# Patient Record
Sex: MALE | Race: BLACK OR AFRICAN AMERICAN | URBAN - METROPOLITAN AREA
[De-identification: names, ages, dates, MRNs, and addresses within clinical notes are randomized per-mention and may not be internally consistent; named-entity substitution may affect disease eponyms.]

---

## 2022-02-09 ENCOUNTER — INPATIENT (INPATIENT)
Facility: HOSPITAL | Age: 77
LOS: 12 days | Discharge: HOME | DRG: 884 | End: 2022-02-22
Attending: INTERNAL MEDICINE | Admitting: FAMILY MEDICINE
Payer: MEDICARE

## 2022-02-09 VITALS
SYSTOLIC BLOOD PRESSURE: 176 MMHG | RESPIRATION RATE: 16 BRPM | HEART RATE: 53 BPM | OXYGEN SATURATION: 99 % | WEIGHT: 184.97 LBS | DIASTOLIC BLOOD PRESSURE: 99 MMHG | HEIGHT: 73 IN | TEMPERATURE: 98 F

## 2022-02-09 DIAGNOSIS — Z95.1 PRESENCE OF AORTOCORONARY BYPASS GRAFT: Chronic | ICD-10-CM

## 2022-02-09 LAB
ADD ON TEST-SPECIMEN IN LAB: SIGNIFICANT CHANGE UP
ALBUMIN SERPL ELPH-MCNC: 3.9 G/DL — SIGNIFICANT CHANGE UP (ref 3.3–5)
ALP SERPL-CCNC: 84 U/L — SIGNIFICANT CHANGE UP (ref 40–120)
ALT FLD-CCNC: 17 U/L — SIGNIFICANT CHANGE UP (ref 12–78)
ANION GAP SERPL CALC-SCNC: 5 MMOL/L — SIGNIFICANT CHANGE UP (ref 5–17)
APPEARANCE UR: CLEAR — SIGNIFICANT CHANGE UP
APTT BLD: 34 SEC — SIGNIFICANT CHANGE UP (ref 27.5–35.5)
AST SERPL-CCNC: 24 U/L — SIGNIFICANT CHANGE UP (ref 15–37)
BASOPHILS # BLD AUTO: 0.03 K/UL — SIGNIFICANT CHANGE UP (ref 0–0.2)
BASOPHILS NFR BLD AUTO: 0.6 % — SIGNIFICANT CHANGE UP (ref 0–2)
BILIRUB SERPL-MCNC: 0.8 MG/DL — SIGNIFICANT CHANGE UP (ref 0.2–1.2)
BILIRUB UR-MCNC: NEGATIVE — SIGNIFICANT CHANGE UP
BUN SERPL-MCNC: 15 MG/DL — SIGNIFICANT CHANGE UP (ref 7–23)
CALCIUM SERPL-MCNC: 9.6 MG/DL — SIGNIFICANT CHANGE UP (ref 8.5–10.1)
CHLORIDE SERPL-SCNC: 106 MMOL/L — SIGNIFICANT CHANGE UP (ref 96–108)
CO2 SERPL-SCNC: 31 MMOL/L — SIGNIFICANT CHANGE UP (ref 22–31)
COLOR SPEC: YELLOW — SIGNIFICANT CHANGE UP
CREAT SERPL-MCNC: 1.08 MG/DL — SIGNIFICANT CHANGE UP (ref 0.5–1.3)
DIFF PNL FLD: NEGATIVE — SIGNIFICANT CHANGE UP
EOSINOPHIL # BLD AUTO: 0.18 K/UL — SIGNIFICANT CHANGE UP (ref 0–0.5)
EOSINOPHIL NFR BLD AUTO: 3.8 % — SIGNIFICANT CHANGE UP (ref 0–6)
ETHANOL SERPL-MCNC: <10 MG/DL — SIGNIFICANT CHANGE UP (ref 0–10)
FOLATE SERPL-MCNC: 10.4 NG/ML — SIGNIFICANT CHANGE UP
GLUCOSE SERPL-MCNC: 94 MG/DL — SIGNIFICANT CHANGE UP (ref 70–99)
GLUCOSE UR QL: NEGATIVE — SIGNIFICANT CHANGE UP
HCT VFR BLD CALC: 44.2 % — SIGNIFICANT CHANGE UP (ref 39–50)
HGB BLD-MCNC: 13.6 G/DL — SIGNIFICANT CHANGE UP (ref 13–17)
IMM GRANULOCYTES NFR BLD AUTO: 0.2 % — SIGNIFICANT CHANGE UP (ref 0–1.5)
INR BLD: 1.07 RATIO — SIGNIFICANT CHANGE UP (ref 0.88–1.16)
KETONES UR-MCNC: NEGATIVE — SIGNIFICANT CHANGE UP
LACTATE SERPL-SCNC: 0.8 MMOL/L — SIGNIFICANT CHANGE UP (ref 0.7–2)
LEUKOCYTE ESTERASE UR-ACNC: ABNORMAL
LYMPHOCYTES # BLD AUTO: 1.53 K/UL — SIGNIFICANT CHANGE UP (ref 1–3.3)
LYMPHOCYTES # BLD AUTO: 32.6 % — SIGNIFICANT CHANGE UP (ref 13–44)
MAGNESIUM SERPL-MCNC: 2.2 MG/DL — SIGNIFICANT CHANGE UP (ref 1.6–2.6)
MCHC RBC-ENTMCNC: 27.7 PG — SIGNIFICANT CHANGE UP (ref 27–34)
MCHC RBC-ENTMCNC: 30.8 GM/DL — LOW (ref 32–36)
MCV RBC AUTO: 90 FL — SIGNIFICANT CHANGE UP (ref 80–100)
MONOCYTES # BLD AUTO: 0.44 K/UL — SIGNIFICANT CHANGE UP (ref 0–0.9)
MONOCYTES NFR BLD AUTO: 9.4 % — SIGNIFICANT CHANGE UP (ref 2–14)
NEUTROPHILS # BLD AUTO: 2.51 K/UL — SIGNIFICANT CHANGE UP (ref 1.8–7.4)
NEUTROPHILS NFR BLD AUTO: 53.4 % — SIGNIFICANT CHANGE UP (ref 43–77)
NITRITE UR-MCNC: POSITIVE
PCP SPEC-MCNC: SIGNIFICANT CHANGE UP
PH UR: 7 — SIGNIFICANT CHANGE UP (ref 5–8)
PLATELET # BLD AUTO: 268 K/UL — SIGNIFICANT CHANGE UP (ref 150–400)
POTASSIUM SERPL-MCNC: 3.9 MMOL/L — SIGNIFICANT CHANGE UP (ref 3.5–5.3)
POTASSIUM SERPL-SCNC: 3.9 MMOL/L — SIGNIFICANT CHANGE UP (ref 3.5–5.3)
PROT SERPL-MCNC: 8.2 GM/DL — SIGNIFICANT CHANGE UP (ref 6–8.3)
PROT UR-MCNC: SIGNIFICANT CHANGE UP
PROTHROM AB SERPL-ACNC: 12.4 SEC — SIGNIFICANT CHANGE UP (ref 10.6–13.6)
RBC # BLD: 4.91 M/UL — SIGNIFICANT CHANGE UP (ref 4.2–5.8)
RBC # FLD: 16.1 % — HIGH (ref 10.3–14.5)
SARS-COV-2 RNA SPEC QL NAA+PROBE: SIGNIFICANT CHANGE UP
SODIUM SERPL-SCNC: 142 MMOL/L — SIGNIFICANT CHANGE UP (ref 135–145)
SP GR SPEC: 1.01 — SIGNIFICANT CHANGE UP (ref 1.01–1.02)
TROPONIN I, HIGH SENSITIVITY RESULT: 23.7 NG/L — SIGNIFICANT CHANGE UP
TSH SERPL-MCNC: 0.82 UU/ML — SIGNIFICANT CHANGE UP (ref 0.34–4.82)
UROBILINOGEN FLD QL: NEGATIVE — SIGNIFICANT CHANGE UP
VIT B12 SERPL-MCNC: 325 PG/ML — SIGNIFICANT CHANGE UP (ref 232–1245)
WBC # BLD: 4.7 K/UL — SIGNIFICANT CHANGE UP (ref 3.8–10.5)
WBC # FLD AUTO: 4.7 K/UL — SIGNIFICANT CHANGE UP (ref 3.8–10.5)

## 2022-02-09 PROCEDURE — 99285 EMERGENCY DEPT VISIT HI MDM: CPT

## 2022-02-09 PROCEDURE — 93010 ELECTROCARDIOGRAM REPORT: CPT

## 2022-02-09 PROCEDURE — 70450 CT HEAD/BRAIN W/O DYE: CPT | Mod: 26,MA

## 2022-02-09 PROCEDURE — 71045 X-RAY EXAM CHEST 1 VIEW: CPT | Mod: 26

## 2022-02-09 RX ORDER — SODIUM CHLORIDE 9 MG/ML
500 INJECTION INTRAMUSCULAR; INTRAVENOUS; SUBCUTANEOUS ONCE
Refills: 0 | Status: COMPLETED | OUTPATIENT
Start: 2022-02-09 | End: 2022-02-09

## 2022-02-09 RX ORDER — CEFTRIAXONE 500 MG/1
1000 INJECTION, POWDER, FOR SOLUTION INTRAMUSCULAR; INTRAVENOUS ONCE
Refills: 0 | Status: COMPLETED | OUTPATIENT
Start: 2022-02-09 | End: 2022-02-09

## 2022-02-09 RX ORDER — THIAMINE MONONITRATE (VIT B1) 100 MG
100 TABLET ORAL DAILY
Refills: 0 | Status: COMPLETED | OUTPATIENT
Start: 2022-02-09 | End: 2022-02-12

## 2022-02-09 RX ORDER — METOPROLOL TARTRATE 50 MG
12.5 TABLET ORAL
Refills: 0 | Status: DISCONTINUED | OUTPATIENT
Start: 2022-02-09 | End: 2022-02-22

## 2022-02-09 RX ORDER — FOLIC ACID 0.8 MG
1 TABLET ORAL DAILY
Refills: 0 | Status: DISCONTINUED | OUTPATIENT
Start: 2022-02-09 | End: 2022-02-22

## 2022-02-09 RX ORDER — ASPIRIN/CALCIUM CARB/MAGNESIUM 324 MG
81 TABLET ORAL DAILY
Refills: 0 | Status: DISCONTINUED | OUTPATIENT
Start: 2022-02-09 | End: 2022-02-22

## 2022-02-09 RX ORDER — CEFTRIAXONE 500 MG/1
1000 INJECTION, POWDER, FOR SOLUTION INTRAMUSCULAR; INTRAVENOUS EVERY 24 HOURS
Refills: 0 | Status: DISCONTINUED | OUTPATIENT
Start: 2022-02-10 | End: 2022-02-11

## 2022-02-09 RX ORDER — CEFTRIAXONE 500 MG/1
1000 INJECTION, POWDER, FOR SOLUTION INTRAMUSCULAR; INTRAVENOUS ONCE
Refills: 0 | Status: DISCONTINUED | OUTPATIENT
Start: 2022-02-09 | End: 2022-02-09

## 2022-02-09 RX ADMIN — SODIUM CHLORIDE 500 MILLILITER(S): 9 INJECTION INTRAMUSCULAR; INTRAVENOUS; SUBCUTANEOUS at 17:06

## 2022-02-09 RX ADMIN — Medication 100 MILLIGRAM(S): at 23:14

## 2022-02-09 RX ADMIN — Medication 1 MILLIGRAM(S): at 23:14

## 2022-02-09 RX ADMIN — Medication 1 TABLET(S): at 23:14

## 2022-02-09 RX ADMIN — CEFTRIAXONE 100 MILLIGRAM(S): 500 INJECTION, POWDER, FOR SOLUTION INTRAMUSCULAR; INTRAVENOUS at 17:57

## 2022-02-09 RX ADMIN — Medication 12.5 MILLIGRAM(S): at 23:15
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
## 2022-02-09 NOTE — ED PROVIDER NOTE - CARE PLAN
1 Principal Discharge DX:	Hallucinations  Secondary Diagnosis:	Acute UTI  Secondary Diagnosis:	Alcohol abuse
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
## 2022-02-09 NOTE — H&P ADULT - NSHPPHYSICALEXAM_GEN_ALL_CORE
ICU Vital Signs Last 24 Hrs  T(C): 36.5 (09 Feb 2022 14:14), Max: 36.5 (09 Feb 2022 14:14)  T(F): 97.7 (09 Feb 2022 14:14), Max: 97.7 (09 Feb 2022 14:14)  HR: 53 (09 Feb 2022 14:14) (53 - 53)  BP: 176/99 (09 Feb 2022 14:14) (176/99 - 176/99)  BP(mean): --  ABP: --  ABP(mean): --  RR: 16 (09 Feb 2022 14:14) (16 - 16)  SpO2: 99% (09 Feb 2022 14:14) (99% - 99%)      PHYSICAL EXAM:    Constitutional: NAD, awake and alert  HEENT: PERR, EOMI, Normal Hearing, MMM  Neck: Soft and supple, No LAD, No JVD  Respiratory: Breath sounds are clear bilaterally, No wheezing, rales or rhonchi  Cardiovascular: S1 and S2, regular rate and rhythm, no Murmurs, gallops or rubs  Gastrointestinal: Bowel Sounds present, soft, nontender, nondistended, no guarding, no rebound  Extremities: No peripheral edema  Vascular: 2+ peripheral pulses  Neurological: A/O x 3, no focal deficits  Musculoskeletal: 5/5 strength b/l upper and lower extremities  Skin: No rashes
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
## 2022-02-09 NOTE — H&P ADULT - NSHPLABSRESULTS_GEN_ALL_CORE
LABS: All Labs Reviewed:                        13.6   4.70  )-----------( 268      ( 09 Feb 2022 14:39 )             44.2     02-09    142  |  106  |  15  ----------------------------<  94  3.9   |  31  |  1.08    Ca    9.6      09 Feb 2022 14:39  Mg     2.2     02-09    TPro  8.2  /  Alb  3.9  /  TBili  0.8  /  DBili  x   /  AST  24  /  ALT  17  /  AlkPhos  84  02-09    PT/INR - ( 09 Feb 2022 14:39 )   PT: 12.4 sec;   INR: 1.07 ratio         PTT - ( 09 Feb 2022 14:39 )  PTT:34.0 sec          Blood Culture:             EKG pending  All imaging reviewed

## 2022-02-09 NOTE — H&P ADULT - ASSESSMENT
#Auditory/visual hallucinations with paranoia  -r/o metabolic etiology ie uti  -will dispo under obs and obtain psych eval  -start ceftriaxone and obtain blood and urine cultures  -CT head negative for acute pathology  -obtain TSH  -Utox negative  -CIWA - symptom triggered      #H/o CABG  -ASA  -obtain EKG   -if abnl ekg findings, order echo  -bp control-> start lopressor 12.5 bid  -check a1c/lipid      DVT px: ambulation/SCDs while in bed
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
## 2022-02-10 DIAGNOSIS — F05 DELIRIUM DUE TO KNOWN PHYSIOLOGICAL CONDITION: ICD-10-CM

## 2022-02-10 DIAGNOSIS — R44.3 HALLUCINATIONS, UNSPECIFIED: ICD-10-CM

## 2022-02-10 LAB
A1C WITH ESTIMATED AVERAGE GLUCOSE RESULT: 5.3 % — SIGNIFICANT CHANGE UP (ref 4–5.6)
ALBUMIN SERPL ELPH-MCNC: 2.8 G/DL — LOW (ref 3.3–5)
ALBUMIN SERPL ELPH-MCNC: 3 G/DL — LOW (ref 3.3–5)
ALP SERPL-CCNC: 63 U/L — SIGNIFICANT CHANGE UP (ref 40–120)
ALP SERPL-CCNC: 75 U/L — SIGNIFICANT CHANGE UP (ref 40–120)
ALT FLD-CCNC: 14 U/L — SIGNIFICANT CHANGE UP (ref 12–78)
ALT FLD-CCNC: 14 U/L — SIGNIFICANT CHANGE UP (ref 12–78)
ANION GAP SERPL CALC-SCNC: 2 MMOL/L — LOW (ref 5–17)
ANION GAP SERPL CALC-SCNC: 5 MMOL/L — SIGNIFICANT CHANGE UP (ref 5–17)
AST SERPL-CCNC: 17 U/L — SIGNIFICANT CHANGE UP (ref 15–37)
AST SERPL-CCNC: 17 U/L — SIGNIFICANT CHANGE UP (ref 15–37)
BASE EXCESS BLDV CALC-SCNC: 7.8 MMOL/L — SIGNIFICANT CHANGE UP
BILIRUB DIRECT SERPL-MCNC: 0.2 MG/DL — SIGNIFICANT CHANGE UP (ref 0–0.3)
BILIRUB DIRECT SERPL-MCNC: <0.1 MG/DL — SIGNIFICANT CHANGE UP (ref 0–0.3)
BILIRUB INDIRECT FLD-MCNC: 0.5 MG/DL — SIGNIFICANT CHANGE UP (ref 0.2–1)
BILIRUB INDIRECT FLD-MCNC: >0.3 MG/DL — SIGNIFICANT CHANGE UP (ref 0.2–1)
BILIRUB SERPL-MCNC: 0.4 MG/DL — SIGNIFICANT CHANGE UP (ref 0.2–1.2)
BILIRUB SERPL-MCNC: 0.7 MG/DL — SIGNIFICANT CHANGE UP (ref 0.2–1.2)
BUN SERPL-MCNC: 16 MG/DL — SIGNIFICANT CHANGE UP (ref 7–23)
BUN SERPL-MCNC: 17 MG/DL — SIGNIFICANT CHANGE UP (ref 7–23)
CALCIUM SERPL-MCNC: 9 MG/DL — SIGNIFICANT CHANGE UP (ref 8.5–10.1)
CALCIUM SERPL-MCNC: 9.1 MG/DL — SIGNIFICANT CHANGE UP (ref 8.5–10.1)
CHLORIDE SERPL-SCNC: 106 MMOL/L — SIGNIFICANT CHANGE UP (ref 96–108)
CHLORIDE SERPL-SCNC: 108 MMOL/L — SIGNIFICANT CHANGE UP (ref 96–108)
CHOLEST SERPL-MCNC: 196 MG/DL — SIGNIFICANT CHANGE UP
CO2 BLDV-SCNC: 36 MMOL/L — HIGH (ref 22–26)
CO2 SERPL-SCNC: 30 MMOL/L — SIGNIFICANT CHANGE UP (ref 22–31)
CO2 SERPL-SCNC: 32 MMOL/L — HIGH (ref 22–31)
CREAT SERPL-MCNC: 1.02 MG/DL — SIGNIFICANT CHANGE UP (ref 0.5–1.3)
CREAT SERPL-MCNC: 1.2 MG/DL — SIGNIFICANT CHANGE UP (ref 0.5–1.3)
ESTIMATED AVERAGE GLUCOSE: 105 MG/DL — SIGNIFICANT CHANGE UP (ref 68–114)
GGT SERPL-CCNC: 22 U/L — SIGNIFICANT CHANGE UP (ref 9–50)
GLUCOSE SERPL-MCNC: 88 MG/DL — SIGNIFICANT CHANGE UP (ref 70–99)
GLUCOSE SERPL-MCNC: 90 MG/DL — SIGNIFICANT CHANGE UP (ref 70–99)
HCO3 BLDV-SCNC: 34 MMOL/L — HIGH (ref 22–29)
HCT VFR BLD CALC: 38.4 % — LOW (ref 39–50)
HCV AB S/CO SERPL IA: 0.19 S/CO — SIGNIFICANT CHANGE UP (ref 0–0.99)
HCV AB SERPL-IMP: SIGNIFICANT CHANGE UP
HDLC SERPL-MCNC: 72 MG/DL — SIGNIFICANT CHANGE UP
HGB BLD-MCNC: 12.6 G/DL — LOW (ref 13–17)
LACTATE SERPL-SCNC: 1.8 MMOL/L — SIGNIFICANT CHANGE UP (ref 0.7–2)
LIPID PNL WITH DIRECT LDL SERPL: 116 MG/DL — HIGH
MAGNESIUM SERPL-MCNC: 2 MG/DL — SIGNIFICANT CHANGE UP (ref 1.6–2.6)
MAGNESIUM SERPL-MCNC: 2.2 MG/DL — SIGNIFICANT CHANGE UP (ref 1.6–2.6)
MCHC RBC-ENTMCNC: 29.6 PG — SIGNIFICANT CHANGE UP (ref 27–34)
MCHC RBC-ENTMCNC: 32.8 GM/DL — SIGNIFICANT CHANGE UP (ref 32–36)
MCV RBC AUTO: 90.4 FL — SIGNIFICANT CHANGE UP (ref 80–100)
NON HDL CHOLESTEROL: 124 MG/DL — SIGNIFICANT CHANGE UP
PCO2 BLDV: 54 MMHG — SIGNIFICANT CHANGE UP (ref 42–55)
PH BLDV: 7.41 — SIGNIFICANT CHANGE UP (ref 7.32–7.43)
PHOSPHATE SERPL-MCNC: 3.2 MG/DL — SIGNIFICANT CHANGE UP (ref 2.5–4.5)
PHOSPHATE SERPL-MCNC: 3.6 MG/DL — SIGNIFICANT CHANGE UP (ref 2.5–4.5)
PLATELET # BLD AUTO: 269 K/UL — SIGNIFICANT CHANGE UP (ref 150–400)
PO2 BLDV: 63 MMHG — SIGNIFICANT CHANGE UP
POTASSIUM SERPL-MCNC: 3.8 MMOL/L — SIGNIFICANT CHANGE UP (ref 3.5–5.3)
POTASSIUM SERPL-MCNC: 4 MMOL/L — SIGNIFICANT CHANGE UP (ref 3.5–5.3)
POTASSIUM SERPL-SCNC: 3.8 MMOL/L — SIGNIFICANT CHANGE UP (ref 3.5–5.3)
POTASSIUM SERPL-SCNC: 4 MMOL/L — SIGNIFICANT CHANGE UP (ref 3.5–5.3)
PROT SERPL-MCNC: 6.3 GM/DL — SIGNIFICANT CHANGE UP (ref 6–8.3)
PROT SERPL-MCNC: 7 GM/DL — SIGNIFICANT CHANGE UP (ref 6–8.3)
RBC # BLD: 4.25 M/UL — SIGNIFICANT CHANGE UP (ref 4.2–5.8)
RBC # FLD: 15.7 % — HIGH (ref 10.3–14.5)
SAO2 % BLDV: 91.9 % — SIGNIFICANT CHANGE UP
SODIUM SERPL-SCNC: 141 MMOL/L — SIGNIFICANT CHANGE UP (ref 135–145)
SODIUM SERPL-SCNC: 142 MMOL/L — SIGNIFICANT CHANGE UP (ref 135–145)
TRIGL SERPL-MCNC: 43 MG/DL — SIGNIFICANT CHANGE UP
WBC # BLD: 5.62 K/UL — SIGNIFICANT CHANGE UP (ref 3.8–10.5)
WBC # FLD AUTO: 5.62 K/UL — SIGNIFICANT CHANGE UP (ref 3.8–10.5)

## 2022-02-10 PROCEDURE — 99223 1ST HOSP IP/OBS HIGH 75: CPT

## 2022-02-10 PROCEDURE — 36415 COLL VENOUS BLD VENIPUNCTURE: CPT

## 2022-02-10 PROCEDURE — 70450 CT HEAD/BRAIN W/O DYE: CPT

## 2022-02-10 PROCEDURE — 84443 ASSAY THYROID STIM HORMONE: CPT

## 2022-02-10 PROCEDURE — 83930 ASSAY OF BLOOD OSMOLALITY: CPT

## 2022-02-10 PROCEDURE — U0005: CPT

## 2022-02-10 PROCEDURE — 82140 ASSAY OF AMMONIA: CPT

## 2022-02-10 PROCEDURE — 99233 SBSQ HOSP IP/OBS HIGH 50: CPT

## 2022-02-10 PROCEDURE — 97530 THERAPEUTIC ACTIVITIES: CPT | Mod: GP

## 2022-02-10 PROCEDURE — 82803 BLOOD GASES ANY COMBINATION: CPT

## 2022-02-10 PROCEDURE — 97162 PT EVAL MOD COMPLEX 30 MIN: CPT | Mod: GP

## 2022-02-10 PROCEDURE — 97116 GAIT TRAINING THERAPY: CPT | Mod: GP

## 2022-02-10 PROCEDURE — 83605 ASSAY OF LACTIC ACID: CPT

## 2022-02-10 PROCEDURE — 90792 PSYCH DIAG EVAL W/MED SRVCS: CPT

## 2022-02-10 PROCEDURE — 85027 COMPLETE CBC AUTOMATED: CPT

## 2022-02-10 PROCEDURE — 83735 ASSAY OF MAGNESIUM: CPT

## 2022-02-10 PROCEDURE — U0003: CPT

## 2022-02-10 PROCEDURE — 80053 COMPREHEN METABOLIC PANEL: CPT

## 2022-02-10 PROCEDURE — 70450 CT HEAD/BRAIN W/O DYE: CPT | Mod: 26

## 2022-02-10 PROCEDURE — 97110 THERAPEUTIC EXERCISES: CPT | Mod: GP

## 2022-02-10 PROCEDURE — 80076 HEPATIC FUNCTION PANEL: CPT

## 2022-02-10 PROCEDURE — 82607 VITAMIN B-12: CPT

## 2022-02-10 PROCEDURE — 95816 EEG AWAKE AND DROWSY: CPT

## 2022-02-10 PROCEDURE — 82962 GLUCOSE BLOOD TEST: CPT

## 2022-02-10 PROCEDURE — 86780 TREPONEMA PALLIDUM: CPT

## 2022-02-10 PROCEDURE — 80048 BASIC METABOLIC PNL TOTAL CA: CPT

## 2022-02-10 PROCEDURE — 93005 ELECTROCARDIOGRAM TRACING: CPT

## 2022-02-10 PROCEDURE — 84100 ASSAY OF PHOSPHORUS: CPT

## 2022-02-10 RX ORDER — ATORVASTATIN CALCIUM 80 MG/1
20 TABLET, FILM COATED ORAL AT BEDTIME
Refills: 0 | Status: DISCONTINUED | OUTPATIENT
Start: 2022-02-10 | End: 2022-02-22

## 2022-02-10 RX ORDER — RISPERIDONE 4 MG/1
0.25 TABLET ORAL AT BEDTIME
Refills: 0 | Status: DISCONTINUED | OUTPATIENT
Start: 2022-02-10 | End: 2022-02-13

## 2022-02-10 RX ORDER — TAMSULOSIN HYDROCHLORIDE 0.4 MG/1
0.4 CAPSULE ORAL AT BEDTIME
Refills: 0 | Status: DISCONTINUED | OUTPATIENT
Start: 2022-02-10 | End: 2022-02-22

## 2022-02-10 RX ORDER — LANOLIN ALCOHOL/MO/W.PET/CERES
3 CREAM (GRAM) TOPICAL AT BEDTIME
Refills: 0 | Status: DISCONTINUED | OUTPATIENT
Start: 2022-02-10 | End: 2022-02-22

## 2022-02-10 RX ADMIN — Medication 81 MILLIGRAM(S): at 09:26

## 2022-02-10 RX ADMIN — RISPERIDONE 0.25 MILLIGRAM(S): 4 TABLET ORAL at 22:46

## 2022-02-10 RX ADMIN — CEFTRIAXONE 100 MILLIGRAM(S): 500 INJECTION, POWDER, FOR SOLUTION INTRAMUSCULAR; INTRAVENOUS at 18:42

## 2022-02-10 RX ADMIN — TAMSULOSIN HYDROCHLORIDE 0.4 MILLIGRAM(S): 0.4 CAPSULE ORAL at 22:45

## 2022-02-10 RX ADMIN — Medication 100 MILLIGRAM(S): at 09:27

## 2022-02-10 RX ADMIN — Medication 1 TABLET(S): at 09:27

## 2022-02-10 RX ADMIN — Medication 0.5 MILLIGRAM(S): at 02:42

## 2022-02-10 RX ADMIN — Medication 1 MILLIGRAM(S): at 09:27

## 2022-02-10 RX ADMIN — Medication 2 MILLIGRAM(S): at 22:47

## 2022-02-10 RX ADMIN — ATORVASTATIN CALCIUM 20 MILLIGRAM(S): 80 TABLET, FILM COATED ORAL at 22:45

## 2022-02-10 RX ADMIN — Medication 12.5 MILLIGRAM(S): at 22:45

## 2022-02-10 RX ADMIN — Medication 12.5 MILLIGRAM(S): at 12:49

## 2022-02-10 NOTE — BEHAVIORAL HEALTH ASSESSMENT NOTE - RISK ASSESSMENT
Low acute risk: no SI, no HI, not agitated, denies anxiety, no depression.   Low long term risk: no psychosis , no hx fo SI or SA but EtOH abuse.   Protective factors: no  psych hx. Low Acute Suicide Risk

## 2022-02-10 NOTE — CONSULT NOTE ADULT - ASSESSMENT
75 yo Male PMH CABG many years ago, no medical followup presented for paranoia. Labs essentially negative except for mild UTI. Code stroke called earlier today as pt was found to be unresponsive by RN. On my exam pt was nonverbal, no spont eye opening, no w/d to deep noxious x 4. Pt held eyes closed when trying to examine pupils and resisted having his head manipulated to check for dolls eyes but then relaxed. No nuchal rigidity. CT head neg for acute path.     - Unlikely presentation for stroke  - Likely catatonia 2nd to psych or possibly withdrawal  - Less likely toxic / metabolic related  - Can try small dose Ativan for catatonia  - Medical evaluation and w/u    Discussed with Dr Mitchell
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
## 2022-02-10 NOTE — PATIENT PROFILE ADULT - FALL HARM RISK - HARM RISK INTERVENTIONS
Assistance with ambulation/Assistance OOB with selected safe patient handling equipment/Communicate Risk of Fall with Harm to all staff/Discuss with provider need for PT consult/Monitor for mental status changes/Monitor gait and stability/Move patient closer to nurses' station/Provide patient with walking aids - walker, cane, crutches/Reinforce activity limits and safety measures with patient and family/Reorient to person, place and time as needed/Tailored Fall Risk Interventions/Toileting schedule using arm’s reach rule for commode and bathroom/Use of alarms - bed, chair and/or voice tab/Visual Cue: Yellow wristband and red socks/Bed in lowest position, wheels locked, appropriate side rails in place/Call bell, personal items and telephone in reach/Instruct patient to call for assistance before getting out of bed or chair/Non-slip footwear when patient is out of bed/[place] to call system/Physically safe environment - no spills, clutter or unnecessary equipment/Purposeful Proactive Rounding/Room/bathroom lighting operational, light cord in reach

## 2022-02-10 NOTE — BEHAVIORAL HEALTH ASSESSMENT NOTE - NSBHCHARTREVIEWVS_PSY_A_CORE FT
Vital Signs Last 24 Hrs  T(C): 37.1 (10 Feb 2022 08:17), Max: 37.3 (10 Feb 2022 07:00)  T(F): 98.7 (10 Feb 2022 08:17), Max: 99.1 (10 Feb 2022 07:00)  HR: 75 (10 Feb 2022 08:17) (53 - 89)  BP: 137/65 (10 Feb 2022 08:17) (112/90 - 176/99)  BP(mean): 88 (09 Feb 2022 18:30) (88 - 88)  RR: 18 (10 Feb 2022 08:17) (16 - 18)  SpO2: 100% (10 Feb 2022 08:17) (97% - 100%)

## 2022-02-10 NOTE — CHART NOTE - NSCHARTNOTEFT_GEN_A_CORE
Pt was seen by his bedside, awake and oriented, answering the questions well. no complains at this point of time.  his examination and vitals are stable.   Pt received ativan earlier due to aggression, presently he is calm and co-operative   a/p- CIWA protocol  Close observation  Case was discussed with Dr. Newberry

## 2022-02-10 NOTE — PROGRESS NOTE ADULT - SUBJECTIVE AND OBJECTIVE BOX
SARAHI CRENSHAW  76y  [id number]    CHIEF COMPLAINT: paranoia    INTERVAL HISTORY: Patient is AOx3, sitting in chair in room, denies any complaints at this time.    ROS:   Review of Systems: REVIEW OF SYSTEMS:    CONSTITUTIONAL: No weakness, fevers or chills  EYES/ENT: No visual changes;  No vertigo or throat pain   NECK: No pain or stiffness  RESPIRATORY: No cough, wheezing, hemoptysis; No shortness of breath  CARDIOVASCULAR: No chest pain or palpitations  GASTROINTESTINAL: No abdominal, nausea, vomiting, or diarrhea. LBM yesterday, reports was normal for him.   GENITOURINARY: No dysuria, frequency or hematuria  NEUROLOGICAL: No numbness or weakness  SKIN: No itching, burning, rashes, or lesions   All other review of systems is negative unless indicated above      VITALS: T(C): 37.1 (02-10-22 @ 08:17), Max: 37.3 (02-10-22 @ 07:00)  HR: 75 (02-10-22 @ 08:17) (53 - 89)  BP: 137/65 (02-10-22 @ 08:17) (112/90 - 176/99)  RR: 18 (02-10-22 @ 08:17) (16 - 18)  SpO2: 100% (02-10-22 @ 08:17) (97% - 100%)  PHYSICAL EXAM: VITALS:  T(F): 98.7 (02-10-22 @ 08:17), Max: 99.1 (02-10-22 @ 07:00)  HR: 75 (02-10-22 @ 08:17) (53 - 89)  BP: 137/65 (02-10-22 @ 08:17) (112/90 - 176/99)  RR: 18 (02-10-22 @ 08:17) (16 - 18)  SpO2: 100% (02-10-22 @ 08:17) (97% - 100%)  Wt(kg): --    I&O's Summary      CAPILLARY BLOOD GLUCOSE          PHYSICAL EXAM:  GENERAL: well appearing male, appears stated age  HEENT:  pupils equal and reactive, EOMI, no oropharyngeal lesions, erythema, exudates, oral thrush  NECK:   supple, no carotid bruits, no palpable lymph nodes, no thyromegaly  CV:  +S1, +S2, regular, no murmurs or rubs  RESP:   lungs clear to auscultation bilaterally, no wheezing, rales, rhonchi, good air entry bilaterally  BREAST:  not examined  GI:  abdomen soft, non-tender, non-distended, normal BS, no bruits, no abdominal masses, no palpable masses  RECTAL:  not examined  :  not examined  MSK:   normal muscle tone, no atrophy, no rigidity, no contractions  EXT:  no clubbing, no cyanosis, no edema, no calf pain, swelling or erythema  VASCULAR:  pulses equal and symmetric in the upper and lower extremities  NEURO:  AAOX3, no focal neurological deficits, follows all commands, able to move extremities spontaneously  SKIN:  no ulcers, lesions or rashes    LABS:                            13.6   4.70  )-----------( 268      ( 2022 14:39 )             44.2     02-10    142  |  108  |  16  ----------------------------<  90  3.8   |  32<H>  |  1.02    Ca    9.0      10 Feb 2022 08:50  Phos  3.6     02-10  Mg     2.0     02-10    TPro  6.3  /  Alb  2.8<L>  /  TBili  0.7  /  DBili  0.2  /  AST  17  /  ALT  14  /  AlkPhos  63  02-10        LIVER FUNCTIONS - ( 10 Feb 2022 08:50 )  Alb: 2.8 g/dL / Pro: 6.3 gm/dL / ALK PHOS: 63 U/L / ALT: 14 U/L / AST: 17 U/L / GGT: x           PT/INR - ( 2022 14:39 )   PT: 12.4 sec;   INR: 1.07 ratio         PTT - ( 2022 14:39 )  PTT:34.0 sec  Urinalysis Basic - ( 2022 16:17 )    Color: Yellow / Appearance: Clear / S.010 / pH: x  Gluc: x / Ketone: Negative  / Bili: Negative / Urobili: Negative   Blood: x / Protein: See Note / Nitrite: Positive   Leuk Esterase: Trace / RBC: 3-5 /HPF / WBC 6-10   Sq Epi: x / Non Sq Epi: Occasional / Bacteria: TNTC        Lactate, Blood: 0.8 mmol/L ( @ 17:02)    Blood, Urine: Negative ( @ 16:17)      MICRO:  Blood, Urine: Negative ( @ 16:17)      IMAGING:    CARDIAC TESTING:    PROCEDURES:    MEDS: MEDICATIONS  (STANDING):  aspirin  chewable 81 milliGRAM(s) Oral daily  cefTRIAXone   IVPB 1000 milliGRAM(s) IV Intermittent every 24 hours  folic acid 1 milliGRAM(s) Oral daily  metoprolol tartrate 12.5 milliGRAM(s) Oral two times a day  multivitamin 1 Tablet(s) Oral daily  thiamine 100 milliGRAM(s) Oral daily    MEDICATIONS  (PRN):  LORazepam   Injectable 2 milliGRAM(s) IntraMuscular every 2 hours PRN Symptom-triggered: 2 point increase in CIWA -Ar score and a total score of 7 or LESS  LORazepam   Injectable 2 milliGRAM(s) IntraMuscular every 1 hour PRN Symptom-triggered: each CIWA -Ar score 8 or GREATER          Code status: Full code  DVT px: ambulatory / SCD  Dispo: SARAHI CRENSHAW  76y  [id number]    CHIEF COMPLAINT: paranoia    INTERVAL HISTORY: Patient is AOx3, sitting in chair in room, denies any complaints at this time.    ROS:     CONSTITUTIONAL: No weakness, fevers or chills  EYES/ENT: No visual changes;  No vertigo or throat pain   NECK: No pain or stiffness  RESPIRATORY: No cough, wheezing, hemoptysis; No shortness of breath  CARDIOVASCULAR: No chest pain or palpitations  GASTROINTESTINAL: No abdominal, nausea, vomiting, or diarrhea. LBM yesterday, reports was normal for him.   GENITOURINARY: No dysuria, frequency or hematuria  NEUROLOGICAL: No numbness or weakness  SKIN: No itching, burning, rashes, or lesions   All other review of systems is negative unless indicated above      VITALS: T(C): 37.1 (02-10-22 @ 08:17), Max: 37.3 (02-10-22 @ 07:00)  HR: 75 (02-10-22 @ 08:17) (53 - 89)  BP: 137/65 (02-10-22 @ 08:17) (112/90 - 176/99)  RR: 18 (02-10-22 @ 08:17) (16 - 18)  SpO2: 100% (02-10-22 @ 08:17) (97% - 100%)          PHYSICAL EXAM:  GENERAL: well appearing male, appears stated age  HEENT:  pupils equal and reactive, EOMI, no oropharyngeal lesions, erythema, exudates, oral thrush  NECK:   supple, no carotid bruits, no palpable lymph nodes, no thyromegaly  CV:  +S1, +S2, regular, no murmurs or rubs  RESP:   lungs clear to auscultation bilaterally, no wheezing, rales, rhonchi, good air entry bilaterally  BREAST:  not examined  GI:  abdomen soft, non-tender, non-distended, normal BS, no bruits, no abdominal masses, no palpable masses  RECTAL:  not examined  :  not examined  MSK:   normal muscle tone, no atrophy, no rigidity, no contractions  EXT:  no clubbing, no cyanosis, no edema, no calf pain, swelling or erythema  VASCULAR:  pulses equal and symmetric in the upper and lower extremities  NEURO:  AAOX3, no focal neurological deficits, follows all commands, able to move extremities spontaneously  SKIN:  no ulcers, lesions or rashes    LABS:                            13.6   4.70  )-----------( 268      ( 2022 14:39 )             44.2     02-10    142  |  108  |  16  ----------------------------<  90  3.8   |  32<H>  |  1.02    Ca    9.0      10 Feb 2022 08:50  Phos  3.6     02-10  Mg     2.0     02-10    TPro  6.3  /  Alb  2.8<L>  /  TBili  0.7  /  DBili  0.2  /  AST  17  /  ALT  14  /  AlkPhos  63  02-10        LIVER FUNCTIONS - ( 10 Feb 2022 08:50 )  Alb: 2.8 g/dL / Pro: 6.3 gm/dL / ALK PHOS: 63 U/L / ALT: 14 U/L / AST: 17 U/L / GGT: x           PT/INR - ( 2022 14:39 )   PT: 12.4 sec;   INR: 1.07 ratio         PTT - ( 2022 14:39 )  PTT:34.0 sec  Urinalysis Basic - ( 2022 16:17 )    Color: Yellow / Appearance: Clear / S.010 / pH: x  Gluc: x / Ketone: Negative  / Bili: Negative / Urobili: Negative   Blood: x / Protein: See Note / Nitrite: Positive   Leuk Esterase: Trace / RBC: 3-5 /HPF / WBC 6-10   Sq Epi: x / Non Sq Epi: Occasional / Bacteria: TNTC        Lactate, Blood: 0.8 mmol/L ( @ 17:02)    Blood, Urine: Negative ( @ 16:17)      MICRO:  Blood, Urine: Negative ( @ 16:17)      MEDS: MEDICATIONS  (STANDING):  aspirin  chewable 81 milliGRAM(s) Oral daily  cefTRIAXone   IVPB 1000 milliGRAM(s) IV Intermittent every 24 hours  folic acid 1 milliGRAM(s) Oral daily  metoprolol tartrate 12.5 milliGRAM(s) Oral two times a day  multivitamin 1 Tablet(s) Oral daily  thiamine 100 milliGRAM(s) Oral daily    MEDICATIONS  (PRN):  LORazepam   Injectable 2 milliGRAM(s) IntraMuscular every 2 hours PRN Symptom-triggered: 2 point increase in CIWA -Ar score and a total score of 7 or LESS  LORazepam   Injectable 2 milliGRAM(s) IntraMuscular every 1 hour PRN Symptom-triggered: each CIWA -Ar score 8 or GREATER

## 2022-02-10 NOTE — BEHAVIORAL HEALTH ASSESSMENT NOTE - HPI (INCLUDE ILLNESS QUALITY, SEVERITY, DURATION, TIMING, CONTEXT, MODIFYING FACTORS, ASSOCIATED SIGNS AND SYMPTOMS)
Pt is a 76 YOWAA  man, who resides with wife, and who denies any psych hx in the past, admitted with A MS stared on CIWA.   Pt denies depressed mood, denies insomnia, appetite problems SI, HI, denies AH, and VH. He spontaneous  talks about some people coming to his home and setting some instruments and wires so that eh can see and hear what is happening in his neighbor's houses.    Pt admits to drinking, but minimizes it and states  he drinks only on the weekend,   He is  and resides with wife and grandson.  

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
## 2022-02-10 NOTE — CONSULT NOTE ADULT - ASSESSMENT
Altered Mental Status, unresponsiveness  - not a candidate for ICU admission at this time  - pt appears to be protecting airway, no indication at this time for intubation and MV  - obtain VBG to assess for CO2 narcosis  - pt appears catatonic, CTH negative for acute pathology  - stat labs, CBC lactate, BMP Mg Phos, serum osmolality  - obtain neurology consult, consider EEG, recent CIWA scores would not suggest an EtOH withdrawal seizure, no known h/o seizures in jessica  - would switch to higher dose IV thiamine   - recommend NPO  - avoid sedating meds for now  - H/O   - ECG       Altered Mental Status, unresponsiveness i/s/o paranoid delusions, UTI, possible toxic metabolic encephalopathy, ?EtOH w/d, ?toxic ingestion, ?HTNive encephalopathy  - not a candidate for ICU admission at this time  - pt appears to be protecting airway, no indication at this time for intubation and MV  - obtain VBG to assess for CO2 narcosis  - BP control, would aim for normotensive  - pt appears catatonic, CTH negative for acute pathology  - stat labs, CBC lactate, BMP Mg Phos, serum osmolality, osmolar gap  - obtain neurology consult, consider EEG, recent CIWA scores would not suggest an EtOH withdrawal seizure, no known h/o seizures in chart, afebrile would hold off on consideration for LP  - ceftriaxone rarely associated with seizures, would continue for UTI, F/U UCx  - would switch to higher dose IV thiamine   - recommend NPO  - avoid sedating meds for now  - ECG       Altered Mental Status, unresponsiveness i/s/o paranoid delusions, UTI, possible toxic metabolic encephalopathy, ?EtOH w/d, ?toxic ingestion, ?HTNive encephalopathy  - not a candidate for ICU admission at this time  - pt appears to be protecting airway, no indication at this time for intubation and MV  - obtain VBG to assess for CO2 narcosis  - BP control, would aim for normotensive  - pt appears catatonic, CTH negative for acute pathology  - stat labs, CBC lactate, BMP Mg Phos, serum osmolality, osmolar gap  - obtain neurology consult, consider EEG, recent CIWA scores would not suggest an EtOH withdrawal seizure, no known h/o seizures in chart, afebrile would hold off on consideration for LP  - ceftriaxone rarely associated with seizures, would continue for UTI, F/U UCx  - would switch to higher dose IV thiamine   - recommend NPO  - ECG

## 2022-02-10 NOTE — BEHAVIORAL HEALTH ASSESSMENT NOTE - NSBHCHARTREVIEWLAB_PSY_A_CORE FT
13.6   4.70  )-----------( 268      ( 09 Feb 2022 14:39 )             44.2   02-10    142  |  108  |  16  ----------------------------<  90  3.8   |  32<H>  |  1.02    Ca    9.0      10 Feb 2022 08:50  Phos  3.6     02-10  Mg     2.0     02-10    TPro  6.3  /  Alb  2.8<L>  /  TBili  0.7  /  DBili  0.2  /  AST  17  /  ALT  14  /  AlkPhos  63  02-10
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
## 2022-02-10 NOTE — BEHAVIORAL HEALTH ASSESSMENT NOTE - ORIENTATION OTHER
Partially oriented in time and place: February 9th, Wednesday, 2022" [facility] in [place]"

## 2022-02-10 NOTE — PROGRESS NOTE ADULT - ASSESSMENT
Patient is 75yo M PMH of CABG 16 years ago, reports he has not seen a doctor "in a very long time", admitted to hospital for paranoia 2/2 metabolic encephalopathy 2/2 UTI vs EtOH withdrawal. Patient receiving IV ceftriaxone with improvement in mental status. Patient currently AOx3. Plan for continued IV ceftriaxone and CIWAs.     #Auditory/visual hallucinations with paranoia  -r/o metabolic etiology ie uti  -will dispo under obs and obtain psych eval  -start ceftriaxone and obtain blood and urine cultures  -CT head negative for acute pathology  -obtain TSH  -Utox negative  -CIWA - symptom triggered      #H/o CABG  -ASA  -obtain EKG   -if abnl ekg findings, order echo  -bp control-> start lopressor 12.5 bid  -check a1c/lipid      DVT px: ambulation/SCDs while in bed Patient is 75yo M PMH of CABG 16 years ago, reports he has not seen a doctor "in a very long time", admitted to hospital for paranoia 2/2 metabolic encephalopathy 2/2 UTI vs EtOH withdrawal. Patient receiving IV ceftriaxone with improvement in mental status. Patient currently AOx3. Plan for continued IV ceftriaxone and CIWAs.     #Auditory/visual hallucinations with paranoia  -r/o metabolic etiology ie uti  -ceftriaxone   - FU urine culture and blood cultures  -CT head negative for acute pathology  -obtain TSH  -Utox negative  -CIWA - symptom triggered    # BPH  - Start Flomax 0.4    #H/o CABG  -ASA 81  - Add statin  - Fu official EKG  -bp control-> start lopressor 12.5 bid  -check a1c/lipid      DVT px: ambulation/SCDs while in bed

## 2022-02-10 NOTE — CONSULT NOTE ADULT - ATTENDING COMMENTS
76 year old man presented with change in MS, episode of unresponsiveness. CT negative. Doubt CVA, r/o delirium, agree with above

## 2022-02-10 NOTE — BEHAVIORAL HEALTH ASSESSMENT NOTE - NSBHCHARTREVIEWIMAGING_PSY_A_CORE FT
INTERPRETATION:  Exam Date: 2/9/2022 4:05 PM    CT head without IV contrast    CLINICAL INFORMATION:  paranoid at home    TECHNIQUE: Contiguous axial sections were obtained through the head.     Coronal and sagittal reformats were obtained.    COMPARISON: CT head 9/5/2006    FINDINGS:    There is no evidence of intraparenchymal or extraaxial hemorrhage.     There is no CT evidence of large vessel acute infarct. No mass effect is   found in the brain.  No evidence of midline shift or herniation pattern.    The ventricles, sulci and basal cisterns appear unremarkable.    Visualized paranasal sinuses are clear.    IMPRESSION:    No acute intracranial findings.

## 2022-02-10 NOTE — BEHAVIORAL HEALTH ASSESSMENT NOTE - SUMMARY
Pt is a 76 YOWAA  man, who resides with wife, and who denies any psych hx in the past, admitted with A MS stared on CIWA.   Pt denies depressed mood, denies insomnia, appetite problems SI, HI, denies AH, and VH. He spontaneous  talks about some people coming to his home and setting some instruments and wires so that eh can see and hear what is happening in his neighbor's houses.    Pt admits to drinking, but minimizes it and states  he drinks only on the weekend,   He is  and resides with wife and grandson.  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  PT presents with UTI and delirium, delusional, prior to coming to hospital acting upon delusional thoughts.  Suggest Risperdal 0.25 mg at HS for delusions.   melatonin 3 mg PO PRN insomnia

## 2022-02-11 LAB
AMMONIA BLD-MCNC: <10 UMOL/L — LOW (ref 11–32)
ANION GAP SERPL CALC-SCNC: 5 MMOL/L — SIGNIFICANT CHANGE UP (ref 5–17)
BUN SERPL-MCNC: 15 MG/DL — SIGNIFICANT CHANGE UP (ref 7–23)
CALCIUM SERPL-MCNC: 9.1 MG/DL — SIGNIFICANT CHANGE UP (ref 8.5–10.1)
CHLORIDE SERPL-SCNC: 108 MMOL/L — SIGNIFICANT CHANGE UP (ref 96–108)
CO2 SERPL-SCNC: 32 MMOL/L — HIGH (ref 22–31)
CREAT SERPL-MCNC: 1.04 MG/DL — SIGNIFICANT CHANGE UP (ref 0.5–1.3)
CULTURE RESULTS: NO GROWTH — SIGNIFICANT CHANGE UP
GLUCOSE SERPL-MCNC: 94 MG/DL — SIGNIFICANT CHANGE UP (ref 70–99)
MAGNESIUM SERPL-MCNC: 2.1 MG/DL — SIGNIFICANT CHANGE UP (ref 1.6–2.6)
OSMOLALITY SERPL: 292 MOSMOL/KG — SIGNIFICANT CHANGE UP (ref 280–301)
PHOSPHATE SERPL-MCNC: 3.5 MG/DL — SIGNIFICANT CHANGE UP (ref 2.5–4.5)
POTASSIUM SERPL-MCNC: 3.8 MMOL/L — SIGNIFICANT CHANGE UP (ref 3.5–5.3)
POTASSIUM SERPL-SCNC: 3.8 MMOL/L — SIGNIFICANT CHANGE UP (ref 3.5–5.3)
SODIUM SERPL-SCNC: 145 MMOL/L — SIGNIFICANT CHANGE UP (ref 135–145)
SPECIMEN SOURCE: SIGNIFICANT CHANGE UP
T PALLIDUM AB TITR SER: NEGATIVE — SIGNIFICANT CHANGE UP
TSH SERPL-MCNC: 1.57 UU/ML — SIGNIFICANT CHANGE UP (ref 0.34–4.82)

## 2022-02-11 PROCEDURE — 99232 SBSQ HOSP IP/OBS MODERATE 35: CPT

## 2022-02-11 PROCEDURE — 99233 SBSQ HOSP IP/OBS HIGH 50: CPT

## 2022-02-11 PROCEDURE — 93010 ELECTROCARDIOGRAM REPORT: CPT

## 2022-02-11 RX ORDER — HALOPERIDOL DECANOATE 100 MG/ML
2 INJECTION INTRAMUSCULAR EVERY 6 HOURS
Refills: 0 | Status: DISCONTINUED | OUTPATIENT
Start: 2022-02-11 | End: 2022-02-21

## 2022-02-11 RX ORDER — THIAMINE MONONITRATE (VIT B1) 100 MG
100 TABLET ORAL DAILY
Refills: 0 | Status: DISCONTINUED | OUTPATIENT
Start: 2022-02-11 | End: 2022-02-22

## 2022-02-11 RX ORDER — AMLODIPINE BESYLATE 2.5 MG/1
10 TABLET ORAL DAILY
Refills: 0 | Status: DISCONTINUED | OUTPATIENT
Start: 2022-02-11 | End: 2022-02-11

## 2022-02-11 RX ADMIN — Medication 3 MILLIGRAM(S): at 21:30

## 2022-02-11 RX ADMIN — RISPERIDONE 0.25 MILLIGRAM(S): 4 TABLET ORAL at 21:30

## 2022-02-11 RX ADMIN — TAMSULOSIN HYDROCHLORIDE 0.4 MILLIGRAM(S): 0.4 CAPSULE ORAL at 21:30

## 2022-02-11 RX ADMIN — Medication 2 MILLIGRAM(S): at 09:55

## 2022-02-11 RX ADMIN — ATORVASTATIN CALCIUM 20 MILLIGRAM(S): 80 TABLET, FILM COATED ORAL at 21:30

## 2022-02-11 RX ADMIN — Medication 12.5 MILLIGRAM(S): at 21:29

## 2022-02-11 NOTE — PROVIDER CONTACT NOTE (CHANGE IN STATUS NOTIFICATION) - SITUATION
Patient admitted for hallucinations and paranoia possibly related to metabolic encephalopathy related to UTI vs. ETOH. No prior psych history. Patient found unresponsive and unarousable with sternal rub. RRT called. 1 hour prior to rrt patient was interacting with staff.

## 2022-02-11 NOTE — PROGRESS NOTE ADULT - ASSESSMENT
Patient is 75yo M PMH of CABG 16 years ago, reports he has not seen a doctor "in a very long time", admitted to hospital for paranoia 2/2 metabolic encephalopathy 2/2 UTI vs EtOH withdrawal. Patient receiving IV ceftriaxone with improvement in mental status.  Patient with acute episode of agitation and unresponsiveness overnight, rapid response called, head CT obtained, no acute changes, seen and evaluated by neurology. This morning patient awoke with another episode of agitation and paranoia, 2mg IM ativan administered, concern for psychosis vs metabolic encephalopathy. Failed attempt to contact family, phone number not in service. Patient seen and cleared by psych on 02/10, recs for Risperdal at bedtime, plan to have psych reeval 2/2 two acute episodes of agitation. Continue IV ceftriaxone and CIWAs.     #Auditory/visual hallucinations with paranoia  -r/o metabolic etiology ie uti  -ceftriaxone   - FU urine culture and blood cultures  -CT head negative for acute pathology  -Utox negative  -CIWA - symptom triggered  -2/10 psych cleared, Risperdal ordered  -2/11 psych re-eval for two episodes of agitation  - will obtain Vit B12, RPR, TSH, and ammonia levels    # BPH  - Start Flomax 0.4    #H/o CABG  -ASA 81  - Add statin  - Fu official EKG  -bp control-> start lopressor 12.5 bid  -check a1c/lipid    DVT px: ambulation/SCDs while in bed Patient is 75yo M PMH of CABG 16 years ago, reports he has not seen a doctor "in a very long time", admitted to hospital for paranoia 2/2 metabolic encephalopathy 2/2 UTI vs EtOH withdrawal. Patient receiving IV ceftriaxone with improvement in mental status.  Patient with acute episode of agitation and unresponsiveness overnight, rapid response called, head CT obtained, no acute changes, seen and evaluated by neurology. This morning patient awoke with another episode of agitation and paranoia, 2mg IM ativan administered, concern for psychosis vs metabolic encephalopathy. Failed attempt to contact family, phone number not in service. Patient seen and cleared by psych on 02/10, recs for Risperdal at bedtime, plan to have psych reeval 2/2 two acute episodes of agitation. Continue IV ceftriaxone and CIWAs.     #Auditory/visual hallucinations with paranoia  - UA culture negative, DC Rocephin  - Blood cultures negative to date  -CT head negative for acute pathology  -U-tox negative  -CIWA - symptom triggered, doubt ETOH related  -  Risperdal ordered  -2/11 psych re-eval for two episodes of agitation  - will obtain Vit B12, RPR, TSH, and ammonia levels  - Ativan and Haldol PRN    # BPH  - Start Flomax 0.4    #H/o CABG, Afib  -ASA 81 only at this point due to agitation, encephalopathy  - Chadsvasc of 4, once stable from psych standpoint and not a fall    risk, would start eliquis.  - statin  -bp control-> start lopressor 12.5 bid  -check a1c/lipid    # DVT px:   - ambulation/SCDs     Disposition: Tried reaching out to family however number not in service. Social work to fu.

## 2022-02-11 NOTE — DIETITIAN INITIAL EVALUATION ADULT. - ADD RECOMMEND
1) Liberalize diet to regular to maximize caloric and nutrient intake, 2) add ensure enlive TID to optimize PO intake, 3) add thiamine and folic acid 2/2 malnutrition/ ETOH use, 4) MVI w/ minerals daily to ensure 100% RDA met, 5) Monitor bowel movements, if no BM for >3 days, consider implementing bowel regimen. RD will continue to monitor PO intake, labs, hydration, and wt prn.

## 2022-02-11 NOTE — DIETITIAN INITIAL EVALUATION ADULT. - PERTINENT LABORATORY DATA
02-11    145  |  108  |  15  ----------------------------<  94  3.8   |  32<H>  |  1.04    Ca    9.1      11 Feb 2022 08:18  Phos  3.5     02-11  Mg     2.1     02-11    TPro  7.0  /  Alb  3.0<L>  /  TBili  0.4  /  DBili  <0.1  /  AST  17  /  ALT  14  /  AlkPhos  75  02-10    Folate, Serum: 10.4 ng/mL (02-09-22 @ 14:39)  Vitamin B12, Serum: 325 pg/mL (02-09-22 @ 14:39)    POCT Blood Glucose.: 82 mg/dL (10 Feb 2022 21:23)
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
## 2022-02-11 NOTE — PROGRESS NOTE ADULT - SUBJECTIVE AND OBJECTIVE BOX
SARAHI CRENSHAW  76y  [id number]    CHIEF COMPLAINT: paranoia    INTERVAL HISTORY: Patient with acute episode of agitation and unresponsiveness overnight requiring rapid response. Patient remains agitated this morning requiring IM ativan.     ROS: Unable to obtain 2/2 patient confusion/agitation.     VITALS: T(C): 36.3 (22 @ 08:00), Max: 36.8 (22 @ 02:00)  HR: 86 (22 @ 08:00) (73 - 91)  BP: 150/90 (22 @ 08:00) (115/59 - 177/98)  RR: 16 (22 @ 08:00) (16 - 18)  SpO2: 99% (22 @ 08:00) (96% - 100%)  PHYSICAL EXAM: VITALS:  T(F): 97.4 (22 @ 08:00), Max: 98.2 (22 @ 02:00)  HR: 86 (22 @ 08:00) (73 - 91)  BP: 150/90 (22 @ 08:00) (115/59 - 177/98)  RR: 16 (22 @ 08:00) (16 - 18)  SpO2: 99% (22 @ 08:00) (96% - 100%)  Wt(kg): --    I&O's Summary      CAPILLARY BLOOD GLUCOSE      POCT Blood Glucose.: 82 mg/dL (10 Feb 2022 21:23)      PHYSICAL EXAM:  GENERAL: patient confused, combative and agitated  HEENT:  pupils equal and reactive, EOMI, no oropharyngeal lesions, erythema, exudates, oral thrush  NECK:   supple, no carotid bruits, no palpable lymph nodes, no thyromegaly  CV:  +S1, +S2, regular, no murmurs or rubs  RESP:   lungs clear to auscultation bilaterally, no wheezing, rales, rhonchi, good air entry bilaterally  BREAST:  not examined  GI:  abdomen soft, non-tender, non-distended, normal BS, no bruits, no abdominal masses, no palpable masses  RECTAL:  not examined  :  not examined  MSK:   normal muscle tone, no atrophy, no rigidity, no contractions  EXT:  no clubbing, no cyanosis, no edema, no calf pain, swelling or erythema  VASCULAR:  pulses equal and symmetric in the upper and lower extremities  NEURO:  AAOX2, no focal neurological deficits, follows all commands, able to move extremities spontaneously  SKIN:  no ulcers, lesions or rashes    LABS:                            12.6   5.62  )-----------( 269      ( 10 Feb 2022 22:24 )             38.4     02-11    145  |  108  |  15  ----------------------------<  94  3.8   |  32<H>  |  1.04    Ca    9.1      2022 08:18  Phos  3.5       Mg     2.1         TPro  7.0  /  Alb  3.0<L>  /  TBili  0.4  /  DBili  <0.1  /  AST  17  /  ALT  14  /  AlkPhos  75  0210        LIVER FUNCTIONS - ( 10 Feb 2022 22:24 )  Alb: 3.0 g/dL / Pro: 7.0 gm/dL / ALK PHOS: 75 U/L / ALT: 14 U/L / AST: 17 U/L / GGT: x           PT/INR - ( 2022 14:39 )   PT: 12.4 sec;   INR: 1.07 ratio         PTT - ( 2022 14:39 )  PTT:34.0 sec  Urinalysis Basic - ( 2022 16:17 )    Color: Yellow / Appearance: Clear / S.010 / pH: x  Gluc: x / Ketone: Negative  / Bili: Negative / Urobili: Negative   Blood: x / Protein: See Note / Nitrite: Positive   Leuk Esterase: Trace / RBC: 3-5 /HPF / WBC 6-10   Sq Epi: x / Non Sq Epi: Occasional / Bacteria: TNTC        Lactate, Blood: 1.8 mmol/L (02-10 @ 22:24)      MICRO:      IMAGING:    CARDIAC TESTING:    PROCEDURES:    MEDS: MEDICATIONS  (STANDING):  aspirin  chewable 81 milliGRAM(s) Oral daily  atorvastatin 20 milliGRAM(s) Oral at bedtime  folic acid 1 milliGRAM(s) Oral daily  metoprolol tartrate 12.5 milliGRAM(s) Oral two times a day  multivitamin 1 Tablet(s) Oral daily  risperiDONE   Tablet 0.25 milliGRAM(s) Oral at bedtime  tamsulosin 0.4 milliGRAM(s) Oral at bedtime  thiamine 100 milliGRAM(s) Oral daily    MEDICATIONS  (PRN):  haloperidol    Injectable 2 milliGRAM(s) IntraMuscular every 6 hours PRN Agitation  LORazepam   Injectable 2 milliGRAM(s) IntraMuscular every 2 hours PRN Symptom-triggered: 2 point increase in CIWA -Ar score and a total score of 7 or LESS  LORazepam   Injectable 2 milliGRAM(s) IntraMuscular every 1 hour PRN Symptom-triggered: each CIWA -Ar score 8 or GREATER  melatonin 3 milliGRAM(s) Oral at bedtime PRN Insomnia          Code status: Full code  DVT px: ambulatory / SCD  Dispo:

## 2022-02-11 NOTE — DIETITIAN INITIAL EVALUATION ADULT. - MALNUTRITION
Pt meets criteria for severe protein-calorie malnutrition in context of chronic disease r/t decreased ability to meet increased nutrient needs 2/2 ETOH abuse AEB severe muscle/ fat wasting, poor PO intake

## 2022-02-11 NOTE — DIETITIAN INITIAL EVALUATION ADULT. - NUTRITION CONSULT
The patient is a 80 year old female with a history of HTN, HL, CVA who is admitted s/p TKR.    Plan:  - Episodes likely vasovagal in nature, in part contributed by dehydration, anemia and pain medication  - No significant bradyarrhythmias noted; intermittent sinus bradycardia and rare blocked PACs  - More anemic this morning - hemoglobin down to 8. May need transfusion.  - Encourage oral hydration  - Minimize narcotics  - Continue losartan 100 mg daily  - BP more elevated this morning. Received losartan this morning. Will not be aggressive treating given recent syncope. Add prn hydralazine PO.  - PT no

## 2022-02-11 NOTE — PROGRESS NOTE ADULT - ASSESSMENT
76 year old man presents with change in MS, agitated, ? etoh withdrawal. Limited exam, CT head showed no acute changes. No significant metabolic derangements.  Suggest:  sedation as per medicine  EEG once cooperative  can consider MRI head w/wo,   PT evaluation  thiamine supplementation    Dr. Javier takes on service from tomorrow. She can f//u as needed.

## 2022-02-11 NOTE — DIETITIAN NUTRITION RISK NOTIFICATION - OTHER RISK FACTORS
Patient has upcoming physical. Asking for lab order to have done prior to visit. ([facility])   Not applicable Elidel Counseling: Patient may experience a mild burning sensation during topical application. Elidel is not approved in children less than 2 years of age. There have been case reports of hematologic and skin malignancies in patients using topical calcineurin inhibitors although causality is questionable.
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
## 2022-02-11 NOTE — DIETITIAN INITIAL EVALUATION ADULT. - PERTINENT MEDS FT
MEDICATIONS  (STANDING):  aspirin  chewable 81 milliGRAM(s) Oral daily  atorvastatin 20 milliGRAM(s) Oral at bedtime  folic acid 1 milliGRAM(s) Oral daily  metoprolol tartrate 12.5 milliGRAM(s) Oral two times a day  multivitamin 1 Tablet(s) Oral daily  risperiDONE   Tablet 0.25 milliGRAM(s) Oral at bedtime  tamsulosin 0.4 milliGRAM(s) Oral at bedtime  thiamine 100 milliGRAM(s) Oral daily    MEDICATIONS  (PRN):  haloperidol    Injectable 2 milliGRAM(s) IntraMuscular every 6 hours PRN Agitation  LORazepam   Injectable 2 milliGRAM(s) IntraMuscular every 2 hours PRN Symptom-triggered: 2 point increase in CIWA -Ar score and a total score of 7 or LESS  LORazepam   Injectable 2 milliGRAM(s) IntraMuscular every 1 hour PRN Symptom-triggered: each CIWA -Ar score 8 or GREATER  melatonin 3 milliGRAM(s) Oral at bedtime PRN Insomnia

## 2022-02-12 LAB
-  AMIKACIN: SIGNIFICANT CHANGE UP
-  AMOXICILLIN/CLAVULANIC ACID: SIGNIFICANT CHANGE UP
-  AMPICILLIN/SULBACTAM: SIGNIFICANT CHANGE UP
-  AMPICILLIN: SIGNIFICANT CHANGE UP
-  AZTREONAM: SIGNIFICANT CHANGE UP
-  CEFAZOLIN: SIGNIFICANT CHANGE UP
-  CEFEPIME: SIGNIFICANT CHANGE UP
-  CEFOXITIN: SIGNIFICANT CHANGE UP
-  CEFTRIAXONE: SIGNIFICANT CHANGE UP
-  CIPROFLOXACIN: SIGNIFICANT CHANGE UP
-  ERTAPENEM: SIGNIFICANT CHANGE UP
-  GENTAMICIN: SIGNIFICANT CHANGE UP
-  IMIPENEM: SIGNIFICANT CHANGE UP
-  LEVOFLOXACIN: SIGNIFICANT CHANGE UP
-  MEROPENEM: SIGNIFICANT CHANGE UP
-  NITROFURANTOIN: SIGNIFICANT CHANGE UP
-  PIPERACILLIN/TAZOBACTAM: SIGNIFICANT CHANGE UP
-  TIGECYCLINE: SIGNIFICANT CHANGE UP
-  TOBRAMYCIN: SIGNIFICANT CHANGE UP
-  TRIMETHOPRIM/SULFAMETHOXAZOLE: SIGNIFICANT CHANGE UP
CULTURE RESULTS: SIGNIFICANT CHANGE UP
METHOD TYPE: SIGNIFICANT CHANGE UP
ORGANISM # SPEC MICROSCOPIC CNT: SIGNIFICANT CHANGE UP
ORGANISM # SPEC MICROSCOPIC CNT: SIGNIFICANT CHANGE UP
SPECIMEN SOURCE: SIGNIFICANT CHANGE UP
VIT B12 SERPL-MCNC: 332 PG/ML — SIGNIFICANT CHANGE UP (ref 232–1245)

## 2022-02-12 PROCEDURE — 99233 SBSQ HOSP IP/OBS HIGH 50: CPT

## 2022-02-12 RX ADMIN — ATORVASTATIN CALCIUM 20 MILLIGRAM(S): 80 TABLET, FILM COATED ORAL at 21:43

## 2022-02-12 RX ADMIN — RISPERIDONE 0.25 MILLIGRAM(S): 4 TABLET ORAL at 21:43

## 2022-02-12 RX ADMIN — Medication 12.5 MILLIGRAM(S): at 10:26

## 2022-02-12 RX ADMIN — Medication 12.5 MILLIGRAM(S): at 21:43

## 2022-02-12 RX ADMIN — Medication 100 MILLIGRAM(S): at 10:26

## 2022-02-12 RX ADMIN — Medication 81 MILLIGRAM(S): at 10:27

## 2022-02-12 RX ADMIN — Medication 1 TABLET(S): at 10:26

## 2022-02-12 RX ADMIN — Medication 1 MILLIGRAM(S): at 10:27

## 2022-02-12 RX ADMIN — TAMSULOSIN HYDROCHLORIDE 0.4 MILLIGRAM(S): 0.4 CAPSULE ORAL at 21:43

## 2022-02-12 NOTE — PROGRESS NOTE ADULT - SUBJECTIVE AND OBJECTIVE BOX
Patient seen and examined:  Cooperative this am. No code  mell called overnight.  No acute complaints. Tolerating  po and voiding well.    ROS: Negative except for above      All other systems reviewed and found to be negative with the exception of what has been described above.    MEDICATIONS  (STANDING):  aspirin  chewable 81 milliGRAM(s) Oral daily  atorvastatin 20 milliGRAM(s) Oral at bedtime  folic acid 1 milliGRAM(s) Oral daily  metoprolol tartrate 12.5 milliGRAM(s) Oral two times a day  multivitamin 1 Tablet(s) Oral daily  risperiDONE   Tablet 0.25 milliGRAM(s) Oral at bedtime  tamsulosin 0.4 milliGRAM(s) Oral at bedtime    MEDICATIONS  (PRN):  haloperidol    Injectable 2 milliGRAM(s) IntraMuscular every 6 hours PRN Agitation  LORazepam   Injectable 2 milliGRAM(s) IntraMuscular every 2 hours PRN Symptom-triggered: 2 point increase in CIWA -Ar score and a total score of 7 or LESS  LORazepam   Injectable 2 milliGRAM(s) IntraMuscular every 1 hour PRN Symptom-triggered: each CIWA -Ar score 8 or GREATER  melatonin 3 milliGRAM(s) Oral at bedtime PRN Insomnia      VITALS:  T(F): 97.1 (02-12-22 @ 08:00), Max: 98.6 (02-12-22 @ 02:38)  HR: 93 (02-12-22 @ 08:00) (78 - 93)  BP: 145/73 (02-12-22 @ 08:00) (143/65 - 177/83)  RR: 18 (02-12-22 @ 08:00) (18 - 18)  SpO2: 99% (02-12-22 @ 08:00) (96% - 100%)    PHYSICAL EXAM:  GEN: NAD, slight confusion  HEENT:  pupils equal and reactive, EOMI, no oropharyngeal lesions, erythema, exudates, oral thrush  NECK:   supple, no carotid bruits, no palpable lymph nodes, no thyromegaly  CV:  +S1, +S2, regular, no murmurs or rubs  RESP:   lungs clear to auscultation bilaterally, no wheezing, rales, rhonchi, good air entry bilaterally  BREAST:  not examined  GI:  abdomen soft, non-tender, non-distended, normal BS, no bruits, no abdominal masses, no palpable masses  RECTAL:  not examined  :  not examined  MSK:   normal muscle tone, no atrophy, no rigidity, no contractions  EXT:  no clubbing, no cyanosis, no edema, no calf pain, swelling or erythema  VASCULAR:  pulses equal and symmetric in the upper and lower extremities  NEURO:  AAOX2, no focal neurological deficits, follows all commands, able to move extremities spontaneously  SKIN:  no ulcers, lesions or rashes    LABS:                            12.6   5.62  )-----------( 269      ( 10 Feb 2022 22:24 )             38.4     02-11    145  |  108  |  15  ----------------------------<  94  3.8   |  32<H>  |  1.04    Ca    9.1      11 Feb 2022 08:18  Phos  3.5     02-11  Mg     2.1     02-11    TPro  7.0  /  Alb  3.0<L>  /  TBili  0.4  /  DBili  <0.1  /  AST  17  /  ALT  14  /  AlkPhos  75  02-10    LIVER FUNCTIONS - ( 10 Feb 2022 22:24 )  Alb: 3.0 g/dL / Pro: 7.0 gm/dL / ALK PHOS: 75 U/L / ALT: 14 U/L / AST: 17 U/L / GGT: x             < from: CT Brain Stroke Protocol (02.10.22 @ 21:52) >    IMPRESSION:  No acute intracranial hemorrhage or mass effect.    < end of copied text >  < from: Xray Chest 1 View- PORTABLE-Urgent (Xray Chest 1 View- PORTABLE-Urgent .) (02.09.22 @ 17:05) >    IMPRESSION: Clear lungs at this time.    < end of copied text >

## 2022-02-12 NOTE — PROGRESS NOTE ADULT - ASSESSMENT
Patient is 75yo M PMH of CABG 16 years ago, reports he has not seen a doctor "in a very long time", admitted to hospital for paranoia 2/2 metabolic encephalopathy 2/2 UTI vs EtOH withdrawal. Patient receiving IV ceftriaxone with improvement in mental status.  Patient with acute episode of agitation and unresponsiveness overnight, rapid response called, head CT obtained, no acute changes, seen and evaluated by neurology. This morning patient awoke with another episode of agitation and paranoia, 2mg IM ativan administered, concern for psychosis vs metabolic encephalopathy. Failed attempt to contact family, phone number not in service. Patient seen and cleared by psych on 02/10, recs for Risperdal at bedtime, plan to have psych reeval 2/2 two acute episodes of agitation. Continue IV ceftriaxone and CIWAs.     #Auditory/visual hallucinations with paranoia  - UA culture negative, DC Rocephin  - Blood cultures negative to date  -CT head negative for acute pathology  - U-tox negative  - DC CIWA- doubt ETOH related  - Risperdal ordered as per psych  - TSH, B12 and RPR negative  - Ativan and Haldol PRN  - Psych reconsult     # BPH  - Start Flomax 0.4    #H/o CABG, Afib  -ASA 81 only at this point due to agitation, encephalopathy  - Chadsvasc of 4, once stable from psych standpoint and not a fall    risk, would start eliquis.  - Atorvastatin   - lopressor 12.5 bid  - check a1c/lipid    # DVT px:   - ambulation/SCDs     Disposition: Tried reaching out to family however number not in service. Social work to fu.

## 2022-02-13 PROCEDURE — 99233 SBSQ HOSP IP/OBS HIGH 50: CPT

## 2022-02-13 PROCEDURE — 99221 1ST HOSP IP/OBS SF/LOW 40: CPT

## 2022-02-13 RX ORDER — RISPERIDONE 4 MG/1
0.5 TABLET ORAL AT BEDTIME
Refills: 0 | Status: DISCONTINUED | OUTPATIENT
Start: 2022-02-13 | End: 2022-02-16

## 2022-02-13 RX ADMIN — Medication 12.5 MILLIGRAM(S): at 10:10

## 2022-02-13 RX ADMIN — Medication 81 MILLIGRAM(S): at 10:10

## 2022-02-13 RX ADMIN — Medication 1 TABLET(S): at 10:10

## 2022-02-13 RX ADMIN — Medication 1 MILLIGRAM(S): at 10:10

## 2022-02-13 NOTE — PROGRESS NOTE ADULT - ASSESSMENT
Patient is 75yo M PMH of CABG 16 years ago, reports he has not seen a doctor "in a very long time", admitted to hospital for paranoia 2/2 metabolic encephalopathy 2/2 UTI vs EtOH withdrawal. Patient receiving IV ceftriaxone with improvement in mental status.  Patient with acute episode of agitation and unresponsiveness overnight, rapid response called, head CT obtained, no acute changes, seen and evaluated by neurology. This morning patient awoke with another episode of agitation and paranoia, 2mg IM ativan administered, concern for psychosis vs metabolic encephalopathy. Failed attempt to contact family, phone number not in service. Patient seen and cleared by psych on 02/10, recs for Risperdal at bedtime, plan to have psych reeval 2/2 two acute episodes of agitation. Continue IV ceftriaxone and CIWAs.     #Auditory/visual hallucinations with paranoia  - UA culture negative, DC Rocephin  - Blood cultures negative to date  - CT head negative for acute pathology x 2   - U-tox negative  - DC CIWA- doubt ETOH related  - Risperdal ordered as per psych  - TSH, B12 and RPR negative  - Ativan and Haldol PRN  - Psych reconsult     # BPH  - Start Flomax 0.4    #H/o CABG, Afib  - ASA 81 only at this point due to agitation, encephalopathy and fall risk  - Chadsvasc of 4, Once evaluated by PT and Psych FU, consider eliquis.  - Compliance with medication will be an issue due to cognitive status  - Atorvastatin   - lopressor 12.5 bid  - Avoiding Full dose lovenox for AC coverage due to agitation, especially    with injections  - PT Eval     # DVT px:   - ambulation/SCDs     Disposition: Tried reaching out to family however number not in service. Social work to fu. Will need ALBERTO pending PT eval and psych reevaluation. Currently on 1:1

## 2022-02-13 NOTE — PROVIDER CONTACT NOTE (OTHER) - BACKGROUND
Patient on enhanced observation for safety reasons. Pt will not stay in bed, is agitated most of the time, and verbally combative.

## 2022-02-13 NOTE — PROGRESS NOTE ADULT - SUBJECTIVE AND OBJECTIVE BOX
Patient seen and examined:  Pleasant this am, no acute   issues overnight. Tolerating  po and Voiding well.    ROS: Negative except for above    All other systems reviewed and found to be negative with the exception of what has been described above.    MEDICATIONS  (STANDING):  aspirin  chewable 81 milliGRAM(s) Oral daily  atorvastatin 20 milliGRAM(s) Oral at bedtime  folic acid 1 milliGRAM(s) Oral daily  metoprolol tartrate 12.5 milliGRAM(s) Oral two times a day  multivitamin 1 Tablet(s) Oral daily  risperiDONE   Tablet 0.25 milliGRAM(s) Oral at bedtime  tamsulosin 0.4 milliGRAM(s) Oral at bedtime    MEDICATIONS  (PRN):  haloperidol    Injectable 2 milliGRAM(s) IntraMuscular every 6 hours PRN Agitation  LORazepam   Injectable 2 milliGRAM(s) IntraMuscular every 2 hours PRN Symptom-triggered: 2 point increase in CIWA -Ar score and a total score of 7 or LESS  LORazepam   Injectable 2 milliGRAM(s) IntraMuscular every 1 hour PRN Symptom-triggered: each CIWA -Ar score 8 or GREATER  melatonin 3 milliGRAM(s) Oral at bedtime PRN Insomnia      VITALS:  T(F): 97.5 (02-13-22 @ 07:53), Max: 98.5 (02-12-22 @ 23:45)  HR: 100 (02-13-22 @ 07:53) (86 - 104)  BP: 151/76 (02-13-22 @ 07:53) (113/63 - 151/76)  RR: 18 (02-13-22 @ 07:53) (18 - 20)  SpO2: 99% (02-13-22 @ 07:53) (99% - 100%)            PHYSICAL EXAM:  GEN: NAD  HEENT:  pupils equal and reactive, EOMI, no oropharyngeal lesions, erythema, exudates, oral thrush  NECK:   supple, no carotid bruits, no palpable lymph nodes, no thyromegaly  CV:  +S1, +S2, irregular, no murmurs or rubs  RESP:   lungs clear to auscultation bilaterally, no wheezing, rales, rhonchi, good air entry bilaterally  BREAST:  not examined  GI:  abdomen soft, non-tender, non-distended, normal BS, no bruits, no abdominal masses, no palpable masses  RECTAL:  not examined  :  not examined  MSK:   normal muscle tone, no atrophy, no rigidity, no contractions  EXT:  no clubbing, no cyanosis, no edema, no calf pain, swelling or erythema  VASCULAR:  pulses equal and symmetric in the upper and lower extremities  NEURO:  AAOX1-2, no focal neurological deficits, follows all commands, able to move extremities spontaneously  SKIN:  no ulcers, lesions or rashes      Labs:                         12.6   5.62  )-----------( 269      ( 10 Feb 2022 22:24 )             38.4     02-11    145  |  108  |  15  ----------------------------<  94  3.8   |  32<H>  |  1.04    Ca    9.1      11 Feb 2022 08:18  Phos  3.5     02-11  Mg     2.1     02-11    TPro  7.0  /  Alb  3.0<L>  /  TBili  0.4  /  DBili  <0.1  /  AST  17  /  ALT  14  /  AlkPhos  75  02-10    LIVER FUNCTIONS - ( 10 Feb 2022 22:24 )  Alb: 3.0 g/dL / Pro: 7.0 gm/dL / ALK PHOS: 75 U/L / ALT: 14 U/L / AST: 17 U/L / GGT: x             < from: CT Brain Stroke Protocol (02.10.22 @ 21:52) >  IMPRESSION:  No acute intracranial hemorrhage or mass effect.    Results were discussed with Dr. Newberry by Dr. Montemayor at 9:58 PM on   2/10/2022 with read back followed.    < end of copied text >  < from: CT Head No Cont (02.09.22 @ 16:05) >  IMPRESSION:    No acute intracranial findings.    --- End of Report ---    < end of copied text >  < from: Xray Chest 1 View- PORTABLE-Urgent (Xray Chest 1 View- PORTABLE-Urgent .) (02.09.22 @ 17:05) >    IMPRESSION: Clear lungs at this time.    < end of copied text >

## 2022-02-13 NOTE — PROVIDER CONTACT NOTE (OTHER) - ASSESSMENT
Patient hard to awaken, even with sterna rubbing. Patient opening eyes slowly and closing. Minimal verbal response to questions. Pt's vitals BP:175/73, HR: 89, O2 98% RA, Temp: 97.8. Pt verbally stated that he cannot breathe although saturation @98%. Pt appears to be aware but pretending to be unresponsive at times.

## 2022-02-14 PROCEDURE — 99232 SBSQ HOSP IP/OBS MODERATE 35: CPT

## 2022-02-14 RX ADMIN — Medication 100 MILLIGRAM(S): at 09:59

## 2022-02-14 RX ADMIN — Medication 1 MILLIGRAM(S): at 09:59

## 2022-02-14 RX ADMIN — ATORVASTATIN CALCIUM 20 MILLIGRAM(S): 80 TABLET, FILM COATED ORAL at 23:37

## 2022-02-14 RX ADMIN — RISPERIDONE 0.5 MILLIGRAM(S): 4 TABLET ORAL at 23:37

## 2022-02-14 RX ADMIN — Medication 12.5 MILLIGRAM(S): at 23:39

## 2022-02-14 RX ADMIN — Medication 81 MILLIGRAM(S): at 09:59

## 2022-02-14 RX ADMIN — Medication 12.5 MILLIGRAM(S): at 09:59

## 2022-02-14 RX ADMIN — Medication 1 TABLET(S): at 09:59

## 2022-02-14 RX ADMIN — TAMSULOSIN HYDROCHLORIDE 0.4 MILLIGRAM(S): 0.4 CAPSULE ORAL at 23:37

## 2022-02-14 NOTE — PHYSICAL THERAPY INITIAL EVALUATION ADULT - PERTINENT HX OF CURRENT PROBLEM, REHAB EVAL
76M PMHx CABG 16 years ago,admitted to hospital for paranoia 2/2 metabolic encephalopathy 2/2 UTI vs EtOH withdrawal. Patient receiving IV ceftriaxone with improvement in mental status.  Patient with acute episode of agitation and unresponsiveness overnight, rapid response called, head CT obtained, no acute changes, seen and evaluated by neurology. This morning patient awoke with another episode of agitation and paranoia, 2mg IM ativan given, concern for psychosis vs metabolic encephalopathy.

## 2022-02-14 NOTE — PROGRESS NOTE ADULT - SUBJECTIVE AND OBJECTIVE BOX
Patient seen and examined:  Pleasant this am, no acute   issues overnight. Tolerating  po and Voiding well.    VITALS:  Vital Signs Last 24 Hrs  T(C): 36.2 (14 Feb 2022 07:38), Max: 36.9 (13 Feb 2022 14:56)  T(F): 97.1 (14 Feb 2022 07:38), Max: 98.5 (13 Feb 2022 14:56)  HR: 78 (14 Feb 2022 07:38) (78 - 89)  BP: 152/74 (14 Feb 2022 07:38) (124/66 - 175/73)  BP(mean): --  RR: 18 (14 Feb 2022 07:38) (18 - 18)  SpO2: 100% (14 Feb 2022 07:38) (98% - 100%)  PHYSICAL EXAM:  GEN: NAD  HEENT:  pupils equal and reactive, EOMI, no oropharyngeal lesions, erythema, exudates, oral thrush  NECK:   supple, no carotid bruits, no palpable lymph nodes, no thyromegaly  CV:  +S1, +S2, irregular, no murmurs or rubs  RESP:   lungs clear to auscultation bilaterally, no wheezing, rales, rhonchi, good air entry bilaterally  BREAST:  not examined  GI:  abdomen soft, non-tender, non-distended, normal BS, no bruits, no abdominal masses, no palpable masses  RECTAL:  not examined  :  not examined  MSK:   normal muscle tone, no atrophy, no rigidity, no contractions  EXT:  no clubbing, no cyanosis, no edema, no calf pain, swelling or erythema  VASCULAR:  pulses equal and symmetric in the upper and lower extremities  NEURO:  AAOX1-2, no focal neurological deficits, follows all commands, able to move extremities spontaneously  SKIN:  no ulcers, lesions or rashes    < from: CT Brain Stroke Protocol (02.10.22 @ 21:52) >  IMPRESSION:  No acute intracranial hemorrhage or mass effect.    Results were discussed with Dr. Newberry by Dr. Montemayor at 9:58 PM on   2/10/2022 with read back followed.    < end of copied text >  < from: CT Head No Cont (02.09.22 @ 16:05) >  IMPRESSION:    No acute intracranial findings.    --- End of Report ---    < end of copied text >  < from: Xray Chest 1 View- PORTABLE-Urgent (Xray Chest 1 View- PORTABLE-Urgent .) (02.09.22 @ 17:05) >    IMPRESSION: Clear lungs at this time.    < end of copied text >    MEDICATIONS  (STANDING):  aspirin  chewable 81 milliGRAM(s) Oral daily  atorvastatin 20 milliGRAM(s) Oral at bedtime  folic acid 1 milliGRAM(s) Oral daily  metoprolol tartrate 12.5 milliGRAM(s) Oral two times a day  multivitamin 1 Tablet(s) Oral daily  risperiDONE   Tablet 0.5 milliGRAM(s) Oral at bedtime  tamsulosin 0.4 milliGRAM(s) Oral at bedtime  thiamine 100 milliGRAM(s) Oral daily    MEDICATIONS  (PRN):  haloperidol    Injectable 2 milliGRAM(s) IntraMuscular every 6 hours PRN Agitation  LORazepam   Injectable 2 milliGRAM(s) IntraMuscular every 2 hours PRN Symptom-triggered: 2 point increase in CIWA -Ar score and a total score of 7 or LESS  LORazepam   Injectable 2 milliGRAM(s) IntraMuscular every 1 hour PRN Symptom-triggered: each CIWA -Ar score 8 or GREATER  melatonin 3 milliGRAM(s) Oral at bedtime PRN Insomnia

## 2022-02-15 LAB
ALBUMIN SERPL ELPH-MCNC: 2.9 G/DL — LOW (ref 3.3–5)
ALP SERPL-CCNC: 67 U/L — SIGNIFICANT CHANGE UP (ref 40–120)
ALT FLD-CCNC: 14 U/L — SIGNIFICANT CHANGE UP (ref 12–78)
ANION GAP SERPL CALC-SCNC: 4 MMOL/L — LOW (ref 5–17)
AST SERPL-CCNC: 16 U/L — SIGNIFICANT CHANGE UP (ref 15–37)
BILIRUB SERPL-MCNC: 0.4 MG/DL — SIGNIFICANT CHANGE UP (ref 0.2–1.2)
BUN SERPL-MCNC: 25 MG/DL — HIGH (ref 7–23)
CALCIUM SERPL-MCNC: 9.2 MG/DL — SIGNIFICANT CHANGE UP (ref 8.5–10.1)
CHLORIDE SERPL-SCNC: 106 MMOL/L — SIGNIFICANT CHANGE UP (ref 96–108)
CO2 SERPL-SCNC: 30 MMOL/L — SIGNIFICANT CHANGE UP (ref 22–31)
CREAT SERPL-MCNC: 1.11 MG/DL — SIGNIFICANT CHANGE UP (ref 0.5–1.3)
GLUCOSE SERPL-MCNC: 92 MG/DL — SIGNIFICANT CHANGE UP (ref 70–99)
HCT VFR BLD CALC: 41.3 % — SIGNIFICANT CHANGE UP (ref 39–50)
HGB BLD-MCNC: 13.1 G/DL — SIGNIFICANT CHANGE UP (ref 13–17)
MCHC RBC-ENTMCNC: 28.2 PG — SIGNIFICANT CHANGE UP (ref 27–34)
MCHC RBC-ENTMCNC: 31.7 GM/DL — LOW (ref 32–36)
MCV RBC AUTO: 88.8 FL — SIGNIFICANT CHANGE UP (ref 80–100)
PLATELET # BLD AUTO: 276 K/UL — SIGNIFICANT CHANGE UP (ref 150–400)
POTASSIUM SERPL-MCNC: 4.9 MMOL/L — SIGNIFICANT CHANGE UP (ref 3.5–5.3)
POTASSIUM SERPL-SCNC: 4.9 MMOL/L — SIGNIFICANT CHANGE UP (ref 3.5–5.3)
PROT SERPL-MCNC: 6.9 GM/DL — SIGNIFICANT CHANGE UP (ref 6–8.3)
RBC # BLD: 4.65 M/UL — SIGNIFICANT CHANGE UP (ref 4.2–5.8)
RBC # FLD: 16 % — HIGH (ref 10.3–14.5)
SODIUM SERPL-SCNC: 140 MMOL/L — SIGNIFICANT CHANGE UP (ref 135–145)
WBC # BLD: 5.31 K/UL — SIGNIFICANT CHANGE UP (ref 3.8–10.5)
WBC # FLD AUTO: 5.31 K/UL — SIGNIFICANT CHANGE UP (ref 3.8–10.5)

## 2022-02-15 PROCEDURE — 99232 SBSQ HOSP IP/OBS MODERATE 35: CPT

## 2022-02-15 RX ORDER — PREGABALIN 225 MG/1
1000 CAPSULE ORAL DAILY
Refills: 0 | Status: DISCONTINUED | OUTPATIENT
Start: 2022-02-15 | End: 2022-02-22

## 2022-02-15 RX ADMIN — PREGABALIN 1000 MICROGRAM(S): 225 CAPSULE ORAL at 10:12

## 2022-02-15 RX ADMIN — Medication 100 MILLIGRAM(S): at 10:11

## 2022-02-15 RX ADMIN — Medication 12.5 MILLIGRAM(S): at 21:06

## 2022-02-15 RX ADMIN — ATORVASTATIN CALCIUM 20 MILLIGRAM(S): 80 TABLET, FILM COATED ORAL at 21:06

## 2022-02-15 RX ADMIN — Medication 12.5 MILLIGRAM(S): at 10:11

## 2022-02-15 RX ADMIN — Medication 1 MILLIGRAM(S): at 10:11

## 2022-02-15 RX ADMIN — HALOPERIDOL DECANOATE 2 MILLIGRAM(S): 100 INJECTION INTRAMUSCULAR at 23:29

## 2022-02-15 RX ADMIN — RISPERIDONE 0.5 MILLIGRAM(S): 4 TABLET ORAL at 21:06

## 2022-02-15 RX ADMIN — TAMSULOSIN HYDROCHLORIDE 0.4 MILLIGRAM(S): 0.4 CAPSULE ORAL at 21:07

## 2022-02-15 RX ADMIN — Medication 1 TABLET(S): at 10:11

## 2022-02-15 RX ADMIN — Medication 81 MILLIGRAM(S): at 10:11

## 2022-02-15 NOTE — PROGRESS NOTE ADULT - ASSESSMENT
Patient is 75yo M PMH of CABG 16 years ago, reports he has not seen a doctor "in a very long time", admitted to hospital for paranoia 2/2 metabolic encephalopathy 2/2 UTI vs EtOH withdrawal. Patient receiving IV ceftriaxone with improvement in mental status.  Patient with acute episode of agitation and unresponsiveness overnight, rapid response called, head CT obtained, no acute changes, seen and evaluated by neurology. This morning patient awoke with another episode of agitation and paranoia, 2mg IM ativan administered, concern for psychosis vs metabolic encephalopathy. Failed attempt to contact family, phone number not in service. Patient seen and cleared by psych on 02/10, recs for Risperdal at bedtime, plan to have psych reeval 2/2 two acute episodes of agitation. Continue IV ceftriaxone and CIWAs.     #AMS Confusion Auditory/visual hallucinations with paranoia   multifactorial History of ETOH use, ?Dementia  - Patient having some delusion /Confabulation   - UA culture negative, DC Rocephin  - Blood cultures negative to date  - CT head negative for acute pathology x 2   - U-tox negative  - DC CIWA- No sign of withdrawal t ETOH related  - Risperdal ordered as per psych  - TSH, B12 and RPR negative  - Ativan and Haldol PRN  - Psych reconsult     # BPH  - Started Flomax 0.4    #H/o CABG, Afib  - ASA 81 only at this point due to agitation, encephalopathy and fall risk  - Chadsvasc of 4, Once evaluated by PT and Psych FU, consider eliquis.  - Compliance with medication will be an issue due to cognitive status  - Atorvastatin   - lopressor 12.5 bid  - Avoiding Full dose lovenox for AC coverage due to agitation, especially    with injections  - PT Eval     # DVT px:   - ambulation/SCDs     Disposition: Tried reaching out to family however number not in service. Patient states his wife is in Rehab, but does not remember Which facility she is . unable to provide a phone    Social work to fu.   Will need ALBERTO pending PT eval and psych reevaluation. Currently on 1:1

## 2022-02-15 NOTE — PROGRESS NOTE ADULT - SUBJECTIVE AND OBJECTIVE BOX
Patient seen and examined:  Pleasant this am, no acute   issues overnight. Tolerating  po and Voiding well.    VITALS:  Vital Signs Last 24 Hrs  T(C): 37.1 (14 Feb 2022 23:12), Max: 37.1 (14 Feb 2022 23:12)  T(F): 98.7 (14 Feb 2022 23:12), Max: 98.7 (14 Feb 2022 23:12)  HR: 82 (14 Feb 2022 23:12) (75 - 82)  BP: 136/61 (14 Feb 2022 23:12) (131/60 - 136/61)  BP(mean): --  RR: 18 (14 Feb 2022 15:37) (18 - 18)  SpO2: 99% (14 Feb 2022 23:12) (99% - 100%)  PHYSICAL EXAM:  GEN: NAD  HEENT:  pupils equal and reactive, EOMI, no oropharyngeal lesions, erythema, exudates, oral thrush  NECK:   supple, no carotid bruits, no palpable lymph nodes, no thyromegaly  CV:  +S1, +S2, irregular, no murmurs or rubs  RESP:   lungs clear to auscultation bilaterally, no wheezing, rales, rhonchi, good air entry bilaterally  BREAST:  not examined  GI:  abdomen soft, non-tender, non-distended, normal BS, no bruits, no abdominal masses, no palpable masses  RECTAL:  not examined  :  not examined  MSK:   normal muscle tone, no atrophy, no rigidity, no contractions  EXT:  no clubbing, no cyanosis, no edema, no calf pain, swelling or erythema  VASCULAR:  pulses equal and symmetric in the upper and lower extremities  NEURO:  AAOX1-2, no focal neurological deficits, follows all commands, able to move extremities spontaneously  SKIN:  no ulcers, lesions or rashes      < from: CT Brain Stroke Protocol (02.10.22 @ 21:52) >  IMPRESSION:  No acute intracranial hemorrhage or mass effect.    Results were discussed with Dr. Newberry by Dr. Montemayor at 9:58 PM on   2/10/2022 with read back followed.    < end of copied text >  < from: CT Head No Cont (02.09.22 @ 16:05) >  IMPRESSION:    No acute intracranial findings.    --- End of Report ---    < end of copied text >  < from: Xray Chest 1 View- PORTABLE-Urgent (Xray Chest 1 View- PORTABLE-Urgent .) (02.09.22 @ 17:05) >    IMPRESSION: Clear lungs at this time.    < end of copied text >

## 2022-02-16 DIAGNOSIS — F10.10 ALCOHOL ABUSE, UNCOMPLICATED: ICD-10-CM

## 2022-02-16 LAB — SARS-COV-2 RNA SPEC QL NAA+PROBE: SIGNIFICANT CHANGE UP

## 2022-02-16 PROCEDURE — 95816 EEG AWAKE AND DROWSY: CPT | Mod: 26

## 2022-02-16 PROCEDURE — 99232 SBSQ HOSP IP/OBS MODERATE 35: CPT

## 2022-02-16 RX ORDER — QUETIAPINE FUMARATE 200 MG/1
25 TABLET, FILM COATED ORAL AT BEDTIME
Refills: 0 | Status: DISCONTINUED | OUTPATIENT
Start: 2022-02-16 | End: 2022-02-17

## 2022-02-16 RX ORDER — PREGABALIN 225 MG/1
1000 CAPSULE ORAL DAILY
Refills: 0 | Status: DISCONTINUED | OUTPATIENT
Start: 2022-02-16 | End: 2022-02-22

## 2022-02-16 RX ADMIN — Medication 12.5 MILLIGRAM(S): at 10:01

## 2022-02-16 RX ADMIN — PREGABALIN 1000 MICROGRAM(S): 225 CAPSULE ORAL at 10:01

## 2022-02-16 RX ADMIN — HALOPERIDOL DECANOATE 2 MILLIGRAM(S): 100 INJECTION INTRAMUSCULAR at 06:38

## 2022-02-16 RX ADMIN — QUETIAPINE FUMARATE 25 MILLIGRAM(S): 200 TABLET, FILM COATED ORAL at 22:23

## 2022-02-16 RX ADMIN — Medication 1 TABLET(S): at 10:01

## 2022-02-16 RX ADMIN — Medication 12.5 MILLIGRAM(S): at 22:23

## 2022-02-16 RX ADMIN — ATORVASTATIN CALCIUM 20 MILLIGRAM(S): 80 TABLET, FILM COATED ORAL at 22:23

## 2022-02-16 RX ADMIN — TAMSULOSIN HYDROCHLORIDE 0.4 MILLIGRAM(S): 0.4 CAPSULE ORAL at 22:23

## 2022-02-16 RX ADMIN — Medication 100 MILLIGRAM(S): at 10:01

## 2022-02-16 RX ADMIN — Medication 1 MILLIGRAM(S): at 10:01

## 2022-02-16 RX ADMIN — Medication 81 MILLIGRAM(S): at 10:01

## 2022-02-16 NOTE — PROGRESS NOTE BEHAVIORAL HEALTH - SUMMARY
Pt is a 76 YOWAA  man, who resides with wife, and who denies any psych hx in the past, admitted with A MS stared on CIWA. Pt denies depressed mood, denies insomnia, appetite problems SI, HI, denies AH, and VH. He spontaneous  talks about some people coming to his home and setting some instruments and wires so that eh can see and hear what is happening in his neighbor's houses.    Pt admits to drinking, but minimizes it and states  he drinks only on the weekend,   He is  and resides with wife and grandson.  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  PT presents with UTI and delirium, delusional, prior to coming to hospital acting upon delusional thoughts.  Suggest Risperdal 0.25 mg at HS for delusions, but primary treatment team checked io to Seroquel 25 mg po QHS.   melatonin 3 mg PO PRN insomnia  Tried to call wife, but non-working number.   EEG ordered but cancelled by performing dpt. PT is encephalopathic and , EEG would be confirming that if performed.   I would suggest to obtain  EEG.  Dementia workup so far negative, NO finding on CT, TSH and Vit B12 are normal. Pt came with UTI and EtOH abuse.  Lisa supplement B12 PO

## 2022-02-16 NOTE — PROVIDER CONTACT NOTE (OTHER) - ACTION/TREATMENT ORDERED:
Placed on 1:1 for safety
Dr. Zamora came and assessed pt. Pt deemed medically stable. Dr. Zamora advised that there may be a psychological issue rather a medical issue. Pt should be further assessed by Psych.

## 2022-02-16 NOTE — PROVIDER CONTACT NOTE (OTHER) - SITUATION
Patient admitted for hallucinations with a previous rapid response called on him this admission.
Patient is confused, paranoid. Attempting to elope stating "get out of my house". Became aggressive toward the staff. Shanel gramajo called for safety.

## 2022-02-16 NOTE — PROGRESS NOTE ADULT - ASSESSMENT
Patient is 75yo M PMH of CABG 16 years ago, reports he has not seen a doctor "in a very long time", admitted to hospital for paranoia 2/2 metabolic encephalopathy 2/2 UTI vs EtOH withdrawal. Patient receiving IV ceftriaxone with improvement in mental status.  Patient with acute episode of agitation and unresponsiveness overnight, rapid response called, head CT obtained, no acute changes, seen and evaluated by neurology. This morning patient awoke with another episode of agitation and paranoia, 2mg IM ativan administered, concern for psychosis vs metabolic encephalopathy. Failed attempt to contact family, phone number not in service. Patient seen and cleared by psych on 02/10, recs for Risperdal at bedtime, plan to have psych reeval 2/2 two acute episodes of agitation. Continue IV ceftriaxone and CIWAs.     #AMS Confusion Auditory/visual hallucinations with paranoia rule out Psychiatric disorder  multifactorial History of ETOH abuse  , ?Dementia  - Patient having some delusion /Confabulation   - UA culture negative, DC Rocephin  - Blood cultures negative to date  - CT head negative for acute pathology x 2   - U-tox negative  - DC CIWA- No sign of withdrawal t ETOH related  - Risperdal ordered as per psych  - TSH, B12 and RPR negative  - Ativan and Haldol PRN  - Psych reconsult   02/16 Patient was agitated today.     # BPH  - Started Flomax 0.4    #H/o CABG, Afib  - ASA 81 only at this point due to agitation, encephalopathy and fall risk  - Chadsvasc of 4, Once evaluated by PT and Psych FU, consider eliquis.  - Compliance with medication will be an issue due to cognitive status  - Atorvastatin   - lopressor 12.5 bid  - Avoiding Full dose lovenox for AC coverage due to agitation, especially    with injections  - PT Eval     # DVT px:   - ambulation/SCDs     Disposition: Tried reaching out to family however number not in service. Patient states his wife is in Rehab, but does not remember Which facility she is . unable to provide a phone    Social work to fu.  Patient will likely  ALBERTO pending PT eval and psych reevaluation. Currently on 1:1

## 2022-02-16 NOTE — PROVIDER CONTACT NOTE (OTHER) - RECOMMENDATIONS
Haldol given
Placed pt on 1L n/c for comfort. Call dr to come and see patient as pt seems to be stable, although physically appearing unstable.

## 2022-02-16 NOTE — PROGRESS NOTE ADULT - SUBJECTIVE AND OBJECTIVE BOX
Patient seen and examined:  Pleasant this am, no acute   issues overnight. Tolerating  po and Voiding well.  02/16 Patient was agitated earlier today, patient though he was in his house and people were invading his house. now more calm . Sleeping     VITALS:  Vital Signs Last 24 Hrs  T(C): 36.7 (15 Feb 2022 21:04), Max: 36.9 (15 Feb 2022 14:38)  T(F): 98.1 (15 Feb 2022 21:04), Max: 98.4 (15 Feb 2022 14:38)  HR: 76 (15 Feb 2022 21:04) (76 - 77)  BP: 134/62 (15 Feb 2022 21:04) (110/68 - 134/62)  BP(mean): --  RR: 18 (15 Feb 2022 21:04) (18 - 18)  SpO2: 100% (15 Feb 2022 21:04) (100% - 100%)  PHYSICAL EXAM:  GEN: NAD  HEENT:  pupils equal and reactive, EOMI, no oropharyngeal lesions, erythema, exudates, oral thrush  NECK:   supple, no carotid bruits, no palpable lymph nodes, no thyromegaly  CV:  +S1, +S2, irregular, no murmurs or rubs  RESP:   lungs clear to auscultation bilaterally, no wheezing, rales, rhonchi, good air entry bilaterally  BREAST:  not examined  GI:  abdomen soft, non-tender, non-distended, normal BS, no bruits, no abdominal masses, no palpable masses  RECTAL:  not examined  :  not examined  MSK:   normal muscle tone, no atrophy, no rigidity, no contractions  EXT:  no clubbing, no cyanosis, no edema, no calf pain, swelling or erythema  VASCULAR:  pulses equal and symmetric in the upper and lower extremities  NEURO:  AAOX1-2, no focal neurological deficits, follows all commands, able to move extremities spontaneously  SKIN:  no ulcers, lesions or rashes  02-15    140  |  106  |  25<H>  ----------------------------<  92  4.9   |  30  |  1.11    Ca    9.2      15 Feb 2022 08:14    TPro  6.9  /  Alb  2.9<L>  /  TBili  0.4  /  DBili  x   /  AST  16  /  ALT  14  /  AlkPhos  67  02-15                          13.1   5.31  )-----------( 276      ( 15 Feb 2022 08:14 )             41.3     LIVER FUNCTIONS - ( 15 Feb 2022 08:14 )  Alb: 2.9 g/dL / Pro: 6.9 gm/dL / ALK PHOS: 67 U/L / ALT: 14 U/L / AST: 16 U/L / GGT: x             < from: CT Brain Stroke Protocol (02.10.22 @ 21:52) >  IMPRESSION:  No acute intracranial hemorrhage or mass effect.    Results were discussed with Dr. Newberry by Dr. Montemayor at 9:58 PM on   2/10/2022 with read back followed.    < end of copied text >  < from: CT Head No Cont (02.09.22 @ 16:05) >  IMPRESSION:    No acute intracranial findings.    --- End of Report ---    < end of copied text >  < from: Xray Chest 1 View- PORTABLE-Urgent (Xray Chest 1 View- PORTABLE-Urgent .) (02.09.22 @ 17:05) >  MEDICATIONS  (STANDING):  aspirin  chewable 81 milliGRAM(s) Oral daily  atorvastatin 20 milliGRAM(s) Oral at bedtime  cyanocobalamin 1000 MICROGram(s) Oral daily  folic acid 1 milliGRAM(s) Oral daily  metoprolol tartrate 12.5 milliGRAM(s) Oral two times a day  multivitamin 1 Tablet(s) Oral daily  risperiDONE   Tablet 0.5 milliGRAM(s) Oral at bedtime  tamsulosin 0.4 milliGRAM(s) Oral at bedtime  thiamine 100 milliGRAM(s) Oral daily    MEDICATIONS  (PRN):  haloperidol    Injectable 2 milliGRAM(s) IntraMuscular every 6 hours PRN Agitation  LORazepam   Injectable 2 milliGRAM(s) IntraMuscular every 2 hours PRN Symptom-triggered: 2 point increase in CIWA -Ar score and a total score of 7 or LESS  LORazepam   Injectable 2 milliGRAM(s) IntraMuscular every 1 hour PRN Symptom-triggered: each CIWA -Ar score 8 or GREATER  melatonin 3 milliGRAM(s) Oral at bedtime PRN Insomnia      IMPRESSION: Clear lungs at this time.    < end of copied text >   Patient seen and examined:  Pleasant this am, no acute   issues overnight. Tolerating  po and Voiding well.  02/16 As per Nurse AID , patient did not sleep well. Patient was agitated earlier today, patient though he was in his house and people were invading his house. now more calm . Sleeping     VITALS:  Vital Signs Last 24 Hrs  T(C): 36.7 (15 Feb 2022 21:04), Max: 36.9 (15 Feb 2022 14:38)  T(F): 98.1 (15 Feb 2022 21:04), Max: 98.4 (15 Feb 2022 14:38)  HR: 76 (15 Feb 2022 21:04) (76 - 77)  BP: 134/62 (15 Feb 2022 21:04) (110/68 - 134/62)  BP(mean): --  RR: 18 (15 Feb 2022 21:04) (18 - 18)  SpO2: 100% (15 Feb 2022 21:04) (100% - 100%)  PHYSICAL EXAM:  GEN: NAD  HEENT:  pupils equal and reactive, EOMI, no oropharyngeal lesions, erythema, exudates, oral thrush  NECK:   supple, no carotid bruits, no palpable lymph nodes, no thyromegaly  CV:  +S1, +S2, irregular, no murmurs or rubs  RESP:   lungs clear to auscultation bilaterally, no wheezing, rales, rhonchi, good air entry bilaterally  BREAST:  not examined  GI:  abdomen soft, non-tender, non-distended, normal BS, no bruits, no abdominal masses, no palpable masses  RECTAL:  not examined  :  not examined  MSK:   normal muscle tone, no atrophy, no rigidity, no contractions  EXT:  no clubbing, no cyanosis, no edema, no calf pain, swelling or erythema  VASCULAR:  pulses equal and symmetric in the upper and lower extremities  NEURO:  AAOX1-2, no focal neurological deficits, follows all commands, able to move extremities spontaneously  SKIN:  no ulcers, lesions or rashes  02-15    140  |  106  |  25<H>  ----------------------------<  92  4.9   |  30  |  1.11    Ca    9.2      15 Feb 2022 08:14    TPro  6.9  /  Alb  2.9<L>  /  TBili  0.4  /  DBili  x   /  AST  16  /  ALT  14  /  AlkPhos  67  02-15                          13.1   5.31  )-----------( 276      ( 15 Feb 2022 08:14 )             41.3     LIVER FUNCTIONS - ( 15 Feb 2022 08:14 )  Alb: 2.9 g/dL / Pro: 6.9 gm/dL / ALK PHOS: 67 U/L / ALT: 14 U/L / AST: 16 U/L / GGT: x             < from: CT Brain Stroke Protocol (02.10.22 @ 21:52) >  IMPRESSION:  No acute intracranial hemorrhage or mass effect.    Results were discussed with Dr. Newberry by Dr. Montemayor at 9:58 PM on   2/10/2022 with read back followed.    < end of copied text >  < from: CT Head No Cont (02.09.22 @ 16:05) >  IMPRESSION:    No acute intracranial findings.    --- End of Report ---    < end of copied text >  < from: Xray Chest 1 View- PORTABLE-Urgent (Xray Chest 1 View- PORTABLE-Urgent .) (02.09.22 @ 17:05) >  MEDICATIONS  (STANDING):  aspirin  chewable 81 milliGRAM(s) Oral daily  atorvastatin 20 milliGRAM(s) Oral at bedtime  cyanocobalamin 1000 MICROGram(s) Oral daily  folic acid 1 milliGRAM(s) Oral daily  metoprolol tartrate 12.5 milliGRAM(s) Oral two times a day  multivitamin 1 Tablet(s) Oral daily  risperiDONE   Tablet 0.5 milliGRAM(s) Oral at bedtime  tamsulosin 0.4 milliGRAM(s) Oral at bedtime  thiamine 100 milliGRAM(s) Oral daily    MEDICATIONS  (PRN):  haloperidol    Injectable 2 milliGRAM(s) IntraMuscular every 6 hours PRN Agitation  LORazepam   Injectable 2 milliGRAM(s) IntraMuscular every 2 hours PRN Symptom-triggered: 2 point increase in CIWA -Ar score and a total score of 7 or LESS  LORazepam   Injectable 2 milliGRAM(s) IntraMuscular every 1 hour PRN Symptom-triggered: each CIWA -Ar score 8 or GREATER  melatonin 3 milliGRAM(s) Oral at bedtime PRN Insomnia      IMPRESSION: Clear lungs at this time.    < end of copied text >

## 2022-02-17 PROCEDURE — 99232 SBSQ HOSP IP/OBS MODERATE 35: CPT

## 2022-02-17 RX ORDER — QUETIAPINE FUMARATE 200 MG/1
50 TABLET, FILM COATED ORAL AT BEDTIME
Refills: 0 | Status: DISCONTINUED | OUTPATIENT
Start: 2022-02-17 | End: 2022-02-21

## 2022-02-17 RX ADMIN — Medication 12.5 MILLIGRAM(S): at 09:34

## 2022-02-17 RX ADMIN — Medication 1 TABLET(S): at 09:35

## 2022-02-17 RX ADMIN — TAMSULOSIN HYDROCHLORIDE 0.4 MILLIGRAM(S): 0.4 CAPSULE ORAL at 21:52

## 2022-02-17 RX ADMIN — ATORVASTATIN CALCIUM 20 MILLIGRAM(S): 80 TABLET, FILM COATED ORAL at 21:53

## 2022-02-17 RX ADMIN — PREGABALIN 1000 MICROGRAM(S): 225 CAPSULE ORAL at 09:35

## 2022-02-17 RX ADMIN — Medication 1 MILLIGRAM(S): at 09:37

## 2022-02-17 RX ADMIN — QUETIAPINE FUMARATE 50 MILLIGRAM(S): 200 TABLET, FILM COATED ORAL at 21:53

## 2022-02-17 RX ADMIN — Medication 3 MILLIGRAM(S): at 21:53

## 2022-02-17 RX ADMIN — Medication 12.5 MILLIGRAM(S): at 21:53

## 2022-02-17 RX ADMIN — Medication 81 MILLIGRAM(S): at 09:36

## 2022-02-17 RX ADMIN — Medication 100 MILLIGRAM(S): at 09:37

## 2022-02-17 RX ADMIN — HALOPERIDOL DECANOATE 2 MILLIGRAM(S): 100 INJECTION INTRAMUSCULAR at 03:51

## 2022-02-17 NOTE — PROGRESS NOTE BEHAVIORAL HEALTH - SUMMARY
Pt is a 76 YOWAA  man, who resides with wife, and who denies any psych hx in the past, admitted with A MS stared on CIWA. Pt denies depressed mood, denies insomnia, appetite problems SI, HI, denies AH, and VH. He spontaneous  talks about some people coming to his home and setting some instruments and wires so that eh can see and hear what is happening in his neighbor's houses.    Pt admits to drinking, but minimizes it and states  he drinks only on the weekend,   He is  and resides with wife and grandson.  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  PT presents with UTI and delirium, delusional, prior to coming to hospital acting upon delusional thoughts.  Suggest Risperdal 0.25 mg at HS for delusions, but primary treatment team checked io to Seroquel 25 mg po QHS.   melatonin 3 mg PO PRN insomnia  Tried to call wife, but non-working number.   EEG ordered but cancelled by performing dpt. PT is encephalopathic and , EEG would be confirming that if performed.   I would suggest to obtain  EEG.  Dementia workup so far negative, NO finding on CT, TSH and Vit B12 are normal. Pt came with UTI and EtOH abuse.  Lisa supplement B12 PO  2/17/22 : EEG suggest metabolic encephalopathy   Increase Seroquel to 50mg at HS to adders nighttime agitation.

## 2022-02-17 NOTE — PROGRESS NOTE ADULT - SUBJECTIVE AND OBJECTIVE BOX
Patient seen and examined:  Pleasant this am, no acute   issues overnight. Tolerating  po and Voiding well.  02/16 As per Nurse AID , patient did not sleep well. Patient was agitated earlier today, patient though he was in his house and people were invading his house. now more calm . Sleeping   02/17 Patient more calm today   VITALS:  Vital Signs Last 24 Hrs  T(C): 36.6 (17 Feb 2022 07:55), Max: 36.6 (16 Feb 2022 22:22)  T(F): 97.8 (17 Feb 2022 07:55), Max: 97.9 (16 Feb 2022 22:22)  HR: 90 (17 Feb 2022 07:55) (90 - 92)  BP: 125/60 (17 Feb 2022 07:55) (124/56 - 125/60)  BP(mean): --  RR: 20 (17 Feb 2022 07:55) (18 - 20)  SpO2: 98% (17 Feb 2022 07:55) (98% - 100%)  PHYSICAL EXAM:  GEN: NAD  HEENT:  pupils equal and reactive, EOMI, no oropharyngeal lesions, erythema, exudates, oral thrush  NECK:   supple, no carotid bruits, no palpable lymph nodes, no thyromegaly  CV:  +S1, +S2, irregular, no murmurs or rubs  RESP:   lungs clear to auscultation bilaterally, no wheezing, rales, rhonchi, good air entry bilaterally  BREAST:  not examined  GI:  abdomen soft, non-tender, non-distended, normal BS, no bruits, no abdominal masses, no palpable masses  RECTAL:  not examined  :  not examined  MSK:   normal muscle tone, no atrophy, no rigidity, no contractions  EXT:  no clubbing, no cyanosis, no edema, no calf pain, swelling or erythema  VASCULAR:  pulses equal and symmetric in the upper and lower extremities  NEURO:  AAOX1-2, no focal neurological deficits, follows all commands, able to move extremities spontaneously  SKIN:  no ulcers, lesions or rashes    < from: CT Brain Stroke Protocol (02.10.22 @ 21:52) >  IMPRESSION:  No acute intracranial hemorrhage or mass effect.    Results were discussed with Dr. Newberry by Dr. Montemayor at 9:58 PM on   2/10/2022 with read back followed.    < end of copied text >  < from: CT Head No Cont (02.09.22 @ 16:05) >  IMPRESSION:    No acute intracranial findings.        < MEDICATIONS  (STANDING):  aspirin  chewable 81 milliGRAM(s) Oral daily  atorvastatin 20 milliGRAM(s) Oral at bedtime  cyanocobalamin 1000 MICROGram(s) Oral daily  cyanocobalamin 1000 MICROGram(s) Oral daily  folic acid 1 milliGRAM(s) Oral daily  metoprolol tartrate 12.5 milliGRAM(s) Oral two times a day  multivitamin 1 Tablet(s) Oral daily  QUEtiapine 25 milliGRAM(s) Oral at bedtime  tamsulosin 0.4 milliGRAM(s) Oral at bedtime  thiamine 100 milliGRAM(s) Oral daily    MEDICATIONS  (PRN):  haloperidol    Injectable 2 milliGRAM(s) IntraMuscular every 6 hours PRN Agitation  LORazepam   Injectable 2 milliGRAM(s) IntraMuscular every 2 hours PRN Symptom-triggered: 2 point increase in CIWA -Ar score and a total score of 7 or LESS  LORazepam   Injectable 2 milliGRAM(s) IntraMuscular every 1 hour PRN Symptom-triggered: each CIWA -Ar score 8 or GREATER  melatonin 3 milliGRAM(s) Oral at bedtime PRN Insomnia

## 2022-02-17 NOTE — PROGRESS NOTE ADULT - ASSESSMENT
Patient is 77yo M PMH of CABG 16 years ago, reports he has not seen a doctor "in a very long time", admitted to hospital for paranoia 2/2 metabolic encephalopathy 2/2 UTI vs EtOH withdrawal. Patient receiving IV ceftriaxone with improvement in mental status.  Patient with acute episode of agitation and unresponsiveness overnight, rapid response called, head CT obtained, no acute changes, seen and evaluated by neurology. This morning patient awoke with another episode of agitation and paranoia, 2mg IM ativan administered, concern for psychosis vs metabolic encephalopathy. Failed attempt to contact family, phone number not in service. Patient seen and cleared by psych on 02/10, recs for Risperdal at bedtime, plan to have psych reeval 2/2 two acute episodes of agitation. Continue IV ceftriaxone and CIWAs.     #AMS Confusion Auditory/visual hallucinations with paranoia rule out Psychiatric disorder  multifactorial History of ETOH abuse  , ?Dementia  - Patient having some delusion /Confabulation   - UA culture negative, DC Rocephin  - Blood cultures negative to date  - CT head negative for acute pathology x 2   - U-tox negative  - DC CIWA- No sign of withdrawal t ETOH related  - Risperdal ordered as per psych  - TSH, B12 and RPR negative  - Ativan and Haldol PRN  - Psych reconsult   02/16 Patient was agitated today.   02/17 patient more calm today , lindsey problem sleeping ;last night     # BPH  - Started Flomax 0.4    #H/o CABG, Afib  - ASA 81 only at this point due to agitation, encephalopathy and fall risk  - Chadsvasc of 4, Once evaluated by PT and Psych FU, consider eliquis.  - Compliance with medication will be an issue due to cognitive status  - Atorvastatin   - lopressor 12.5 bid  - Avoiding Full dose lovenox for AC coverage due to agitation, especially    with injections  - PT Eval     # DVT px:   - ambulation/SCDs     Disposition: Tried reaching out to family however number not in service. Patient states his wife is in Rehab, but does not remember Which facility she is . unable to provide a phone    Social work to fu.  Patient will likely  [facility] pending PT eval and psych reevaluation. Currently on 1:1

## 2022-02-17 NOTE — PROGRESS NOTE BEHAVIORAL HEALTH - NSBHCHARTREVIEWLAB_PSY_A_CORE FT
13.1   5.31  )-----------( 276      ( 15 Feb 2022 08:14 )             41.3   02-15    140  |  106  |  25<H>  ----------------------------<  92  4.9   |  30  |  1.11    Ca    9.2      15 Feb 2022 08:14    TPro  6.9  /  Alb  2.9<L>  /  TBili  0.4  /  DBili  x   /  AST  16  /  ALT  14  /  AlkPhos  67  02-15    Thyroid Stimulating Hormone, Serum (02.11.22 @ 11:54)    Thyroid Stimulating Hormone, Serum: 1.57 uU/mL  Vitamin B12, Serum (02.11.22 @ 11:54)    Vitamin B12, Serum: 332 pg/mL on low normal side.
13.1   5.31  )-----------( 276      ( 15 Feb 2022 08:14 )             41.3   02-15    140  |  106  |  25<H>  ----------------------------<  92  4.9   |  30  |  1.11    Ca    9.2      15 Feb 2022 08:14    TPro  6.9  /  Alb  2.9<L>  /  TBili  0.4  /  DBili  x   /  AST  16  /  ALT  14  /  AlkPhos  67  02-15    Thyroid Stimulating Hormone, Serum (02.11.22 @ 11:54)    Thyroid Stimulating Hormone, Serum: 1.57 uU/mL  Vitamin B12, Serum (02.11.22 @ 11:54)    Vitamin B12, Serum: 332 pg/mL on low normal side.
13.6   4.70  )-----------( 268      ( 09 Feb 2022 14:39 )             44.2   02-10    142  |  108  |  16  ----------------------------<  90  3.8   |  32<H>  |  1.02    Ca    9.0      10 Feb 2022 08:50  Phos  3.6     02-10  Mg     2.0     02-10    TPro  6.3  /  Alb  2.8<L>  /  TBili  0.7  /  DBili  0.2  /  AST  17  /  ALT  14  /  AlkPhos  63  02-10

## 2022-02-18 PROCEDURE — 99232 SBSQ HOSP IP/OBS MODERATE 35: CPT

## 2022-02-18 RX ORDER — POLYETHYLENE GLYCOL 3350 17 G/17G
17 POWDER, FOR SOLUTION ORAL
Refills: 0 | Status: COMPLETED | OUTPATIENT
Start: 2022-02-18 | End: 2022-02-20

## 2022-02-18 RX ADMIN — ATORVASTATIN CALCIUM 20 MILLIGRAM(S): 80 TABLET, FILM COATED ORAL at 21:55

## 2022-02-18 RX ADMIN — PREGABALIN 1000 MICROGRAM(S): 225 CAPSULE ORAL at 09:19

## 2022-02-18 RX ADMIN — Medication 100 MILLIGRAM(S): at 09:18

## 2022-02-18 RX ADMIN — TAMSULOSIN HYDROCHLORIDE 0.4 MILLIGRAM(S): 0.4 CAPSULE ORAL at 21:55

## 2022-02-18 RX ADMIN — Medication 1 MILLIGRAM(S): at 09:18

## 2022-02-18 RX ADMIN — Medication 81 MILLIGRAM(S): at 09:18

## 2022-02-18 RX ADMIN — Medication 12.5 MILLIGRAM(S): at 09:18

## 2022-02-18 RX ADMIN — POLYETHYLENE GLYCOL 3350 17 GRAM(S): 17 POWDER, FOR SOLUTION ORAL at 22:13

## 2022-02-18 RX ADMIN — QUETIAPINE FUMARATE 50 MILLIGRAM(S): 200 TABLET, FILM COATED ORAL at 21:55

## 2022-02-18 RX ADMIN — Medication 1 TABLET(S): at 09:18

## 2022-02-18 RX ADMIN — Medication 12.5 MILLIGRAM(S): at 22:03

## 2022-02-18 NOTE — PROGRESS NOTE BEHAVIORAL HEALTH - NSBHCONSULTRECOMMENDOTHER_PSY_A_CORE FT
side effects and benefits discussed  with pt.

## 2022-02-18 NOTE — PROGRESS NOTE ADULT - ASSESSMENT
Patient is 75yo M PMH of CABG 16 years ago, reports he has not seen a doctor "in a very long time", admitted to hospital for paranoia 2/2 metabolic encephalopathy 2/2 UTI vs EtOH withdrawal. Patient receiving IV ceftriaxone with improvement in mental status.  Patient with acute episode of agitation and unresponsiveness overnight, rapid response called, head CT obtained, no acute changes, seen and evaluated by neurology. This morning patient awoke with another episode of agitation and paranoia, 2mg IM ativan administered, concern for psychosis vs metabolic encephalopathy. Failed attempt to contact family, phone number not in service. Patient seen and cleared by psych on 02/10, recs for Risperdal at bedtime, plan to have psych reeval 2/2 two acute episodes of agitation. Continue IV ceftriaxone and CIWAs.     #AMS Confusion Auditory/visual hallucinations with paranoia rule out Psychiatric disorder  multifactorial   - Acute Delirium on admission   - History of ETOH use as per patient on weekends   -Dementia  - Patient having some delusion /Confabulation , asking for envelopes to return  check to [place]   - UA culture negative, DC Rocephin  - Blood cultures negative to date  - CT head negative for acute pathology x 2   - U-tox negative  - DC CIWA- No sign of withdrawal t ETOH related  - Risperdal ordered as per psych  - TSH, B12 and RPR negative  - Ativan and Haldol PRN  - Psych reconsult   02/16 Patient was agitated today.   02/17 patient more calm today , some problem sleeping ;last night , increased Seroquel to 50mg   02/18 Patient calm today, Sleep wel last nigh , SW Was able to speak to Some Family Members     # BPH  - Started Flomax 0.4    #H/o CABG, Afib  - ASA 81 only at this point due to agitation, encephalopathy and fall risk  - Chadsvasc of 4, Once evaluated by PT and Psych FU, consider eliquis.  - Compliance with medication will be an issue due to cognitive status  - Atorvastatin   - lopressor 12.5 bid  - Avoiding Full dose lovenox for AC coverage due to agitation, especially    with injections    # DVT px:   - ambulation/SCDs     Disposition: Tried reaching out to family however number not in service.   Patient states his wife is in Rehab, but does not remember Which facility she is . unable to provide a phone    Social work to fu.    Patient will likely DC to  [facility] vs Home if we are able  to find safe discharge/ Family Support    Consulted

## 2022-02-18 NOTE — PROGRESS NOTE BEHAVIORAL HEALTH - NSBHCONSULTMEDSLEEP_PSY_A_CORE FT
Melatonin 3 mg PRN insomnia

## 2022-02-18 NOTE — PROGRESS NOTE BEHAVIORAL HEALTH - NSBHCHARTREVIEWIMAGING_PSY_A_CORE FT
INTERPRETATION:  Exam Date: 2/9/2022 4:05 PM    CT head without IV contrast    CLINICAL INFORMATION:  paranoid at home    TECHNIQUE: Contiguous axial sections were obtained through the head.     Coronal and sagittal reformats were obtained.    COMPARISON: CT head 9/5/2006    FINDINGS:    There is no evidence of intraparenchymal or extraaxial hemorrhage.     There is no CT evidence of large vessel acute infarct. No mass effect is   found in the brain.  No evidence of midline shift or herniation pattern.    The ventricles, sulci and basal cisterns appear unremarkable.    Visualized paranasal sinuses are clear.    IMPRESSION:    No acute intracranial findings.
PROCEDURE DATE:  02/09/2022          INTERPRETATION:  Exam Date: 2/9/2022 4:05 PM    CT head without IV contrast    CLINICAL INFORMATION:  paranoid at home    TECHNIQUE: Contiguous axial sections were obtained through the head.     Coronal and sagittal reformats were obtained.    COMPARISON: CT head 9/5/2006    FINDINGS:    There is no evidence of intraparenchymal or extraaxial hemorrhage.     There is no CT evidence of large vessel acute infarct. No mass effect is   found in the brain.  No evidence of midline shift or herniation pattern.    The ventricles, sulci and basal cisterns appear unremarkable.    Visualized paranasal sinuses are clear.    IMPRESSION:    No acute intracranial findings.
INTERPRETATION:  Exam Date: 2/9/2022 4:05 PM    CT head without IV contrast    CLINICAL INFORMATION:  paranoid at home    TECHNIQUE: Contiguous axial sections were obtained through the head.     Coronal and sagittal reformats were obtained.    COMPARISON: CT head 9/5/2006    FINDINGS:    There is no evidence of intraparenchymal or extraaxial hemorrhage.     There is no CT evidence of large vessel acute infarct. No mass effect is   found in the brain.  No evidence of midline shift or herniation pattern.    The ventricles, sulci and basal cisterns appear unremarkable.    Visualized paranasal sinuses are clear.    IMPRESSION:    No acute intracranial findings.

## 2022-02-18 NOTE — PROGRESS NOTE ADULT - SUBJECTIVE AND OBJECTIVE BOX
Patient seen and examined:  Pleasant this am, no acute   issues overnight. Tolerating diet po and Voiding well.  02/16 As per Nurse AID , patient did not sleep well. Patient was agitated earlier today, patient though he was in his house and people were invading his house. now more calm . Sleeping   02/17 Patient more calm today   02/18 Patient is calm today, was able to sleep last night   VITALS:  PHYSICAL EXAM:  Vital Signs Last 24 Hrs  T(C): 37.2 (02-18-22 @ 08:04), Max: 37.2 (02-18-22 @ 08:04)  T(F): 99 (02-18-22 @ 08:04), Max: 99 (02-18-22 @ 08:04)  HR: 81 (02-18-22 @ 08:04) (74 - 85)  BP: 121/74 (02-18-22 @ 08:04) (121/74 - 134/55)  BP(mean): 75 (02-17-22 @ 21:49) (75 - 75)  RR: 18 (02-18-22 @ 08:04) (17 - 20)  SpO2: 99% (02-18-22 @ 08:04) (98% - 100%)  GEN: NAD  HEENT:  pupils equal and reactive, EOMI, no oropharyngeal lesions, erythema, exudates, oral thrush  NECK:   supple, no carotid bruits, no palpable lymph nodes, no thyromegaly  CV:  +S1, +S2, irregular, no murmurs or rubs  RESP:   lungs clear to auscultation bilaterally, no wheezing, rales, rhonchi, good air entry bilaterally  BREAST:  not examined  GI:  abdomen soft, non-tender, non-distended, normal BS, no bruits, no abdominal masses, no palpable masses  RECTAL:  not examined  :  not examined  MSK:   normal muscle tone, no atrophy, no rigidity, no contractions  EXT:  no clubbing, no cyanosis, no edema, no calf pain, swelling or erythema  VASCULAR:  pulses equal and symmetric in the upper and lower extremities  NEURO:  AAOX1-2, no focal neurological deficits, follows all commands, able to move extremities spontaneously  SKIN:  no ulcers, lesions or rashes      < from: CT Brain Stroke Protocol (02.10.22 @ 21:52) >  IMPRESSION:  No acute intracranial hemorrhage or mass effect.    Results were discussed with Dr. Newberry by Dr. Montemayor at 9:58 PM on   2/10/2022 with read back followed.    < end of copied text >  < from: CT Head No Cont (02.09.22 @ 16:05) >  IMPRESSION:    No acute intracranial findings.  MEDICATIONS  (STANDING):  aspirin  chewable 81 milliGRAM(s) Oral daily  atorvastatin 20 milliGRAM(s) Oral at bedtime  cyanocobalamin 1000 MICROGram(s) Oral daily  cyanocobalamin 1000 MICROGram(s) Oral daily  folic acid 1 milliGRAM(s) Oral daily  metoprolol tartrate 12.5 milliGRAM(s) Oral two times a day  multivitamin 1 Tablet(s) Oral daily  QUEtiapine 50 milliGRAM(s) Oral at bedtime  tamsulosin 0.4 milliGRAM(s) Oral at bedtime  thiamine 100 milliGRAM(s) Oral daily    MEDICATIONS  (PRN):  haloperidol    Injectable 2 milliGRAM(s) IntraMuscular every 6 hours PRN Agitation  melatonin 3 milliGRAM(s) Oral at bedtime PRN Insomnia

## 2022-02-18 NOTE — PROGRESS NOTE BEHAVIORAL HEALTH - NSBHCHARTREVIEWINVESTIGATE_PSY_A_CORE FT
EEG Summary/Classification:  This was an abnormal EEG study due to the presence of:  -Mild generalized slowing  -Excess beta activity.    EEG Clinical Correlate/  Impression:  The findings are suggestive of diffuse cerebral   dysfunction/encephalopathy.  Excess beta activity is likely a medication effect which can be seen with   benzodiazepines and barbiturates.  No epileptiform activity was seen and no clinical events or seizures were   recorded. Consider a repeat study if clinically indicated.
EEG Summary/Classification:  This was an abnormal EEG study due to the presence of:  -Mild generalized slowing  -Excess beta activity.    EEG Clinical Correlate/  Impression:  The findings are suggestive of diffuse cerebral   dysfunction/encephalopathy.  Excess beta activity is likely a medication effect which can be seen with   benzodiazepines and barbiturates.  No epileptiform activity was seen and no clinical events or seizures were   recorded. Consider a repeat study if clinically indicated.
Ventricular Rate 86 BPM    Atrial Rate 86 BPM    P-R Interval 256 ms    QRS Duration 98 ms    Q-T Interval 404 ms    QTC Calculation(Bazett) 483 ms    P Axis 84 degrees    R Axis 80 degrees    T Axis 90 degrees    Diagnosis Line Sinus rhythm with 1st degree A-V block  Anteroseptal infarct (cited on or before 09-SEP-2012)  Abnormal ECG  When compared with ECG of 09-FEB-2022 20:46,  Sinus rhythm has replaced Atrial fibrillation  ST elevation now present in Anterior leads

## 2022-02-18 NOTE — PROGRESS NOTE BEHAVIORAL HEALTH - SUMMARY
Pt is a 76 YOWAA  man, who resides with wife, and who denies any psych hx in the past, admitted with A MS stared on CIWA. Pt denies depressed mood, denies insomnia, appetite problems SI, HI, denies AH, and VH. He spontaneous  talks about some people coming to his home and setting some instruments and wires so that eh can see and hear what is happening in his neighbor's houses.    Pt admits to drinking, but minimizes it and states  he drinks only on the weekend,   He is  and resides with wife and grandson.  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  PT presents with UTI and delirium, delusional, prior to coming to hospital acting upon delusional thoughts.  Suggest Risperdal 0.25 mg at HS for delusions, but primary treatment team checked io to Seroquel 25 mg po QHS.   melatonin 3 mg PO PRN insomnia  Tried to call wife, but non-working number.   EEG ordered but cancelled by performing dpt. PT is encephalopathic and , EEG would be confirming that if performed.   I would suggest to obtain  EEG.  Dementia workup so far negative, NO finding on CT, TSH and Vit B12 are normal. Pt came with UTI and EtOH abuse.  Lisa supplement B12 PO  2/17/22 : EEG suggest metabolic encephalopathy   Increase Seroquel to 50mg at HS to adders nighttime agitation.  2/18/2022 Collaterals: grandson of pt-s significant other - Michael Bucio [phone] - left a message   Further clarification of pt-s cognitive  baseline is needed to determine  pre-hospital presence of cognitive impairment. PT si with resoling delirium, but stili confused . Appears to be confabulating.   Underlying  dementia can not be exclude and it is very likely that pt has itr. .  PT has microvascular disease.  PT is tolerating well increase of Seroquel.

## 2022-02-18 NOTE — PROGRESS NOTE BEHAVIORAL HEALTH - NSBHCONSULTFOLLOWAFTERCARE_PSY_A_CORE FT
PT can be refereed to [facility] for f/u geriatric psychiatrist Dr Akers.
PT can be refereed to [facility] for f/u

## 2022-02-19 PROCEDURE — 99232 SBSQ HOSP IP/OBS MODERATE 35: CPT

## 2022-02-19 RX ORDER — MIRTAZAPINE 45 MG/1
15 TABLET, ORALLY DISINTEGRATING ORAL DAILY
Refills: 0 | Status: DISCONTINUED | OUTPATIENT
Start: 2022-02-19 | End: 2022-02-21

## 2022-02-19 RX ADMIN — Medication 12.5 MILLIGRAM(S): at 10:26

## 2022-02-19 RX ADMIN — POLYETHYLENE GLYCOL 3350 17 GRAM(S): 17 POWDER, FOR SOLUTION ORAL at 10:26

## 2022-02-19 RX ADMIN — QUETIAPINE FUMARATE 50 MILLIGRAM(S): 200 TABLET, FILM COATED ORAL at 20:39

## 2022-02-19 RX ADMIN — ATORVASTATIN CALCIUM 20 MILLIGRAM(S): 80 TABLET, FILM COATED ORAL at 20:39

## 2022-02-19 RX ADMIN — Medication 100 MILLIGRAM(S): at 10:24

## 2022-02-19 RX ADMIN — PREGABALIN 1000 MICROGRAM(S): 225 CAPSULE ORAL at 10:25

## 2022-02-19 RX ADMIN — MIRTAZAPINE 15 MILLIGRAM(S): 45 TABLET, ORALLY DISINTEGRATING ORAL at 14:27

## 2022-02-19 RX ADMIN — Medication 12.5 MILLIGRAM(S): at 20:39

## 2022-02-19 RX ADMIN — Medication 1 MILLIGRAM(S): at 10:26

## 2022-02-19 RX ADMIN — TAMSULOSIN HYDROCHLORIDE 0.4 MILLIGRAM(S): 0.4 CAPSULE ORAL at 20:39

## 2022-02-19 RX ADMIN — Medication 1 TABLET(S): at 10:25

## 2022-02-19 RX ADMIN — Medication 81 MILLIGRAM(S): at 10:25

## 2022-02-19 RX ADMIN — PREGABALIN 1000 MICROGRAM(S): 225 CAPSULE ORAL at 10:26

## 2022-02-19 NOTE — PROGRESS NOTE BEHAVIORAL HEALTH - SUMMARY
This is a 75 yo, AA, , male, non-caregiver, domiciles with wife; with a history of alcohol abuse, no formal past psychiatric history; per record, was admitted with  altered mental status; was placed on a CIWA protocol. 1:1 staff in place for safety precaution.    On assessment patient is calm, but notably talkative. He is alert but ot oriented to his location, time, place or situation. He states he is my friend; however, we've never met before. He states he is not sure where he is, but states "I am here because they need help". However, he could not say who need his help or what type of help was needed of him. Out of context, he states "this is my store, I had an incident yesterday; I want to sit down, eat and get fat". He is not able to respond appropriately to any of the assessment questions except to say "good" when he was asked about his sleep and his appetite. His thought process and concentration are disorganized and notably impaired. He is maintained on a 1:1 observation for safety precaution. 1:1 Staff Eduard Gamboa at bedside during this assessment.    PLAN:  1. Psychiatry will follow-up.  2. May continue Seroquel 50mg PO at bedtime. (Patient tolerating well).  3. May continue on Haldol 2mg PO Q 6 hours as needed for agitation.  4. Continue 1:1 observation for safety precaution given notable disorientation. This is a 77 yo, AA, , male, non-caregiver, domiciles with wife; with a history of alcohol abuse, no formal past psychiatric history; per record, was admitted with  altered mental status; was placed on a CIWA protocol. 1:1 staff in place for safety precaution.    On assessment patient is calm, but notably talkative. He is alert but ot oriented to his location, time, place or situation. He states he is my friend; however, we've never met before. He states he is not sure where he is, but states "I am here because they need help". However, he could not say who need his help or what type of help was needed of him. Out of context, he states "this is my store, I had an incident yesterday; I want to sit down, eat and get fat". He is not able to respond appropriately to any of the assessment questions except to say "good" when he was asked about his sleep and his appetite. His thought process and concentration are disorganized and notably impaired. He is maintained on a 1:1 observation for safety precaution. 1:1 Staff Eduard Gamboa at bedside during this assessment.     Spoke to patient's nurse James who had no acute safety concerns to report and noted that patient has not been aggressive or agitated. Will continue current meds as prescribed.    PLAN:  1. Psychiatry will follow-up.  2. May continue Seroquel 50mg PO at bedtime. (Patient tolerating well).  3. May continue on Haldol 2mg PO Q 6 hours as needed for agitation.  4. Continue 1:1 observation for safety precaution given notable disorientation.  5. Psychiatry will follow-up.

## 2022-02-19 NOTE — PROGRESS NOTE BEHAVIORAL HEALTH - NSBHFUPINTERVALHXFT_PSY_A_CORE
PT is comfortable in his bed, slept better, no reported agitation. Tolerating Seroquel very well.   Pt continues to  be  confused, somewhat  improving. Today he reads the date from the wall. he knows he id in hospital but the name Is "[facility]".   N0 suicidal or homicidal behavior. Pt denies SI, HI, AH, Vh he does not remembers disturbing his neighbourhood prior to coming to hospital.
Patient in bed, alert, no orientation to place, oriented to self, knows his , endorses that he takes meds as prescribed. Denied current S/H/I/P, has fair sleep/appetite, was seen watching TV, As per RN covering he did not sleep last night and has poor orientation, was also somewhat agitated. Discussed the option of increasing the Risperdal to 0.5 mg HS for .0.25 mg
This is a 75 yo, AA, , male, non-caregiver, domiciles with wife; no formal past psychiatric history; per record, was admitted with  altered mental status; was placed on a CIWA protocol. 1:1 staff in place for safety precaution.    On assessment patient is calm, but notably talkative. He is alert but ot oriented to his location, time, place or situation. He states he is my friend; however, we've never met before. He states he is not sure where he is, but states "I am here because they need help". However, he could not say who need his help or what type of help was needed of him. Out of context, he states "this is my store, I had an incident yesterday; I want to sit down, eat and get fat". He is not able to respond appropriately to any of the assessment questions except to say "good" when he was asked about his sleep and his appetite. His thought process and concentration are disorganized and notably impaired. He is maintained on a 1:1 observation for safety precaution. 1:1 Staff Eduard Gamboa at bedside during this assessment.
Patient in bed, alert, no orientation to place, time and situation, oriented to self. He is smiling and making jokes. When asked what is the month he responded "One with F in it".  Denied current S/H/I/P,  As per RN did not sleep last night, was confused and combative, code gray was called 3 x.   EEG not performed.
Pt had visitor today . His neighbor Mr Yariel Cabrera phone [phone] .  He states he knows  the pt as a neighbor. He is a   and he lives with somebody.  Pt state it is his wife and grandson.   Pt continues to  be  confused, but improving. Today he states if is february 13, 2022. He coud not say the place. He recognizes his neighbor and knows his name .   NO suicidal or homicidal behavior. Per CSW code rox was called last night.

## 2022-02-19 NOTE — PROGRESS NOTE BEHAVIORAL HEALTH - NSBHFUPINTERVALCCFT_PSY_A_CORE
I am good
MY sister .gregg in [place]
"I am your friend, I came here to give you the courage you have"
" I'm in somewhere "
I am fine

## 2022-02-19 NOTE — PROGRESS NOTE BEHAVIORAL HEALTH - RISK ASSESSMENT
Low risk for suicide: NO SI, pt presents delirious,   Low long term risk: no hx fo SA per psych  hospitalization.
Low risk for suicide
Low risk for suicide: NO SI, pt presents delirious,   Low long term risk: no hx fo SA per psych  hospitalization.
Low risk for suicide: NO SI, pt presents delirious,   Low long term risk: no hx fo SA per psych  hospitalization.
Elevated risk for accidental self harm given current confusion.

## 2022-02-19 NOTE — PROGRESS NOTE BEHAVIORAL HEALTH - NSBHCHARTREVIEWVS_PSY_A_CORE FT
Vital Signs Last 24 Hrs  T(C): 36.6 (19 Feb 2022 15:30), Max: 37 (18 Feb 2022 21:55)  T(F): 97.9 (19 Feb 2022 15:30), Max: 98.6 (18 Feb 2022 21:55)  HR: 90 (19 Feb 2022 15:30) (84 - 108)  BP: 136/68 (19 Feb 2022 15:30) (116/76 - 166/73)  BP(mean): 84 (19 Feb 2022 15:30) (84 - 84)  RR: 19 (19 Feb 2022 15:30) (19 - 20)  SpO2: 100% (19 Feb 2022 15:30) (95% - 100%)
Vital Signs Last 24 Hrs  T(C): 36.7 (15 Feb 2022 21:04), Max: 36.9 (15 Feb 2022 14:38)  T(F): 98.1 (15 Feb 2022 21:04), Max: 98.4 (15 Feb 2022 14:38)  HR: 102 (16 Feb 2022 08:58) (76 - 102)  BP: 148/73 (16 Feb 2022 08:58) (110/68 - 148/73)  BP(mean): --  RR: 18 (16 Feb 2022 08:58) (18 - 18)  SpO2: 100% (16 Feb 2022 08:58) (100% - 100%)
Vital Signs Last 24 Hrs  T(C): 37.2 (18 Feb 2022 08:04), Max: 37.2 (18 Feb 2022 08:04)  T(F): 99 (18 Feb 2022 08:04), Max: 99 (18 Feb 2022 08:04)  HR: 81 (18 Feb 2022 08:04) (74 - 85)  BP: 121/74 (18 Feb 2022 08:04) (121/74 - 134/55)  BP(mean): 75 (17 Feb 2022 21:49) (75 - 75)  RR: 18 (18 Feb 2022 08:04) (17 - 20)  SpO2: 99% (18 Feb 2022 08:04) (98% - 100%)
Vital Signs Last 24 Hrs  T(C): 36.7 (15 Feb 2022 21:04), Max: 36.9 (15 Feb 2022 14:38)  T(F): 98.1 (15 Feb 2022 21:04), Max: 98.4 (15 Feb 2022 14:38)  HR: 102 (16 Feb 2022 08:58) (76 - 102)  BP: 148/73 (16 Feb 2022 08:58) (110/68 - 148/73)  BP(mean): --  RR: 18 (16 Feb 2022 08:58) (18 - 18)  SpO2: 100% (16 Feb 2022 08:58) (100% - 100%)
Vital Signs Last 24 Hrs  T(C): 36.4 (13 Feb 2022 07:53), Max: 36.9 (12 Feb 2022 23:45)  T(F): 97.5 (13 Feb 2022 07:53), Max: 98.5 (12 Feb 2022 23:45)  HR: 100 (13 Feb 2022 07:53) (100 - 104)  BP: 151/76 (13 Feb 2022 07:53) (136/67 - 151/76)  BP(mean): --  RR: 18 (13 Feb 2022 07:53) (18 - 18)  SpO2: 99% (13 Feb 2022 07:53) (99% - 99%)

## 2022-02-19 NOTE — PROGRESS NOTE BEHAVIORAL HEALTH - NSBHCONSULTOBSREASON_PSY_A_CORE FT
Safety precaution secondary to confusion.
Low acute risk

## 2022-02-19 NOTE — PROGRESS NOTE BEHAVIORAL HEALTH - THOUGHT PROCESS
Illogical/Impaired reasoning/Other
Illogical/Impaired reasoning
Illogical/Impaired reasoning/Disorganized

## 2022-02-19 NOTE — PROGRESS NOTE BEHAVIORAL HEALTH - NSBHCONSULTOBS_PSY_A_CORE
Constant observation
Routine observation

## 2022-02-19 NOTE — PROGRESS NOTE BEHAVIORAL HEALTH - NSBHCONSFOLLOWNEEDS_PSY_A_CORE
no psychiatric contraindications to discharge
Patient needs further psychiatric safety assessment prior to discharge
no psychiatric contraindications to discharge

## 2022-02-19 NOTE — PROGRESS NOTE BEHAVIORAL HEALTH - ORIENTATION OTHER
Partially oriented in time and place: February 9th, Wednesday, 2022" [facility] in [place]"
Partially oriented in time and place: February 9th, Wednesday, 2022" [facility] in [place]"
Partially oriented in time and place

## 2022-02-19 NOTE — PROGRESS NOTE ADULT - SUBJECTIVE AND OBJECTIVE BOX
Cheif complaints and Diagnosis:    Subjective:       REVIEW OF SYSTEMS:    CONSTITUTIONAL: No weakness, fevers or chills  EYES/ENT: No visual changes;  No vertigo or throat pain   NECK: No pain or stiffness  RESPIRATORY: No cough, wheezing, hemoptysis; No shortness of breath  CARDIOVASCULAR: No chest pain or palpitations  GASTROINTESTINAL: No abdominal or epigastric pain. No nausea, vomiting, or hematemesis; No diarrhea or constipation. No melena or hematochezia.  GENITOURINARY: No dysuria, frequency or hematuria  NEUROLOGICAL: No numbness or weakness  SKIN: No itching, burning, rashes, or lesions   All other review of systems is negative unless indicated above      Vital Signs Last 24 Hrs  T(C): 36.6 (19 Feb 2022 09:28), Max: 37 (18 Feb 2022 21:55)  T(F): 97.8 (19 Feb 2022 09:28), Max: 98.6 (18 Feb 2022 21:55)  HR: 108 (19 Feb 2022 09:28) (72 - 108)  BP: 116/76 (19 Feb 2022 09:28) (97/57 - 166/73)  BP(mean): --  RR: 20 (19 Feb 2022 09:28) (17 - 20)  SpO2: 95% (19 Feb 2022 09:28) (95% - 100%)    HEENT:   pupils equal and reactive, EOMI, no oropharyngeal lesions, erythema, exudates, oral thrush    NECK:   supple, no carotid bruits, no palpable lymph nodes, no thyromegaly    CV:  +S1, +S2, regular, no murmurs or rubs    RESP:   lungs clear to auscultation bilaterally, no wheezing, rales, rhonchi, good air entry bilaterally    BREAST:  not examined    GI:  abdomen soft, non-tender, non-distended, normal BS, no bruits, no abdominal masses, no palpable masses    RECTAL:  not examined    :  not examined    MSK:   normal muscle tone, no atrophy, no rigidity, no contractions    EXT:   no clubbing, no cyanosis, no edema, no calf pain, swelling or erythema    VASCULAR:  pulses equal and symmetric in the upper and lower extremities    NEURO:  AAOX3, no focal neurological deficits, follows all commands, able to move extremities spontaneously    SKIN:  no ulcers, lesions or rashes    MEDICATIONS  (STANDING):  aspirin  chewable 81 milliGRAM(s) Oral daily  atorvastatin 20 milliGRAM(s) Oral at bedtime  cyanocobalamin 1000 MICROGram(s) Oral daily  cyanocobalamin 1000 MICROGram(s) Oral daily  folic acid 1 milliGRAM(s) Oral daily  metoprolol tartrate 12.5 milliGRAM(s) Oral two times a day  multivitamin 1 Tablet(s) Oral daily  polyethylene glycol 3350 17 Gram(s) Oral two times a day  QUEtiapine 50 milliGRAM(s) Oral at bedtime  tamsulosin 0.4 milliGRAM(s) Oral at bedtime  thiamine 100 milliGRAM(s) Oral daily    MEDICATIONS  (PRN):  haloperidol    Injectable 2 milliGRAM(s) IntraMuscular every 6 hours PRN Agitation  melatonin 3 milliGRAM(s) Oral at bedtime PRN Insomnia              Assessment and Plan:     Patient is 75yo M PMH of CABG 16 years ago, reports he has not seen a doctor "in a very long time", admitted to hospital for paranoia 2/2 metabolic encephalopathy 2/2 UTI vs EtOH withdrawal. Patient receiving IV ceftriaxone with improvement in mental status.  Patient with acute episode of agitation and unresponsiveness overnight, rapid response called, head CT obtained, no acute changes, seen and evaluated by neurology. This morning patient awoke with another episode of agitation and paranoia, 2mg IM ativan administered, concern for psychosis vs metabolic encephalopathy. Failed attempt to contact family, phone number not in service. Patient seen and cleared by psych on 02/10, recs for Risperdal at bedtime, plan to have psych reeval 2/2 two acute episodes of agitation. Continue IV ceftriaxone and CIWAs.     #AMS Confusion Auditory/visual hallucinations with paranoia rule out Psychiatric disorder  multifactorial   - Acute Delirium on admission   - History of ETOH use as per patient on weekends   -Dementia  - Patient having some delusion /Confabulation , asking for envelopes to return  check to [place]   - UA culture negative, DC Rocephin  - Blood cultures negative to date  - CT head negative for acute pathology x 2   - U-tox negative  - DC CIWA- No sign of withdrawal t ETOH related  - Risperdal ordered as per psych  - TSH, B12 and RPR negative  - Ativan and Haldol PRN  - Psych reconsult   02/16 Patient was agitated today.   02/17 patient more calm today , some problem sleeping ;last night , increased Seroquel to 50mg   02/18 Patient calm today, Sleep wel last nigh , SW Was able to speak to Some Family Members     # BPH  - Started Flomax 0.4    #H/o CABG, Afib  - ASA 81 only at this point due to agitation, encephalopathy and fall risk  - Chadsvasc of 4, Once evaluated by PT and Psych FU, consider eliquis.  - Compliance with medication will be an issue due to cognitive status  - Atorvastatin   - lopressor 12.5 bid  - Avoiding Full dose lovenox for AC coverage due to agitation, especially    with injections    # DVT px:   - ambulation/SCDs     Disposition: Tried reaching out to family however number not in service.   Patient states his wife is in Rehab, but does not remember Which facility she is . unable to provide a phone    Social work to fu.    Patient will likely DC to  [facility] vs Home if we are able  to find safe discharge/ Family Support    Consulted    Cheif complaints and Diagnosis: AMS    Subjective: no complaints      REVIEW OF SYSTEMS:    CONSTITUTIONAL: No weakness, fevers or chills  EYES/ENT: No visual changes;  No vertigo or throat pain   NECK: No pain or stiffness  RESPIRATORY: No cough, wheezing, hemoptysis; No shortness of breath  CARDIOVASCULAR: No chest pain or palpitations  GASTROINTESTINAL: No abdominal or epigastric pain. No nausea, vomiting, or hematemesis; No diarrhea or constipation. No melena or hematochezia.  GENITOURINARY: No dysuria, frequency or hematuria  NEUROLOGICAL: No numbness or weakness  SKIN: No itching, burning, rashes, or lesions   All other review of systems is negative unless indicated above      Vital Signs Last 24 Hrs  T(C): 36.6 (19 Feb 2022 09:28), Max: 37 (18 Feb 2022 21:55)  T(F): 97.8 (19 Feb 2022 09:28), Max: 98.6 (18 Feb 2022 21:55)  HR: 108 (19 Feb 2022 09:28) (72 - 108)  BP: 116/76 (19 Feb 2022 09:28) (97/57 - 166/73)  BP(mean): --  RR: 20 (19 Feb 2022 09:28) (17 - 20)  SpO2: 95% (19 Feb 2022 09:28) (95% - 100%)    HEENT:   pupils equal and reactive, EOMI, no oropharyngeal lesions, erythema, exudates, oral thrush    NECK:   supple, no carotid bruits, no palpable lymph nodes, no thyromegaly    CV:  +S1, +S2, regular, no murmurs or rubs    RESP:   lungs clear to auscultation bilaterally, no wheezing, rales, rhonchi, good air entry bilaterally    BREAST:  not examined    GI:  abdomen soft, non-tender, non-distended, normal BS, no bruits, no abdominal masses, no palpable masses    RECTAL:  not examined    :  not examined    MSK:   normal muscle tone, no atrophy, no rigidity, no contractions    EXT:   no clubbing, no cyanosis, no edema, no calf pain, swelling or erythema    VASCULAR:  pulses equal and symmetric in the upper and lower extremities    NEURO:  AAOX3, no focal neurological deficits, follows all commands, able to move extremities spontaneously    SKIN:  no ulcers, lesions or rashes    MEDICATIONS  (STANDING):  aspirin  chewable 81 milliGRAM(s) Oral daily  atorvastatin 20 milliGRAM(s) Oral at bedtime  cyanocobalamin 1000 MICROGram(s) Oral daily  cyanocobalamin 1000 MICROGram(s) Oral daily  folic acid 1 milliGRAM(s) Oral daily  metoprolol tartrate 12.5 milliGRAM(s) Oral two times a day  multivitamin 1 Tablet(s) Oral daily  polyethylene glycol 3350 17 Gram(s) Oral two times a day  QUEtiapine 50 milliGRAM(s) Oral at bedtime  tamsulosin 0.4 milliGRAM(s) Oral at bedtime  thiamine 100 milliGRAM(s) Oral daily    MEDICATIONS  (PRN):  haloperidol    Injectable 2 milliGRAM(s) IntraMuscular every 6 hours PRN Agitation  melatonin 3 milliGRAM(s) Oral at bedtime PRN Insomnia              Assessment and Plan:     Patient is 77yo M PMH of CABG 16 years ago, reports he has not seen a doctor "in a very long time", admitted to hospital for paranoia 2/2 metabolic encephalopathy 2/2 UTI vs EtOH withdrawal. Patient receiving IV ceftriaxone with improvement in mental status.  Patient with acute episode of agitation and unresponsiveness overnight, rapid response called, head CT obtained, no acute changes, seen and evaluated by neurology. This morning patient awoke with another episode of agitation and paranoia, 2mg IM ativan administered, concern for psychosis vs metabolic encephalopathy. Failed attempt to contact family, phone number not in service. Patient seen and cleared by psych on 02/10, recs for Risperdal at bedtime, plan to have psych reeval 2/2 two acute episodes of agitation. Continue IV ceftriaxone and CIWAs.     #AMS Confusion Auditory/visual hallucinations with paranoia rule out Psychiatric disorder  multifactorial   - Acute Delirium on admission   - History of ETOH use as per patient on weekends   -Dementia  - Patient having some delusion /Confabulation , asking for envelopes to return  check to [place]   - UA culture negative, DC Rocephin  - Blood cultures negative to date  - CT head negative for acute pathology x 2   - U-tox negative  - DC CIWA- No sign of withdrawal t ETOH related  - TSH, B12 and RPR negative  - Haldol PRN  -Seroquel standing, doing well on this; Start remeron during the day  - Psych reconsult   02/16 Patient was agitated today.   02/17 patient more calm today , some problem sleeping ;last night , increased Seroquel to 50mg   02/18 Patient calm today, Sleep wel last nigh , SW Was able to speak to Some Family Members     # BPH  - Started Flomax 0.4    #H/o CABG, Afib  - ASA 81 only at this point due to agitation, encephalopathy and fall risk  - Chadsvasc of 4, Once evaluated by PT and Psych FU, consider eliquis.  - Compliance with medication will be an issue due to cognitive status  - Atorvastatin   - lopressor 12.5 bid  - Avoiding Full dose lovenox for AC coverage due to agitation, especially    with injections    # DVT px:   - ambulation/SCDs     Disposition: Tried reaching out to family however number not in service.   Patient states his wife is in Rehab, but does not remember Which facility she is . unable to provide a phone    Social work to fu.    Patient will likely DC to  [facility] vs Home if we are able  to find safe discharge/ Family Support    Consulted

## 2022-02-20 PROCEDURE — 99232 SBSQ HOSP IP/OBS MODERATE 35: CPT

## 2022-02-20 RX ADMIN — QUETIAPINE FUMARATE 50 MILLIGRAM(S): 200 TABLET, FILM COATED ORAL at 21:03

## 2022-02-20 RX ADMIN — MIRTAZAPINE 15 MILLIGRAM(S): 45 TABLET, ORALLY DISINTEGRATING ORAL at 10:43

## 2022-02-20 RX ADMIN — TAMSULOSIN HYDROCHLORIDE 0.4 MILLIGRAM(S): 0.4 CAPSULE ORAL at 21:03

## 2022-02-20 RX ADMIN — Medication 81 MILLIGRAM(S): at 10:44

## 2022-02-20 RX ADMIN — Medication 12.5 MILLIGRAM(S): at 21:03

## 2022-02-20 RX ADMIN — HALOPERIDOL DECANOATE 2 MILLIGRAM(S): 100 INJECTION INTRAMUSCULAR at 22:31

## 2022-02-20 RX ADMIN — Medication 1 TABLET(S): at 10:44

## 2022-02-20 RX ADMIN — Medication 12.5 MILLIGRAM(S): at 10:44

## 2022-02-20 RX ADMIN — Medication 100 MILLIGRAM(S): at 10:44

## 2022-02-20 RX ADMIN — PREGABALIN 1000 MICROGRAM(S): 225 CAPSULE ORAL at 13:14

## 2022-02-20 RX ADMIN — Medication 1 MILLIGRAM(S): at 10:43

## 2022-02-20 RX ADMIN — Medication 3 MILLIGRAM(S): at 21:03

## 2022-02-20 RX ADMIN — ATORVASTATIN CALCIUM 20 MILLIGRAM(S): 80 TABLET, FILM COATED ORAL at 21:03

## 2022-02-20 NOTE — PROGRESS NOTE ADULT - SUBJECTIVE AND OBJECTIVE BOX
[facility] complaints and Diagnosis:  AMS/ Dementia    Subjective: no complaints      REVIEW OF SYSTEMS:    CONSTITUTIONAL: No weakness, fevers or chills  EYES/ENT: No visual changes;  No vertigo or throat pain   NECK: No pain or stiffness  RESPIRATORY: No cough, wheezing, hemoptysis; No shortness of breath  CARDIOVASCULAR: No chest pain or palpitations  GASTROINTESTINAL: No abdominal or epigastric pain. No nausea, vomiting, or hematemesis; No diarrhea or constipation. No melena or hematochezia.  GENITOURINARY: No dysuria, frequency or hematuria  NEUROLOGICAL: No numbness or weakness  SKIN: No itching, burning, rashes, or lesions   All other review of systems is negative unless indicated above      Vital Signs Last 24 Hrs  T(C): 36.7 (20 Feb 2022 07:53), Max: 36.7 (19 Feb 2022 23:49)  T(F): 98.1 (20 Feb 2022 07:53), Max: 98.1 (19 Feb 2022 23:49)  HR: 98 (20 Feb 2022 07:53) (90 - 108)  BP: 107/50 (20 Feb 2022 07:53) (107/50 - 136/68)  BP(mean): 84 (19 Feb 2022 15:30) (84 - 84)  RR: 18 (20 Feb 2022 07:53) (18 - 20)  SpO2: 100% (20 Feb 2022 07:53) (95% - 100%)    HEENT:   pupils equal and reactive, EOMI, no oropharyngeal lesions, erythema, exudates, oral thrush    NECK:   supple, no carotid bruits, no palpable lymph nodes, no thyromegaly    CV:  +S1, +S2, regular, no murmurs or rubs    RESP:   lungs clear to auscultation bilaterally, no wheezing, rales, rhonchi, good air entry bilaterally    BREAST:  not examined    GI:  abdomen soft, non-tender, non-distended, normal BS, no bruits, no abdominal masses, no palpable masses    RECTAL:  not examined    :  not examined    MSK:   normal muscle tone, no atrophy, no rigidity, no contractions    EXT:   no clubbing, no cyanosis, no edema, no calf pain, swelling or erythema    VASCULAR:  pulses equal and symmetric in the upper and lower extremities    NEURO:  AAOX3, no focal neurological deficits, follows all commands, able to move extremities spontaneously    SKIN:  no ulcers, lesions or rashes    MEDICATIONS  (STANDING):  aspirin  chewable 81 milliGRAM(s) Oral daily  atorvastatin 20 milliGRAM(s) Oral at bedtime  cyanocobalamin 1000 MICROGram(s) Oral daily  cyanocobalamin 1000 MICROGram(s) Oral daily  folic acid 1 milliGRAM(s) Oral daily  metoprolol tartrate 12.5 milliGRAM(s) Oral two times a day  mirtazapine 15 milliGRAM(s) Oral daily  multivitamin 1 Tablet(s) Oral daily  polyethylene glycol 3350 17 Gram(s) Oral two times a day  QUEtiapine 50 milliGRAM(s) Oral at bedtime  tamsulosin 0.4 milliGRAM(s) Oral at bedtime  thiamine 100 milliGRAM(s) Oral daily    MEDICATIONS  (PRN):  haloperidol    Injectable 2 milliGRAM(s) IntraMuscular every 6 hours PRN Agitation  melatonin 3 milliGRAM(s) Oral at bedtime PRN Insomnia            Assessment and Plan:     Patient is 77yo M PMH of CABG 16 years ago, reports he has not seen a doctor "in a very long time", admitted to hospital for paranoia 2/2 metabolic encephalopathy 2/2 UTI vs EtOH withdrawal. Patient receiving IV ceftriaxone with improvement in mental status.  Patient with acute episode of agitation and unresponsiveness overnight, rapid response called, head CT obtained, no acute changes, seen and evaluated by neurology. This morning patient awoke with another episode of agitation and paranoia, 2mg IM ativan administered, concern for psychosis vs metabolic encephalopathy. Failed attempt to contact family, phone number not in service. Patient seen and cleared by psych on 02/10, recs for Risperdal at bedtime, plan to have psych reeval 2/2 two acute episodes of agitation. Continue IV ceftriaxone and CIWAs.     #AMS Confusion Auditory/visual hallucinations with paranoia rule out Psychiatric disorder  multifactorial   - Acute Delirium on admission   - History of ETOH use as per patient on weekends   -Dementia  - Patient having some delusion /Confabulation , asking for envelopes to return  check to [place]   - UA culture negative, DC Rocephin  - Blood cultures negative to date  - CT head negative for acute pathology x 2   - U-tox negative  - DC CIWA- No sign of withdrawal t ETOH related  - TSH, B12 and RPR negative  - Haldol PRN  -Seroquel standing, doing well on this; Start remeron during the day  - Psych reconsult   02/16 Patient was agitated today.   02/17 patient more calm today , some problem sleeping ;last night , increased Seroquel to 50mg   02/18 Patient calm today, Sleep wel last nigh , SW Was able to speak to Some Family Members     # BPH  - Started Flomax 0.4    #H/o CABG, Afib  - ASA 81 only at this point due to agitation, encephalopathy and fall risk  - Chadsvasc of 4, Once evaluated by PT and Psych FU, consider eliquis.  - Compliance with medication will be an issue due to cognitive status  - Atorvastatin   - lopressor 12.5 bid  - Avoiding Full dose lovenox for AC coverage due to agitation, especially    with injections    # DVT px:   - ambulation/SCDs     Disposition: Tried reaching out to family however number not in service.   Patient states his wife is in Rehab, but does not remember Which facility she is . unable to provide a phone    Social work to fu.    Patient will likely DC to  [facility] vs Home if we are able  to find safe discharge/ Family Support    Consulted

## 2022-02-21 PROCEDURE — 99232 SBSQ HOSP IP/OBS MODERATE 35: CPT

## 2022-02-21 RX ORDER — MIRTAZAPINE 45 MG/1
15 TABLET, ORALLY DISINTEGRATING ORAL DAILY
Refills: 0 | Status: DISCONTINUED | OUTPATIENT
Start: 2022-02-22 | End: 2022-02-22

## 2022-02-21 RX ORDER — QUETIAPINE FUMARATE 200 MG/1
50 TABLET, FILM COATED ORAL DAILY
Refills: 0 | Status: DISCONTINUED | OUTPATIENT
Start: 2022-02-21 | End: 2022-02-22

## 2022-02-21 RX ORDER — TRAZODONE HCL 50 MG
25 TABLET ORAL DAILY
Refills: 0 | Status: DISCONTINUED | OUTPATIENT
Start: 2022-02-21 | End: 2022-02-22

## 2022-02-21 RX ORDER — HALOPERIDOL DECANOATE 100 MG/ML
2 INJECTION INTRAMUSCULAR EVERY 6 HOURS
Refills: 0 | Status: DISCONTINUED | OUTPATIENT
Start: 2022-02-21 | End: 2022-02-22

## 2022-02-21 RX ORDER — ACETAMINOPHEN 500 MG
650 TABLET ORAL ONCE
Refills: 0 | Status: COMPLETED | OUTPATIENT
Start: 2022-02-21 | End: 2022-02-21

## 2022-02-21 RX ADMIN — Medication 1 MILLIGRAM(S): at 10:12

## 2022-02-21 RX ADMIN — Medication 12.5 MILLIGRAM(S): at 10:12

## 2022-02-21 RX ADMIN — Medication 650 MILLIGRAM(S): at 01:21

## 2022-02-21 RX ADMIN — Medication 100 MILLIGRAM(S): at 10:12

## 2022-02-21 RX ADMIN — PREGABALIN 1000 MICROGRAM(S): 225 CAPSULE ORAL at 10:12

## 2022-02-21 RX ADMIN — Medication 650 MILLIGRAM(S): at 00:51

## 2022-02-21 RX ADMIN — Medication 3 MILLIGRAM(S): at 21:40

## 2022-02-21 RX ADMIN — Medication 1 TABLET(S): at 10:12

## 2022-02-21 RX ADMIN — MIRTAZAPINE 15 MILLIGRAM(S): 45 TABLET, ORALLY DISINTEGRATING ORAL at 10:12

## 2022-02-21 RX ADMIN — QUETIAPINE FUMARATE 50 MILLIGRAM(S): 200 TABLET, FILM COATED ORAL at 18:00

## 2022-02-21 RX ADMIN — Medication 25 MILLIGRAM(S): at 18:00

## 2022-02-21 RX ADMIN — ATORVASTATIN CALCIUM 20 MILLIGRAM(S): 80 TABLET, FILM COATED ORAL at 21:39

## 2022-02-21 RX ADMIN — Medication 12.5 MILLIGRAM(S): at 21:39

## 2022-02-21 RX ADMIN — TAMSULOSIN HYDROCHLORIDE 0.4 MILLIGRAM(S): 0.4 CAPSULE ORAL at 21:39

## 2022-02-21 RX ADMIN — HALOPERIDOL DECANOATE 2 MILLIGRAM(S): 100 INJECTION INTRAMUSCULAR at 19:41

## 2022-02-21 RX ADMIN — Medication 81 MILLIGRAM(S): at 10:12

## 2022-02-21 NOTE — PROGRESS NOTE ADULT - NUTRITIONAL ASSESSMENT
This patient has been assessed with a concern for Malnutrition and has been determined to have a diagnosis/diagnoses of Severe protein-calorie malnutrition.    This patient is being managed with:   Diet Regular-  Entered: Feb 11 2022  2:51PM    
This patient has been assessed with a concern for Malnutrition and has been determined to have a diagnosis/diagnoses of Severe protein-calorie malnutrition.    This patient is being managed with:   Diet DASH/TLC-  Sodium & Cholesterol Restricted  Entered: Feb 9 2022  5:18PM    The following pending diet order is being considered for treatment of Severe protein-calorie malnutrition:  Diet Regular-  Entered: Feb 11 2022  2:51PM  
This patient has been assessed with a concern for Malnutrition and has been determined to have a diagnosis/diagnoses of Severe protein-calorie malnutrition.    This patient is being managed with:   Diet DASH/TLC-  Sodium & Cholesterol Restricted  Entered: Feb 9 2022  5:18PM    The following pending diet order is being considered for treatment of Severe protein-calorie malnutrition:  Diet Regular-  Entered: Feb 11 2022  2:51PM  
This patient has been assessed with a concern for Malnutrition and has been determined to have a diagnosis/diagnoses of Severe protein-calorie malnutrition.    This patient is being managed with:   Diet Regular-  Entered: Feb 11 2022  2:51PM    
This patient has been assessed with a concern for Malnutrition and has been determined to have a diagnosis/diagnoses of Severe protein-calorie malnutrition.    This patient is being managed with:   Diet Regular-  Entered: Feb 11 2022  2:51PM

## 2022-02-21 NOTE — PROGRESS NOTE ADULT - PROVIDER SPECIALTY LIST ADULT
Hospitalist
Neurology
Hospitalist

## 2022-02-21 NOTE — PROGRESS NOTE ADULT - REASON FOR ADMISSION
kashifa

## 2022-02-21 NOTE — PROGRESS NOTE ADULT - SUBJECTIVE AND OBJECTIVE BOX
[facility] complaints and Diagnosis: agitation    Subjective: no complaints, calm, cooperative; alert and oriented      REVIEW OF SYSTEMS:    CONSTITUTIONAL: No weakness, fevers or chills  EYES/ENT: No visual changes;  No vertigo or throat pain   NECK: No pain or stiffness  RESPIRATORY: No cough, wheezing, hemoptysis; No shortness of breath  CARDIOVASCULAR: No chest pain or palpitations  GASTROINTESTINAL: No abdominal or epigastric pain. No nausea, vomiting, or hematemesis; No diarrhea or constipation. No melena or hematochezia.  GENITOURINARY: No dysuria, frequency or hematuria  NEUROLOGICAL: No numbness or weakness  SKIN: No itching, burning, rashes, or lesions   All other review of systems is negative unless indicated above      Vital Signs Last 24 Hrs  T(C): 36.4 (21 Feb 2022 07:19), Max: 36.7 (20 Feb 2022 15:00)  T(F): 97.5 (21 Feb 2022 07:19), Max: 98 (20 Feb 2022 15:00)  HR: 103 (21 Feb 2022 10:15) (98 - 115)  BP: 116/54 (21 Feb 2022 10:15) (96/79 - 137/95)  BP(mean): --  RR: 18 (21 Feb 2022 07:19) (18 - 18)  SpO2: 100% (21 Feb 2022 07:19) (95% - 100%)    HEENT:   pupils equal and reactive, EOMI, no oropharyngeal lesions, erythema, exudates, oral thrush    NECK:   supple, no carotid bruits, no palpable lymph nodes, no thyromegaly    CV:  +S1, +S2, regular, no murmurs or rubs    RESP:   lungs clear to auscultation bilaterally, no wheezing, rales, rhonchi, good air entry bilaterally    BREAST:  not examined    GI:  abdomen soft, non-tender, non-distended, normal BS, no bruits, no abdominal masses, no palpable masses    RECTAL:  not examined    :  not examined    MSK:   normal muscle tone, no atrophy, no rigidity, no contractions    EXT:   no clubbing, no cyanosis, no edema, no calf pain, swelling or erythema    VASCULAR:  pulses equal and symmetric in the upper and lower extremities    NEURO:  AAOX3, no focal neurological deficits, follows all commands, able to move extremities spontaneously    SKIN:  no ulcers, lesions or rashes    MEDICATIONS  (STANDING):  aspirin  chewable 81 milliGRAM(s) Oral daily  atorvastatin 20 milliGRAM(s) Oral at bedtime  cyanocobalamin 1000 MICROGram(s) Oral daily  cyanocobalamin 1000 MICROGram(s) Oral daily  folic acid 1 milliGRAM(s) Oral daily  metoprolol tartrate 12.5 milliGRAM(s) Oral two times a day  mirtazapine 15 milliGRAM(s) Oral daily  multivitamin 1 Tablet(s) Oral daily  QUEtiapine 50 milliGRAM(s) Oral at bedtime  tamsulosin 0.4 milliGRAM(s) Oral at bedtime  thiamine 100 milliGRAM(s) Oral daily    MEDICATIONS  (PRN):  haloperidol    Injectable 2 milliGRAM(s) IntraMuscular every 6 hours PRN Agitation  melatonin 3 milliGRAM(s) Oral at bedtime PRN Insomnia            Assessment and Plan:     Patient is 77yo M PMH of CABG 16 years ago, reports he has not seen a doctor "in a very long time", admitted to hospital for paranoia 2/2 metabolic encephalopathy 2/2 UTI vs EtOH withdrawal. Patient receiving IV ceftriaxone with improvement in mental status.  Patient with acute episode of agitation and unresponsiveness overnight, rapid response called, head CT obtained, no acute changes, seen and evaluated by neurology. This morning patient awoke with another episode of agitation and paranoia, 2mg IM ativan administered, concern for psychosis vs metabolic encephalopathy. Failed attempt to contact family, phone number not in service. Patient seen and cleared by psych on 02/10, recs for Risperdal at bedtime, plan to have psych reeval 2/2 two acute episodes of agitation. Continue IV ceftriaxone and CIWAs.     #AMS Confusion Auditory/visual hallucinations with paranoia rule out Psychiatric disorder  multifactorial   - Acute Delirium on admission   - History of ETOH use as per patient on weekends   -Dementia  - Patient having some delusion /Confabulation , asking for envelopes to return  check to [place]   - UA culture negative, DC Rocephin  - Blood cultures negative to date  - CT head negative for acute pathology x 2   - U-tox negative  - DC CIWA- No sign of withdrawal t ETOH related  - TSH, B12 and RPR negative  - Haldol PRN  -Seroquel standing PM , doing well on this; Start remeron in AM   -start Trazadone for sleep assistance  -patient calm, no complaints; cooperative.         # BPH  - Started Flomax 0.4    #H/o CABG, Afib  - ASA 81 only at this point due to agitation, encephalopathy and fall risk  - Chadsvasc of 4, Once evaluated by PT and Psych FU, consider eliquis.  - Compliance with medication will be an issue due to cognitive status  - Atorvastatin   - lopressor 12.5 bid  - Avoiding Full dose lovenox for AC coverage due to agitation, especially    with injections    # DVT px:   - ambulation/SCDs     Disposition: Tried reaching out to family however number not in service.   Patient states his wife is in Rehab, but does not remember Which facility she is . unable to provide a phone    Social work to fu.    Patient will likely DC to  [facility]

## 2022-02-22 ENCOUNTER — TRANSCRIPTION ENCOUNTER (OUTPATIENT)
Age: 77
End: 2022-02-22

## 2022-02-22 VITALS
HEART RATE: 84 BPM | DIASTOLIC BLOOD PRESSURE: 67 MMHG | SYSTOLIC BLOOD PRESSURE: 128 MMHG | TEMPERATURE: 98 F | OXYGEN SATURATION: 99 % | RESPIRATION RATE: 18 BRPM

## 2022-02-22 LAB — SARS-COV-2 RNA SPEC QL NAA+PROBE: SIGNIFICANT CHANGE UP

## 2022-02-22 PROCEDURE — 99239 HOSP IP/OBS DSCHRG MGMT >30: CPT

## 2022-02-22 RX ORDER — TRAZODONE HCL 50 MG
25 TABLET ORAL
Qty: 0 | Refills: 0 | DISCHARGE
Start: 2022-02-22

## 2022-02-22 RX ORDER — METOPROLOL TARTRATE 50 MG
12.5 TABLET ORAL
Qty: 60 | Refills: 1
Start: 2022-02-22 | End: 2022-04-22

## 2022-02-22 RX ORDER — MIRTAZAPINE 45 MG/1
1 TABLET, ORALLY DISINTEGRATING ORAL
Qty: 30 | Refills: 1
Start: 2022-02-22 | End: 2022-04-22

## 2022-02-22 RX ORDER — TAMSULOSIN HYDROCHLORIDE 0.4 MG/1
1 CAPSULE ORAL
Qty: 30 | Refills: 1
Start: 2022-02-22 | End: 2022-04-22

## 2022-02-22 RX ORDER — ATORVASTATIN CALCIUM 80 MG/1
1 TABLET, FILM COATED ORAL
Qty: 30 | Refills: 1
Start: 2022-02-22 | End: 2022-04-22

## 2022-02-22 RX ORDER — TRAZODONE HCL 50 MG
0.5 TABLET ORAL
Qty: 15 | Refills: 1
Start: 2022-02-22 | End: 2022-04-22

## 2022-02-22 RX ORDER — QUETIAPINE FUMARATE 200 MG/1
1 TABLET, FILM COATED ORAL
Qty: 0 | Refills: 0 | DISCHARGE
Start: 2022-02-22

## 2022-02-22 RX ORDER — QUETIAPINE FUMARATE 200 MG/1
1 TABLET, FILM COATED ORAL
Qty: 30 | Refills: 1
Start: 2022-02-22 | End: 2022-04-22

## 2022-02-22 RX ORDER — ASPIRIN/CALCIUM CARB/MAGNESIUM 324 MG
1 TABLET ORAL
Qty: 0 | Refills: 0 | DISCHARGE
Start: 2022-02-22

## 2022-02-22 RX ORDER — METOPROLOL TARTRATE 50 MG
1 TABLET ORAL
Qty: 30 | Refills: 1
Start: 2022-02-22 | End: 2022-04-22

## 2022-02-22 RX ORDER — ATORVASTATIN CALCIUM 80 MG/1
1 TABLET, FILM COATED ORAL
Qty: 0 | Refills: 0 | DISCHARGE
Start: 2022-02-22

## 2022-02-22 RX ORDER — TRAZODONE HCL 50 MG
25 TABLET ORAL
Qty: 30 | Refills: 1
Start: 2022-02-22 | End: 2022-04-22

## 2022-02-22 RX ORDER — TAMSULOSIN HYDROCHLORIDE 0.4 MG/1
1 CAPSULE ORAL
Qty: 0 | Refills: 0 | DISCHARGE
Start: 2022-02-22

## 2022-02-22 RX ORDER — MIRTAZAPINE 45 MG/1
1 TABLET, ORALLY DISINTEGRATING ORAL
Qty: 0 | Refills: 0 | DISCHARGE
Start: 2022-02-22

## 2022-02-22 RX ADMIN — Medication 1 TABLET(S): at 09:34

## 2022-02-22 RX ADMIN — Medication 25 MILLIGRAM(S): at 17:24

## 2022-02-22 RX ADMIN — Medication 81 MILLIGRAM(S): at 09:34

## 2022-02-22 RX ADMIN — PREGABALIN 1000 MICROGRAM(S): 225 CAPSULE ORAL at 09:36

## 2022-02-22 RX ADMIN — Medication 100 MILLIGRAM(S): at 09:35

## 2022-02-22 RX ADMIN — Medication 12.5 MILLIGRAM(S): at 09:35

## 2022-02-22 RX ADMIN — QUETIAPINE FUMARATE 50 MILLIGRAM(S): 200 TABLET, FILM COATED ORAL at 17:24

## 2022-02-22 RX ADMIN — MIRTAZAPINE 15 MILLIGRAM(S): 45 TABLET, ORALLY DISINTEGRATING ORAL at 09:36

## 2022-02-22 RX ADMIN — Medication 1 MILLIGRAM(S): at 09:34

## 2022-02-22 NOTE — DISCHARGE NOTE PROVIDER - NSDCMRMEDTOKEN_GEN_ALL_CORE_FT
aspirin 81 mg oral tablet, chewable: 1 tab(s) orally once a day  atorvastatin 20 mg oral tablet: 1 tab(s) orally once a day (at bedtime)  metoprolol: 12.5 milligram(s) orally 2 times a day   mirtazapine 15 mg oral tablet: 1 tab(s) orally once a day in the morning  Multiple Vitamins oral tablet: 1 tab(s) orally once a day  QUEtiapine 50 mg oral tablet: 1 tab(s) orally once a day in the evening around 6PM   tamsulosin 0.4 mg oral capsule: 1 cap(s) orally once a day (at bedtime)  traZODone: 25 milligram(s) orally once a day , in evening around 6PM

## 2022-02-22 NOTE — DISCHARGE NOTE PROVIDER - CARE PROVIDER_API CALL
Abhi Isbell  [facility] MEDICINE  [address] [place] [address]  Phone: [phone]  Fax: [phone]  Follow Up Time:

## 2022-02-22 NOTE — DISCHARGE NOTE NURSING/CASE MANAGEMENT/SOCIAL WORK - NSDCPEFALRISK_GEN_ALL_CORE
For information on Fall & Injury Prevention, visit: [email or web address] OR  [email or web address] OR  https://www.cdc.gov/steadi/patient.html

## 2022-02-22 NOTE — DISCHARGE NOTE PROVIDER - NSDCCPCAREPLAN_GEN_ALL_CORE_FT
PRINCIPAL DISCHARGE DIAGNOSIS  Diagnosis: Agitation  Assessment and Plan of Treatment:   *Continue with Remeron in AM  *Continue with Trazadone and Seroquel in evening at aprox 6PM.   *outpatient follow up with Geriatrician      SECONDARY DISCHARGE DIAGNOSES  Diagnosis: Alcohol abuse  Assessment and Plan of Treatment:   *Refrain from all alcohol consumption.

## 2022-02-22 NOTE — DISCHARGE NOTE PROVIDER - DETAILS OF MALNUTRITION DIAGNOSIS/DIAGNOSES
This patient has been assessed with a concern for Malnutrition and was treated during this hospitalization for the following Nutrition diagnosis/diagnoses:     -  02/11/2022: Severe protein-calorie malnutrition

## 2022-02-22 NOTE — DISCHARGE NOTE NURSING/CASE MANAGEMENT/SOCIAL WORK - PATIENT PORTAL LINK FT
You can access the FollowMyHealth Patient Portal offered by [facility] by registering at the following website: [email or web address]. By joining [facility]’s FollowMyHealth portal, you will also be able to view your health information using other applications (apps) compatible with our system.

## 2022-02-22 NOTE — DISCHARGE NOTE NURSING/CASE MANAGEMENT/SOCIAL WORK - NSDCFUADDAPPT_GEN_ALL_CORE_FT
[facility] with Faith Vargas N.P. on Wednesday, 2/23 1:45pm. Address: [address], [address], [place] [address], phone # [phone].

## 2022-02-22 NOTE — DISCHARGE NOTE PROVIDER - NSDCFUADDAPPT_GEN_ALL_CORE_FT
[facility] with Faith Vargas N.P. on Wednesday, 2/23 1:45pm. Address: [address], [address], [place] [address], phone # [phone].

## 2022-02-22 NOTE — DISCHARGE NOTE PROVIDER - HOSPITAL COURSE
Vital Signs Last 24 Hrs  T(C): 36.4 (22 Feb 2022 07:07), Max: 36.9 (21 Feb 2022 15:03)  T(F): 97.5 (22 Feb 2022 07:07), Max: 98.4 (21 Feb 2022 15:03)  HR: 86 (22 Feb 2022 07:07) (86 - 119)  BP: 122/58 (22 Feb 2022 07:07) (122/58 - 137/90)  BP(mean): --  RR: 18 (22 Feb 2022 07:07) (17 - 18)  SpO2: 99% (22 Feb 2022 07:07) (89% - 100%)    HEENT:   pupils equal and reactive, EOMI, no oropharyngeal lesions, erythema, exudates, oral thrush    NECK:   supple, no carotid bruits, no palpable lymph nodes, no thyromegaly    CV:  +S1, +S2, regular, no murmurs or rubs    RESP:   lungs clear to auscultation bilaterally, no wheezing, rales, rhonchi, good air entry bilaterally    BREAST:  not examined    GI:  abdomen soft, non-tender, non-distended, normal BS, no bruits, no abdominal masses, no palpable masses    RECTAL:  not examined    :  not examined    MSK:   normal muscle tone, no atrophy, no rigidity, no contractions    EXT:   no clubbing, no cyanosis, no edema, no calf pain, swelling or erythema    VASCULAR:  pulses equal and symmetric in the upper and lower extremities    NEURO:  AAOX3, no focal neurological deficits, follows all commands, able to move extremities spontaneously    SKIN:  no ulcers, lesions or rashes              Hospital Course:     Patient is 75yo M PMH of CABG 16 years ago, afib (not on AC, Ekg sinus), reports he has not seen a doctor "in a very long time", admitted to hospital for paranoia 2/2 metabolic encephalopathy 2/2 UTI vs EtOH withdrawal.     #AMS Confusion Auditory/visual hallucinations with paranoia rule out Psychiatric disorder  multifactorial   - Acute Delirium on admission , possibly related to etoh with hx of dementia  -patient has intermittent bouts of agitation due to this dementia  -started on Seroquel, Trazadone, Remeron. Doing  much  better on this.   -cleared for discharge to home with family      # BPH  - Started Flomax 0.4

## 2022-02-25 ENCOUNTER — INPATIENT (INPATIENT)
Facility: HOSPITAL | Age: 77
LOS: 3 days | Discharge: HOME | DRG: 313 | End: 2022-03-01
Attending: HOSPITALIST | Admitting: HOSPITALIST
Payer: MEDICARE

## 2022-02-25 VITALS
HEIGHT: 73 IN | SYSTOLIC BLOOD PRESSURE: 150 MMHG | TEMPERATURE: 98 F | WEIGHT: 179.9 LBS | RESPIRATION RATE: 18 BRPM | OXYGEN SATURATION: 95 % | HEART RATE: 89 BPM | DIASTOLIC BLOOD PRESSURE: 72 MMHG

## 2022-02-25 DIAGNOSIS — F10.10 ALCOHOL ABUSE, UNCOMPLICATED: ICD-10-CM

## 2022-02-25 DIAGNOSIS — F22 DELUSIONAL DISORDERS: ICD-10-CM

## 2022-02-25 DIAGNOSIS — I48.91 UNSPECIFIED ATRIAL FIBRILLATION: ICD-10-CM

## 2022-02-25 DIAGNOSIS — F99 MENTAL DISORDER, NOT OTHERWISE SPECIFIED: ICD-10-CM

## 2022-02-25 DIAGNOSIS — G93.41 METABOLIC ENCEPHALOPATHY: ICD-10-CM

## 2022-02-25 DIAGNOSIS — Z95.1 PRESENCE OF AORTOCORONARY BYPASS GRAFT: Chronic | ICD-10-CM

## 2022-02-25 DIAGNOSIS — E43 UNSPECIFIED SEVERE PROTEIN-CALORIE MALNUTRITION: ICD-10-CM

## 2022-02-25 DIAGNOSIS — I10 ESSENTIAL (PRIMARY) HYPERTENSION: ICD-10-CM

## 2022-02-25 DIAGNOSIS — F03.90 UNSPECIFIED DEMENTIA WITHOUT BEHAVIORAL DISTURBANCE: ICD-10-CM

## 2022-02-25 DIAGNOSIS — F05 DELIRIUM DUE TO KNOWN PHYSIOLOGICAL CONDITION: ICD-10-CM

## 2022-02-25 DIAGNOSIS — Z95.1 PRESENCE OF AORTOCORONARY BYPASS GRAFT: ICD-10-CM

## 2022-02-25 DIAGNOSIS — R45.1 RESTLESSNESS AND AGITATION: ICD-10-CM

## 2022-02-25 DIAGNOSIS — N40.0 BENIGN PROSTATIC HYPERPLASIA WITHOUT LOWER URINARY TRACT SYMPTOMS: ICD-10-CM

## 2022-02-25 LAB
ALBUMIN SERPL ELPH-MCNC: 3.3 G/DL — SIGNIFICANT CHANGE UP (ref 3.3–5)
ALP SERPL-CCNC: 78 U/L — SIGNIFICANT CHANGE UP (ref 40–120)
ALT FLD-CCNC: 29 U/L — SIGNIFICANT CHANGE UP (ref 12–78)
ANION GAP SERPL CALC-SCNC: 6 MMOL/L — SIGNIFICANT CHANGE UP (ref 5–17)
APPEARANCE UR: CLEAR — SIGNIFICANT CHANGE UP
APTT BLD: 32.8 SEC — SIGNIFICANT CHANGE UP (ref 27.5–35.5)
AST SERPL-CCNC: 29 U/L — SIGNIFICANT CHANGE UP (ref 15–37)
BASOPHILS # BLD AUTO: 0.07 K/UL — SIGNIFICANT CHANGE UP (ref 0–0.2)
BASOPHILS NFR BLD AUTO: 1 % — SIGNIFICANT CHANGE UP (ref 0–2)
BILIRUB SERPL-MCNC: 0.7 MG/DL — SIGNIFICANT CHANGE UP (ref 0.2–1.2)
BILIRUB UR-MCNC: NEGATIVE — SIGNIFICANT CHANGE UP
BUN SERPL-MCNC: 47 MG/DL — HIGH (ref 7–23)
CALCIUM SERPL-MCNC: 8.8 MG/DL — SIGNIFICANT CHANGE UP (ref 8.5–10.1)
CHLORIDE SERPL-SCNC: 107 MMOL/L — SIGNIFICANT CHANGE UP (ref 96–108)
CO2 SERPL-SCNC: 27 MMOL/L — SIGNIFICANT CHANGE UP (ref 22–31)
COLOR SPEC: YELLOW — SIGNIFICANT CHANGE UP
CREAT SERPL-MCNC: 1.47 MG/DL — HIGH (ref 0.5–1.3)
D DIMER BLD IA.RAPID-MCNC: 556 NG/ML DDU — HIGH
DIFF PNL FLD: NEGATIVE — SIGNIFICANT CHANGE UP
EOSINOPHIL # BLD AUTO: 0.52 K/UL — HIGH (ref 0–0.5)
EOSINOPHIL NFR BLD AUTO: 7.4 % — HIGH (ref 0–6)
GLUCOSE SERPL-MCNC: 91 MG/DL — SIGNIFICANT CHANGE UP (ref 70–99)
GLUCOSE UR QL: NEGATIVE — SIGNIFICANT CHANGE UP
HCT VFR BLD CALC: 38.8 % — LOW (ref 39–50)
HGB BLD-MCNC: 12.8 G/DL — LOW (ref 13–17)
IMM GRANULOCYTES NFR BLD AUTO: 0.4 % — SIGNIFICANT CHANGE UP (ref 0–1.5)
INR BLD: 1.12 RATIO — SIGNIFICANT CHANGE UP (ref 0.88–1.16)
KETONES UR-MCNC: NEGATIVE — SIGNIFICANT CHANGE UP
LEUKOCYTE ESTERASE UR-ACNC: NEGATIVE — SIGNIFICANT CHANGE UP
LYMPHOCYTES # BLD AUTO: 2.47 K/UL — SIGNIFICANT CHANGE UP (ref 1–3.3)
LYMPHOCYTES # BLD AUTO: 35.3 % — SIGNIFICANT CHANGE UP (ref 13–44)
MAGNESIUM SERPL-MCNC: 2.8 MG/DL — HIGH (ref 1.6–2.6)
MCHC RBC-ENTMCNC: 29.3 PG — SIGNIFICANT CHANGE UP (ref 27–34)
MCHC RBC-ENTMCNC: 33 GM/DL — SIGNIFICANT CHANGE UP (ref 32–36)
MCV RBC AUTO: 88.8 FL — SIGNIFICANT CHANGE UP (ref 80–100)
MONOCYTES # BLD AUTO: 0.69 K/UL — SIGNIFICANT CHANGE UP (ref 0–0.9)
MONOCYTES NFR BLD AUTO: 9.9 % — SIGNIFICANT CHANGE UP (ref 2–14)
NEUTROPHILS # BLD AUTO: 3.22 K/UL — SIGNIFICANT CHANGE UP (ref 1.8–7.4)
NEUTROPHILS NFR BLD AUTO: 46 % — SIGNIFICANT CHANGE UP (ref 43–77)
NITRITE UR-MCNC: NEGATIVE — SIGNIFICANT CHANGE UP
NT-PROBNP SERPL-SCNC: 126 PG/ML — SIGNIFICANT CHANGE UP (ref 0–450)
PH UR: 5 — SIGNIFICANT CHANGE UP (ref 5–8)
PLATELET # BLD AUTO: 382 K/UL — SIGNIFICANT CHANGE UP (ref 150–400)
POTASSIUM SERPL-MCNC: 4.4 MMOL/L — SIGNIFICANT CHANGE UP (ref 3.5–5.3)
POTASSIUM SERPL-SCNC: 4.4 MMOL/L — SIGNIFICANT CHANGE UP (ref 3.5–5.3)
PROT SERPL-MCNC: 7.6 GM/DL — SIGNIFICANT CHANGE UP (ref 6–8.3)
PROT UR-MCNC: 100
PROTHROM AB SERPL-ACNC: 13 SEC — SIGNIFICANT CHANGE UP (ref 10.5–13.4)
RBC # BLD: 4.37 M/UL — SIGNIFICANT CHANGE UP (ref 4.2–5.8)
RBC # FLD: 14.7 % — HIGH (ref 10.3–14.5)
SODIUM SERPL-SCNC: 140 MMOL/L — SIGNIFICANT CHANGE UP (ref 135–145)
SP GR SPEC: 1.01 — SIGNIFICANT CHANGE UP (ref 1.01–1.02)
TROPONIN I, HIGH SENSITIVITY RESULT: 15.56 NG/L — SIGNIFICANT CHANGE UP
TROPONIN I, HIGH SENSITIVITY RESULT: 18.15 NG/L — SIGNIFICANT CHANGE UP
UROBILINOGEN FLD QL: 4
WBC # BLD: 7 K/UL — SIGNIFICANT CHANGE UP (ref 3.8–10.5)
WBC # FLD AUTO: 7 K/UL — SIGNIFICANT CHANGE UP (ref 3.8–10.5)

## 2022-02-25 PROCEDURE — 71045 X-RAY EXAM CHEST 1 VIEW: CPT | Mod: 26

## 2022-02-25 PROCEDURE — 71275 CT ANGIOGRAPHY CHEST: CPT | Mod: 26,MA

## 2022-02-25 PROCEDURE — 93010 ELECTROCARDIOGRAM REPORT: CPT

## 2022-02-25 PROCEDURE — 99285 EMERGENCY DEPT VISIT HI MDM: CPT | Mod: CS

## 2022-02-25 NOTE — ED PROVIDER NOTE - CARE PLAN
1 Principal Discharge DX:	Chest pain   Principal Discharge DX:	Chest pain  Secondary Diagnosis:	Hallucination

## 2022-02-25 NOTE — ED PROVIDER NOTE - OBJECTIVE STATEMENT
77 y/o male with PMHx of CAD s/p CABg presents to the ED BIBA c/o CP. Pt states pain is 10/10 and took a full dose aspirin. Pt currently is not in pain. Denies fever, black or bloody stool, dizziness, SOB. No other complaints at this time. 75 y/o male with PMHx of CAD s/p CABg presents to the ED BIBA c/o CP. Pt states pain wass 10/10 and took a full dose aspirin. Pt currently is not in pain. all symptoms have resolved. Denies fever, black or bloody stool, dizziness, SOB. No other complaints at this time.

## 2022-02-25 NOTE — ED PROVIDER NOTE - PROGRESS NOTE DETAILS
Khadijah Thomas for attending Dr. AMANDA Link  Spoke with Dr. Moreno sending EKG now. Khadijah Thomas for attending Dr. AMANDA Link   Called PCI Khadijah Thomas for attending Dr. AMANDA Link   Pt seen at bedside by Dr. Moreno. No STEMI. Will get trend enzymes and reassess. trop negative x 2. cta negative. spoke with patient at first he states he "feels great". however now he is stating that a big man came into the ED and started sticking needles in his legs. will need to admit for delirium Gama Link M.D., Attending Physician pt endorsed to Dr. Haro accepts to obs. Gama Link M.D., Attending Physician

## 2022-02-25 NOTE — CONSULT NOTE ADULT - SUBJECTIVE AND OBJECTIVE BOX
Date of Consult: 2/25/2022    CHIEF COMPLAINT:    HISTORY OF PRESENT ILLNESS:  76 year old man with CABG (2012 with Dr. Yusra MORALEZ to LAD and MELONIE to OM), history of alcohol abuse, paranoia hallucinations earlier this year admission who presents for what patient describes as an incontinence episode which was also associated with chest pain across his chest. This was mainly at rest. No shortness of breath, diaphoresis. It self resolved and currently he denies having any chest pain at all and is more focused on his incontinence issues with urgency. He doesn't have regular cardiac follow up. He was here earlier in the month with paranoid hallucinations that required admission, behavior health assessment.     Allergies    No Known Allergies    Intolerances      MEDICATIONS:      PAST MEDICAL & SURGICAL HISTORY:  S/P CABG (coronary artery bypass graft)    FAMILY HISTORY:    SOCIAL HISTORY:    [ ] Non-smoker  [ ] Smoker  [ ] Alcohol    REVIEW OF SYSTEMS:  See HPI. Otherwise, 10 point ROS done and otherwise negative.    PHYSICAL EXAM:  T(C): 36.4 (02-25-22 @ 18:47), Max: 36.4 (02-25-22 @ 18:47)  HR: 89 (02-25-22 @ 18:47) (89 - 89)  BP: 150/72 (02-25-22 @ 18:47) (150/72 - 150/72)  RR: 18 (02-25-22 @ 18:47) (18 - 18)  SpO2: 95% (02-25-22 @ 18:47) (95% - 95%)  Wt(kg): --  I&O's Summary    Appearance: dishevelled 	  HEENT:   Normal oral mucosa, PERRL, EOMI	  Lymphatic: No lymphadenopathy  Cardiovascular: Normal S1 S2, No JVD, No murmurs, No edema  Respiratory: Lungs clear to auscultation	  Psychiatry: A & O x 3, Mood & affect appropriate  Gastrointestinal:  Soft, Non-tender, + BS	  Skin: No rashes, No ecchymoses, No cyanosis	  Neurologic: Non-focal  Extremities: Normal range of motion, No clubbing, cyanosis or edema  Vascular: Peripheral pulses palpable 2+ bilaterally      LABS:	 	    CBC Full  -  ( 25 Feb 2022 18:57 )  WBC Count : 7.00 K/uL  Hemoglobin : 12.8 g/dL  Hematocrit : 38.8 %  Platelet Count - Automated : 382 K/uL  Mean Cell Volume : 88.8 fl  Mean Cell Hemoglobin : 29.3 pg  Mean Cell Hemoglobin Concentration : 33.0 gm/dL  Auto Neutrophil # : 3.22 K/uL  Auto Lymphocyte # : 2.47 K/uL  Auto Monocyte # : 0.69 K/uL  Auto Eosinophil # : 0.52 K/uL  Auto Basophil # : 0.07 K/uL  Auto Neutrophil % : 46.0 %  Auto Lymphocyte % : 35.3 %  Auto Monocyte % : 9.9 %  Auto Eosinophil % : 7.4 %  Auto Basophil % : 1.0 %        CARDIAC MARKERS: no blood work back    ECG: sinus rhythm 70 with anteroseptal infarct pattern- Q wave with elevation of ST segment in V1 (biphasic in V2) which is same as baseline 2/10/2022 during last admission.  	    PREVIOUS DIAGNOSTIC TESTING:    [ ] Echocardiogram:  [ ]  Catheterization:  [ ] Stress Test:  	  	  ASSESSMENT/PLAN:

## 2022-02-25 NOTE — ED PROVIDER NOTE - CLINICAL SUMMARY MEDICAL DECISION MAKING FREE TEXT BOX
7 y/o male with PMHx of CAD s/p CABg presents to the ED c/o CP. Pt states pain is 10/10 and took full dose of aspirin PTA and called EMS pain. Pt states pain has improved and in no current pain. Denies fever, black or bloody stool, SOB, dizziness. Will touch base with PCI due abnormal EKG, labs and admit. 7 y/o male with PMHx of CAD s/p CABg presents to the ED c/o CP. Pt states pain was 10/10 and took full dose of aspirin PTA and called EMS pain. Pt states pain has improved and in no current pain. Denies fever, black or bloody stool, SOB, dizziness. Will touch base with PCI due abnormal EKG, labs and admit.

## 2022-02-25 NOTE — CONSULT NOTE ADULT - TIME BILLING
Differential diagnosis and plan of care discussed with patient after the evaluation.   Counseling on diet, exercise, and medication compliance was done.  Counseling on smoking and alcohol cessation was offered when appropriate.  Pain assessed and judicious use of narcotics when appropriate was discussed.  Importance of fall prevention discussed.  Advanced care planning was discussed with patient and family.

## 2022-02-25 NOTE — ED ADULT TRIAGE NOTE - CHIEF COMPLAINT QUOTE
pt BIBA from home for CP since 11 AM. pain is localized, midsternal, non-radiating. pshx CABGX3 16 years ago. not on anticoagulants. pt denies CP at this time. denies HA, nausea, dizziness, SOB. STAT EKG IP. pt BIBA from home for CP since 11 AM. pain is localized, midsternal, non-radiating. pshx CABGX3 16 years ago. not on anticoagulants. denies HA, nausea, dizziness, SOB. EMS administered 324 ASA @ 1830, pt also states he took "5 aspirin". pt denies active CP at this time. MD Reens at bedside for evaluation, STAT EKG obtained in triage bay.

## 2022-02-25 NOTE — ED PROVIDER NOTE - PHYSICAL EXAMINATION
Constitutional: NAD AAOx3  Eyes: PERRLA EOMI  Head: Normocephalic atraumatic  Mouth: MMM  Cardiac: regular rate   Resp: Lungs CTAB  GI: Abd s/nt/nd  Neuro: CN2-12 intact  Skin: No visible rashes Constitutional: NAD AAOx3  Eyes: PERRLA EOMI  Head: Normocephalic atraumatic  Mouth: MMM  Cardiac: regular rate normal peripheral pulses no LE swelling  Resp: Lungs CTAB  GI: Abd s/nt/nd  Neuro: CN2-12 intact  Skin: No visible rashes

## 2022-02-25 NOTE — ED PROVIDER NOTE - NS ED ROS FT
Constitutional: No fever or chills  Eyes: No visual changes  HEENT: No throat pain  CV: +chest pain  Resp: No SOB no cough  GI: No abd pain, nausea or vomiting, no black or bloody stool   : No dysuria  MSK: No musculoskeletal pain  Skin: No rash  Neuro: No headache

## 2022-02-25 NOTE — ED PROVIDER NOTE - NS_ ATTENDINGSCRIBEDETAILS _ED_A_ED_FT
I, Gama Link MD,  performed the initial face to face bedside interview with this patient regarding history of present illness, review of symptoms and relevant past medical, social and family history.  I completed an independent physical examination.  I was the initial provider who evaluated this patient.   I personally saw the patient and performed a substantive portion of the visit including all aspects of the medical decision making.  The history, relevant review of systems, past medical and surgical history, medical decision making, and physical examination was documented by the scribe in my presence and I attest to the accuracy of the documentation.

## 2022-02-25 NOTE — ED PROVIDER NOTE - CARE PROVIDER_API CALL
Aniceto Kirk (DO)  Cardiology  [address] [place] [address]  Phone: [phone]  Fax: [phone]  Follow Up Time: Urgent

## 2022-02-26 DIAGNOSIS — R07.9 CHEST PAIN, UNSPECIFIED: ICD-10-CM

## 2022-02-26 LAB
ANION GAP SERPL CALC-SCNC: 4 MMOL/L — LOW (ref 5–17)
BASOPHILS # BLD AUTO: 0.07 K/UL — SIGNIFICANT CHANGE UP (ref 0–0.2)
BASOPHILS NFR BLD AUTO: 1.1 % — SIGNIFICANT CHANGE UP (ref 0–2)
BUN SERPL-MCNC: 33 MG/DL — HIGH (ref 7–23)
CALCIUM SERPL-MCNC: 8.7 MG/DL — SIGNIFICANT CHANGE UP (ref 8.5–10.1)
CHLORIDE SERPL-SCNC: 109 MMOL/L — HIGH (ref 96–108)
CO2 SERPL-SCNC: 26 MMOL/L — SIGNIFICANT CHANGE UP (ref 22–31)
CREAT SERPL-MCNC: 1.01 MG/DL — SIGNIFICANT CHANGE UP (ref 0.5–1.3)
EOSINOPHIL # BLD AUTO: 0.44 K/UL — SIGNIFICANT CHANGE UP (ref 0–0.5)
EOSINOPHIL NFR BLD AUTO: 6.7 % — HIGH (ref 0–6)
GLUCOSE SERPL-MCNC: 84 MG/DL — SIGNIFICANT CHANGE UP (ref 70–99)
HCT VFR BLD CALC: 38.7 % — LOW (ref 39–50)
HGB BLD-MCNC: 12.4 G/DL — LOW (ref 13–17)
IMM GRANULOCYTES NFR BLD AUTO: 0.3 % — SIGNIFICANT CHANGE UP (ref 0–1.5)
LYMPHOCYTES # BLD AUTO: 2.63 K/UL — SIGNIFICANT CHANGE UP (ref 1–3.3)
LYMPHOCYTES # BLD AUTO: 40 % — SIGNIFICANT CHANGE UP (ref 13–44)
MCHC RBC-ENTMCNC: 27.9 PG — SIGNIFICANT CHANGE UP (ref 27–34)
MCHC RBC-ENTMCNC: 32 GM/DL — SIGNIFICANT CHANGE UP (ref 32–36)
MCV RBC AUTO: 87.2 FL — SIGNIFICANT CHANGE UP (ref 80–100)
MONOCYTES # BLD AUTO: 0.63 K/UL — SIGNIFICANT CHANGE UP (ref 0–0.9)
MONOCYTES NFR BLD AUTO: 9.6 % — SIGNIFICANT CHANGE UP (ref 2–14)
NEUTROPHILS # BLD AUTO: 2.79 K/UL — SIGNIFICANT CHANGE UP (ref 1.8–7.4)
NEUTROPHILS NFR BLD AUTO: 42.3 % — LOW (ref 43–77)
PLATELET # BLD AUTO: 335 K/UL — SIGNIFICANT CHANGE UP (ref 150–400)
POTASSIUM SERPL-MCNC: 4.4 MMOL/L — SIGNIFICANT CHANGE UP (ref 3.5–5.3)
POTASSIUM SERPL-MCNC: 5.7 MMOL/L — HIGH (ref 3.5–5.3)
POTASSIUM SERPL-SCNC: 4.4 MMOL/L — SIGNIFICANT CHANGE UP (ref 3.5–5.3)
POTASSIUM SERPL-SCNC: 5.7 MMOL/L — HIGH (ref 3.5–5.3)
RBC # BLD: 4.44 M/UL — SIGNIFICANT CHANGE UP (ref 4.2–5.8)
RBC # FLD: 14.8 % — HIGH (ref 10.3–14.5)
SARS-COV-2 RNA SPEC QL NAA+PROBE: SIGNIFICANT CHANGE UP
SODIUM SERPL-SCNC: 139 MMOL/L — SIGNIFICANT CHANGE UP (ref 135–145)
WBC # BLD: 6.58 K/UL — SIGNIFICANT CHANGE UP (ref 3.8–10.5)
WBC # FLD AUTO: 6.58 K/UL — SIGNIFICANT CHANGE UP (ref 3.8–10.5)

## 2022-02-26 PROCEDURE — 97163 PT EVAL HIGH COMPLEX 45 MIN: CPT | Mod: GP

## 2022-02-26 PROCEDURE — 12345: CPT | Mod: NC

## 2022-02-26 PROCEDURE — 87086 URINE CULTURE/COLONY COUNT: CPT

## 2022-02-26 PROCEDURE — 97116 GAIT TRAINING THERAPY: CPT | Mod: GP

## 2022-02-26 PROCEDURE — 93971 EXTREMITY STUDY: CPT | Mod: 26,LT

## 2022-02-26 PROCEDURE — 84132 ASSAY OF SERUM POTASSIUM: CPT

## 2022-02-26 PROCEDURE — 93010 ELECTROCARDIOGRAM REPORT: CPT

## 2022-02-26 PROCEDURE — 93005 ELECTROCARDIOGRAM TRACING: CPT

## 2022-02-26 PROCEDURE — 81001 URINALYSIS AUTO W/SCOPE: CPT

## 2022-02-26 PROCEDURE — 99223 1ST HOSP IP/OBS HIGH 75: CPT

## 2022-02-26 PROCEDURE — 36415 COLL VENOUS BLD VENIPUNCTURE: CPT

## 2022-02-26 RX ORDER — ATORVASTATIN CALCIUM 80 MG/1
20 TABLET, FILM COATED ORAL AT BEDTIME
Refills: 0 | Status: DISCONTINUED | OUTPATIENT
Start: 2022-02-26 | End: 2022-03-01

## 2022-02-26 RX ORDER — ASPIRIN/CALCIUM CARB/MAGNESIUM 324 MG
81 TABLET ORAL DAILY
Refills: 0 | Status: DISCONTINUED | OUTPATIENT
Start: 2022-02-26 | End: 2022-03-01

## 2022-02-26 RX ORDER — LANOLIN ALCOHOL/MO/W.PET/CERES
3 CREAM (GRAM) TOPICAL AT BEDTIME
Refills: 0 | Status: DISCONTINUED | OUTPATIENT
Start: 2022-02-26 | End: 2022-03-01

## 2022-02-26 RX ORDER — METOPROLOL TARTRATE 50 MG
25 TABLET ORAL DAILY
Refills: 0 | Status: DISCONTINUED | OUTPATIENT
Start: 2022-02-26 | End: 2022-03-01

## 2022-02-26 RX ORDER — SODIUM CHLORIDE 9 MG/ML
1000 INJECTION INTRAMUSCULAR; INTRAVENOUS; SUBCUTANEOUS
Refills: 0 | Status: DISCONTINUED | OUTPATIENT
Start: 2022-02-26 | End: 2022-02-26

## 2022-02-26 RX ORDER — QUETIAPINE FUMARATE 200 MG/1
50 TABLET, FILM COATED ORAL AT BEDTIME
Refills: 0 | Status: DISCONTINUED | OUTPATIENT
Start: 2022-02-26 | End: 2022-02-26

## 2022-02-26 RX ORDER — METOPROLOL TARTRATE 50 MG
12.5 TABLET ORAL
Refills: 0 | Status: DISCONTINUED | OUTPATIENT
Start: 2022-02-26 | End: 2022-02-26

## 2022-02-26 RX ORDER — TRAZODONE HCL 50 MG
25 TABLET ORAL AT BEDTIME
Refills: 0 | Status: DISCONTINUED | OUTPATIENT
Start: 2022-02-26 | End: 2022-02-26

## 2022-02-26 RX ORDER — MIRTAZAPINE 45 MG/1
15 TABLET, ORALLY DISINTEGRATING ORAL DAILY
Refills: 0 | Status: DISCONTINUED | OUTPATIENT
Start: 2022-02-26 | End: 2022-02-26

## 2022-02-26 RX ORDER — TAMSULOSIN HYDROCHLORIDE 0.4 MG/1
0.4 CAPSULE ORAL AT BEDTIME
Refills: 0 | Status: DISCONTINUED | OUTPATIENT
Start: 2022-02-26 | End: 2022-03-01

## 2022-02-26 RX ORDER — QUETIAPINE FUMARATE 200 MG/1
25 TABLET, FILM COATED ORAL AT BEDTIME
Refills: 0 | Status: DISCONTINUED | OUTPATIENT
Start: 2022-02-26 | End: 2022-03-01

## 2022-02-26 RX ORDER — HYDRALAZINE HCL 50 MG
10 TABLET ORAL EVERY 6 HOURS
Refills: 0 | Status: DISCONTINUED | OUTPATIENT
Start: 2022-02-26 | End: 2022-02-28

## 2022-02-26 RX ORDER — HEPARIN SODIUM 5000 [USP'U]/ML
5000 INJECTION INTRAVENOUS; SUBCUTANEOUS EVERY 12 HOURS
Refills: 0 | Status: DISCONTINUED | OUTPATIENT
Start: 2022-02-26 | End: 2022-03-01

## 2022-02-26 RX ORDER — ACETAMINOPHEN 500 MG
650 TABLET ORAL EVERY 6 HOURS
Refills: 0 | Status: DISCONTINUED | OUTPATIENT
Start: 2022-02-26 | End: 2022-03-01

## 2022-02-26 RX ORDER — ONDANSETRON 8 MG/1
4 TABLET, FILM COATED ORAL EVERY 8 HOURS
Refills: 0 | Status: DISCONTINUED | OUTPATIENT
Start: 2022-02-26 | End: 2022-02-26

## 2022-02-26 RX ORDER — METOPROLOL TARTRATE 50 MG
1 TABLET ORAL
Qty: 0 | Refills: 0 | DISCHARGE

## 2022-02-26 RX ADMIN — Medication 25 MILLIGRAM(S): at 09:48

## 2022-02-26 RX ADMIN — ATORVASTATIN CALCIUM 20 MILLIGRAM(S): 80 TABLET, FILM COATED ORAL at 21:03

## 2022-02-26 RX ADMIN — TAMSULOSIN HYDROCHLORIDE 0.4 MILLIGRAM(S): 0.4 CAPSULE ORAL at 21:02

## 2022-02-26 RX ADMIN — Medication 1 TABLET(S): at 09:48

## 2022-02-26 RX ADMIN — Medication 81 MILLIGRAM(S): at 09:47

## 2022-02-26 RX ADMIN — QUETIAPINE FUMARATE 25 MILLIGRAM(S): 200 TABLET, FILM COATED ORAL at 21:02

## 2022-02-26 RX ADMIN — HEPARIN SODIUM 5000 UNIT(S): 5000 INJECTION INTRAVENOUS; SUBCUTANEOUS at 09:47

## 2022-02-26 RX ADMIN — HEPARIN SODIUM 5000 UNIT(S): 5000 INJECTION INTRAVENOUS; SUBCUTANEOUS at 21:03

## 2022-02-26 RX ADMIN — SODIUM CHLORIDE 75 MILLILITER(S): 9 INJECTION INTRAMUSCULAR; INTRAVENOUS; SUBCUTANEOUS at 03:28

## 2022-02-26 NOTE — H&P ADULT - HISTORY OF PRESENT ILLNESS
77 y/o M with PMH CAD s/p CABG, A fib not on AC d/t concerns with medication non-compliance and agitation, BPH, alcohol abuse, paranoia/hallucinations presents for chest pain. Patient reports his chest pain started yesterday described as a constant heaviness in his chest rated 10/10 in intensity. Associated with SOB, sweats. No recent trauma or injury. Non-positional, non-pleuritic, non-tender. Took 5 doses of 81 mg aspirin. Reports runny nose, minimal cough with mucus production, and left lower extremity pain. He reports being admitted previously with similar symptoms. The ER physician believed the pt was experiencing auditory/visual hallucinations while in the ER. However, on my assessment he was A+Ox3 (person, place, and time) and able to assess risks and benefits of staying in the hospital. He did not display any active auditory or visual hallucinations at the time of my evaluation. However, his nurse did state that the pt reported being "stabbed with a nail in his left lower extremity," unsure if this was related to the pain he was experiencing. Reports lower extremity discomfort after walking long distances. Denies headaches, dizziness, fevers, chills, abdominal pain, N/V, diarrhea/constipation.      Prior admission:   - 2/10/2022-2/22/2022: Admitted for AMS, auditory and visual hallucinations possibly secondary to alcohol withdrawal with possible dementia -> started on Seroquel, Trazodone, Remeron.     ER course: /72. Labs: Hb 12.8, D-dimer 556, BUN 47, Cr 1.47 (baseline ~1.02). UA: occasional bacteria. COVID negative.     EKG:     Imaging:   - CXR: sternotomy wires present, no consolidation, no effusion, no pneumothorax (personally reviewed).     Pt was evaluated by interventional radiology and there was no emergent indication for cardiac catheterization c/w aspirin and statin. He is being placed on telemetry observation for further management.  77 y/o M with PMH CAD s/p CABG, A fib not on AC d/t concerns with medication non-compliance and agitation, BPH, alcohol abuse, paranoia/hallucinations presents for chest pain. Patient reports his chest pain started yesterday described as a constant heaviness in his chest rated 10/10 in intensity. Associated with SOB, sweats. No recent trauma or injury. Non-positional, non-pleuritic, non-tender. Took 5 doses of 81 mg aspirin. Reports runny nose, minimal cough with mucus production, and left lower extremity pain. He reports being admitted previously with similar symptoms. The ER physician believed the pt was experiencing auditory/visual hallucinations while in the ER. However, on my assessment he was A+Ox3 (person, place, and time) and able to assess risks and benefits of staying in the hospital. He did not display any active auditory or visual hallucinations at the time of my evaluation. However, his nurse did state that the pt reported being "stabbed with a nail in his left lower extremity," unsure if this was related to the pain he was experiencing. Reports lower extremity discomfort after walking long distances. Denies headaches, dizziness, fevers, chills, abdominal pain, N/V, diarrhea/constipation.      Prior admission:   - 2/10/2022-2/22/2022: Admitted for AMS, auditory and visual hallucinations possibly secondary to alcohol withdrawal with possible dementia -> started on Seroquel, Trazodone, Remeron.     ER course: /72. Labs: Hb 12.8, D-dimer 556, BUN 47, Cr 1.47 (baseline ~1.02). UA: occasional bacteria. COVID negative.     EKG: NSR with sinus arrhythmia with HR 83 bpm, 1st degree AV block  ms, QTC prolonged 559 ms, ST elevations in V1 and V2 unchanged from prior EKG (personally reviewed). Cardiology reviewed the pt's EKG: EKG is largely unchanged from EKG earlier this month with anteroseptal infarct pattern, biphasic V2.     Imaging:   - CXR: sternotomy wires present, no consolidation, no effusion, no pneumothorax (personally reviewed).   - CTA: negative for PE.     Pt was evaluated by interventional radiology and there was no emergent indication for cardiac catheterization c/w aspirin and statin. He is being placed on telemetry observation for further management.

## 2022-02-26 NOTE — PROVIDER CONTACT NOTE (OTHER) - SITUATION
Per tele monitor, patient had 2, 15-20 seconds long, episodes of heart rate in the 30s.  Dr Canales aware, contiue to monitor.

## 2022-02-26 NOTE — PROGRESS NOTE ADULT - ASSESSMENT
Diet:  DVT px:  Code status:  Emergency contact:  Dispo: 77 y/o man with CAD s/p CABG, afib not on AC d/t concerns with medication non-compliance, BPH, hx of alcohol use disorder, paranoia, hallucinations, presented 2/26 for chest pain that started the day prior, was described as a constant heaviness in his chest rated, severe,  in intensity, associated with dyspnea and sweats. Non-positional, non-pleuritic, non-tender. No recent trauma or injury. Denied headaches, dizziness, fevers, chills, abdominal pain, N/V, diarrhea/constipation. Of note, patient was recently admitted 2/10-2/22 for altered mental status, auditory and visual hallucinations, possibly secondary to alcohol withdrawal, possible dementia with delirium, started on Seroquel, Trazodone, Remeron that admission. In the ED on this presentation, vitals unremarkable aside from mild HTN, /72. Exam unrevealing, thought rather tangential, disjointed. Hgb 12.8. BUN 47, Cr 1.47 (baseline ~1.02). D-dimer 556. Trop negative. UA w/ occasional bacteria. COVID negative. EKG NSR, rate WNL, 1st degree AV block, qtc prolonged 559 ms, old ischemic changes. CXR w/ sternotomy wires present, no consolidation, no effusion, no pneumothorax. CTA negative for PE. Patient was continued on aspirin, statin, beta blocker, and admitted to Medicine.     Chest discomfort, known CAD, hx of CABG  Now chest pain free, feeling generally well. Trop negative. No acute ischemic EKG changes.   - Continue aspirin, statin, beta blocker    Atelectasis  no hypoxia. No fever.   - Encourage incentive spirometry if patient is able to participate    BPH  Chronic, stable  - Continue Flomax    Hx of recent psychosis  Chronicity unclear, underlying known psychiatric diagnosis unclear. No agitation issues. No current behavioral concerns. That being said, mentation and affect do not appear normal. 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 RPR WNL, TSH WNL. Vit B12 WNL. Attempting to get further collateral history from family. Psychiatry consulted as well.   - Awaiting further input from family, Psychiatry Consult Liason    Physical deconditioning and debility  PT consulted. Fall precautions.  - Place OOB to chair daily, encourage PT        Diet: DASH  DVT px: Heparin subcut  Code status: Full  Dispo: To be determined pending further clinical assessment 77 y/o man with CAD s/p CABG, afib not on AC d/t concerns with medication non-compliance, BPH, hx of alcohol use disorder, paranoia, hallucinations, presented 2/26 for chest pain that started the day prior, was described as a constant heaviness in his chest rated, severe,  in intensity, associated with dyspnea and sweats. Non-positional, non-pleuritic, non-tender. No recent trauma or injury. Denied headaches, dizziness, fevers, chills, abdominal pain, N/V, diarrhea/constipation. Of note, patient was recently admitted 2/10-2/22 for altered mental status, auditory and visual hallucinations, possibly secondary to alcohol withdrawal, possible dementia with delirium, started on Seroquel, Trazodone, Remeron that admission. In the ED on this presentation, vitals unremarkable aside from mild HTN, /72. Exam unrevealing, thought rather tangential, disjointed. Hgb 12.8. BUN 47, Cr 1.47 (baseline ~1.02). D-dimer 556. Trop negative. UA w/ occasional bacteria. COVID negative. EKG NSR, rate WNL, 1st degree AV block, qtc prolonged 559 ms, old ischemic changes. CXR w/ sternotomy wires present, no consolidation, no effusion, no pneumothorax. CTA negative for PE. Patient was continued on aspirin, statin, beta blocker, and admitted to Medicine.     Chest discomfort, known CAD, hx of CABG  Now chest pain free, feeling generally well. Trop negative. No acute ischemic EKG changes.   - Continue aspirin, statin, beta blocker    Atelectasis  no hypoxia. No fever.   - Encourage incentive spirometry if patient is able to participate    BPH  Chronic, stable  - Continue Flomax    Hx of recent psychosis  Chronicity unclear, underlying known psychiatric diagnosis unclear. No agitation issues. No current behavioral concerns. That being said, mentation and affect do not appear normal. 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 RPR WNL, TSH WNL. Vit B12 WNL. Attempting to get further collateral history from family. Psychiatry consulted as well.   - Awaiting further input from family, Psychiatry Consult Liason  - Outpatient referral to [facility] Dr. Akers    Physical deconditioning and debility  PT consulted. Fall precautions.  - Place OOB to chair daily, encourage PT        Diet: DASH  DVT px: Heparin subcut  Code status: Full  Dispo: To be determined pending further clinical assessment

## 2022-02-26 NOTE — PHYSICAL THERAPY INITIAL EVALUATION ADULT - PERTINENT HX OF CURRENT PROBLEM, REHAB EVAL
Recent hospitalization 2/10-22 for AMS/hallucination/ETOH. Sent to ED for 10/10 chest pain. Hallucinations in ED. -PE/DVT.

## 2022-02-26 NOTE — PHYSICAL THERAPY INITIAL EVALUATION ADULT - IMPAIRMENTS FOUND, PT EVAL
aerobic capacity/endurance/arousal, attention, and cognition/cognitive impairment/gait, locomotion, and balance/gross motor/muscle strength/poor safety awareness

## 2022-02-26 NOTE — PHYSICAL THERAPY INITIAL EVALUATION ADULT - TRANSFER SAFETY CONCERNS NOTED: SIT/STAND, REHAB EVAL
decreased balance during turns/decreased safety awareness/stand pivot/decreased step length/decreased weight-shifting ability

## 2022-02-26 NOTE — PHYSICAL THERAPY INITIAL EVALUATION ADULT - GAIT DEVIATIONS NOTED, PT EVAL
decreased benjamín/increased time in double stance/decreased velocity of limb motion/decreased step length/decreased stride length/decreased swing-to-stance ratio/decreased weight-shifting ability

## 2022-02-26 NOTE — H&P ADULT - NSHPREVIEWOFSYSTEMS_GEN_ALL_CORE
Constitutional: negative for fatigue, negative for fever, negative for chills, negative for decreased appetite.  Skin: negative for rashes, negative for open wounds, negative for jaundice.   Eyes: negative for blurry vision, negative for double vision.   Ears, nose, throat: negative for ear pain, positive for runny nose, negative for nasal congestion, negative for sore throat, negative for lymph node swelling.   Cardiovascular: positive for chest pain, negative for palpitations, negative for lower extremity swelling.   Respiratory: positive for shortness of breath, negative for wheezing, negative for cough.   Gastrointestinal: negative for abdominal pain, negative for nausea, negative for vomiting, negative for diarrhea, negative for constipation, negative for blood in the stool, negative for black tarry stools.   Genitourinary: negative for burning on urination, negative for urinary urgency or frequency, negative for blood in the urine.   Endocrine: negative for cold intolerance, negative for heat intolerance, negative for increased thirst.   Hematologic: negative for easy bruising or bleeding.   Musculoskeletal: positive for left lower extremity pain   Neurological: negative for dizziness, negative for headaches, negative for loss of consciousness, negative for motor weakness, negative for sensory deficits.   Psychiatric: negative for depression, negative for anxiety.

## 2022-02-26 NOTE — H&P ADULT - NSHPPHYSICALEXAM_GEN_ALL_CORE
ICU Vital Signs Last 24 Hrs  T(C): 36.7 (26 Feb 2022 00:55), Max: 36.7 (26 Feb 2022 00:55)  T(F): 98 (26 Feb 2022 00:55), Max: 98 (26 Feb 2022 00:55)  HR: 74 (26 Feb 2022 00:55) (74 - 89)  BP: 130/83 (26 Feb 2022 00:55) (130/83 - 150/72)  RR: 17 (26 Feb 2022 00:55) (17 - 18)  SpO2: 96% (26 Feb 2022 00:55) (95% - 96%)    General: Awake and alert, cooperative with exam. No acute distress.   Skin: Warm, dry, and pink.   Eyes: Pupils equal and reactive to light. Extraocular eye movements intact. No conjunctival injection, discharge, or scleral icterus.   HEENT: Atraumatic, normocephalic. Moist mucus membranes.   Cardiology: Normal S1, S2. No murmurs, rubs, or gallops. Regular rate and rhythm.   Respiratory: Lungs clear to ascultation bilaterally. Good air exchange. No wheezes, rales, or rhonchi. Normal chest expansion.   Gastrointestinal: Positive bowel sounds. Soft, non-tender, non-distended. No guarding, rigidity, or rebound tenderness. No hepatosplenomegaly.   Musculoskeletal: 5/5 motor strength in all extremities. Normal range of motion. Tenderness on palpation of left lower extremity   Extremities: No peripheral edema bilaterally. Dorsalis pedis pulses 2+ bilaterally.   Neurological: A+Ox3 (person, place, and time). Cranial nerves 2-12 intact. Normal speech. No facial droop. No focal neurological deficits.   Psychiatric: Normal affect. Normal mood.

## 2022-02-26 NOTE — PATIENT PROFILE ADULT - FALL HARM RISK - HARM RISK INTERVENTIONS
Assistance with ambulation/Assistance OOB with selected safe patient handling equipment/Communicate Risk of Fall with Harm to all staff/Discuss with provider need for PT consult/Monitor for mental status changes/Monitor gait and stability/Move patient closer to nurses' station/Provide patient with walking aids - walker, cane, crutches/Reinforce activity limits and safety measures with patient and family/Reorient to person, place and time as needed/Tailored Fall Risk Interventions/Toileting schedule using arm’s reach rule for commode and bathroom/Use of alarms - bed, chair and/or voice tab/Visual Cue: Yellow wristband and red socks/Bed in lowest position, wheels locked, appropriate side rails in place/Call bell, personal items and telephone in reach/Instruct patient to call for assistance before getting out of bed or chair/Non-slip footwear when patient is out of bed/[place] to call system/Physically safe environment - no spills, clutter or unnecessary equipment/Purposeful Proactive Rounding/Room/bathroom lighting operational, light cord in reach

## 2022-02-26 NOTE — PHYSICAL THERAPY INITIAL EVALUATION ADULT - GENERAL OBSERVATIONS, REHAB EVAL
Pt received sleeping in bed. TnI 22.34 trending up. RN and pt agreeable to PT. Vitals monitored on 3E. Awoken to voice. AxOx1. Speaks fluently but usually nonsensical. Could fall asleep mid conversation. Pt was able to walk to the door.

## 2022-02-26 NOTE — ED ADULT NURSE NOTE - NSIMPLEMENTINTERV_GEN_ALL_ED
Implemented All Fall Risk Interventions:  [place] to call system. Call bell, personal items and telephone within reach. Instruct patient to call for assistance. Room bathroom lighting operational. Non-slip footwear when patient is off stretcher. Physically safe environment: no spills, clutter or unnecessary equipment. Stretcher in lowest position, wheels locked, appropriate side rails in place. Provide visual cue, wrist band, yellow gown, etc. Monitor gait and stability. Monitor for mental status changes and reorient to person, place, and time. Review medications for side effects contributing to fall risk. Reinforce activity limits and safety measures with patient and family.

## 2022-02-26 NOTE — PROGRESS NOTE ADULT - SUBJECTIVE AND OBJECTIVE BOX
Chief complaint: Chest discomfort    Interval history: Patient seen and examined.     ROS: Multisystem review is comprehensively negative x 10 systems except as above    Physical Exam:  Vitals  Gen:  HEENT:  Neck:  Chest:  CVS:  Abd:  Ext:  Skin:  Neuro:  Mood:    Labs:    Micro:    Imaging:    Cardiac Testing:    Procedures:    Meds:      Diet:  DVT px:  Code status:  Emergency contact:  Dispo: Chief complaint: Chest discomfort    Interval history: Patient seen and examined. A bit of a limited historian. Does convey that he now feels well today. No longer with chest pain or other complaints. NMo dyspnea, lightheadedness or palpitations No GI complaints or urinary complaints .Does feel generally weak and deconditioned.     ROS: Multisystem review is comprehensively negative x 10 systems except as above    Physical Exam:  Vitals  Gen:  HEENT:  Neck:  Chest:  CVS:  Abd:  Ext:  Skin:  Neuro:  Mood:    Labs:    Micro:    Imaging:    Cardiac Testing:    Procedures:    Meds:       Chief complaint: Chest discomfort    Interval history: Patient seen and examined. A bit of a limited historian. Does convey that he now feels well today. No longer with chest pain or other complaints. No dyspnea, lightheadedness or palpitations No GI complaints or urinary complaints .Does feel generally weak and deconditioned.     ROS: Multisystem review is comprehensively negative x 10 systems except as above but limited by his suspected neurocognitive disease and or psychiatric ailment    Physical Exam:  T(F): 97.8 (2022 09:12), Max: 98 (2022 00:55)  HR: 77 (2022 09:46) (74 - 89)  BP: 127/57 (2022 09:46) (95/56 - 150/72)  RR: 18 (2022 09:12) (17 - 18)  SpO2: 97% (2022 09:12) (95% - 100%)    Gen: No acute distress  HEENT: NCAT PERRL EOMI  Neck: Supple, no JVD  Chest: CTA B/L, normal resp effort at rest  CVS: S1 S2 normal, RRR  Abd: +BS soft NT ND   Ext: No edema or calf tenderess  Skin: Warm dry  Neuro: Awake and alert, answers simple questions, follows simple commands  Mood: Calm, pleasant, thought a bit tangential, denies any active visual or auditory hallucination, no suicidal or homicidal ideation    Labs:    CBC                         12.4   6.58  )----------( 335             38.7     BMP       139      |  109   |  33   ----------------------------< 84   4.4 (rpt)  |   26    |  1.01     Ca 8.7  Mg 2.8    LFTs: TPro  7.6  /  Alb  3.3  /  TBili  0.7  /  DBili  x   /  AST  29  /  ALT  29  /  AlkPhos  78      Coags: PT: 13.0 sec;   INR: 1.12 ratio   PTT:32.8 sec    Trop (-) x 3   pro    D-dimer 556    Urinalysis   Color: Yellow / Appearance: Clear / S.015 / pH: x  Gluc: x / Ketone: Negative  / Bili: Negative / Urobili: 4   Blood: x / Protein: 100 / Nitrite: Negative   Leuk Esterase: Negative / RBC: Negative /HPF / WBC Negative   Sq Epi: x / Non Sq Epi: Negative / Bacteria: Occasional    Micro:  COVID19 PCR : Neg    Imaging:  LLE venous duplex : No obvious thrombus in the visualized left lower extremity deep veins.    CTA chest W : No pulmonary embolism. No endobronchial lesion. Mild emphysema. Mild atelectasis in the right lower lobe. Mild right posteromedial pleural thickening. No pleural effusion or pneumothorax. Calcified lymph nodes consistent with prior granulomatous   disease. Enlarged heart. No pericardial effusion. No aortic aneurysm. Mildly calcified aortic valve. Coronary artery calcified plaque. L kidney cyst. Sternotomy. Degenerative changes of the spine. Diffusely heterogeneous bone marrow.    CXR : The visualized lungs are clear of airspace consolidations or effusions. No pneumothorax. The heart and mediastinum size and configuration are within normal limits. Status post coronary artery bypass graft procedure. Visualized osseous structures are intact.    Cardiac Testing:  EKG : Sinus rhythm with 1st degree A-V block. Anteroseptal q (old). Prolonged QT    Meds:  MEDICATIONS  (STANDING):  aspirin  chewable 81 milliGRAM(s) Oral daily  atorvastatin 20 milliGRAM(s) Oral at bedtime  heparin   Injectable 5000 Unit(s) SubCutaneous every 12 hours  metoprolol succinate ER 25 milliGRAM(s) Oral daily  multivitamin 1 Tablet(s) Oral daily  sodium chloride 0.9%. 1000 milliLiter(s) (75 mL/Hr) IV Continuous <Continuous>  tamsulosin 0.4 milliGRAM(s) Oral at bedtime    MEDICATIONS  (PRN):  acetaminophen     Tablet .. 650 milliGRAM(s) Oral every 6 hours PRN Temp greater or equal to 38C (100.4F), Mild Pain (1 - 3)  aluminum hydroxide/magnesium hydroxide/simethicone Suspension 30 milliLiter(s) Oral every 4 hours PRN Dyspepsia  hydrALAZINE Injectable 10 milliGRAM(s) IV Push every 6 hours PRN SBP > 160  melatonin 3 milliGRAM(s) Oral at bedtime PRN Insomnia

## 2022-02-26 NOTE — H&P ADULT - NSICDXPASTMEDICALHX_GEN_ALL_CORE_FT
PAST MEDICAL HISTORY:  Atrial fibrillation Not on AC    BPH (benign prostatic hyperplasia)     CAD (coronary artery disease)     History of alcohol use     History of hallucinations     Paranoia

## 2022-02-26 NOTE — H&P ADULT - ASSESSMENT
77 y/o M presents with chest pain     1. Chest pain could be secondary to unstable angina vs. GERD vs. costochondritis   - Place on telemetry observation  - Troponin 15.56 -> 18.15, will trend 1 more troponin  - s/p 325 mg of aspirin prior to arrival as per pt, c/w 81 mg of aspirin daily   - If troponins continue to trend upwards -> will order heparin, place pt NPO  - Cardiology recs (Dr. Moreno) appreciated     2. Paranoia/auditory/visual hallucinations   - c/w Seroquel, trazodone, and Remeron   - Consult psychiatry   - At the time of my evaluation pt is A+Ox3, does confabulate at times but is aware of risks and benefits of leaving the hospital   - Recent workup including RPR, TSH, B12 negative during last admission     3. Hypertension   - /72, monitor closely   - Hydralazine for SBP > 160     4. Normocytic anemia   - Hb 12.8, monitor     5. Elevated D-dimer   - D-dimer 556, CTA negative for PE     6. Left lower extremity pain   - Ordered Doppler US of the lower extremity   - If persistence of symptoms, may need arterial doppler   - PT evaluation     7. Acute kidney injury   - Cr 1.47 (baseline ~1.02), monitor closely   - Gentle hydration with IVF at 75 ml/hr     8. Asymptomatic bacteriuria   - UA: occasional bacteria, f/u UCx   - If UCx positive, will treat with abx     9. History of CAD s/p CABG, A fib not on AC d/t concerns with medication non-complinance and agitation, BPH, alcohol abuse, paranoia/hallucinations  - c/w home medications     DVT ppx: Heparin 5,000 units Q12H (hold for PLT <50,000)   Code status: Full code (Pt agrees to chest compressions and intubation if required).     *Patient wanted to wait until the morning to call his wife. He does not know his medications, and said everything was continued from last admission, please verify medications with his wife if possible.    75 y/o M presents with chest pain     1. Chest pain could be secondary to unstable angina vs. GERD vs. costochondritis   - Place on telemetry observation  - Troponin 15.56 -> 18.15, will trend 1 more troponin  - s/p 325 mg of aspirin prior to arrival as per pt, c/w 81 mg of aspirin daily   - If troponins continue to trend upwards -> will order heparin, place pt NPO  - Cardiology recs (Dr. Moreno) appreciated     2. Paranoia/auditory/visual hallucinations   - c/w Seroquel, trazodone, and Remeron   - Consult psychiatry   - At the time of my evaluation pt is A+Ox3, does confabulate at times but is aware of risks and benefits of leaving the hospital   - Recent workup including RPR, TSH, B12 negative during last admission     3. Hypertension   - /72, monitor closely   - Hydralazine for SBP > 160     4. Normocytic anemia   - Hb 12.8, monitor     5. Elevated D-dimer   - D-dimer 556, CTA negative for PE     6. Left lower extremity pain   - Ordered Doppler US of the lower extremity   - If persistence of symptoms, may need arterial doppler   - PT evaluation     7. Acute kidney injury   - Cr 1.47 (baseline ~1.02), monitor closely   - Gentle hydration with IVF at 75 ml/hr     8. Asymptomatic bacteriuria   - UA: occasional bacteria, f/u UCx   - If UCx positive, will treat with abx     9. Prolonged QTC   - Avoid QTC prolonging medications   - QTC prolonged 559 ms   - Hold Seroquel for now, will speak to psychiatry tomorrow     10. History of CAD s/p CABG, A fib not on AC d/t concerns with medication non-compliance and agitation, BPH, alcohol abuse, paranoia/hallucinations  - c/w home medications     DVT ppx: Heparin 5,000 units Q12H (hold for PLT <50,000)   Code status: Full code (Pt agrees to chest compressions and intubation if required).     *Patient wanted to wait until the morning to call his wife. He does not know his medications, and said everything was continued from last admission, please verify medications with his wife if possible.    75 y/o M presents with chest pain     1. Chest pain could be secondary to unstable angina vs. GERD vs. costochondritis   - Place on telemetry observation  - Troponin 15.56 -> 18.15, will trend 1 more troponin  - s/p 325 mg of aspirin prior to arrival as per pt, c/w 81 mg of aspirin daily   - If troponins continue to trend upwards -> will order heparin, place pt NPO  - Cardiology recs (Dr. Moreno) appreciated     2. Paranoia/auditory/visual hallucinations   - Consult psychiatry   - QTC prolonged to 559 ms; hold Trazodone, Remeron, Seroquel   - At the time of my evaluation pt is A+Ox3, does confabulate at times but is aware of risks and benefits of leaving the hospital   - Recent workup including RPR, TSH, B12 negative during last admission     3. Hypertension   - /72, monitor closely   - Hydralazine for SBP > 160     4. Normocytic anemia   - Hb 12.8, monitor     5. Elevated D-dimer   - D-dimer 556, CTA negative for PE     6. Left lower extremity pain   - Ordered Doppler US of the lower extremity   - If persistence of symptoms, may need arterial doppler   - PT evaluation     7. Acute kidney injury   - Cr 1.47 (baseline ~1.02), monitor closely   - Gentle hydration with IVF at 75 ml/hr     8. Asymptomatic bacteriuria   - UA: occasional bacteria, f/u UCx   - If UCx positive, will treat with abx     9. Prolonged QTC   - Avoid QTC prolonging medications   - QTC prolonged 559 ms   - Hold Trazodone, Remeron, Seroquel; will speak to psychiatry tomorrow regarding further options    10. History of CAD s/p CABG, A fib not on AC d/t concerns with medication non-compliance and agitation, BPH, alcohol abuse, paranoia/hallucinations  - c/w home medications     DVT ppx: Heparin 5,000 units Q12H (hold for PLT <50,000)   Code status: Full code (Pt agrees to chest compressions and intubation if required).     *Patient wanted to wait until the morning to call his wife. He does not know his medications, and said everything was continued from last admission, please verify medications with his wife if possible.

## 2022-02-26 NOTE — ED ADULT NURSE NOTE - CHIEF COMPLAINT QUOTE
pt BIBA from home for CP since 11 AM. pain is localized, midsternal, non-radiating. pshx CABGX3 16 years ago. not on anticoagulants. denies HA, nausea, dizziness, SOB. EMS administered 324 ASA @ 1830, pt also states he took "5 aspirin". pt denies active CP at this time. MD Reens at bedside for evaluation, STAT EKG obtained in triage bay.

## 2022-02-26 NOTE — ED ADULT NURSE NOTE - OBJECTIVE STATEMENT
pt BIBA from home for CP since 11 AM. pain is localized, midsternal, non-radiating. pshx CABGX3 16 years ago. not on anticoagulants. denies HA, nausea, dizziness, SOB. EMS administered 324 ASA @ 1830, pt also states he took "5 aspirin". pt denies active CP at this time.

## 2022-02-26 NOTE — ED ADULT NURSE NOTE - NSFALLRSKASSESSTYPE_ED_ALL_ED
Initial (On Arrival) - Pt reporting lightheadedness upon standing when preparing for bed  - Denies LOC or head injury  - CT head demonstrating no gross acute intracranial hemorrhage, mass effect, or acute osseous fracture  - Trops (-) x1, EKG ______  - Lightheadedness suspectedly d/t decreased intravascular volume  - F/u orthostatic VS - Pt reporting lightheadedness upon standing when preparing for bed  - Denies LOC or head injury  - CT head demonstrating no gross acute intracranial hemorrhage, mass effect, or acute osseous fracture  - Trops (-) x1, EKG AV dual-paced with PVC  - Lightheadedness suspectedly d/t decreased intravascular volume  - F/u orthostatic VS - Pt reporting lightheadedness upon standing when preparing for bed  - Denies LOC or head injury  - CT head demonstrating no gross acute intracranial hemorrhage, mass effect, or acute osseous fracture  - Trops (-) x1, EKG AV dual-paced with PVC  - Trend CE  - Lightheadedness suspectedly d/t decreased intravascular volume  - F/u orthostatic VS - Pt reporting lightheadedness upon standing when preparing for bed  - Denies LOC or head injury  - CT head demonstrating no gross acute intracranial hemorrhage, mass effect, or acute osseous fracture  - Trops (-) x1, EKG AV dual-paced with PVC  - Trend CE  - Lightheadedness suspectedly d/t decreased intravascular volume vs ACS vs PE in setting of elevated age-adjusted d-dimer  - F/u orthostatic VS - Pt reporting lightheadedness upon standing when preparing for bed  - Denies LOC or head injury  - CT head demonstrating no gross acute intracranial hemorrhage, mass effect, or acute osseous fracture  - Trops (-) x1, EKG AV dual-paced with PVC  - Trend CE  - Lightheadedness suspect d/t decreased intravascular volume vs ACS vs PE in setting of elevated age-adjusted d-dimer  - F/u orthostatic VS- start heparin gtt. obtain cta when cr improves

## 2022-02-27 LAB
APPEARANCE UR: CLEAR — SIGNIFICANT CHANGE UP
BACTERIA # UR AUTO: ABNORMAL
BILIRUB UR-MCNC: NEGATIVE — SIGNIFICANT CHANGE UP
COLOR SPEC: YELLOW — SIGNIFICANT CHANGE UP
DIFF PNL FLD: NEGATIVE — SIGNIFICANT CHANGE UP
EPI CELLS # UR: SIGNIFICANT CHANGE UP
GLUCOSE UR QL: NEGATIVE — SIGNIFICANT CHANGE UP
KETONES UR-MCNC: NEGATIVE — SIGNIFICANT CHANGE UP
LEUKOCYTE ESTERASE UR-ACNC: ABNORMAL
NITRITE UR-MCNC: NEGATIVE — SIGNIFICANT CHANGE UP
PH UR: 5 — SIGNIFICANT CHANGE UP (ref 5–8)
PROT UR-MCNC: 100
RBC CASTS # UR COMP ASSIST: SIGNIFICANT CHANGE UP /HPF (ref 0–4)
SP GR SPEC: 1.02 — SIGNIFICANT CHANGE UP (ref 1.01–1.02)
UROBILINOGEN FLD QL: 1
WBC UR QL: SIGNIFICANT CHANGE UP

## 2022-02-27 PROCEDURE — 99232 SBSQ HOSP IP/OBS MODERATE 35: CPT

## 2022-02-27 PROCEDURE — 99221 1ST HOSP IP/OBS SF/LOW 40: CPT

## 2022-02-27 RX ORDER — THIAMINE MONONITRATE (VIT B1) 100 MG
500 TABLET ORAL EVERY 8 HOURS
Refills: 0 | Status: COMPLETED | OUTPATIENT
Start: 2022-02-27 | End: 2022-03-01

## 2022-02-27 RX ADMIN — Medication 81 MILLIGRAM(S): at 09:09

## 2022-02-27 RX ADMIN — Medication 255 MILLIGRAM(S): at 21:04

## 2022-02-27 RX ADMIN — ATORVASTATIN CALCIUM 20 MILLIGRAM(S): 80 TABLET, FILM COATED ORAL at 21:03

## 2022-02-27 RX ADMIN — HEPARIN SODIUM 5000 UNIT(S): 5000 INJECTION INTRAVENOUS; SUBCUTANEOUS at 21:04

## 2022-02-27 RX ADMIN — TAMSULOSIN HYDROCHLORIDE 0.4 MILLIGRAM(S): 0.4 CAPSULE ORAL at 21:03

## 2022-02-27 RX ADMIN — Medication 25 MILLIGRAM(S): at 09:09

## 2022-02-27 RX ADMIN — Medication 1 TABLET(S): at 09:09

## 2022-02-27 RX ADMIN — HEPARIN SODIUM 5000 UNIT(S): 5000 INJECTION INTRAVENOUS; SUBCUTANEOUS at 09:09

## 2022-02-27 RX ADMIN — Medication 255 MILLIGRAM(S): at 12:09

## 2022-02-27 RX ADMIN — QUETIAPINE FUMARATE 25 MILLIGRAM(S): 200 TABLET, FILM COATED ORAL at 21:04

## 2022-02-27 NOTE — BH CONSULTATION LIAISON ASSESSMENT NOTE - NSBHCHARTREVIEWLAB_PSY_A_CORE FT
12.4   6.58  )-----------( 335      ( 26 Feb 2022 06:41 )             38.7   02-26    x   |  x   |  x   ----------------------------<  x   4.4   |  x   |  x     Ca    8.7      26 Feb 2022 06:41  Mg     2.8     02-25    TPro  7.6  /  Alb  3.3  /  TBili  0.7  /  DBili  x   /  AST  29  /  ALT  29  /  AlkPhos  78  02-25

## 2022-02-27 NOTE — PROGRESS NOTE ADULT - ASSESSMENT
77 y/o man with CAD s/p CABG, afib not on AC d/t concerns with medication non-compliance, BPH, hx of alcohol use disorder, paranoia, hallucinations, presented 2/26 for chest pain that started the day prior, was described as a constant heaviness in his chest rated, severe,  in intensity, associated with dyspnea and sweats. Non-positional, non-pleuritic, non-tender. No recent trauma or injury. Denied headaches, dizziness, fevers, chills, abdominal pain, N/V, diarrhea/constipation. Of note, patient was recently admitted 2/10-2/22 for altered mental status, auditory and visual hallucinations, possibly secondary to alcohol withdrawal, possible dementia with delirium, started on Seroquel, Trazodone, Remeron that admission. In the ED on this presentation, vitals unremarkable aside from mild HTN, /72. Exam unrevealing, thought rather tangential, disjointed. Hgb 12.8. BUN 47, Cr 1.47 (baseline ~1.02). D-dimer 556. Trop negative. UA w/ occasional bacteria. COVID negative. EKG NSR, rate WNL, 1st degree AV block, qtc prolonged 559 ms, old ischemic changes. CXR w/ sternotomy wires present, no consolidation, no effusion, no pneumothorax. CTA negative for PE. Patient was continued on aspirin, statin, beta blocker, and admitted to Medicine.     Chest discomfort, known CAD, hx of CABG  Now chest pain free, feeling generally well. Trop negative. No acute ischemic EKG changes.   - Continue aspirin, statin, beta blocker    Atelectasis  no hypoxia. No fever.   - Encourage incentive spirometry if patient is able to participate    BPH  Chronic, stable  - Continue Flomax    Hx of recent psychosis  Chronicity unclear, underlying known psychiatric diagnosis unclear. No agitation issues. No current behavioral concerns. That being said, mentation and affect do not appear normal. 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 RPR WNL, TSH WNL. Vit B12 WNL. Attempting to get further collateral history from family. Psychiatry consulted as well.   - Awaiting further input from family, Psychiatry Consult Liason  - Outpatient referral to Geriatric Psychiatry Dr. Akers    Physical deconditioning and debility  PT consulted. Fall precautions.  - Place OOB to chair daily, encourage PT        Diet: DASH  DVT px: Heparin subcut  Code status: Full  Dispo: To be determined pending further clinical assessment 76 year-old man with CAD s/p CABG, afib not on AC d/t concerns with medication non-compliance, also hx of BPH, urinary urgency and incontinence, alcohol use disorder and recent worsening cognitive function associated with paranoia and hallucinations per his partner's grandsons who care for him, presented 2/26 for chest pain that started the day prior, was described as a constant heaviness in his chest rated, severe in intensity, associated with dyspnea, non-positional, non-pleuritic, non-reproducible with palpation. No recent trauma or injury. Denied headaches, dizziness, fevers, chills, abdominal pain, N/V, diarrhea/constipation. Of note, patient was recently admitted 2/10-2/22 for altered mental status, auditory and visual hallucinations, possibly secondary to alcohol withdrawal, possible dementia with delirium, started on Seroquel, Trazodone, Remeron that admission. In the ED on this presentation, vitals unremarkable aside from mild HTN, /72. Exam unrevealing, thought rather tangential, disjointed. Hgb 12.8. BUN 47, Cr 1.47 (baseline ~1.02). D-dimer 556. Trop negative. UA w/ occasional bacteria. COVID negative. EKG NSR, rate WNL, 1st degree AV block, qtc prolonged 559 ms, old ischemic changes. CXR w/ sternotomy wires present, no consolidation, no effusion, no pneumothorax. CTA negative for PE. Patient was continued on aspirin, statin, beta blocker, and admitted to Medicine.     Chest discomfort, known CAD, hx of CABG  Now chest pain free, feeling generally well. Trop negative. No acute ischemic EKG changes.   - Continue aspirin, statin, beta blocker    Hx of recent psychosis, cognitive decline  Chronicity unclear, no known psychiatric diagnosis. No agitation issues here. No current behavioral concerns. That being said, mentation and affect do not appear normal. 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 RPR WNL, TSH WNL. Vit B12 WNL on recent prior admission. Attempting to get further collateral history from family. Psychiatry consulted. Neurology consulted. He does have possible confabulation and risk factors for Wernicke's with alcohol use disorder and suspected protein-calorie malnutrition.  - Consulted Neurology and Psychiatry  - Started empiric IV thiamine 500mg q8h  - Seroquel 25mg Po qHS  - Outpatient referral to Geriatric Psychiatry Dr. Akers    Atelectasis  No hypoxia. No fever.   - Encourage incentive spirometry    BPH  Chronic, stable. Has chronic urinary urgency and incontinence. UA in ED possibly not a clean catch. UA, Ucx repeated, in process  - Continue Flomax  - F/u in process  UA, Ucx    Physical deconditioning and debility  Patient seen by PT, recommended home with home PT, home assist  - Continue restorative PT sessions    Severe protein-calorie malnutrition  Decreased muscle mass noted. Nutrition consulted.   - Encourage PO intake, trend weight      Diet: DASH  DVT px: Heparin subcut  Code status: Full  Emergency contact: Sascha Toribio [phone], Johnny Freedman [phone]  Dispo: Anticipate discharge to home with home services, continued support from his partner's two grandsons 76 year-old man with CAD s/p CABG, afib not on AC d/t concerns with medication non-compliance, also hx of BPH, urinary urgency and incontinence, alcohol use disorder and recent worsening cognitive function associated with paranoia and hallucinations per his partner's grandsons who care for him, presented 2/26 for chest pain that started the day prior, was described as a constant heaviness in his chest rated severe in intensity, associated with dyspnea, non-positional, non-pleuritic, non-reproducible with palpation. No recent trauma or injury. Denied headaches, dizziness, fevers, chills, abdominal pain, N/V, diarrhea/constipation. Of note, patient was recently admitted 2/10-2/22 for altered mental status, auditory and visual hallucinations, possibly secondary to alcohol withdrawal, possible dementia with delirium, started on Seroquel, Trazodone, Remeron that admission. In the ED on this presentation, vitals unremarkable aside from mild HTN, /72. Exam unrevealing, thought rather tangential, disjointed. Hgb 12.8. BUN 47, Cr 1.47 (baseline ~1.02). D-dimer 556. Trop negative. UA w/ occasional bacteria. COVID negative. EKG NSR, rate WNL, 1st degree AV block, qtc prolonged 559 ms, old ischemic changes. CXR w/ sternotomy wires present, no consolidation, no effusion, no pneumothorax. CTA negative for PE. Patient was continued on aspirin, statin, beta blocker, and admitted to Medicine.     Chest discomfort, known CAD, hx of CABG  Now chest pain free, feeling generally well. Trop negative. No acute ischemic EKG changes.   - Continue aspirin, statin, beta blocker    Hx of recent psychosis, cognitive decline  His partner became sick, got COVID got a UTI, declined herself and is now in a facility, increased his drinking, talking to her when she isn't. Early January. Chronicity unclear, no known psychiatric diagnosis. No agitation issues here. No current behavioral concerns. That being said, mentation and affect do not appear normal. 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 RPR WNL, TSH WNL. Vit B12 WNL on recent prior admission. Attempting to get further collateral history from family. Psychiatry consulted. Neurology consulted. He does have possible confabulation and risk factors for Wernicke's with alcohol use disorder and suspected protein-calorie malnutrition.  - Consulted Neurology and Psychiatry  - Started empiric IV thiamine 500mg q8h  - Seroquel 25mg Po qHS  - Outpatient referral to Geriatric Psychiatry Dr. Akers    Atelectasis  No hypoxia. No fever.   - Encourage incentive spirometry    BPH  Chronic, stable. Has chronic urinary urgency and incontinence. UA in ED possibly not a clean catch. UA, Ucx repeated, in process  - Continue Flomax  - F/u in process  UA, Ucx    Physical deconditioning and debility  Patient seen by PT, recommended home with home PT, home assist  - Continue restorative PT sessions    Severe protein-calorie malnutrition  Decreased muscle mass noted. Nutrition consulted.   - Encourage PO intake, trend weight      Diet: DASH  DVT px: Heparin subcut  Code status: Full  Emergency contact: Sascha Toribio [phone], Johnny Freedman [phone]  Dispo: Anticipate discharge to home with home services, continued support from his partner's two grandsons 76 year-old man with CAD s/p CABG, afib not on AC d/t concerns with medication non-compliance, also hx of BPH, urinary urgency and incontinence, alcohol use disorder and recent worsening cognitive function associated with paranoia and hallucinations per his partner's grandsons who care for him, presented 2/26 for chest pain that started the day prior, was described as a constant heaviness in his chest rated severe in intensity, associated with dyspnea, non-positional, non-pleuritic, non-reproducible with palpation. No recent trauma or injury. Denied headaches, dizziness, fevers, chills, abdominal pain, N/V, diarrhea/constipation. Of note, patient was recently admitted 2/10-2/22 for altered mental status, auditory and visual hallucinations, possibly secondary to alcohol withdrawal, possible dementia with delirium, started on Seroquel, Trazodone, Remeron that admission. In the ED on this presentation, vitals unremarkable aside from mild HTN, /72. Exam unrevealing, thought rather tangential, disjointed. Hgb 12.8. BUN 47, Cr 1.47 (baseline ~1.02). D-dimer 556. Trop negative. UA w/ occasional bacteria. COVID negative. EKG NSR, rate WNL, 1st degree AV block, qtc prolonged 559 ms, old ischemic changes. CXR w/ sternotomy wires present, no consolidation, no effusion, no pneumothorax. CTA negative for PE. Patient was continued on aspirin, statin, beta blocker, and admitted to Medicine.     Chest discomfort, known CAD, hx of CABG  Now chest pain free, feeling generally well. Trop negative. No acute ischemic EKG changes.   - Continue aspirin, statin, beta blocker    Recent cognitive decline with delirium, decrease in ability to perform his ADLs  Above issues really started to manifest in early January 2022 when his partner became sick, got COVID, got a UTI, declined herself and is now in a facility rather than home with him. He substantially increased his alcohol drinking and his cognitive issues and delirium accelerated with his partner's grandsons (who are his caretakers) noticed that he was talking to their grandmother when she was not there, etc. 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 On recent admission early mid February, RPR WNL, TSH WNL. Vit B12 WNL, CT head. He does have possible confabulation and risk factors for Wernicke's with alcohol use disorder and suspected protein-calorie malnutrition, started on empiric IV thiamine. Neurology consulted. Psychiatry consulted.   - Consulted Neurology and Psychiatry  - Continue empiric IV thiamine 500mg q8h (2/27-)  - Seroquel 25mg PO qHS  - Outpatient referral to Geriatric Psychiatry Dr. Akers    Atelectasis  No hypoxia. No fever.   - Encourage incentive spirometry    BPH  Chronic, stable. Has chronic urinary urgency and incontinence. UA in ED possibly not a clean catch. UA, Ucx repeated, in process  - Continue Flomax  - F/u in process  UA, Ucx    Physical deconditioning and debility  Patient seen by PT, recommended home with home PT, home assist  - Continue restorative PT sessions    Severe protein-calorie malnutrition  Decreased muscle mass noted. Nutrition consulted.   - Encourage PO intake, trend weight      Diet: DASH  DVT px: Heparin subcut  Code status: Full  Emergency contact: Sascha Toribio [phone], Johnny Freedman [phone]  Dispo: Anticipate discharge to home with home services, continued support from his partner's two grandsons

## 2022-02-27 NOTE — BH CONSULTATION LIAISON ASSESSMENT NOTE - NSBHATTESTSEENBY_PSY_A_CORE
02/14/17 1147   Quick Adds   Type of Visit Initial PT Evaluation   Living Environment   Lives With alone  (family live in upstairs of duplex)   Living Arrangements condominium   Home Accessibility stairs to enter home   Number of Stairs to Enter Home 3   Number of Stairs Within Home 0   Stair Railings at Home outside, present on right side   Transportation Available car;family or friend will provide   Living Environment Comment Has been receiving assistance from her family since hospital discharge at the end of January   Self-Care   Dominant Hand right   Usual Activity Tolerance moderate   Current Activity Tolerance poor   Regular Exercise yes   Activity/Exercise Type strength training   Exercise Amount/Frequency 3-5 times/wk   Equipment Currently Used at Home cane, straight;walker, rolling   Activity/Exercise/Self-Care Comment Pt states that she does not use assistive devices when she feels well. Has been using cane since hospital discharge   Functional Level Prior   Ambulation 1-->assistive equipment   Transferring 1-->assistive equipment   Toileting 1-->assistive equipment   Bathing 2-->assistive person   Dressing 0-->independent   Eating 0-->independent   Communication 0-->understands/communicates without difficulty   Swallowing 0-->swallows foods/liquids without difficulty   Cognition 0 - no cognition issues reported   Fall history within last six months no   Which of the above functional risks had a recent onset or change? ambulation;transferring;toileting;bathing   Prior Functional Level Comment Recently discharged from hospital; was using single end cane & receiving assistance from her family; she was about to initiate home PT & OT intervention   General Information   Referring Physician ACACIA Haider, CNP   Patient/Family Goals Statement Return home; improved breathing; improved activity tolerance   Pertinent History of Current Problem (include personal factors and/or comorbidities that impact the  POC) Pt admitted with increasing fatigue, SOB, nausea/vomting and upper abd pain. PMHx: right single lung tx 2009; metastatic colon cancer with liver mets; osteoporosis with chronic compression fx; HTN; HLD; DM2   Precautions/Limitations immunosuppressed;oxygen therapy device and L/min   General Observations Pt lying supine upon PTs arrival; agreeable to PT session    Cognitive Status Examination   Orientation orientation to person, place and time   Level of Consciousness alert   Follows Commands and Answers Questions 100% of the time   Personal Safety and Judgment intact   Memory intact   Pain Assessment   Patient Currently in Pain Yes, see Vital Sign flowsheet   Range of Motion (ROM)   ROM Quick Adds No deficits were identified   Strength   Strength Comments generalized weakness; feels poorly & unable to tolerate MMT   Bed Mobility   Bed Mobility Comments supine to sit with HOB elevated to 20 degrees & SBA   Transfer Skills   Transfer Comments SBA for sit to/from stand from EOB with use of WW   Gait   Gait Comments SBA & assist to manage multiple lines during gait training; was able to amb 45 ft x 2 on level; exhibits decreased gait michel, decreased heelstrike bilaterally, excessive use of U/E support   Balance   Balance Comments sitting-good at EOB without U/E support; standing-fair, needs bilateral U/E support at this time   General Therapy Interventions   Planned Therapy Interventions balance training;bed mobility training;gait training;strengthening;transfer training   Clinical Impression   Criteria for Skilled Therapeutic Intervention yes, treatment indicated   PT Diagnosis impaired mobility   Influenced by the following impairments decreased strength, decreased activity tolerance; need for supplemental O2   Functional limitations due to impairments decreased independence with bed mobility, transfers & gait   Clinical Presentation Evolving/Changing   Clinical Presentation Rationale Recent increase in  "fatigue, SOB; now requiring supplemental O2   Clinical Decision Making (Complexity) Moderate complexity   Therapy Frequency` daily   Predicted Duration of Therapy Intervention (days/wks) 10 days   Anticipated Discharge Disposition Home with Assist;Home with Home Therapy   Risk & Benefits of therapy have been explained Yes   Patient, Family & other staff in agreement with plan of care Yes   Clinical Impression Comments Recent increase in SOB requiring supplemental O2 with impairment in bed mobility, transfers, gait & activity tolerance   [facility] TM \"6 Clicks\"   2016, Trustees of [facility], under license to [employer].  All rights reserved.   6 Clicks Short Forms Basic Mobility Inpatient Short Form   [facility] AM-PAC  \"6 Clicks\" V.2 Basic Mobility Inpatient Short Form   1. Turning from your back to your side while in a flat bed without using bedrails? 4 - None   2. Moving from lying on your back to sitting on the side of a flat bed without using bedrails? 3 - A Little   3. Moving to and from a bed to a chair (including a wheelchair)? 3 - A Little   4. Standing up from a chair using your arms (e.g., wheelchair, or bedside chair)? 4 - None   5. To walk in hospital room? 3 - A Little   6. Climbing 3-5 steps with a railing? 2 - A Lot   Basic Mobility Raw Score (Score out of 24.Lower scores equate to lower levels of function) 19   Total Evaluation Time   Total Evaluation Time (Minutes) 10     " attending Psychiatrist without NP/Trainee

## 2022-02-27 NOTE — BH CONSULTATION LIAISON ASSESSMENT NOTE - NSBHCHARTREVIEWVS_PSY_A_CORE FT
Vital Signs Last 24 Hrs  T(C): 36.4 (27 Feb 2022 07:59), Max: 36.4 (27 Feb 2022 07:59)  T(F): 97.6 (27 Feb 2022 07:59), Max: 97.6 (27 Feb 2022 07:59)  HR: 82 (27 Feb 2022 07:59) (71 - 82)  BP: 114/68 (27 Feb 2022 07:59) (114/68 - 140/57)  BP(mean): --  RR: 17 (27 Feb 2022 07:59) (16 - 18)  SpO2: 100% (27 Feb 2022 07:59) (96% - 100%)

## 2022-02-27 NOTE — BH CONSULTATION LIAISON ASSESSMENT NOTE - RISK ASSESSMENT
Low acute risk: no SI, no HI, not agitated, denies anxiety, no depression.   Low long term risk: no psychosis , no hx fo SI or SA but EtOH abuse.   Protective factors: no  psych hx.

## 2022-02-27 NOTE — BH CONSULTATION LIAISON ASSESSMENT NOTE - HPI (INCLUDE ILLNESS QUALITY, SEVERITY, DURATION, TIMING, CONTEXT, MODIFYING FACTORS, ASSOCIATED SIGNS AND SYMPTOMS)
As per earlier Assessment most recently: Pt is a 76 YOWAA  man, who resides with wife, and who denies any psych hx in the past, admitted with A MS stared on CIWA.   Pt denies depressed mood, denies insomnia, appetite problems SI, HI, denies AH, and VH. He spontaneous  talks about some people coming to his home and setting some instruments and wires so that he can see and hear what is happening in his neighbor's houses.    Pt admits to drinking, but minimizes it and states  he drinks only on the weekend,   He is  and resides with wife and grandson.  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    02/27/2022: Patient in bed, alert, oriented to time and place, good eye contact, and no agitation/aggression, and talks and relates well. he endorses that he is sleeping very well, mood in control, does not know why he is here, has no idea of hs current medical issues, He added that he hear something when he wakes up at 4 AM, and then he tries to look through the house or search the house, at times he sees people outside, denied paranoid or other delusional beliefs. Not aware what meds he takes regularly. He knows that he is in [facility]. He denied alcohol use daily, but once in a while he drinks Tanquer[place] Gin. No other mental health issues in the past.

## 2022-02-27 NOTE — BH CONSULTATION LIAISON ASSESSMENT NOTE - CURRENT MEDICATION
MEDICATIONS  (STANDING):  aspirin  chewable 81 milliGRAM(s) Oral daily  atorvastatin 20 milliGRAM(s) Oral at bedtime  heparin   Injectable 5000 Unit(s) SubCutaneous every 12 hours  metoprolol succinate ER 25 milliGRAM(s) Oral daily  multivitamin 1 Tablet(s) Oral daily  QUEtiapine 25 milliGRAM(s) Oral at bedtime  tamsulosin 0.4 milliGRAM(s) Oral at bedtime  thiamine IVPB 500 milliGRAM(s) IV Intermittent every 8 hours    MEDICATIONS  (PRN):  acetaminophen     Tablet .. 650 milliGRAM(s) Oral every 6 hours PRN Temp greater or equal to 38C (100.4F), Mild Pain (1 - 3)  aluminum hydroxide/magnesium hydroxide/simethicone Suspension 30 milliLiter(s) Oral every 4 hours PRN Dyspepsia  hydrALAZINE Injectable 10 milliGRAM(s) IV Push every 6 hours PRN SBP > 160  melatonin 3 milliGRAM(s) Oral at bedtime PRN Insomnia

## 2022-02-27 NOTE — BH CONSULTATION LIAISON ASSESSMENT NOTE - SUMMARY
Patient in bed, alert, oriented to time and place, good eye contact, and no agitation/aggression, and talks and relates well. he endorses that he is sleeping very well, mood in control, does not know why he is here, has no idea of hs current medical issues, He added that he hear something when he wakes up at 4 AM, and then he tries to look through the house or search the house, at times he sees people outside, denied paranoid or other delusional beliefs. Not aware what meds he takes regularly. He knows that he is in [facility]. He denied alcohol use daily, but once in a while he drinks Tanquer[place] Gin. No other mental health issues in the past.    Plan: Patient is alert, oriented to time and place with good sleep/appetite. He endorses that he at times hears voices and also sees things. On Seroquel 25 mg HS with good effect with sleep and adequate functioning.    Plan: To continue with Seroquel 25 mg HS           Psych F/U PRN

## 2022-02-27 NOTE — PROGRESS NOTE ADULT - SUBJECTIVE AND OBJECTIVE BOX
Chief complaint: Chest discomfort    Interval history: Patient seen and examined. A bit of a limited historian. Does convey that he now feels well today. No longer with chest pain or other complaints. No dyspnea, lightheadedness or palpitations No GI complaints or urinary complaints .Does feel generally weak and deconditioned.     : Noted to have abnormal UA upon presentation here but may not have been a clean catch (+ epithelial cells). Ordered for repeat UA UCx. Reassuringly, no fever or  complaints but patient is a rather limited historian. Seen by PT yesterday. Recommended home with home PT, home assist. Awaiting input from Psychiatry re his possible underlying psychiatric disease vs substance use disorder related neurocognitive disease vs other.     ROS: Multisystem review is comprehensively negative x 10 systems except as above but limited by his suspected neurocognitive disease and or psychiatric ailment    Physical Exam:  T(F): 97.8 (2022 09:12), Max: 98 (2022 00:55)  HR: 77 (2022 09:46) (74 - 89)  BP: 127/57 (2022 09:46) (95/56 - 150/72)  RR: 18 (2022 09:12) (17 - 18)  SpO2: 97% (2022 09:12) (95% - 100%)    Gen: No acute distress  HEENT: NCAT PERRL EOMI  Neck: Supple, no JVD  Chest: CTA B/L, normal resp effort at rest  CVS: S1 S2 normal, RRR  Abd: +BS soft NT ND   Ext: No edema or calf tenderess  Skin: Warm dry  Neuro: Awake and alert, answers simple questions, follows simple commands  Mood: Calm, pleasant, thought a bit tangential, denies any active visual or auditory hallucination, no suicidal or homicidal ideation    Labs:    CBC                         12.4   6.58  )----------( 335             38.7     BMP       139      |  109   |  33   ----------------------------< 84   4.4 (rpt)  |   26    |  1.01     Ca 8.7  Mg 2.8    LFTs: TPro  7.6  /  Alb  3.3  /  TBili  0.7  /  DBili  x   /  AST  29  /  ALT  29  /  AlkPhos  78      Coags: PT: 13.0 sec;   INR: 1.12 ratio   PTT:32.8 sec    Trop (-) x 3   pro    D-dimer 556    Urinalysis   Color: Yellow / Appearance: Clear / S.015 / pH: x  Gluc: x / Ketone: Negative  / Bili: Negative / Urobili: 4   Blood: x / Protein: 100 / Nitrite: Negative   Leuk Esterase: Negative / RBC: Negative /HPF / WBC Negative   Sq Epi: x / Non Sq Epi: Negative / Bacteria: Occasional    Micro:  COVID19 PCR : Neg    Imaging:  LLE venous duplex : No obvious thrombus in the visualized left lower extremity deep veins.    CTA chest W : No pulmonary embolism. No endobronchial lesion. Mild emphysema. Mild atelectasis in the right lower lobe. Mild right posteromedial pleural thickening. No pleural effusion or pneumothorax. Calcified lymph nodes consistent with prior granulomatous   disease. Enlarged heart. No pericardial effusion. No aortic aneurysm. Mildly calcified aortic valve. Coronary artery calcified plaque. L kidney cyst. Sternotomy. Degenerative changes of the spine. Diffusely heterogeneous bone marrow.    CXR : The visualized lungs are clear of airspace consolidations or effusions. No pneumothorax. The heart and mediastinum size and configuration are within normal limits. Status post coronary artery bypass graft procedure. Visualized osseous structures are intact.    Cardiac Testing:  EKG : Sinus rhythm with 1st degree A-V block. Anteroseptal q (old). Prolonged QT    Meds:  MEDICATIONS  (STANDING):  aspirin  chewable 81 milliGRAM(s) Oral daily  atorvastatin 20 milliGRAM(s) Oral at bedtime  heparin   Injectable 5000 Unit(s) SubCutaneous every 12 hours  metoprolol succinate ER 25 milliGRAM(s) Oral daily  multivitamin 1 Tablet(s) Oral daily  sodium chloride 0.9%. 1000 milliLiter(s) (75 mL/Hr) IV Continuous <Continuous>  tamsulosin 0.4 milliGRAM(s) Oral at bedtime    MEDICATIONS  (PRN):  acetaminophen     Tablet .. 650 milliGRAM(s) Oral every 6 hours PRN Temp greater or equal to 38C (100.4F), Mild Pain (1 - 3)  aluminum hydroxide/magnesium hydroxide/simethicone Suspension 30 milliLiter(s) Oral every 4 hours PRN Dyspepsia  hydrALAZINE Injectable 10 milliGRAM(s) IV Push every 6 hours PRN SBP > 160  melatonin 3 milliGRAM(s) Oral at bedtime PRN Insomnia       Chief complaint: Chest discomfort    Interval history: Overnight patient with some mild sundowning, received Seroquel 25mg with good effect. Patient seen and examined today on AM rounds. Patient awake and alert. A bit of a limited historian. Answers most questions fairly appropriately but does have some tangentiality, possible confabulation as well. No longer with chest pain or other complaints. No dyspnea, lightheadedness or palpitations. Does feel generally weak and deconditioned. Seen by PT yesterday. Recommended home with home PT, home assist when medically cleared. Noted to have abnormal UA upon presentation here but may not have been a clean catch (+ epithelial cells). Ordered for repeat UA UCx. Reassuringly, no fever or  complaints but again, patient is a rather limited historian. Awaiting input from Psychiatry re his possible underlying psychiatric disease vs substance use disorder related neurocognitive disease vs other. Also consulted Neurology for their assessment and empirically placed patient on IV thiamine to cover for possible Wernicke's.     ROS: Multisystem review is comprehensively negative x 10 systems except as above but limited by his suspected neurocognitive disease and or psychiatric ailment    Physical Exam:  T(F): 97.6 (27 Feb 2022 07:59), Max: 97.6 (27 Feb 2022 07:59)  HR: 82 (27 Feb 2022 07:59) (71 - 82)  BP: 114/68 (27 Feb 2022 07:59) (114/68 - 140/57)  RR: 17 (27 Feb 2022 07:59) (16 - 18)  SpO2: 100% (27 Feb 2022 07:59) (96% - 100%) on room air    Gen: No acute distress  HEENT: NCAT PERRL EOMI  Neck: Supple, no JVD  Chest: CTA B/L, normal resp effort at rest  CVS: S1 S2 normal, RRR  Abd: +BS soft NT ND   Ext: No edema or calf tenderness  Skin: Warm dry  Neuro: Awake and alert, answers simple questions, follows simple commands. CN appear intact. No nystagmus. No dysarthria. Thought content is a bit abnormal, tangential. Strength, sensation intact. FNF intact.  Mood: Calm, pleasant, denies any active visual or auditory hallucination, no suicidal or homicidal ideation    Labs:    CBC 2/26                        12.4   6.58  )----------( 335             38.7     BMP 2/26      139      |  109   |  33   ----------------------------< 84   4.4 (rpt)  |   26    |  1.01     Ca 8.7  Mg 2.8    LFTs: TPro  7.6  /  Alb  3.3  /  TBili  0.7  /  DBili  x   /  AST  29  /  ALT  29  /  AlkPhos  78      Coags: PT: 13.0 sec;   INR: 1.12 ratio   PTT:32.8 sec    Trop (-) x 3   pro    D-dimer 556    From prior admission, TSH, B12 WNL (2/11), T. pallidum Ab negative (2/11)    Urinalysis 2/25: Ket-, prot+, N-, LE-, RBC-, WBC-, Bact occ    Rpt Urinalysis 2/27: In process    Micro:  Urine culture 2/27: In process  COVID19 PCR 2/25: Neg    Imaging:  LLE venous duplex 2/26: No obvious thrombus in the visualized left lower extremity deep veins.    CTA chest W 2/25: No pulmonary embolism. No endobronchial lesion. Mild emphysema. Mild atelectasis in the right lower lobe. Mild right posteromedial pleural thickening. No pleural effusion or pneumothorax. Calcified lymph nodes consistent with prior granulomatous   disease. Enlarged heart. No pericardial effusion. No aortic aneurysm. Mildly calcified aortic valve. Coronary artery calcified plaque. L kidney cyst. Sternotomy. Degenerative changes of the spine. Diffusely heterogeneous bone marrow.    CXR 2/25: The visualized lungs are clear of airspace consolidations or effusions. No pneumothorax. The heart and mediastinum size and configuration are within normal limits. Status post coronary artery bypass graft procedure. Visualized osseous structures are intact.    Cardiac Testing:  EKG 2/25: Sinus rhythm with 1st degree A-V block. Anteroseptal q (old). Prolonged QT    Meds:  MEDICATIONS  (STANDING):  aspirin  chewable 81 milliGRAM(s) Oral daily  atorvastatin 20 milliGRAM(s) Oral at bedtime  heparin   Injectable 5000 Unit(s) SubCutaneous every 12 hours  metoprolol succinate ER 25 milliGRAM(s) Oral daily  multivitamin 1 Tablet(s) Oral daily  QUEtiapine 25 milliGRAM(s) Oral at bedtime  tamsulosin 0.4 milliGRAM(s) Oral at bedtime  thiamine IVPB 500 milliGRAM(s) IV Intermittent every 8 hours    MEDICATIONS  (PRN):  acetaminophen     Tablet .. 650 milliGRAM(s) Oral every 6 hours PRN Temp greater or equal to 38C (100.4F), Mild Pain (1 - 3)  aluminum hydroxide/magnesium hydroxide/simethicone Suspension 30 milliLiter(s) Oral every 4 hours PRN Dyspepsia  hydrALAZINE Injectable 10 milliGRAM(s) IV Push every 6 hours PRN SBP > 160  melatonin 3 milliGRAM(s) Oral at bedtime PRN Insomnia   Chief complaint: Chest discomfort    Interval history: Overnight patient with some mild sundowning, received Seroquel 25mg with good effect. Patient seen and examined today on AM rounds. Patient awake and alert. A bit of a limited historian. Answers most questions fairly appropriately but does have some tangentiality, possible confabulation as well. No longer with chest pain or other complaints. No dyspnea, lightheadedness or palpitations. Does feel generally weak and deconditioned. Seen by PT yesterday. Recommended home with home PT, home assist when medically cleared. Noted to have abnormal UA upon presentation here but may not have been a clean catch (+ epithelial cells). Ordered for repeat UA UCx. Reassuringly, no fever or  complaints but again, patient is a rather limited historian. Awaiting input from Psychiatry re his possible underlying psychiatric disease vs substance use disorder related neurocognitive disease vs other. Also consulted Neurology for their assessment and empirically placed patient on IV thiamine to cover for possible Wernicke's.     ROS: Multisystem review is comprehensively negative x 10 systems except as above but limited by his suspected neurocognitive disease and or psychiatric ailment    Physical Exam:  T(F): 97.6 (27 Feb 2022 07:59), Max: 97.6 (27 Feb 2022 07:59)  HR: 82 (27 Feb 2022 07:59) (71 - 82)  BP: 114/68 (27 Feb 2022 07:59) (114/68 - 140/57)  RR: 17 (27 Feb 2022 07:59) (16 - 18)  SpO2: 100% (27 Feb 2022 07:59) (96% - 100%) on room air    Gen: No acute distress  HEENT: NCAT PERRL EOMI  Neck: Supple, no JVD  Chest: CTA B/L, normal resp effort at rest  CVS: S1 S2 normal, RRR  Abd: +BS soft NT ND   Ext: No edema or calf tenderness  Skin: Warm dry  Neuro: Awake and alert, answers simple questions, follows simple commands. CN appear intact. No nystagmus. No dysarthria. Thought content is a bit abnormal, tangential. Strength, sensation intact. FNF intact.  Mood: Calm, pleasant, denies any active visual or auditory hallucination, no suicidal or homicidal ideation    Labs:    CBC 2/26                        12.4   6.58  )----------( 335             38.7     BMP 2/26      139      |  109   |  33   ----------------------------< 84   4.4 (rpt)  |   26    |  1.01     Ca 8.7  Mg 2.8    LFTs: TPro  7.6  /  Alb  3.3  /  TBili  0.7  /  DBili  x   /  AST  29  /  ALT  29  /  AlkPhos  78      Coags: PT: 13.0 sec;   INR: 1.12 ratio   PTT:32.8 sec    Trop (-) x 3   pro    D-dimer 556    From prior admission, TSH, B12 WNL (2/11), T. pallidum Ab negative (2/11)    Urinalysis 2/25: Ket-, prot+, N-, LE-, RBC-, WBC-, Bact occ    Rpt Urinalysis 2/27: In process    Micro:  Urine culture 2/27: In process  COVID19 PCR 2/25: Neg    Imaging:  LLE venous duplex 2/26: No obvious thrombus in the visualized left lower extremity deep veins.    CTA chest W 2/25: No pulmonary embolism. No endobronchial lesion. Mild emphysema. Mild atelectasis in the right lower lobe. Mild right posteromedial pleural thickening. No pleural effusion or pneumothorax. Calcified lymph nodes consistent with prior granulomatous disease. Enlarged heart. No pericardial effusion. No aortic aneurysm. Mildly calcified aortic valve. Coronary artery calcified plaque. L kidney cyst. Sternotomy. Degenerative changes of the spine. Diffusely heterogeneous bone marrow.    CXR 2/25: The visualized lungs are clear of airspace consolidations or effusions. No pneumothorax. The heart and mediastinum size and configuration are within normal limits. Status post coronary artery bypass graft procedure. Visualized osseous structures are intact.    Cardiac Testing:  EKG 2/25: Sinus rhythm with 1st degree A-V block. Anteroseptal q (old). Prolonged QT    Meds:  MEDICATIONS  (STANDING):  aspirin  chewable 81 milliGRAM(s) Oral daily  atorvastatin 20 milliGRAM(s) Oral at bedtime  heparin   Injectable 5000 Unit(s) SubCutaneous every 12 hours  metoprolol succinate ER 25 milliGRAM(s) Oral daily  multivitamin 1 Tablet(s) Oral daily  QUEtiapine 25 milliGRAM(s) Oral at bedtime  tamsulosin 0.4 milliGRAM(s) Oral at bedtime  thiamine IVPB 500 milliGRAM(s) IV Intermittent every 8 hours    MEDICATIONS  (PRN):  acetaminophen     Tablet .. 650 milliGRAM(s) Oral every 6 hours PRN Temp greater or equal to 38C (100.4F), Mild Pain (1 - 3)  aluminum hydroxide/magnesium hydroxide/simethicone Suspension 30 milliLiter(s) Oral every 4 hours PRN Dyspepsia  hydrALAZINE Injectable 10 milliGRAM(s) IV Push every 6 hours PRN SBP > 160  melatonin 3 milliGRAM(s) Oral at bedtime PRN Insomnia

## 2022-02-28 LAB
CULTURE RESULTS: SIGNIFICANT CHANGE UP
SPECIMEN SOURCE: SIGNIFICANT CHANGE UP

## 2022-02-28 PROCEDURE — 99233 SBSQ HOSP IP/OBS HIGH 50: CPT

## 2022-02-28 PROCEDURE — 99232 SBSQ HOSP IP/OBS MODERATE 35: CPT

## 2022-02-28 PROCEDURE — 99223 1ST HOSP IP/OBS HIGH 75: CPT

## 2022-02-28 RX ADMIN — Medication 25 MILLIGRAM(S): at 10:07

## 2022-02-28 RX ADMIN — Medication 255 MILLIGRAM(S): at 21:31

## 2022-02-28 RX ADMIN — Medication 1 TABLET(S): at 10:07

## 2022-02-28 RX ADMIN — Medication 81 MILLIGRAM(S): at 10:07

## 2022-02-28 RX ADMIN — Medication 255 MILLIGRAM(S): at 05:28

## 2022-02-28 RX ADMIN — ATORVASTATIN CALCIUM 20 MILLIGRAM(S): 80 TABLET, FILM COATED ORAL at 21:29

## 2022-02-28 RX ADMIN — Medication 255 MILLIGRAM(S): at 13:13

## 2022-02-28 RX ADMIN — HEPARIN SODIUM 5000 UNIT(S): 5000 INJECTION INTRAVENOUS; SUBCUTANEOUS at 10:10

## 2022-02-28 RX ADMIN — TAMSULOSIN HYDROCHLORIDE 0.4 MILLIGRAM(S): 0.4 CAPSULE ORAL at 21:30

## 2022-02-28 RX ADMIN — HEPARIN SODIUM 5000 UNIT(S): 5000 INJECTION INTRAVENOUS; SUBCUTANEOUS at 21:29

## 2022-02-28 RX ADMIN — QUETIAPINE FUMARATE 25 MILLIGRAM(S): 200 TABLET, FILM COATED ORAL at 21:30

## 2022-02-28 NOTE — DIETITIAN INITIAL EVALUATION ADULT. - PERTINENT LABORATORY DATA
Sodium, Serum: 139 mmol/L   Potassium, Serum: 5.7 mmol/L   Chloride, Serum: 109 mmol/L   Carbon Dioxide, Serum: 26 mmol/L   Anion Gap, Serum: 4 mmol/L   Blood Urea Nitrogen, Serum: 33 mg/dL   Creatinine, Serum: 1.01 mg/dL   Glucose, Serum: 84 mg/dL   Calcium, Total Serum: 8.7 mg/dL   eGFR if Non : 72: Interpretative comment   Phosphorus Level, Serum: 3.5 mg/dL (02.11.22 @ 08:18)

## 2022-02-28 NOTE — CONSULT NOTE ADULT - ASSESSMENT
76 year old man with CABG (2012 with Dr. Yusra MORALEZ to LAD and SVG to OM), history of alcohol abuse, paranoia hallucinations history who presents with chest pain in the setting of incontinence. Reviewed his EKG which is largely unchanged from EKG earlier this month with anteroseptal infarct pattern, biphasic V2.   -in the setting of no chest pain currently and unchanged EKG, plan to observe patient and will await cardiac biomarkers with blood work.   -no plan for emergent cardiac catheterization at this time.   -continue aspirin as well as aggressive medical management of CAD including high dose statin  -please reach out if clinical situation changes  -findings discussed with patient, and ER Dr. Link     Thank you for allowing me to participate in the care of your patient. Feel free to contact me if any clinical issues arise.    Ariel Moreno MD, FACC, [facility]   Interventional Cardiovascular Attending  [facility], [facility]   [facility]   Office ([place]): [phone]  Office ([place]): [phone]  Email: [email or web address]  
77 y/o M with PMHx of CAD s/p CABG, A fib - not on AC, (concerns for non-compliance) alcohol abuse, paranoia/hallucinations presented for chest pain, constant heaviness in his chest. Has been w/u by cardio, on aspirin and statin. Was noted to be confused at times, ? confabulating, pt started on IV Thiamine. Pt was admitted to  2/10/2022-2/22/2022 for AMS + hallucinations, alcohol withdrawal, seen by neuro and psyche, had EEG, was started on Seroquel, Trazodone, Remeron.     Upon questioning today, is appropriate, well oriented, gives proper answers, mistakes current president with ex-president but seems aware of current events, no hallucinations or psychotic events ate present. No diplopia, HA, ataxia. He is aware that lent is round the corner, he plans to stop smoking/drinking for lent.    # Fluctuating encephalopathy; not typical wernicke's,     # Behavioral / mood disorder    - given history of ETOH abuse, would continue Thiamine 100 MG daily after completion of Tiamine IV X 3 days    - Would obtain B12, TSH level    - F/U with neuro for cognitive testing    - PT eval    Above D/W patient and Dr. Canales    Call neuro if needed henceforth    Above D/W patient

## 2022-02-28 NOTE — CONSULT NOTE ADULT - SUBJECTIVE AND OBJECTIVE BOX
CC: 76 y old  Male who presents with a chief complaint of Atypical chest pain, FUNMILAYO, delirium (28 Feb 2022 08:46)      HPI:  77 y/o M with PMH CAD s/p CABG, A fib not on AC, (concerns for non-compliance) alcohol abuse, paranoia/hallucinations presented for chest pain, constant heaviness in his chest. Pt was evaluated by cardio, no emergent indication for cardiac catheterization, was c/w aspirin and statin. He has been observed on telemetry. He     Pt was admitted to  from  2/10/2022-2/22/2022 for AMS, auditory and visual hallucinations, secondary to alcohol withdrawal, seen by neuro and psyche, had EEG, WAS started on Seroquel, Trazodone, Remeron.           PAST MEDICAL & SURGICAL HISTORY:  CAD (coronary artery disease)  Atrial fibrillation, Not on AC  History of alcohol use  BPH (benign prostatic hyperplasia)  Paranoia  History of hallucinations  S/P CABG (coronary artery bypass graft)        FAMILY HISTORY:  FH: asthma (Father)      Social Hx:  Nonsmoker, alcohol use ++      MEDICATIONS  (STANDING):  aspirin  chewable 81 milliGRAM(s) Oral daily  atorvastatin 20 milliGRAM(s) Oral at bedtime  heparin   Injectable 5000 Unit(s) SubCutaneous every 12 hours  metoprolol succinate ER 25 milliGRAM(s) Oral daily  multivitamin 1 Tablet(s) Oral daily  QUEtiapine 25 milliGRAM(s) Oral at bedtime  tamsulosin 0.4 milliGRAM(s) Oral at bedtime  thiamine IVPB 500 milliGRAM(s) IV Intermittent every 8 hours       Allergies  No Known Allergies  Intolerances        ROS: Pertinent positives in HPI, all other ROS were reviewed and are negative.        Vital Signs Last 24 Hrs  T(C): 36.3 (28 Feb 2022 07:20), Max: 36.3 (28 Feb 2022 07:20)  T(F): 97.4 (28 Feb 2022 07:20), Max: 97.4 (28 Feb 2022 07:20)  HR: 81 (28 Feb 2022 07:20) (80 - 81)  BP: 128/73 (28 Feb 2022 07:20) (128/73 - 133/54)  BP(mean): --  RR: 18 (28 Feb 2022 07:20) (18 - 18)  SpO2: 100% (28 Feb 2022 07:20) (100% - 100%)      Gen exam:  Normocephalic, in no distress,  awake and alert.  HEENT: PERRLA, EOMI,   Neck: Supple.  Respiratory: Breath sounds are clear bilaterally  Cardiovascular: S1 and S2, regular / irregular rhythm  Gastrointestinal: soft, nontender  Extremities:  no edema  Vascular: Caritid Bruit - no  Musculoskeletal: no joint swelling/tenderness, no abnormal movements  Skin: No rashes      Neurological exam:  HF: A x O x 3. Appropriately interactive, normal affect. Speech fluent, No Aphasia or paraphasic errors. Naming /repetition intact   CN: REINA, EOMI, VFF, facial sensation normal, no NLFD, tongue midline, Palate moves equally, SCM equal bilaterally  Motor: No pronator drift, Strength 5/5 in all 4 ext, normal bulk and tone, no tremor, rigidity or bradykinesia.    Sens: Intact to light touch / PP/ VS/ JS    Reflexes: Symmetric and normal . BJ 2+, BR 2+, KJ 2+, AJ 2+, downgoing toes b/l  Coord:  No FNFA, dysmetria, NORMA intact   Gait/Balance: Normal/Cannot test    NIHSS:      Labs:   02-26    x   |  x   |  x   ----------------------------<  x   4.4   |  x   |  x       02-10 Chol 196 LDL -- HDL 72 Trig 43      Radiology:  < from: CT Brain Stroke Protocol (02.10.22 @ 21:52) >  IMPRESSION:  No acute intracranial hemorrhage or mass effect.    < from: EEG Awake or Drowsy (02.16.22 @ 14:30) >  EEG Summary/Classification:  This was an abnormal EEG study due to the presence of:  -Mild generalized slowing  -Excess beta activity.    EEG Clinical Correlate/  Impression:  The findings are suggestive of diffuse cerebral   dysfunction/encephalopathy.  Excess beta activity is likely a medication effect which can be seen with   benzodiazepines and barbiturates.  No epileptiform activity was seen and no clinical events or seizures were   recorded. Consider a repeat study if clinically indicated.      < end of copied text >   CC: 76 y old  Male who presents with a chief complaint of Atypical chest pain, FUNMILAYO, delirium (28 Feb 2022 08:46)      HPI:  77 y/o M with PMH CAD s/p CABG, A fib not on AC, (concerns for non-compliance) alcohol abuse, also h/o paranoia/hallucinations presented for chest pain, constant heaviness in his chest. Pt was evaluated by cardio, no emergent indication for cardiac cath, was continued on aspirin and statin. He has been observed on telemetry, has been noted to be confused at times, ? confabulating  hence neuro consulted, pt started on IV Thiamine.     Upon questioning the patient, he is appropriate, well oriented, gives proper answers, at times mistakes current president with ex-president but seems aware of current events, no hallucinations or psychotic events ate present. Denies diplopia, HA, ataxia.  He is aware that lent is round the corner, he plans to stop smoking/drinking for lent.    Pt was admitted to  from  2/10/2022-2/22/2022 for AMS + auditory and visual hallucinations, ? alcohol withdrawal, seen by neuro and psyche, had EEG, was started on Seroquel, Trazodone, Remeron.         PAST MEDICAL & SURGICAL HISTORY:  CAD (coronary artery disease)  Atrial fibrillation, Not on AC  History of alcohol use  BPH (benign prostatic hyperplasia)  Paranoia  History of hallucinations  S/P CABG (coronary artery bypass graft)        FAMILY HISTORY:  FH: asthma (Father)      Social Hx:  Smoker +, alcohol use ++      MEDICATIONS  (STANDING):  aspirin  chewable 81 milliGRAM(s) Oral daily  atorvastatin 20 milliGRAM(s) Oral at bedtime  heparin   Injectable 5000 Unit(s) SubCutaneous every 12 hours  metoprolol succinate ER 25 milliGRAM(s) Oral daily  multivitamin 1 Tablet(s) Oral daily  QUEtiapine 25 milliGRAM(s) Oral at bedtime  tamsulosin 0.4 milliGRAM(s) Oral at bedtime  thiamine IVPB 500 milliGRAM(s) IV Intermittent every 8 hours       Allergies  No Known Allergies  Intolerances        ROS: Pertinent positives in HPI, all other ROS were reviewed and are negative.        Vital Signs Last 24 Hrs  T(C): 36.3 (28 Feb 2022 07:20), Max: 36.3 (28 Feb 2022 07:20)  T(F): 97.4 (28 Feb 2022 07:20), Max: 97.4 (28 Feb 2022 07:20)  HR: 81 (28 Feb 2022 07:20) (80 - 81)  BP: 128/73 (28 Feb 2022 07:20) (128/73 - 133/54)  BP(mean): --  RR: 18 (28 Feb 2022 07:20) (18 - 18)  SpO2: 100% (28 Feb 2022 07:20) (100% - 100%)      Gen exam:  Normocephalic, in no distress,  awake and alert.  HEENT: PERRLA, EOMI,   Neck: Supple.  Respiratory: Breath sounds are clear bilaterally  Cardiovascular: S1 and S2, regular   Extremities:  no edema  Vascular: Caritid Bruit - no  Musculoskeletal: no tremor, no abnormal movements  Skin: No rashes      Neurological exam:  HF: A x O x 3. interactive, normal affect. Speech fluent, No Aphasia. Naming /repetition intact. Decreased concentration/recall  CN: REINA, EOMI, VFF, facial sensation normal, no NLFD, tongue midline, Palate moves equally, SCM equal bilaterally  Motor: No pronator drift, Strength 5/5 in all 4 ext, normal bulk and tone, no tremor    Sens: Intact to light touch    Reflexes: Symmetric and normal . BJ 2+, BR 2+, KJ 0, AJ 0, downgoing toes b/l  Coord:  No FNFA, dysmetria   Gait/Balance: Able to stand and bear wt, no ataxia    Labs:   02-26    x   |  x   |  x   ----------------------------<  x   4.4   |  x   |  x     02-10 Chol 196 LDL -- HDL 72 Trig 43      Radiology:  < from: CT Brain Stroke Protocol (02.10.22 @ 21:52) >  IMPRESSION:  No acute intracranial hemorrhage or mass effect.    < from: EEG Awake or Drowsy (02.16.22 @ 14:30) >  EEG Summary/Classification:  This was an abnormal EEG study due to the presence of:  -Mild generalized slowing  -Excess beta activity.    EEG Clinical Correlate/  Impression:  The findings are suggestive of diffuse cerebral   dysfunction/encephalopathy.  Excess beta activity is likely a medication effect which can be seen with   benzodiazepines and barbiturates.  No epileptiform activity was seen and no clinical events or seizures were   recorded. Consider a repeat study if clinically indicated.

## 2022-02-28 NOTE — PROGRESS NOTE ADULT - REASON FOR ADMISSION
Atypical chest pain, psychosis, FUNMILAYO
Atypical chest pain, psychosis, FUNMILAYO
Chest pain
Atypical chest pain, FUNMILAYO, delirium

## 2022-02-28 NOTE — PROGRESS NOTE ADULT - SUBJECTIVE AND OBJECTIVE BOX
Chief complaint: Chest discomfort    Interval history: Overnight patient with some mild sundowning, received Seroquel 25mg with good effect. Patient seen and examined today on AM rounds. Patient awake and alert. A bit of a limited historian. Answers most questions fairly appropriately but does have some tangentiality, possible confabulation as well. No longer with chest pain or other complaints. No dyspnea, lightheadedness or palpitations. Does feel generally weak and deconditioned. Seen by PT yesterday. Recommended home with home PT, home assist when medically cleared. Noted to have abnormal UA upon presentation here but may not have been a clean catch (+ epithelial cells). Ordered for repeat UA UCx. Reassuringly, no fever or  complaints but again, patient is a rather limited historian. Awaiting input from Psychiatry re his possible underlying psychiatric disease vs substance use disorder related neurocognitive disease vs other. Also consulted Neurology for their assessment and empirically placed patient on IV thiamine to cover for possible Wernicke's.     2/27: No new issues or complaints. Feels well. Still a bit tangential. Not quite frankly confabulating. Answers questions fairly appropriately. On empiric IV thiamine day 2. UA from yesterday N-, trace LE, 5 WBC, 2 RBC, few bact, occ epi. No dysuria, hematuria, suprapubic pain or flank pain. Just his chronic urinary urgency.     ROS: Multisystem review is comprehensively negative x 10 systems except as above but limited by his suspected neurocognitive disease and or psychiatric ailment    Physical Exam:  T(F): 97.6 (27 Feb 2022 07:59), Max: 97.6 (27 Feb 2022 07:59)  HR: 82 (27 Feb 2022 07:59) (71 - 82)  BP: 114/68 (27 Feb 2022 07:59) (114/68 - 140/57)  RR: 17 (27 Feb 2022 07:59) (16 - 18)  SpO2: 100% (27 Feb 2022 07:59) (96% - 100%) on room air    Gen: No acute distress  HEENT: NCAT PERRL EOMI  Neck: Supple, no JVD  Chest: CTA B/L, normal resp effort at rest  CVS: S1 S2 normal, RRR  Abd: +BS soft NT ND   Ext: No edema or calf tenderness  Skin: Warm dry  Neuro: Awake and alert, answers simple questions, follows simple commands. CN appear intact. No nystagmus. No dysarthria. Thought content is a bit abnormal, tangential. Strength, sensation intact. FNF intact.  Mood: Calm, pleasant, denies any active visual or auditory hallucination, no suicidal or homicidal ideation    Labs:    CBC 2/26                        12.4   6.58  )----------( 335             38.7     BMP 2/26      139      |  109   |  33   ----------------------------< 84   4.4 (rpt)  |   26    |  1.01     Ca 8.7  Mg 2.8    LFTs: TPro  7.6  /  Alb  3.3  /  TBili  0.7  /  DBili  x   /  AST  29  /  ALT  29  /  AlkPhos  78      Coags: PT: 13.0 sec;   INR: 1.12 ratio   PTT:32.8 sec    Trop (-) x 3   pro    D-dimer 556    From prior admission, TSH, B12 WNL (2/11), T. pallidum Ab negative (2/11)    Urinalysis 2/25: Ket-, prot+, N-, LE-, RBC-, WBC-, Bact occ    Rpt Urinalysis 2/27: In process    Micro:  Urine culture 2/27: In process  COVID19 PCR 2/25: Neg    Imaging:  LLE venous duplex 2/26: No obvious thrombus in the visualized left lower extremity deep veins.    CTA chest W 2/25: No pulmonary embolism. No endobronchial lesion. Mild emphysema. Mild atelectasis in the right lower lobe. Mild right posteromedial pleural thickening. No pleural effusion or pneumothorax. Calcified lymph nodes consistent with prior granulomatous disease. Enlarged heart. No pericardial effusion. No aortic aneurysm. Mildly calcified aortic valve. Coronary artery calcified plaque. L kidney cyst. Sternotomy. Degenerative changes of the spine. Diffusely heterogeneous bone marrow.    CXR 2/25: The visualized lungs are clear of airspace consolidations or effusions. No pneumothorax. The heart and mediastinum size and configuration are within normal limits. Status post coronary artery bypass graft procedure. Visualized osseous structures are intact.    Cardiac Testing:  EKG 2/25: Sinus rhythm with 1st degree A-V block. Anteroseptal q (old). Prolonged QT    Meds:  MEDICATIONS  (STANDING):  aspirin  chewable 81 milliGRAM(s) Oral daily  atorvastatin 20 milliGRAM(s) Oral at bedtime  heparin   Injectable 5000 Unit(s) SubCutaneous every 12 hours  metoprolol succinate ER 25 milliGRAM(s) Oral daily  multivitamin 1 Tablet(s) Oral daily  QUEtiapine 25 milliGRAM(s) Oral at bedtime  tamsulosin 0.4 milliGRAM(s) Oral at bedtime  thiamine IVPB 500 milliGRAM(s) IV Intermittent every 8 hours    MEDICATIONS  (PRN):  acetaminophen     Tablet .. 650 milliGRAM(s) Oral every 6 hours PRN Temp greater or equal to 38C (100.4F), Mild Pain (1 - 3)  aluminum hydroxide/magnesium hydroxide/simethicone Suspension 30 milliLiter(s) Oral every 4 hours PRN Dyspepsia  hydrALAZINE Injectable 10 milliGRAM(s) IV Push every 6 hours PRN SBP > 160  melatonin 3 milliGRAM(s) Oral at bedtime PRN Insomnia   Chief complaint: Chest discomfort    Interval history: Overnight patient with some mild sundowning, received Seroquel 25mg with good effect. Patient seen and examined today on AM rounds. Patient awake and alert. A bit of a limited historian. Answers most questions fairly appropriately but does have some tangentiality, possible confabulation as well. No longer with chest pain or other complaints. No dyspnea, lightheadedness or palpitations. Does feel generally weak and deconditioned. Seen by PT yesterday. Recommended home with home PT, home assist when medically cleared. Noted to have abnormal UA upon presentation here but may not have been a clean catch (+ epithelial cells). Ordered for repeat UA UCx. Reassuringly, no fever or  complaints but again, patient is a rather limited historian. Awaiting input from Psychiatry re his possible underlying psychiatric disease vs substance use disorder related neurocognitive disease vs other. Also consulted Neurology for their assessment and empirically placed patient on IV thiamine to cover for possible Wernicke's.     2/28: No new issues or complaints. Feels well. Still a bit tangential. Not quite frankly confabulating. Answers questions fairly appropriately. On empiric IV thiamine day 2. UA from yesterday N-, trace LE, 5 WBC, 2 RBC, few bact, occ epi. No dysuria, hematuria, suprapubic pain or flank pain. Just his chronic urinary urgency.     ROS: Multisystem review is comprehensively negative x 10 systems except as above but limited by his suspected neurocognitive disease and or psychiatric ailment    Physical Exam:  T(F): 97.6 (27 Feb 2022 07:59), Max: 97.6 (27 Feb 2022 07:59)  HR: 82 (27 Feb 2022 07:59) (71 - 82)  BP: 114/68 (27 Feb 2022 07:59) (114/68 - 140/57)  RR: 17 (27 Feb 2022 07:59) (16 - 18)  SpO2: 100% (27 Feb 2022 07:59) (96% - 100%) on room air    Gen: No acute distress  HEENT: NCAT PERRL EOMI  Neck: Supple, no JVD  Chest: CTA B/L, normal resp effort at rest  CVS: S1 S2 normal, RRR  Abd: +BS soft NT ND   Ext: No edema or calf tenderness  Skin: Warm dry  Neuro: Awake and alert, answers simple questions, follows simple commands. CN appear intact. No nystagmus. No dysarthria. Thought content is a bit abnormal, tangential. Strength, sensation intact. FNF intact.  Mood: Calm, pleasant, denies any active visual or auditory hallucination, no suicidal or homicidal ideation    Labs:    CBC 2/26                        12.4   6.58  )----------( 335             38.7     BMP 2/26      139      |  109   |  33   ----------------------------< 84   4.4 (rpt)  |   26    |  1.01     Ca 8.7  Mg 2.8    LFTs: TPro  7.6  /  Alb  3.3  /  TBili  0.7  /  DBili  x   /  AST  29  /  ALT  29  /  AlkPhos  78      Coags: PT: 13.0 sec;   INR: 1.12 ratio   PTT:32.8 sec    Trop (-) x 3   pro    D-dimer 556    From prior admission, TSH, B12 WNL (2/11), T. pallidum Ab negative (2/11)    Urinalysis 2/25: Ket-, prot+, N-, LE-, RBC-, WBC-, Bact occ    Rpt Urinalysis 2/27: In process    Micro:  Urine culture 2/27: In process  COVID19 PCR 2/25: Neg    Imaging:  LLE venous duplex 2/26: No obvious thrombus in the visualized left lower extremity deep veins.    CTA chest W 2/25: No pulmonary embolism. No endobronchial lesion. Mild emphysema. Mild atelectasis in the right lower lobe. Mild right posteromedial pleural thickening. No pleural effusion or pneumothorax. Calcified lymph nodes consistent with prior granulomatous disease. Enlarged heart. No pericardial effusion. No aortic aneurysm. Mildly calcified aortic valve. Coronary artery calcified plaque. L kidney cyst. Sternotomy. Degenerative changes of the spine. Diffusely heterogeneous bone marrow.    CXR 2/25: The visualized lungs are clear of airspace consolidations or effusions. No pneumothorax. The heart and mediastinum size and configuration are within normal limits. Status post coronary artery bypass graft procedure. Visualized osseous structures are intact.    Cardiac Testing:  EKG 2/25: Sinus rhythm with 1st degree A-V block. Anteroseptal q (old). Prolonged QT    Meds:  MEDICATIONS  (STANDING):  aspirin  chewable 81 milliGRAM(s) Oral daily  atorvastatin 20 milliGRAM(s) Oral at bedtime  heparin   Injectable 5000 Unit(s) SubCutaneous every 12 hours  metoprolol succinate ER 25 milliGRAM(s) Oral daily  multivitamin 1 Tablet(s) Oral daily  QUEtiapine 25 milliGRAM(s) Oral at bedtime  tamsulosin 0.4 milliGRAM(s) Oral at bedtime  thiamine IVPB 500 milliGRAM(s) IV Intermittent every 8 hours    MEDICATIONS  (PRN):  acetaminophen     Tablet .. 650 milliGRAM(s) Oral every 6 hours PRN Temp greater or equal to 38C (100.4F), Mild Pain (1 - 3)  aluminum hydroxide/magnesium hydroxide/simethicone Suspension 30 milliLiter(s) Oral every 4 hours PRN Dyspepsia  hydrALAZINE Injectable 10 milliGRAM(s) IV Push every 6 hours PRN SBP > 160  melatonin 3 milliGRAM(s) Oral at bedtime PRN Insomnia   Chief complaint: Chest discomfort    Interval history: No new issues. Feels well. Chest discomfort has been resolved for a couple of days now. No dyspnea, lightheadedness or palpitations. Still a bit tangential but markedly improved compared to initial presentation. Answers questions fairly appropriately. No longer confabulating. Physically deconditioned but ambulating well with rolling walker, 1-person assist. Approaching stability for discharge home.     ROS: Multisystem review is comprehensively negative x 10 systems except as above but limited by his suspected neurocognitive disease     Physical Exam:  T(F): 97.4 (28 Feb 2022 07:20), Max: 97.4 (28 Feb 2022 07:20)  HR: 81 (28 Feb 2022 07:20) (80 - 81)  BP: 128/73 (28 Feb 2022 07:20) (128/73 - 133/54)  RR: 18 (28 Feb 2022 07:20) (18 - 18)  SpO2: 100% (28 Feb 2022 07:20) (100% - 100%) on room air    Gen: No acute distress  HEENT: NCAT PERRL EOMI  Neck: Supple, no JVD  Chest: CTA B/L, normal resp effort at rest  CVS: S1 S2 normal, RRR  Abd: +BS soft NT ND   Ext: No edema or calf tenderness  Skin: Warm dry  Neuro: Awake and alert, answers questions, follows commands. CN intact. No nystagmus. No dysarthria. Thought content is improved, Less tangential. Strength, sensation intact. FNF intact.  Mood: Calm, pleasant, denies any active visual or auditory hallucination, no suicidal or homicidal ideation    Labs:    CBC 2/26                        12.4   6.58  )----------( 335             38.7     BMP 2/26      139      |  109   |  33   ----------------------------< 84   4.4 (rpt)  |   26    |  1.01     Ca 8.7  Mg 2.8    LFTs: TPro  7.6  /  Alb  3.3  /  TBili  0.7  /  DBili  x   /  AST  29  /  ALT  29  /  AlkPhos  78      Coags: PT: 13.0 sec;   INR: 1.12 ratio   PTT:32.8 sec    Trop (-) x 3   pro    D-dimer 556    From prior admission, TSH, B12 WNL (2/11), T. pallidum Ab negative (2/11)    Urinalysis 2/25: Ket-, prot+, N-, LE-, RBC-, WBC-, occ bacterial    Rpt Urinalysis 2/27: Ket-, prot_, N-, trace LE, 3-5 WBC, 0-2 RBC, few bact, occ epithelial cells    Micro:  Urine culture 2/27: Neg  COVID19 PCR 2/25: Neg    Imaging:  LLE venous duplex 2/26: No obvious thrombus in the visualized left lower extremity deep veins.    CTA chest W 2/25: No pulmonary embolism. No endobronchial lesion. Mild emphysema. Mild atelectasis in the right lower lobe. Mild right posteromedial pleural thickening. No pleural effusion or pneumothorax. Calcified lymph nodes consistent with prior granulomatous disease. Enlarged heart. No pericardial effusion. No aortic aneurysm. Mildly calcified aortic valve. Coronary artery calcified plaque. L kidney cyst. Sternotomy. Degenerative changes of the spine. Diffusely heterogeneous bone marrow.    CXR 2/25: The visualized lungs are clear of airspace consolidations or effusions. No pneumothorax. The heart and mediastinum size and configuration are within normal limits. Status post coronary artery bypass graft procedure. Visualized osseous structures are intact.    Cardiac Testing:  EKG 2/25: Sinus rhythm with 1st degree A-V block. Anteroseptal q (old). Prolonged QT    Meds:  MEDICATIONS  (STANDING):  aspirin  chewable 81 milliGRAM(s) Oral daily  atorvastatin 20 milliGRAM(s) Oral at bedtime  heparin   Injectable 5000 Unit(s) SubCutaneous every 12 hours  metoprolol succinate ER 25 milliGRAM(s) Oral daily  multivitamin 1 Tablet(s) Oral daily  QUEtiapine 25 milliGRAM(s) Oral at bedtime  tamsulosin 0.4 milliGRAM(s) Oral at bedtime  thiamine IVPB 500 milliGRAM(s) IV Intermittent every 8 hours    MEDICATIONS  (PRN):  acetaminophen     Tablet .. 650 milliGRAM(s) Oral every 6 hours PRN Temp greater or equal to 38C (100.4F), Mild Pain (1 - 3)  melatonin 3 milliGRAM(s) Oral at bedtime PRN Insomnia   Chief complaint: Chest discomfort    Interval history: No new issues. Feels well. Chest discomfort has been resolved for a couple of days now. No dyspnea, lightheadedness or palpitations. Still a bit tangential but markedly improved concentration and thought content as compared to initial presentation. Answers questions fairly appropriately. No longer confabulating. Physically deconditioned but ambulating well with rolling walker, 1-person assist. Approaching stability for discharge home.     ROS: Multisystem review is comprehensively negative x 10 systems except as above but limited by his suspected neurocognitive disease     Physical Exam:  T(F): 97.4 (28 Feb 2022 07:20), Max: 97.4 (28 Feb 2022 07:20)  HR: 81 (28 Feb 2022 07:20) (80 - 81)  BP: 128/73 (28 Feb 2022 07:20) (128/73 - 133/54)  RR: 18 (28 Feb 2022 07:20) (18 - 18)  SpO2: 100% (28 Feb 2022 07:20) (100% - 100%) on room air    Gen: No acute distress  HEENT: NCAT PERRL EOMI  Neck: Supple, no JVD  Chest: CTA B/L, normal resp effort at rest  CVS: S1 S2 normal, RRR  Abd: +BS soft NT ND   Ext: No edema or calf tenderness  Skin: Warm dry  Neuro: Awake and alert, answers questions, follows commands. CN intact. No nystagmus. No dysarthria. Thought content is improved, Less tangential. Strength, sensation intact. FNF intact.  Mood: Calm, pleasant, denies any active visual or auditory hallucination, no suicidal or homicidal ideation    Labs:    CBC 2/26                        12.4   6.58  )----------( 335             38.7     BMP 2/26      139      |  109   |  33   ----------------------------< 84   4.4 (rpt)  |   26    |  1.01     Ca 8.7  Mg 2.8    LFTs: TPro  7.6  /  Alb  3.3  /  TBili  0.7  /  DBili  x   /  AST  29  /  ALT  29  /  AlkPhos  78      Coags: PT: 13.0 sec;   INR: 1.12 ratio   PTT:32.8 sec    Trop (-) x 3   pro    D-dimer 556    From prior admission, TSH, B12 WNL (2/11), T. pallidum Ab negative (2/11)    Urinalysis 2/25: Ket-, prot+, N-, LE-, RBC-, WBC-, occ bacterial    Rpt Urinalysis 2/27: Ket-, prot_, N-, trace LE, 3-5 WBC, 0-2 RBC, few bact, occ epithelial cells    Micro:  Urine culture 2/27: Neg  COVID19 PCR 2/25: Neg    Imaging:  LLE venous duplex 2/26: No obvious thrombus in the visualized left lower extremity deep veins.    CTA chest W 2/25: No pulmonary embolism. No endobronchial lesion. Mild emphysema. Mild atelectasis in the right lower lobe. Mild right posteromedial pleural thickening. No pleural effusion or pneumothorax. Calcified lymph nodes consistent with prior granulomatous disease. Enlarged heart. No pericardial effusion. No aortic aneurysm. Mildly calcified aortic valve. Coronary artery calcified plaque. L kidney cyst. Sternotomy. Degenerative changes of the spine. Diffusely heterogeneous bone marrow.    CXR 2/25: The visualized lungs are clear of airspace consolidations or effusions. No pneumothorax. The heart and mediastinum size and configuration are within normal limits. Status post coronary artery bypass graft procedure. Visualized osseous structures are intact.    Cardiac Testing:  EKG 2/25: Sinus rhythm with 1st degree A-V block. Anteroseptal q (old). Prolonged QT    Meds:  MEDICATIONS  (STANDING):  aspirin  chewable 81 milliGRAM(s) Oral daily  atorvastatin 20 milliGRAM(s) Oral at bedtime  heparin   Injectable 5000 Unit(s) SubCutaneous every 12 hours  metoprolol succinate ER 25 milliGRAM(s) Oral daily  multivitamin 1 Tablet(s) Oral daily  QUEtiapine 25 milliGRAM(s) Oral at bedtime  tamsulosin 0.4 milliGRAM(s) Oral at bedtime  thiamine IVPB 500 milliGRAM(s) IV Intermittent every 8 hours    MEDICATIONS  (PRN):  acetaminophen     Tablet .. 650 milliGRAM(s) Oral every 6 hours PRN Temp greater or equal to 38C (100.4F), Mild Pain (1 - 3)  melatonin 3 milliGRAM(s) Oral at bedtime PRN Insomnia

## 2022-02-28 NOTE — DIETITIAN INITIAL EVALUATION ADULT. - ORAL INTAKE PTA/DIET HISTORY
Pt reports desire to eat. Pt is skinny and screened for severe protein-calorie malnutrition in the context of chronic illness. Pt appears to have lost weight in the past but no documentation noted for weight loss. Pt presents with very clear signs of severe muscle and fat wasting. Pt agreeable to ensure in hospital.

## 2022-02-28 NOTE — PROGRESS NOTE ADULT - ASSESSMENT
76 year old man with CABG (2012 with Dr. Yusra MORALEZ to LAD and SVG to OM), history of alcohol abuse, paranoia hallucinations history who presents with stable angina but also with ongoing visual hallucinations which he admits to. EKG unchanged, not dynamic, and cardiac enzymes are negative.  -continue aspirin, statin with ongoing risk factor modification for CAD   -no plan for ischemic work up at this time given stable, non-anginal presentation. Can consider it if symptoms recur after psychiatric treatment which can be done as outpatient.   -please reach out if clinical situation changes  -findings discussed with patient, team    Thank you for allowing me to participate in the care of your patient. Feel free to contact me if any clinical issues arise.    Ariel Moreno MD, FACC, [facility]   Interventional Cardiovascular Attending  [facility], [facility]   [facility]   Office ([place]): [phone]  Office ([place]): [phone]  Email: [email or web address]

## 2022-02-28 NOTE — PROGRESS NOTE ADULT - ASSESSMENT
76 year-old man with CAD s/p CABG, afib not on AC d/t concerns with medication non-compliance, also hx of BPH, urinary urgency and incontinence, alcohol use disorder and recent worsening cognitive function associated with paranoia and hallucinations per his partner's grandsons who care for him, presented 2/26 for chest pain that started the day prior, was described as a constant heaviness in his chest rated severe in intensity, associated with dyspnea, non-positional, non-pleuritic, non-reproducible with palpation. No recent trauma or injury. Denied headaches, dizziness, fevers, chills, abdominal pain, N/V, diarrhea/constipation. Of note, patient was recently admitted 2/10-2/22 for altered mental status, auditory and visual hallucinations, possibly secondary to alcohol withdrawal, possible dementia with delirium, started on Seroquel, Trazodone, Remeron that admission. In the ED on this presentation, vitals unremarkable aside from mild HTN, /72. Exam unrevealing, thought rather tangential, disjointed. Hgb 12.8. BUN 47, Cr 1.47 (baseline ~1.02). D-dimer 556. Trop negative. UA w/ occasional bacteria. COVID negative. EKG NSR, rate WNL, 1st degree AV block, qtc prolonged 559 ms, old ischemic changes. CXR w/ sternotomy wires present, no consolidation, no effusion, no pneumothorax. CTA negative for PE. Patient was continued on aspirin, statin, beta blocker, and admitted to Medicine.     Chest discomfort, known CAD, hx of CABG  Now chest pain free, feeling generally well. Trop negative. No acute ischemic EKG changes.   - Continue aspirin, statin, beta blocker    Recent cognitive decline with delirium, decrease in ability to perform his ADLs  Above issues really started to manifest in early January 2022 when his partner became sick, got COVID, got a UTI, declined herself and is now in a facility rather than home with him. He substantially increased his alcohol drinking and his cognitive issues and delirium accelerated with his partner's grandsons (who are his caretakers) noticed that he was talking to their grandmother when she was not there, etc. 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 On recent admission early mid February, RPR WNL, TSH WNL. Vit B12 WNL, CT head. He does have possible confabulation and risk factors for Wernicke's with alcohol use disorder and suspected protein-calorie malnutrition, started on empiric IV thiamine. Neurology consulted. Psychiatry consulted.   - Consulted Neurology and Psychiatry  - Continue empiric IV thiamine 500mg q8h (2/27-)  - Seroquel 25mg PO qHS  - Outpatient referral to Geriatric Psychiatry Dr. Akers    Atelectasis  No hypoxia. No fever.   - Encourage incentive spirometry    BPH  Chronic, stable. Has chronic urinary urgency and incontinence. UA in ED possibly not a clean catch. UA, Ucx repeated, in process  - Continue Flomax  - F/u in process  UA, Ucx    Physical deconditioning and debility  Patient seen by PT, recommended home with home PT, home assist  - Continue restorative PT sessions    Severe protein-calorie malnutrition  Decreased muscle mass noted. Nutrition consulted.   - Encourage PO intake, trend weight      Diet: DASH  DVT px: Heparin subcut  Code status: Full  Emergency contact: Sascha Toribio [phone], Johnny Freedman [phone]  Dispo: Anticipate discharge to home with home services, continued support from his partner's two grandsons 76 year-old man with CAD s/p CABG, afib not on AC d/t concerns with medication non-compliance, also hx of BPH, urinary urgency and incontinence, alcohol use disorder and recent worsening cognitive function associated with paranoia and hallucinations per his partner's grandsons who care for him, presented 2/26 for chest pain that started the day prior, was described as a constant heaviness in his chest rated severe in intensity, associated with dyspnea, non-positional, non-pleuritic, non-reproducible with palpation. No recent trauma or injury. Denied headaches, dizziness, fevers, chills, abdominal pain, N/V, diarrhea/constipation. Of note, patient was recently admitted 2/10-2/22 for altered mental status, auditory and visual hallucinations, possibly secondary to alcohol withdrawal, possible dementia with delirium, started on Seroquel, Trazodone, Remeron that admission. In the ED on this presentation, vitals unremarkable aside from mild HTN, /72. Exam unrevealing, thought rather tangential, disjointed. Hgb 12.8. BUN 47, Cr 1.47 (baseline ~1.02). D-dimer 556. Trop negative. UA w/ occasional bacteria. COVID negative. EKG NSR, rate WNL, 1st degree AV block, qtc prolonged 559 ms, old ischemic changes. CXR w/ sternotomy wires present, no consolidation, no effusion, no pneumothorax. CTA negative for PE. Patient was continued on aspirin, statin, beta blocker, and admitted to Medicine.     Chest discomfort, unable to determine etiology  Now chest pain free, feeling generally well. Trop negative. No acute ischemic EKG changes. CXR with no consolidation, no effusion, no pneumothorax. CTA negative for PE. No heartburn or other GI complaints. No significant musculoskeletal complaints.   - Monitor as needed for recurrence    CAD, hx of CABG  Stable   - Continue aspirin, statin, beta blocker    Recent cognitive decline with delirium, decrease in ability to perform his ADLs  Above issues really started to manifest in early January 2022 when his partner became sick, got COVID, got a UTI, declined herself and is now in a facility rather than home with him. He substantially increased his alcohol drinking and his cognitive issues and delirium accelerated with his partner's grandsons (who are his caretakers) noticed that he was talking to their grandmother when she was not there, etc. 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 On recent admission early mid February, RPR WNL, TSH WNL. Vit B12 WNL, CT head. He does have possible confabulation and risk factors for Wernicke's with alcohol use disorder and suspected protein-calorie malnutrition, started on empiric IV thiamine. Neurology consulted. Psychiatry consulted.   - Consulted Neurology and Psychiatry  - Continue empiric IV thiamine 500mg q8h (2/27-)  - Seroquel 25mg PO qHS  - Outpatient referral to Geriatric Psychiatry Dr. Akers    Atelectasis  No hypoxia. No fever.   - Encourage incentive spirometry    BPH  Chronic, stable. Has chronic urinary urgency and incontinence. UA in ED possibly not a clean catch. UA, Ucx repeated, in process  - Continue Flomax  - F/u in process  UA, Ucx    Physical deconditioning and debility  Patient seen by PT, recommended home with home PT, home assist  - Continue restorative PT sessions    Severe protein-calorie malnutrition  Decreased muscle mass noted. Nutrition consulted.   - Encourage PO intake, trend weight      Diet: DASH  DVT px: Heparin subcut  Code status: Full  Emergency contact: Sascha Toribio [phone], Johnny Freedman [phone]  Dispo: Anticipate discharge to home with home services, continued support from his partner's two grandsons 76 year-old man with CAD s/p CABG, afib not on AC due to concerns with medication non-compliance, also hx of BPH, chronic urinary urgency and incontinence, recent worsening cognitive function with episodes of delirium per his partner's grandsons who care for him, presented here 2/26 for chest discomfort. History from him was somewhat limited by his cognitive status, which has been declining since early January when his partner was admitted, had decline thereafter, and she was subsequently discharged to rehab and no longer with him. He himself had a recent admission 2/10-2/22 for delirium in this setting and his turning to alcohol. He was started on Seroquel, Trazodone, and Remeron that admission, ultimately discharged back home with his partner's grandsons. In the ED on this presentation, chest discomfort was said to have started the day prior, was described as a heaviness, severe in intensity, non-positional, non-pleuritic, and not reproducible with palpation. No recent trauma or injury. No dyspnea, cough, fever. No abdominal pain, nausea, vomiting, diarrhea or constipation. No dysuria, hematuria or suprapubic pain. ED vitals were unremarkable aside from mild HTN, /72. Exam unrevealing aside from suggestion of delirium with rather tangential, disjointed responses to questions. Hgb 12.8. BUN 47, Cr 1.47 (baseline ~1.02). D-dimer 556. Trop negative. EKG NSR, rate WNL, 1st degree AV block, qtc prolonged 559 ms, old ischemic changes. COVID negative. UA N-, few WBCs, occasional bacteria. CXR w/ sternotomy wires present, no consolidation, no effusion, no pneumothorax. CTA negative for PE. Patient was continued on aspirin, statin, beta blocker, and admitted to Medicine.     Chest discomfort, unable to determine etiology  Now chest pain free, feeling generally well. Trop negative. No acute ischemic EKG changes. CXR with no consolidation, no effusion, no pneumothorax. CTA negative for PE. No heartburn or other GI complaints. No significant musculoskeletal complaints.   - Monitor as needed    QTc prolongation  As noted on ED EKG. Held recently started Remeron and Trazodone and reduced recently started Seroquel to 25mg qHS. QTc now improved, 470s.   - Monitor as needed    Recent cognitive decline with delirium, decrease in ability to perform his ADLs  Cognitive issues really started to manifest in early January 2022 when his partner became sick, got COVID, got a UTI, declined further and was placed on a facility rather than home with him. He substantially increased his alcohol drinking and his cognitive issues worsened further with episodes of delirium per his partner's grandsons. They noticed him "talking to their grandmother" when she was not there, etc. On recent admission early mid February, RPR WNL, TSH WNL. Vit B12 WNL, CT head negative. This admission he was thought to have possible confabulation and risk factors for Wernicke's with alcohol use, suspected protein-calorie malnutrition. He was thus started on empiric IV thiamine, day 2 today. Neurology consulted. Psychiatry consulted. Agree with assessment and plan. Patient now improved.   - Continue empiric IV thiamine 500mg q8h (2/27-), plan to decrease to 250mg IV dose tomorrow and switch to PO thiamine upon discharge home  - Continue Seroquel, reduced this admission to 25mg PO qHS with good effect  - Outpatient referral to Geriatric Psychiatry Dr. Delphine MEJIA, hx of CABG  Stable   - Continue aspirin, statin, beta blocker    Atelectasis  No hypoxia. No fever.   - Encourage incentive spirometry    Abnormal urinalysis  UA with N-, trace LE, 3-5 WBC, 0-2 RBC, few bact, occ epi. No dysuria, hematuria, suprapubic pain or flank pain. Just his chronic urinary urgency. Urine culture negative.   - Monitor as needed    BPH  Chronic, stable. Has chronic urinary urgency and incontinence.   - Continue Flomax    Physical deconditioning and debility  Patient seen by PT, recommended home with home PT, home assist  - Continue restorative PT sessions    Severe protein-calorie malnutrition  Decreased muscle mass noted. Nutrition consulted.   - Encourage PO intake, trend weight      Diet: DASH  DVT px: Heparin subcut  Code status: Full  Emergency contact: Sascha Toribio [phone], Johnny Freedman [phone]  Dispo: Anticipate discharge to home with home services tomorrow with continued support from his partner's two grandsons who live with him 76 year-old man with CAD s/p CABG, afib not on AC due to concerns with medication non-compliance, also with BPH, chronic urinary urgency and incontinence, recent worsening cognitive function including episodes of delirium per his partner's grandsons who care for him, presented here 2/26 for chest discomfort. History from him was somewhat limited by his cognitive status, which has been declining since early January when his partner was admitted and subsequently discharged to rehab and no longer living with him. He himself had a recent admission 2/10-2/22 for delirium in this setting plus his turning to alcohol. He was started on Seroquel, Trazodone and Remeron that admission, ultimately discharged back home with his partner's grandsons. In the ED on this presentation, chest discomfort was said to have started the day prior, was described as a heaviness, severe in intensity, non-positional, non-pleuritic, and not reproducible with palpation. No recent trauma or injury. No dyspnea, cough, fever. No abdominal pain, nausea, vomiting, diarrhea or constipation. No dysuria, hematuria or suprapubic pain. ED vitals were unremarkable aside from mild HTN, /72. Exam unrevealing aside from suggestion of delirium with rather tangential, disjointed responses to questions. Hgb 12.8. BUN 47, Cr 1.47 (baseline ~1.02). D-dimer 556. Trop negative. EKG NSR, rate WNL, 1st degree AV block, qtc prolonged 559 ms, old ischemic changes. COVID negative. UA N-, few WBCs, occasional bacteria. CXR w/ sternotomy wires present, no consolidation, no effusion, no pneumothorax. CTA negative for PE. Patient was continued on aspirin, statin, beta blocker, and admitted to Medicine.     Chest discomfort, unable to determine etiology  Now chest pain free, feeling generally well. Trop negative. No acute ischemic EKG changes. CXR with no consolidation, no effusion, no pneumothorax. CTA negative for PE. No heartburn or other GI complaints. No significant musculoskeletal complaints.   - Monitor as needed    QTc prolongation  As noted on ED EKG. Held recently-started Remeron and Trazodone and reduced recently-started Seroquel to 25mg qHS. QTc now improved, 470s.   - Monitor as needed    Recent cognitive decline with delirium, decrease in ability to perform his ADLs  Cognitive issues really started to manifest in early January 2022 when his partner became sick with COVID, then a UTI, declined further, and then was discharged to a facility rather than home with him. He substantially increased his alcohol drinking and his cognitive issues worsened more. Per his partner's grandsons, there have been episodes of delirium, e.g him talking to their grandmother when she was not there, etc. On recent admission early mid February, RPR WNL, TSH WNL. Vit B12 WNL, CT head negative. This admission, he was thought to have risk for Wernicke's given possible confabulation, alcohol use and protein-calorie malnutrition. He was thus started on empiric IV thiamine, day 2 today. Neurology consulted. Psychiatry consulted. Agree with assessment and plan. Patient now improving.   - Continue empiric IV thiamine 500mg q8h (2/27-) with plan to continue at least one more day and then switch to PO thiamine  - Continue Seroquel, reduced this admission to 25mg PO qHS with good effect  - Outpatient referral to Geriatric Psychiatry Dr. Delphine MEJIA, hx of CABG  Stable   - Continue aspirin, statin, beta blocker    Atelectasis  No hypoxia. No fever.   - Encourage incentive spirometry    Abnormal urinalysis  UA with N-, trace LE, 3-5 WBC, 0-2 RBC, few bact, occ epi. No dysuria, hematuria, suprapubic pain or flank pain. Just his chronic urinary urgency. Urine culture negative.   - Monitor as needed    BPH  Chronic, stable. Has chronic urinary urgency and incontinence.   - Continue Flomax    Physical deconditioning and debility  Patient seen by PT, recommended home with home PT, home assist  - Continue restorative PT sessions    Severe protein-calorie malnutrition  Decreased muscle mass noted. Nutrition consulted.   - Encourage PO intake, trend weight      Diet: DASH  DVT px: Heparin subcut  Code status: Full  Emergency contact: Sascha Toribio [phone], Johnny Freedman [phone]  Dispo: Anticipate discharge to home with home services tomorrow with continued support from his partner's two grandsons who live with him

## 2022-02-28 NOTE — CDI QUERY NOTE - NSCDIOTHERTXTBX_GEN_ALL_CORE_HH
This patient was admitted with chest pain and your clarification is needed regarding the etiology:    HP Adult 2-26-22 @ 1:09  1. Chest pain could be secondary to unstable angina vs. GERD vs. costochondritis   - Place on telemetry observation  - Troponin 15.56 -> 18.15, will trend 1 more troponin    Progress Note Adult-Hospitalist Attending 2-26-22 @ 07:54  Chest discomfort, known CAD, hx of CABG  Now chest pain free, feeling generally well. Trop negative. No acute ischemic EKG changes.   - Continue aspirin, statin, beta blocker    Can the etiology of the chest pain be specified?  A) Chest pain likely due to costochondritis.  B) Chest pain likely due to GERD.  C) Chest pain likely due to unstable angina.  D) Chest pain likely due to other, please specify:  E) Unable to determine.

## 2022-02-28 NOTE — DIETITIAN INITIAL EVALUATION ADULT. - PERTINENT MEDS FT
MEDICATIONS  (STANDING):  aspirin  chewable 81 milliGRAM(s) Oral daily  atorvastatin 20 milliGRAM(s) Oral at bedtime  heparin   Injectable 5000 Unit(s) SubCutaneous every 12 hours  metoprolol succinate ER 25 milliGRAM(s) Oral daily  multivitamin 1 Tablet(s) Oral daily  QUEtiapine 25 milliGRAM(s) Oral at bedtime  tamsulosin 0.4 milliGRAM(s) Oral at bedtime  thiamine IVPB 500 milliGRAM(s) IV Intermittent every 8 hours    MEDICATIONS  (PRN):  acetaminophen     Tablet .. 650 milliGRAM(s) Oral every 6 hours PRN Temp greater or equal to 38C (100.4F), Mild Pain (1 - 3)  melatonin 3 milliGRAM(s) Oral at bedtime PRN Insomnia
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
## 2022-03-01 ENCOUNTER — TRANSCRIPTION ENCOUNTER (OUTPATIENT)
Age: 77
End: 2022-03-01

## 2022-03-01 VITALS
DIASTOLIC BLOOD PRESSURE: 68 MMHG | SYSTOLIC BLOOD PRESSURE: 110 MMHG | OXYGEN SATURATION: 95 % | RESPIRATION RATE: 18 BRPM | HEART RATE: 87 BPM | TEMPERATURE: 98 F

## 2022-03-01 DIAGNOSIS — F03.90 UNSPECIFIED DEMENTIA WITHOUT BEHAVIORAL DISTURBANCE: ICD-10-CM

## 2022-03-01 PROCEDURE — 99232 SBSQ HOSP IP/OBS MODERATE 35: CPT

## 2022-03-01 PROCEDURE — 99239 HOSP IP/OBS DSCHRG MGMT >30: CPT

## 2022-03-01 RX ORDER — PREGABALIN 225 MG/1
1 CAPSULE ORAL
Qty: 30 | Refills: 0
Start: 2022-03-01 | End: 2022-03-30

## 2022-03-01 RX ORDER — PREGABALIN 225 MG/1
1000 CAPSULE ORAL DAILY
Refills: 0 | Status: DISCONTINUED | OUTPATIENT
Start: 2022-03-02 | End: 2022-03-01

## 2022-03-01 RX ORDER — QUETIAPINE FUMARATE 200 MG/1
50 TABLET, FILM COATED ORAL AT BEDTIME
Refills: 0 | Status: DISCONTINUED | OUTPATIENT
Start: 2022-03-01 | End: 2022-03-01

## 2022-03-01 RX ORDER — METOPROLOL TARTRATE 50 MG
0.5 TABLET ORAL
Qty: 0 | Refills: 0 | DISCHARGE

## 2022-03-01 RX ORDER — METOPROLOL TARTRATE 50 MG
1 TABLET ORAL
Qty: 0 | Refills: 0 | DISCHARGE
Start: 2022-03-01

## 2022-03-01 RX ORDER — PREGABALIN 225 MG/1
1000 CAPSULE ORAL ONCE
Refills: 0 | Status: COMPLETED | OUTPATIENT
Start: 2022-03-01 | End: 2022-03-01

## 2022-03-01 RX ORDER — QUETIAPINE FUMARATE 200 MG/1
1 TABLET, FILM COATED ORAL
Qty: 0 | Refills: 0 | DISCHARGE
Start: 2022-03-01

## 2022-03-01 RX ADMIN — Medication 81 MILLIGRAM(S): at 10:23

## 2022-03-01 RX ADMIN — PREGABALIN 1000 MICROGRAM(S): 225 CAPSULE ORAL at 15:24

## 2022-03-01 RX ADMIN — Medication 25 MILLIGRAM(S): at 10:23

## 2022-03-01 RX ADMIN — Medication 1 TABLET(S): at 10:23

## 2022-03-01 RX ADMIN — HEPARIN SODIUM 5000 UNIT(S): 5000 INJECTION INTRAVENOUS; SUBCUTANEOUS at 10:22

## 2022-03-01 RX ADMIN — Medication 255 MILLIGRAM(S): at 06:32

## 2022-03-01 NOTE — DISCHARGE NOTE PROVIDER - DETAILS OF MALNUTRITION DIAGNOSIS/DIAGNOSES
This patient has been assessed with a concern for Malnutrition and was treated during this hospitalization for the following Nutrition diagnosis/diagnoses:     -  02/28/2022: Severe protein-calorie malnutrition   -  02/28/2022: Underweight (BMI < 19)

## 2022-03-01 NOTE — DISCHARGE NOTE NURSING/CASE MANAGEMENT/SOCIAL WORK - PATIENT PORTAL LINK FT
You can access the FollowMyHealth Patient Portal offered by [facility] by registering at the following website: [email or web address]. By joining [employer]s FollowMyHealth portal, you will also be able to view your health information using other applications (apps) compatible with our system.

## 2022-03-01 NOTE — PROGRESS NOTE BEHAVIORAL HEALTH - SUMMARY
Patient in bed, alert, oriented to time and place, good eye contact, and no agitation/aggression, and talks and relates well. he endorses that he is sleeping very well, mood in control, does not know why he is here, has no idea of hs current medical issues, He added that he hear something when he wakes up at 4 AM, and then he tries to look through the house or search the house, at times he sees people outside, denied paranoid or other delusional beliefs. Not aware what meds he takes regularly. He knows that he is in [facility]. He denied alcohol use daily, but once in a while he drinks [place] Gin. No other mental health issues in the past.    Plan: Patient is alert, oriented to time and place with good sleep/appetite. He endorses that he at times hears voices and also sees things. On Seroquel 25 mg HS with good effect with sleep and adequate functioning.    3.1.2022 - Patient is not depressed, suicidal or psychotic. Patient symptoms not indicating imminent risk for harm to self; not warranting involuntary in-patient hospitalization. Patient is safe for discharge.     Plan: To continue with Seroquel 25 mg HS           Psych F/U PRN

## 2022-03-01 NOTE — DISCHARGE NOTE PROVIDER - CARE PROVIDERS DIRECT ADDRESSES
[email or web address],DirectAddress_Unknown,DirectAddress_Unknown

## 2022-03-01 NOTE — DISCHARGE NOTE PROVIDER - NSDCCPCAREPLAN_GEN_ALL_CORE_FT
PRINCIPAL DISCHARGE DIAGNOSIS  Diagnosis: Chest pain  Assessment and Plan of Treatment: non-cardiac; No clear etiology due to patient being poor historian      SECONDARY DISCHARGE DIAGNOSES  Diagnosis: Encephalopathy chronic  Assessment and Plan of Treatment: Likely multifactorial with Vascular dementia + Component of wernickes but mostly unable to clinically determine source

## 2022-03-01 NOTE — PROGRESS NOTE BEHAVIORAL HEALTH - NSBHCHARTREVIEWVS_PSY_A_CORE FT
Vital Signs Last 24 Hrs  T(C): 36.4 (01 Mar 2022 08:22), Max: 36.4 (01 Mar 2022 08:22)  T(F): 97.5 (01 Mar 2022 08:22), Max: 97.5 (01 Mar 2022 08:22)  HR: 87 (01 Mar 2022 08:22) (87 - 95)  BP: 110/68 (01 Mar 2022 08:22) (110/68 - 157/69)  BP(mean): 77 (01 Mar 2022 08:22) (77 - 77)  RR: 18 (01 Mar 2022 08:22) (18 - 18)  SpO2: 95% (01 Mar 2022 08:22) (95% - 95%)

## 2022-03-01 NOTE — PROGRESS NOTE BEHAVIORAL HEALTH - NSBHFUPINTERVALHXFT_PSY_A_CORE
Patient alert and oriented to person, place, year, but not situation, likely in the setting of neurocognitive decline. Patient thought process is variable, mostly linear however tends to be circumstantial on a story of a time he was on a plane. Patient however is not grossly disorganized; denying mood / psychotic symptoms; denying auditory / visual hallucinations.

## 2022-03-01 NOTE — DISCHARGE NOTE PROVIDER - NSDCMRMEDTOKEN_GEN_ALL_CORE_FT
aspirin 81 mg oral tablet, chewable: 1 tab(s) orally once a day  atorvastatin 20 mg oral tablet: 1 tab(s) orally once a day (at bedtime)  cyanocobalamin 1000 mcg oral tablet: 1 tab(s) orally once a day   metoprolol succinate 25 mg oral tablet, extended release: 1 tab(s) orally once a day  Multiple Vitamins oral tablet: 1 tab(s) orally once a day  QUEtiapine 50 mg oral tablet: 1 tab(s) orally once a day (at bedtime)  tamsulosin 0.4 mg oral capsule: 1 cap(s) orally once a day (at bedtime)

## 2022-03-01 NOTE — PROGRESS NOTE BEHAVIORAL HEALTH - NSBHCHARTREVIEWLAB_PSY_A_CORE FT
Lactate Trend            CAPILLARY BLOOD GLUCOSE            Culture Results:   <10,000 CFU/mL Normal Urogenital Theresa (02-27 @ 08:20)  Culture Results:   No growth (02-10 @ 06:20)  Culture Results:   >100,000 CFU/ml Escherichia coli (02-09 @ 16:17)

## 2022-03-01 NOTE — DISCHARGE NOTE PROVIDER - PROVIDER TOKENS
PROVIDER:[TOKEN:[[id number]],FOLLOWUP:[1 month],ESTABLISHEDPATIENT:[T]],PROVIDER:[TOKEN:[[id number]],FOLLOWUP:[2 weeks],ESTABLISHEDPATIENT:[T]],FREE:[LAST:[Primary Medical Doctor],PHONE:[(   )    -],FAX:[(   )    -],ESTABLISHEDPATIENT:[T]]

## 2022-03-01 NOTE — DISCHARGE NOTE PROVIDER - CARE PROVIDER_API CALL
Antonia Gr)  Neurology  [address] [facility], [address]  [place] [address]  Phone: [phone]  Fax: [phone]  Established Patient  Follow Up Time: 1 month    Ariel Moreno)  Cardiology; Internal Medicine; Interventional Cardiology  [address] [facility], [facility] [address]  [place], [place] [id number]  Phone: [phone]  Fax: [phone]  Established Patient  Follow Up Time: 2 weeks    Primary Medical Doctor,   Phone: (   )    -  Fax: (   )    -  Established Patient  Follow Up Time:
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
## 2022-03-04 DIAGNOSIS — Z91.14 PATIENT'S OTHER NONCOMPLIANCE WITH MEDICATION REGIMEN: ICD-10-CM

## 2022-03-04 DIAGNOSIS — E43 UNSPECIFIED SEVERE PROTEIN-CALORIE MALNUTRITION: ICD-10-CM

## 2022-03-04 DIAGNOSIS — R82.71 BACTERIURIA: ICD-10-CM

## 2022-03-04 DIAGNOSIS — I48.91 UNSPECIFIED ATRIAL FIBRILLATION: ICD-10-CM

## 2022-03-04 DIAGNOSIS — R94.31 ABNORMAL ELECTROCARDIOGRAM [ECG] [EKG]: ICD-10-CM

## 2022-03-04 DIAGNOSIS — Z95.1 PRESENCE OF AORTOCORONARY BYPASS GRAFT: ICD-10-CM

## 2022-03-04 DIAGNOSIS — F17.210 NICOTINE DEPENDENCE, CIGARETTES, UNCOMPLICATED: ICD-10-CM

## 2022-03-04 DIAGNOSIS — I44.0 ATRIOVENTRICULAR BLOCK, FIRST DEGREE: ICD-10-CM

## 2022-03-04 DIAGNOSIS — F01.50 VASCULAR DEMENTIA WITHOUT BEHAVIORAL DISTURBANCE: ICD-10-CM

## 2022-03-04 DIAGNOSIS — I25.10 ATHEROSCLEROTIC HEART DISEASE OF NATIVE CORONARY ARTERY WITHOUT ANGINA PECTORIS: ICD-10-CM

## 2022-03-04 DIAGNOSIS — R53.81 OTHER MALAISE: ICD-10-CM

## 2022-03-04 DIAGNOSIS — N40.0 BENIGN PROSTATIC HYPERPLASIA WITHOUT LOWER URINARY TRACT SYMPTOMS: ICD-10-CM

## 2022-03-04 DIAGNOSIS — R07.9 CHEST PAIN, UNSPECIFIED: ICD-10-CM

## 2022-03-04 DIAGNOSIS — N17.9 ACUTE KIDNEY FAILURE, UNSPECIFIED: ICD-10-CM

## 2022-03-04 DIAGNOSIS — G93.40 ENCEPHALOPATHY, UNSPECIFIED: ICD-10-CM

## 2022-03-04 DIAGNOSIS — I10 ESSENTIAL (PRIMARY) HYPERTENSION: ICD-10-CM

## 2022-03-04 DIAGNOSIS — J98.11 ATELECTASIS: ICD-10-CM

## 2022-03-04 DIAGNOSIS — D64.9 ANEMIA, UNSPECIFIED: ICD-10-CM

## 2022-04-19 NOTE — H&P ADULT - NSHPSOCIALHISTORY_GEN_ALL_CORE
Lives with his wife and her grandson. He states his wife is currently recovering from COVID. He is independent with ADLs and IADLs. Smoked 1 ppd for a number of years but unsure how long, quits during Lent. Occasionally drinks alcohol. Denies drug use.
Detail Level: Simple
Size Of Lesion: 5mm x 2mm

## 2022-05-02 ENCOUNTER — INPATIENT (INPATIENT)
Facility: HOSPITAL | Age: 77
LOS: 7 days | Discharge: NURSING, REHAB OR LONG-TERM CARE | DRG: 70 | End: 2022-05-10
Attending: INTERNAL MEDICINE | Admitting: INTERNAL MEDICINE
Payer: MEDICARE

## 2022-05-02 VITALS
RESPIRATION RATE: 18 BRPM | SYSTOLIC BLOOD PRESSURE: 164 MMHG | HEART RATE: 85 BPM | TEMPERATURE: 98 F | HEIGHT: 73 IN | DIASTOLIC BLOOD PRESSURE: 73 MMHG | WEIGHT: 199.96 LBS

## 2022-05-02 DIAGNOSIS — Z95.1 PRESENCE OF AORTOCORONARY BYPASS GRAFT: Chronic | ICD-10-CM

## 2022-05-02 LAB
ALBUMIN SERPL ELPH-MCNC: 3.5 G/DL — SIGNIFICANT CHANGE UP (ref 3.3–5)
ALP SERPL-CCNC: 85 U/L — SIGNIFICANT CHANGE UP (ref 40–120)
ALT FLD-CCNC: 29 U/L — SIGNIFICANT CHANGE UP (ref 12–78)
ANION GAP SERPL CALC-SCNC: 3 MMOL/L — LOW (ref 5–17)
APAP SERPL-MCNC: < 2 UG/ML (ref 10–30)
AST SERPL-CCNC: 67 U/L — HIGH (ref 15–37)
BASOPHILS # BLD AUTO: 0.04 K/UL — SIGNIFICANT CHANGE UP (ref 0–0.2)
BASOPHILS NFR BLD AUTO: 0.6 % — SIGNIFICANT CHANGE UP (ref 0–2)
BILIRUB SERPL-MCNC: 0.9 MG/DL — SIGNIFICANT CHANGE UP (ref 0.2–1.2)
BUN SERPL-MCNC: 18 MG/DL — SIGNIFICANT CHANGE UP (ref 7–23)
CALCIUM SERPL-MCNC: 9 MG/DL — SIGNIFICANT CHANGE UP (ref 8.5–10.1)
CHLORIDE SERPL-SCNC: 105 MMOL/L — SIGNIFICANT CHANGE UP (ref 96–108)
CO2 SERPL-SCNC: 31 MMOL/L — SIGNIFICANT CHANGE UP (ref 22–31)
CREAT SERPL-MCNC: 1.34 MG/DL — HIGH (ref 0.5–1.3)
EGFR: 55 ML/MIN/1.73M2 — LOW
EOSINOPHIL # BLD AUTO: 0.15 K/UL — SIGNIFICANT CHANGE UP (ref 0–0.5)
EOSINOPHIL NFR BLD AUTO: 2.2 % — SIGNIFICANT CHANGE UP (ref 0–6)
ETHANOL SERPL-MCNC: <10 MG/DL — SIGNIFICANT CHANGE UP (ref 0–10)
GLUCOSE SERPL-MCNC: 68 MG/DL — LOW (ref 70–99)
HCT VFR BLD CALC: 42.5 % — SIGNIFICANT CHANGE UP (ref 39–50)
HGB BLD-MCNC: 13.6 G/DL — SIGNIFICANT CHANGE UP (ref 13–17)
IMM GRANULOCYTES NFR BLD AUTO: 0.3 % — SIGNIFICANT CHANGE UP (ref 0–1.5)
LYMPHOCYTES # BLD AUTO: 1.41 K/UL — SIGNIFICANT CHANGE UP (ref 1–3.3)
LYMPHOCYTES # BLD AUTO: 20.8 % — SIGNIFICANT CHANGE UP (ref 13–44)
MCHC RBC-ENTMCNC: 28.1 PG — SIGNIFICANT CHANGE UP (ref 27–34)
MCHC RBC-ENTMCNC: 32 GM/DL — SIGNIFICANT CHANGE UP (ref 32–36)
MCV RBC AUTO: 87.8 FL — SIGNIFICANT CHANGE UP (ref 80–100)
MONOCYTES # BLD AUTO: 0.67 K/UL — SIGNIFICANT CHANGE UP (ref 0–0.9)
MONOCYTES NFR BLD AUTO: 9.9 % — SIGNIFICANT CHANGE UP (ref 2–14)
NEUTROPHILS # BLD AUTO: 4.49 K/UL — SIGNIFICANT CHANGE UP (ref 1.8–7.4)
NEUTROPHILS NFR BLD AUTO: 66.2 % — SIGNIFICANT CHANGE UP (ref 43–77)
PLATELET # BLD AUTO: 246 K/UL — SIGNIFICANT CHANGE UP (ref 150–400)
POTASSIUM SERPL-MCNC: 4.5 MMOL/L — SIGNIFICANT CHANGE UP (ref 3.5–5.3)
POTASSIUM SERPL-SCNC: 4.5 MMOL/L — SIGNIFICANT CHANGE UP (ref 3.5–5.3)
PROT SERPL-MCNC: 7.3 GM/DL — SIGNIFICANT CHANGE UP (ref 6–8.3)
RBC # BLD: 4.84 M/UL — SIGNIFICANT CHANGE UP (ref 4.2–5.8)
RBC # FLD: 14.4 % — SIGNIFICANT CHANGE UP (ref 10.3–14.5)
SALICYLATES SERPL-MCNC: <1.7 MG/DL (ref 2.8–20)
SODIUM SERPL-SCNC: 139 MMOL/L — SIGNIFICANT CHANGE UP (ref 135–145)
TSH SERPL-MCNC: 0.61 UU/ML — SIGNIFICANT CHANGE UP (ref 0.34–4.82)
WBC # BLD: 6.78 K/UL — SIGNIFICANT CHANGE UP (ref 3.8–10.5)
WBC # FLD AUTO: 6.78 K/UL — SIGNIFICANT CHANGE UP (ref 3.8–10.5)

## 2022-05-02 PROCEDURE — 99285 EMERGENCY DEPT VISIT HI MDM: CPT

## 2022-05-02 PROCEDURE — 73562 X-RAY EXAM OF KNEE 3: CPT | Mod: 26,RT

## 2022-05-02 PROCEDURE — 93010 ELECTROCARDIOGRAM REPORT: CPT

## 2022-05-02 NOTE — ED ADULT TRIAGE NOTE - CHIEF COMPLAINT QUOTE
Patient presents from home by EMS complaining of right knee pain that has been ongoing for a couple years. Pt denies any recent falls. Pt lives alone at home. Pt A&Ox4, however per EMS family states he seems confused.

## 2022-05-02 NOTE — ED ADULT TRIAGE NOTE - IDEAL BODY WEIGHT(KG)
80 [Follow-Up - Clinic] : a clinic follow-up of [FreeTextEntry2] : noninvasive testing for dyspnea on exertion, multiple CAD risk factors [FreeTextEntry1] : Ling is an 87-year-old female with history of hypertension, DM 2, obesity, HFpEF, edema, claudication, palpitations, partial deafness.\par \par Patient has dyspnea with moderate exertion unchanged. Patient has complaints of leg claudication and nocturnal leg cramping. Cardiovascular review of symptoms is negative for exertional chest pain, palpitations, dizziness or syncope.  No PND or orthopnea leg edema.  No bleeding or black stool.\par \par No exercise routine. Patient is walking less than 5 minutes. \par \par Exercise Myoview stress test June 2019, LVEF 65%, small fixed apical defect and the presence of breast attenuation and normal wall motion likely artifact, no chest pain, nonischemic EKG response, baseline sinus rhythm IRBBB low-voltage\par \par Echocardiogram May 2019, LVEF 65%, mild diastolic dysfunction, aneurysmal interatrial septum, normal RVSP\par \par Carotid Doppler May 2019 mild nonobstructive plaque

## 2022-05-02 NOTE — ED PROVIDER NOTE - CLINICAL SUMMARY MEDICAL DECISION MAKING FREE TEXT BOX
76M with LE pain and confusion. documented etoh use, dementia. will obtain labs, xrays. likely admission as patient is confused and lives alone.

## 2022-05-02 NOTE — ED PROVIDER NOTE - OBJECTIVE STATEMENT
76M with PMHx CAD s/p CABG, A fib not on AC (2/2 concerns with medication non-compliance, falls and agitation), BPH, alcohol abuse, paranoia/hallucinations presents for pain in his legs. patient appears confused, states he knows he is in the hospital and states "they are putting things in and taking them out and he'll be here until they are done." Patient is confused. reports some pain in his legs for the past few weeks. states he fell 3 weeks ago and did not seek medical care at that time. + lower back pain when he tries to raise his legs. Of note, patient does not speak [place]er as documented in the chart. He speaks English and a little Macedonian.

## 2022-05-02 NOTE — ED STATDOCS - PROGRESS NOTE DETAILS
Pt with increased confusion, called police because he thinks family is stealing from him, drinks alcohol several days a week, not yet today with ? hallucinations, moved to main bed for further eval. AAOx3, chronic right knee pain, no new trauma

## 2022-05-02 NOTE — ED PROVIDER NOTE - PROGRESS NOTE DETAILS
I Miryam Yates attest that this documentation has been prepared under the direction and in the presence of Doctor Dupont

## 2022-05-02 NOTE — ED STATDOCS - CCCP TRG CHIEF CMPLNT
Deyanira Prabhakar is a 62year old female. Patient presents with:  Checkup: 6 months. Right shoulder and neck pain. Two days ago she had a bloody nose. She was coughing and coughed up a blood clot from the blody nose.  Head pain across her forehead for about ONE V-GO FOR 24 H SUBCUTNEOUSLY UTD     • Imipramine HCl 50 MG Oral Tab Take 3 tablets (150 mg total) by mouth nightly. Patient states she takes 3 tablets nightly for a total dose of 150mg.  270 tablet 3   • CLOPIDOGREL BISULFATE 75 MG Oral Tab TAKE 1 TABLE metolazone 2.5 MG Oral Tab Take 1 tablet (2.5 mg total) by mouth as needed. 30 tablet 0   • TraMADol HCl 50 MG Oral Tab Take 1 tablet (50 mg total) by mouth every 6 (six) hours as needed.  30 tablet 0   • HUMULIN R U-500 KWIKPEN 500 UNIT/ML Subcutaneous Sol No         REVIEW OF SYSTEMS:   GENERAL: feels well otherwise  SKIN: denies any unusual skin lesions  EYES:denies blurred vision or double vision  HEENT: denies nasal congestion, sinus pain or ST  LUNGS: denies shortness of breath with exertion or cough  C adenomatous colonic polyps  Found on colonoscopy 9/11/13 -- repeat done 1/2019 revealed 3 more adenomatous polyps. Repeat 3-5 years per Dr. Federico Juan.      Hx of transient ischemic attack (TIA) (expressive aphasia)  Continue Plavix.   Pt notes occasional diffi imipramine 150mg/day  Multiple life stressors -- caring for 81 y/o mom with Alzheimer's as well as for mom's cousin.  with multiple med problems.   Offered therapist referral -- pt declines for now.        RTC 6 mos EVERARDO Chris, 03/03/21, pain, knee

## 2022-05-03 DIAGNOSIS — R41.82 ALTERED MENTAL STATUS, UNSPECIFIED: ICD-10-CM

## 2022-05-03 PROBLEM — F22 DELUSIONAL DISORDERS: Chronic | Status: ACTIVE | Noted: 2022-02-26

## 2022-05-03 PROBLEM — N40.0 BENIGN PROSTATIC HYPERPLASIA WITHOUT LOWER URINARY TRACT SYMPTOMS: Chronic | Status: ACTIVE | Noted: 2022-02-26

## 2022-05-03 PROBLEM — I25.10 ATHEROSCLEROTIC HEART DISEASE OF NATIVE CORONARY ARTERY WITHOUT ANGINA PECTORIS: Chronic | Status: ACTIVE | Noted: 2022-02-25

## 2022-05-03 PROBLEM — Z87.898 PERSONAL HISTORY OF OTHER SPECIFIED CONDITIONS: Chronic | Status: ACTIVE | Noted: 2022-02-26

## 2022-05-03 PROBLEM — I48.91 UNSPECIFIED ATRIAL FIBRILLATION: Chronic | Status: ACTIVE | Noted: 2022-02-26

## 2022-05-03 LAB
ADD ON TEST-SPECIMEN IN LAB: SIGNIFICANT CHANGE UP
ALBUMIN SERPL ELPH-MCNC: 3.1 G/DL — LOW (ref 3.3–5)
ALP SERPL-CCNC: 71 U/L — SIGNIFICANT CHANGE UP (ref 40–120)
ALT FLD-CCNC: 26 U/L — SIGNIFICANT CHANGE UP (ref 12–78)
AMMONIA BLD-MCNC: 57 UMOL/L — HIGH (ref 11–32)
ANION GAP SERPL CALC-SCNC: 9 MMOL/L — SIGNIFICANT CHANGE UP (ref 5–17)
APPEARANCE UR: CLEAR — SIGNIFICANT CHANGE UP
AST SERPL-CCNC: 44 U/L — HIGH (ref 15–37)
BILIRUB DIRECT SERPL-MCNC: 0.2 MG/DL — SIGNIFICANT CHANGE UP (ref 0–0.3)
BILIRUB INDIRECT FLD-MCNC: 0.4 MG/DL — SIGNIFICANT CHANGE UP (ref 0.2–1)
BILIRUB SERPL-MCNC: 0.6 MG/DL — SIGNIFICANT CHANGE UP (ref 0.2–1.2)
BILIRUB UR-MCNC: NEGATIVE — SIGNIFICANT CHANGE UP
BUN SERPL-MCNC: 18 MG/DL — SIGNIFICANT CHANGE UP (ref 7–23)
CALCIUM SERPL-MCNC: 8.7 MG/DL — SIGNIFICANT CHANGE UP (ref 8.5–10.1)
CHLORIDE SERPL-SCNC: 106 MMOL/L — SIGNIFICANT CHANGE UP (ref 96–108)
CO2 SERPL-SCNC: 28 MMOL/L — SIGNIFICANT CHANGE UP (ref 22–31)
COLOR SPEC: YELLOW — SIGNIFICANT CHANGE UP
CREAT SERPL-MCNC: 1.16 MG/DL — SIGNIFICANT CHANGE UP (ref 0.5–1.3)
DIFF PNL FLD: ABNORMAL
EGFR: 66 ML/MIN/1.73M2 — SIGNIFICANT CHANGE UP
FOLATE SERPL-MCNC: 9.9 NG/ML — SIGNIFICANT CHANGE UP
GLUCOSE SERPL-MCNC: 84 MG/DL — SIGNIFICANT CHANGE UP (ref 70–99)
GLUCOSE UR QL: NEGATIVE — SIGNIFICANT CHANGE UP
KETONES UR-MCNC: NEGATIVE — SIGNIFICANT CHANGE UP
LEUKOCYTE ESTERASE UR-ACNC: ABNORMAL
MAGNESIUM SERPL-MCNC: 2.1 MG/DL — SIGNIFICANT CHANGE UP (ref 1.6–2.6)
NITRITE UR-MCNC: POSITIVE
PH UR: 6.5 — SIGNIFICANT CHANGE UP (ref 5–8)
PHOSPHATE SERPL-MCNC: 3.6 MG/DL — SIGNIFICANT CHANGE UP (ref 2.5–4.5)
POTASSIUM SERPL-MCNC: 3.3 MMOL/L — LOW (ref 3.5–5.3)
POTASSIUM SERPL-SCNC: 3.3 MMOL/L — LOW (ref 3.5–5.3)
PROT SERPL-MCNC: 6.4 GM/DL — SIGNIFICANT CHANGE UP (ref 6–8.3)
PROT UR-MCNC: 100
SARS-COV-2 RNA SPEC QL NAA+PROBE: SIGNIFICANT CHANGE UP
SODIUM SERPL-SCNC: 143 MMOL/L — SIGNIFICANT CHANGE UP (ref 135–145)
SP GR SPEC: 1.01 — SIGNIFICANT CHANGE UP (ref 1.01–1.02)
T PALLIDUM AB TITR SER: NEGATIVE — SIGNIFICANT CHANGE UP
TSH SERPL-MCNC: 0.53 UU/ML — SIGNIFICANT CHANGE UP (ref 0.34–4.82)
UROBILINOGEN FLD QL: 4
VIT B12 SERPL-MCNC: 636 PG/ML — SIGNIFICANT CHANGE UP (ref 232–1245)

## 2022-05-03 PROCEDURE — 36415 COLL VENOUS BLD VENIPUNCTURE: CPT

## 2022-05-03 PROCEDURE — 82607 VITAMIN B-12: CPT

## 2022-05-03 PROCEDURE — 71045 X-RAY EXAM CHEST 1 VIEW: CPT

## 2022-05-03 PROCEDURE — 97530 THERAPEUTIC ACTIVITIES: CPT | Mod: GP

## 2022-05-03 PROCEDURE — 72125 CT NECK SPINE W/O DYE: CPT | Mod: 26,MA

## 2022-05-03 PROCEDURE — U0003: CPT

## 2022-05-03 PROCEDURE — 72100 X-RAY EXAM L-S SPINE 2/3 VWS: CPT | Mod: 26

## 2022-05-03 PROCEDURE — 84484 ASSAY OF TROPONIN QUANT: CPT

## 2022-05-03 PROCEDURE — 84100 ASSAY OF PHOSPHORUS: CPT

## 2022-05-03 PROCEDURE — 99223 1ST HOSP IP/OBS HIGH 75: CPT

## 2022-05-03 PROCEDURE — 95816 EEG AWAKE AND DROWSY: CPT

## 2022-05-03 PROCEDURE — 97162 PT EVAL MOD COMPLEX 30 MIN: CPT | Mod: GP

## 2022-05-03 PROCEDURE — 70450 CT HEAD/BRAIN W/O DYE: CPT | Mod: 26,MA

## 2022-05-03 PROCEDURE — 87635 SARS-COV-2 COVID-19 AMP PRB: CPT

## 2022-05-03 PROCEDURE — 84443 ASSAY THYROID STIM HORMONE: CPT

## 2022-05-03 PROCEDURE — 82746 ASSAY OF FOLIC ACID SERUM: CPT

## 2022-05-03 PROCEDURE — 83735 ASSAY OF MAGNESIUM: CPT

## 2022-05-03 PROCEDURE — 93005 ELECTROCARDIOGRAM TRACING: CPT

## 2022-05-03 PROCEDURE — 85027 COMPLETE CBC AUTOMATED: CPT

## 2022-05-03 PROCEDURE — 97116 GAIT TRAINING THERAPY: CPT | Mod: GP

## 2022-05-03 PROCEDURE — 82140 ASSAY OF AMMONIA: CPT

## 2022-05-03 PROCEDURE — 86780 TREPONEMA PALLIDUM: CPT

## 2022-05-03 PROCEDURE — 80048 BASIC METABOLIC PNL TOTAL CA: CPT

## 2022-05-03 PROCEDURE — U0005: CPT

## 2022-05-03 PROCEDURE — 80076 HEPATIC FUNCTION PANEL: CPT

## 2022-05-03 PROCEDURE — 73521 X-RAY EXAM HIPS BI 2 VIEWS: CPT | Mod: 26

## 2022-05-03 RX ORDER — LANOLIN ALCOHOL/MO/W.PET/CERES
3 CREAM (GRAM) TOPICAL AT BEDTIME
Refills: 0 | Status: DISCONTINUED | OUTPATIENT
Start: 2022-05-03 | End: 2022-05-10

## 2022-05-03 RX ORDER — METOPROLOL TARTRATE 50 MG
25 TABLET ORAL DAILY
Refills: 0 | Status: DISCONTINUED | OUTPATIENT
Start: 2022-05-03 | End: 2022-05-10

## 2022-05-03 RX ORDER — QUETIAPINE FUMARATE 200 MG/1
50 TABLET, FILM COATED ORAL AT BEDTIME
Refills: 0 | Status: DISCONTINUED | OUTPATIENT
Start: 2022-05-03 | End: 2022-05-10

## 2022-05-03 RX ORDER — ATORVASTATIN CALCIUM 80 MG/1
20 TABLET, FILM COATED ORAL AT BEDTIME
Refills: 0 | Status: DISCONTINUED | OUTPATIENT
Start: 2022-05-03 | End: 2022-05-10

## 2022-05-03 RX ORDER — SODIUM CHLORIDE 9 MG/ML
1000 INJECTION INTRAMUSCULAR; INTRAVENOUS; SUBCUTANEOUS
Refills: 0 | Status: DISCONTINUED | OUTPATIENT
Start: 2022-05-03 | End: 2022-05-05

## 2022-05-03 RX ORDER — THIAMINE MONONITRATE (VIT B1) 100 MG
100 TABLET ORAL ONCE
Refills: 0 | Status: COMPLETED | OUTPATIENT
Start: 2022-05-03 | End: 2022-05-03

## 2022-05-03 RX ORDER — CEFTRIAXONE 500 MG/1
1000 INJECTION, POWDER, FOR SOLUTION INTRAMUSCULAR; INTRAVENOUS ONCE
Refills: 0 | Status: COMPLETED | OUTPATIENT
Start: 2022-05-03 | End: 2022-05-03

## 2022-05-03 RX ORDER — THIAMINE MONONITRATE (VIT B1) 100 MG
100 TABLET ORAL DAILY
Refills: 0 | Status: DISCONTINUED | OUTPATIENT
Start: 2022-05-03 | End: 2022-05-04

## 2022-05-03 RX ORDER — CEFTRIAXONE 500 MG/1
1000 INJECTION, POWDER, FOR SOLUTION INTRAMUSCULAR; INTRAVENOUS EVERY 24 HOURS
Refills: 0 | Status: COMPLETED | OUTPATIENT
Start: 2022-05-03 | End: 2022-05-05

## 2022-05-03 RX ORDER — FOLIC ACID 0.8 MG
1 TABLET ORAL DAILY
Refills: 0 | Status: DISCONTINUED | OUTPATIENT
Start: 2022-05-03 | End: 2022-05-10

## 2022-05-03 RX ORDER — ASPIRIN/CALCIUM CARB/MAGNESIUM 324 MG
81 TABLET ORAL DAILY
Refills: 0 | Status: DISCONTINUED | OUTPATIENT
Start: 2022-05-03 | End: 2022-05-10

## 2022-05-03 RX ORDER — ONDANSETRON 8 MG/1
4 TABLET, FILM COATED ORAL EVERY 8 HOURS
Refills: 0 | Status: DISCONTINUED | OUTPATIENT
Start: 2022-05-03 | End: 2022-05-10

## 2022-05-03 RX ORDER — ACETAMINOPHEN 500 MG
650 TABLET ORAL EVERY 6 HOURS
Refills: 0 | Status: DISCONTINUED | OUTPATIENT
Start: 2022-05-03 | End: 2022-05-10

## 2022-05-03 RX ORDER — TAMSULOSIN HYDROCHLORIDE 0.4 MG/1
0.4 CAPSULE ORAL AT BEDTIME
Refills: 0 | Status: DISCONTINUED | OUTPATIENT
Start: 2022-05-03 | End: 2022-05-10

## 2022-05-03 RX ADMIN — Medication 81 MILLIGRAM(S): at 11:10

## 2022-05-03 RX ADMIN — CEFTRIAXONE 100 MILLIGRAM(S): 500 INJECTION, POWDER, FOR SOLUTION INTRAMUSCULAR; INTRAVENOUS at 05:33

## 2022-05-03 RX ADMIN — Medication 25 MILLIGRAM(S): at 11:11

## 2022-05-03 RX ADMIN — SODIUM CHLORIDE 50 MILLILITER(S): 9 INJECTION INTRAMUSCULAR; INTRAVENOUS; SUBCUTANEOUS at 18:07

## 2022-05-03 RX ADMIN — Medication 100 MILLIGRAM(S): at 15:19

## 2022-05-03 RX ADMIN — QUETIAPINE FUMARATE 50 MILLIGRAM(S): 200 TABLET, FILM COATED ORAL at 21:59

## 2022-05-03 RX ADMIN — TAMSULOSIN HYDROCHLORIDE 0.4 MILLIGRAM(S): 0.4 CAPSULE ORAL at 21:58

## 2022-05-03 RX ADMIN — Medication 1 TABLET(S): at 11:10

## 2022-05-03 RX ADMIN — ATORVASTATIN CALCIUM 20 MILLIGRAM(S): 80 TABLET, FILM COATED ORAL at 21:58

## 2022-05-03 RX ADMIN — Medication 100 MILLIGRAM(S): at 00:26

## 2022-05-03 RX ADMIN — Medication 1 MILLIGRAM(S): at 11:10

## 2022-05-03 NOTE — PHARMACOTHERAPY INTERVENTION NOTE - COMMENTS
Med history complete, reviewed medications and allergies with patient and confirmed medication list with doctor first med profile, patient able to provide limited history, all medication related questions answered

## 2022-05-03 NOTE — H&P ADULT - NSHPPHYSICALEXAM_GEN_ALL_CORE
ICU Vital Signs Last 24 Hrs  T(C): 36.7 (03 May 2022 07:07), Max: 36.7 (02 May 2022 23:47)  T(F): 98.1 (03 May 2022 07:07), Max: 98.1 (03 May 2022 07:07)  HR: 74 (03 May 2022 07:07) (74 - 88)  BP: 151/103 (03 May 2022 07:07) (126/62 - 164/73)  BP(mean): 118 (03 May 2022 07:07) (118 - 118)  ABP: --  ABP(mean): --  RR: 18 (03 May 2022 07:07) (18 - 18)  SpO2: 100% (03 May 2022 07:07) (100% - 100%)      PHYSICAL EXAM:    Constitutional: NAD, awake and alert  HEENT: PERR, EOMI, Normal Hearing, MMM. + arcus senilus b/l  Neck: Soft and supple, No LAD, No JVD  Respiratory: Breath sounds are clear bilaterally, No wheezing, rales or rhonchi  Cardiovascular: S1 and S2, regular rate and rhythm, no Murmurs, gallops or rubs  Gastrointestinal: Bowel Sounds present, soft, nontender, nondistended, no guarding, no rebound  Extremities: No peripheral edema; chronic venous skin changes b/l, no effusions. Good rom throughout all extremities  Vascular: 2+ peripheral pulses  Neurological: A/O x 2, no focal deficits  Musculoskeletal: 5/5 strength b/l upper and lower extremities  Skin: No rashes

## 2022-05-03 NOTE — H&P ADULT - HISTORY OF PRESENT ILLNESS
PAST MEDICAL & SURGICAL HISTORY:  CAD (coronary artery disease)  Atrial fibrillation, Not on AC  History of alcohol use  BPH (benign prostatic hyperplasia)  Paranoia  History of hallucinations  S/P CABG (coronary artery bypass graft)    77 y/o M with PMH CAD s/p CABG, A fib not on AC d/t concerns with medication non-compliance and agitation, BPH, alcohol abuse, paranoia/hallucinations recently d/c in 2/2022 for fluctuating encephalopathy with behavioral/mood disorder and diagnosed with neurocognitive decline with delirium and ruled out for Wernicke's encephalopathy now presents with similar features.  Pt extremely tangential during exam and states " you're the doctor, figure out why I am here!"   Believes the hospital is his primary residence and needs constant redirection during exam. States he has been falling alot due to pain in his knees and legs. Walks with a cane however still needs to walk close to sturdy objects to mitigate his falls risk. States  he is a drinker but will not quantify how much he drinks and states " not alot."   ED course: UA ++ started on ceftriaxone. Scr 1.3 baseline 1.01. CTH and C spine negative. Pelvic, and knee xrays are pending. Pt is afebrrile and hemodynamically stable without features of active withdrawal  Attempted to call emergency contact Bharath Freedman at [phone], no answer awaiting call back    FAMILY HISTORY:  FH: asthma (Father)      Social Hx:  Smoker +, alcohol use ++     77 y/o M with PMH CAD s/p CABG, A fib not on AC d/t concerns with medication non-compliance and agitation, BPH, alcohol abuse, paranoia/hallucinations recently d/c in 2/2022 for fluctuating encephalopathy with behavioral/mood disorder and diagnosed with neurocognitive decline with delirium and ruled out for Wernicke's encephalopathy now presents with similar features.  Pt extremely tangential during exam and states " you're the doctor, figure out why I am here!"   Believes the hospital is his primary residence and needs constant redirection during exam. States he has been falling alot due to pain in his knees and legs. Walks with a cane however still needs to walk close to sturdy objects to mitigate his falls risk. States  he is a drinker but will not quantify how much he drinks and states " not alot."   ED course: UA ++ started on ceftriaxone. Scr 1.3 baseline 1.01. CTH and C spine negative. Pelvic, and knee xrays are pending. Pt is afebrrile and hemodynamically stable without features of active withdrawal  Attempted to call emergency contact Bharath Freedman at [phone], no answer awaiting call back                  PAST MEDICAL & SURGICAL HISTORY:  CAD (coronary artery disease)  Atrial fibrillation, Not on AC  History of alcohol use  BPH (benign prostatic hyperplasia)  Paranoia  History of hallucinations  S/P CABG (coronary artery bypass graft)    FAMILY HISTORY:  FH: asthma (Father)      Social Hx:  Smoker +, alcohol use ++

## 2022-05-03 NOTE — PATIENT PROFILE ADULT - FALL HARM RISK - HARM RISK INTERVENTIONS

## 2022-05-03 NOTE — H&P ADULT - NSHPLABSRESULTS_GEN_ALL_CORE
CBC:            13.6   6.78  )-----------( 246      ( 05-02-22 @ 20:56 )             42.5         Chem:         ( 05-02-22 @ 20:56 )    139  |  105  |  18  ----------------------------<  68<L>  4.5   |  31  |  1.34<H>        Liver Functions: ( 05-02-22 @ 20:56 )  Alb: 3.5 g/dL / Pro: 7.3 gm/dL / ALK PHOS: 85 U/L / ALT: 29 U/L / AST: 67 U/L / GGT: x              Type & Screen:

## 2022-05-03 NOTE — PHYSICAL THERAPY INITIAL EVALUATION ADULT - PERTINENT HX OF CURRENT PROBLEM, REHAB EVAL
75 y/o M with PMH CAD s/p CABG, A fib not on AC d/t concerns with medication non-compliance and agitation, BPH, alcohol abuse, paranoia/hallucinations recently d/c in 2/2022 for fluctuating encephalopathy with behavioral/mood disorder and diagnosed with neurocognitive decline with delirium and ruled out for Wernicke's encephalopathy now presents with similar features. Pt with +UA. XRs neg for fx.

## 2022-05-03 NOTE — PHYSICAL THERAPY INITIAL EVALUATION ADULT - GENERAL OBSERVATIONS, REHAB EVAL
Pt seen for 45min PT Eval. Pt rec'd standing with CNA returning from bathroom. Pt amb with CGA with SC. Pt given RW improved stability. Pt amb 150ft with RW and S. Pt trans sit<>Stand with S. Pt returned to bed with S. Pt magdalene, A&Ox1-2. Pt returned semi supine all needs met PARUL mcintosh.

## 2022-05-03 NOTE — ED ADULT NURSE NOTE - NSIMPLEMENTINTERV_GEN_ALL_ED
Implemented All Fall Risk Interventions:  [place] to call system. Call bell, personal items and telephone within reach. Instruct patient to call for assistance. Room bathroom lighting operational. Non-slip footwear when patient is off stretcher. Physically safe environment: no spills, clutter or unnecessary equipment. Stretcher in lowest position, wheels locked, appropriate side rails in place. Provide visual cue, wrist band, yellow gown, etc. Monitor gait and stability. Monitor for mental status changes and reorient to person, place, and time. Review medications for side effects contributing to fall risk. Reinforce activity limits and safety measures with patient and family.

## 2022-05-03 NOTE — PHYSICAL THERAPY INITIAL EVALUATION ADULT - PRECAUTIONS/LIMITATIONS, REHAB EVAL
CT BRAIN: Motion limited study. No evidence of acute intracranial hemorrhage, midline shift or CT evidence of acute territorial infarct.; CT CERVICAL SPINE: No acute cervical fracture or traumatic malalignment./fall precautions

## 2022-05-03 NOTE — H&P ADULT - ASSESSMENT
Neurocognitive decline  #Likely superimposed with metabolic encephalopathy  #ETOH abuse  #UTI  #FUNMILAYO  - admit to [facility]  - CTH/C scpine negative  - Check TSH, RPR, B12, folate  - Will resume patients trazodone, seroquel as per psych reccs in Feb   - Agree with ceftriaxone and f/u urine cultures  - Will need PT consult, may need home care vs placement is pt having significant falls and lives alone  - Follow up Xray of knee and pelvis to r/o fracture  - Monitor for s/s of withdrawal. CIWA with ativan prn  - MVI, IV thiamine for 3 days, folate   - Resume all home non nephrotoxic meds  - normal saline 50ccc/hr      DVT px: HSQ  Fall risk

## 2022-05-04 LAB
ANION GAP SERPL CALC-SCNC: 4 MMOL/L — LOW (ref 5–17)
BUN SERPL-MCNC: 22 MG/DL — SIGNIFICANT CHANGE UP (ref 7–23)
CALCIUM SERPL-MCNC: 8.9 MG/DL — SIGNIFICANT CHANGE UP (ref 8.5–10.1)
CHLORIDE SERPL-SCNC: 110 MMOL/L — HIGH (ref 96–108)
CO2 SERPL-SCNC: 24 MMOL/L — SIGNIFICANT CHANGE UP (ref 22–31)
CREAT SERPL-MCNC: 1.12 MG/DL — SIGNIFICANT CHANGE UP (ref 0.5–1.3)
EGFR: 69 ML/MIN/1.73M2 — SIGNIFICANT CHANGE UP
GLUCOSE SERPL-MCNC: 86 MG/DL — SIGNIFICANT CHANGE UP (ref 70–99)
HCT VFR BLD CALC: 39.9 % — SIGNIFICANT CHANGE UP (ref 39–50)
HGB BLD-MCNC: 13.1 G/DL — SIGNIFICANT CHANGE UP (ref 13–17)
MAGNESIUM SERPL-MCNC: 1.9 MG/DL — SIGNIFICANT CHANGE UP (ref 1.6–2.6)
MCHC RBC-ENTMCNC: 29.8 PG — SIGNIFICANT CHANGE UP (ref 27–34)
MCHC RBC-ENTMCNC: 32.8 GM/DL — SIGNIFICANT CHANGE UP (ref 32–36)
MCV RBC AUTO: 90.7 FL — SIGNIFICANT CHANGE UP (ref 80–100)
PHOSPHATE SERPL-MCNC: 3.3 MG/DL — SIGNIFICANT CHANGE UP (ref 2.5–4.5)
PLATELET # BLD AUTO: 239 K/UL — SIGNIFICANT CHANGE UP (ref 150–400)
POTASSIUM SERPL-MCNC: 3.9 MMOL/L — SIGNIFICANT CHANGE UP (ref 3.5–5.3)
POTASSIUM SERPL-SCNC: 3.9 MMOL/L — SIGNIFICANT CHANGE UP (ref 3.5–5.3)
RBC # BLD: 4.4 M/UL — SIGNIFICANT CHANGE UP (ref 4.2–5.8)
RBC # FLD: 14.6 % — HIGH (ref 10.3–14.5)
SODIUM SERPL-SCNC: 138 MMOL/L — SIGNIFICANT CHANGE UP (ref 135–145)
WBC # BLD: 5.3 K/UL — SIGNIFICANT CHANGE UP (ref 3.8–10.5)
WBC # FLD AUTO: 5.3 K/UL — SIGNIFICANT CHANGE UP (ref 3.8–10.5)

## 2022-05-04 PROCEDURE — 99233 SBSQ HOSP IP/OBS HIGH 50: CPT

## 2022-05-04 RX ORDER — THIAMINE MONONITRATE (VIT B1) 100 MG
100 TABLET ORAL DAILY
Refills: 0 | Status: DISCONTINUED | OUTPATIENT
Start: 2022-05-04 | End: 2022-05-08

## 2022-05-04 RX ORDER — ENOXAPARIN SODIUM 100 MG/ML
40 INJECTION SUBCUTANEOUS EVERY 24 HOURS
Refills: 0 | Status: DISCONTINUED | OUTPATIENT
Start: 2022-05-04 | End: 2022-05-10

## 2022-05-04 RX ADMIN — ATORVASTATIN CALCIUM 20 MILLIGRAM(S): 80 TABLET, FILM COATED ORAL at 21:18

## 2022-05-04 RX ADMIN — CEFTRIAXONE 100 MILLIGRAM(S): 500 INJECTION, POWDER, FOR SOLUTION INTRAMUSCULAR; INTRAVENOUS at 14:00

## 2022-05-04 RX ADMIN — Medication 3 MILLIGRAM(S): at 21:18

## 2022-05-04 RX ADMIN — ENOXAPARIN SODIUM 40 MILLIGRAM(S): 100 INJECTION SUBCUTANEOUS at 12:27

## 2022-05-04 RX ADMIN — SODIUM CHLORIDE 50 MILLILITER(S): 9 INJECTION INTRAMUSCULAR; INTRAVENOUS; SUBCUTANEOUS at 13:58

## 2022-05-04 RX ADMIN — TAMSULOSIN HYDROCHLORIDE 0.4 MILLIGRAM(S): 0.4 CAPSULE ORAL at 21:18

## 2022-05-04 RX ADMIN — QUETIAPINE FUMARATE 50 MILLIGRAM(S): 200 TABLET, FILM COATED ORAL at 21:17

## 2022-05-04 RX ADMIN — Medication 1 MILLIGRAM(S): at 12:15

## 2022-05-04 RX ADMIN — Medication 81 MILLIGRAM(S): at 12:15

## 2022-05-04 RX ADMIN — Medication 25 MILLIGRAM(S): at 12:15

## 2022-05-04 RX ADMIN — Medication 100 MILLIGRAM(S): at 14:00

## 2022-05-04 RX ADMIN — Medication 1 TABLET(S): at 12:15

## 2022-05-04 NOTE — PROGRESS NOTE ADULT - ATTENDING COMMENTS
Patient seen and examined with PA student Lizandro Fournier, I was physically present for the key portions of the evaluation and management (E/M) service provided.  I agree with the above history, physical, and plan which I have reviewed and edited where appropriate.  - pt presenting with AMS and was likely metabolic encephalopathy in setting of UTI.  continue ceftriaxone.  - continue CIWA protocol - continue mvi/folate/thiamine

## 2022-05-04 NOTE — PROGRESS NOTE ADULT - ASSESSMENT
77 y/o M with PMH CAD s/p CABG, A fib not on AC d/t concerns with medication non-compliance and agitation, BPH, alcohol abuse, paranoia/hallucinations recently d/c in 2/2022 for fluctuating encephalopathy with behavioral/mood disorder and diagnosed with neurocognitive decline with delirium and ruled out for Wernicke's encephalopathy now presents with similar features.  Pt extremely tangential during exam and states " you're the doctor, figure out why I am here!"   Believes the hospital is his primary residence and needs constant redirection during exam. States he has been falling alot due to pain in his knees and legs. Walks with a cane however still needs to walk close to sturdy objects to mitigate his falls risk. States  he is a drinker but will not quantify how much he drinks and states " not alot."     Neurocognitive decline  #Likely superimposed with metabolic encephalopathy  #ETOH abuse  #UTI  #FUNMILAYO  - admitted to medicine   - CTH/C scpine negative  - TSH-.53, RPR-neg ,, folate-9.9  - Will resume patients trazodone, seroquel as per psych reccs in Feb   - c/w IV ceftriaxone  - f/u urine cx   - F/u PT consult, may need home care vs placement is pt having significant falls and lives alone  - X-ray knee and pelvis neg for fracture  - Monitor for s/s of withdrawal. CIWA with ativan prn  - MVI, IV thiamine for 3 days, folate   - Resume all home non nephrotoxic meds  - normal saline 50ccc/hr      DVT px: HSQ  Fall risk   75 y/o M with PMH CAD s/p CABG, A fib not on AC d/t concerns with medication non-compliance and agitation, BPH, alcohol abuse, paranoia/hallucinations recently d/c in 2/2022 for fluctuating encephalopathy with behavioral/mood disorder and diagnosed with neurocognitive decline with delirium and ruled out for Wernicke's encephalopathy now presents with similar features.  Pt extremely tangential during exam and states " you're the doctor, figure out why I am here!"   Believes the hospital is his primary residence and needs constant redirection during exam. States he has been falling alot due to pain in his knees and legs. Walks with a cane however still needs to walk close to sturdy objects to mitigate his falls risk. States  he is a drinker but will not quantify how much he drinks and states " not alot."     Neurocognitive decline  #Likely superimposed with metabolic encephalopathy  #ETOH abuse  #UTI  #FUNMILAYO  - admitted to medicine   - CTH/C scpine negative  - TSH-.53, RPR-neg ,, folate-9.9  - Will resume patients trazodone, seroquel as per psych reccs in Feb   - c/w IV ceftriaxone  - f/u urine cx   - F/u PT consult, may need home care vs placement is pt having significant falls and lives alone  - Pt walked hallway with PT assistance   - X-ray knee and pelvis neg for fracture  - Monitor for s/s of withdrawal. CIWA with ativan prn  - MVI, IV thiamine for 3 days, folate   - Resume all home non nephrotoxic meds  - normal saline 50ccc/hr      DVT px: HSQ  Fall risk

## 2022-05-04 NOTE — PROGRESS NOTE ADULT - SUBJECTIVE AND OBJECTIVE BOX
77 y/o M with PMH CAD s/p CABG, A fib not on AC d/t concerns with medication non-compliance and agitation, BPH, alcohol abuse, paranoia/hallucinations recently d/c in 2022 for fluctuating encephalopathy with behavioral/mood disorder and diagnosed with neurocognitive decline with delirium and ruled out for Wernicke's encephalopathy now presents with similar features.  Pt extremely tangential during exam and states " you're the doctor, figure out why I am here!"   Believes the hospital is his primary residence and needs constant redirection during exam. States he has been falling alot due to pain in his knees and legs. Walks with a cane however still needs to walk close to sturdy objects to mitigate his falls risk. States  he is a drinker but will not quantify how much he drinks and states " not alot."     : Pt found resting comfortably in bed. Able to answer questions. AxOx3. Denies any fever, chills, dysuria, hematuria, or other complaints.     ROS:   All 10 systems reviewed and found to be negative with the exception of what has been described above.    Vital Signs Last 24 Hrs  T(C): 36.8 (04 May 2022 07:43), Max: 36.9 (03 May 2022 20:10)  T(F): 98.2 (04 May 2022 07:43), Max: 98.4 (03 May 2022 20:10)  HR: 71 (04 May 2022 07:43) (61 - 80)  BP: 129/68 (04 May 2022 07:43) (120/62 - 148/72)  BP(mean): --  RR: 18 (04 May 2022 07:43) (17 - 18)  SpO2: 97% (04 May 2022 07:43) (96% - 100%)                              13.1   5.30  )-----------( 239      ( 04 May 2022 06:41 )             39.9     05-04    138  |  110<H>  |  22  ----------------------------<  86  3.9   |  24  |  1.12    Ca    8.9      04 May 2022 06:41  Phos  3.3     05-04  Mg     1.9     05-04    TPro  6.4  /  Alb  3.1<L>  /  TBili  0.6  /  DBili  0.2  /  AST  44<H>  /  ALT  26  /  AlkPhos  71  05-03        LIVER FUNCTIONS - ( 03 May 2022 12:08 )  Alb: 3.1 g/dL / Pro: 6.4 gm/dL / ALK PHOS: 71 U/L / ALT: 26 U/L / AST: 44 U/L / GGT: x             Urinalysis Basic - ( 03 May 2022 02:33 )    Color: Yellow / Appearance: Clear / S.015 / pH: x  Gluc: x / Ketone: Negative  / Bili: Negative / Urobili: 4   Blood: x / Protein: 100 / Nitrite: Positive   Leuk Esterase: Trace / RBC: 0-2 /HPF / WBC 3-5   Sq Epi: x / Non Sq Epi: Occasional / Bacteria: TNTC    Radiology:     CT CERVICAL SPINE  ACC: [id number] EXAM: CT BRAIN    PROCEDURE DATE: 2022        INTERPRETATION: CLINICAL INFORMATION: Trauma. Fall.    TECHNIQUE:  Axial CT images were acquired through the head and cervical spine.  Intravenous contrast: None  Two-dimensional reformats were generated.    COMPARISON STUDY: None    FINDINGS:  CT head:    Diagnostic accuracy is limited secondary to patient motion.    There is no CT evidence of acute intracranial hemorrhage, mass effect, midline shift, or acute territorial infarct. The ventricles and sulci are normal in size and configuration. The basal cisterns are patent.    The visualized paranasal sinuses are clear.    The mastoid air cells and middle ear cavities are grossly clear.    There is no depressed calvarial fracture.      CT cervical spine:    There is preservation of the cervical lordosis.  There is no CT evidence of an acute cervical spine fracture or traumatic malalignment.  There is no suspicious lytic or blastic lesion.  The paraspinous soft tissues are unremarkable within limits of CT scan.    Degenerative changes:  There are multilevel degenerative changes characterized by disc osteophyte complexes and facet and uncinate hypertrophy with resultant mild multilevel central canal and neural foraminal stenosis.    Incidental findings:  Visualized soft tissues of the neck are unremarkable.  Pleural-parenchymal scarring at the lung apices.      IMPRESSION:    CT BRAIN:    Motion limited study. No evidence of acute intracranial hemorrhage, midline shift or CT evidence of acute territorial infarct.    If the patient's symptoms persist, consider short interval follow-up head CT or brain MRI if there are no MRI contraindications.      CT CERVICAL SPINE:    No acute cervical fracture or traumatic malalignment.    MRI would be required to evaluate the ligamentous structures at higher sensitivity as well as for better evaluation of the cervical canal and its contents.  ACC: [id number] EXAM: XR LS SPINE AP LAT 2-3 VIEWS  ACC: [id number] EXAM: XR HIPS BI WITH PELV 2V    PROCEDURE DATE: 2022        INTERPRETATION: Bilateral hips and pelvis and lumbar spine. Concern is fracture.    Lumbar spine. AP and lateral views. 3 images. There is a mild right upper and a mild left lower lumbar curve slightly increased from 2011.    There is moderate spondylitic change starting in the mid level proceeding inferiorly. This is somewhat increased from prior.    No bone destruction or fracture. Arterial calcification.    Bilateral hips and pelvis. 5 views.    There is a vascular stent seen in each upper thigh.    There is mild hip degeneration in the laterally greater in the left hip.    No bone destruction or fracture.    IMPRESSION: Increasing mild to and fro lumbar curve an increasing lumbar spondylitic change.    Rather mild degeneration in the outer acetabular corners left greater than right. No fracture.    Vascular stents in each thigh.    XR KNEE 3 VIEWS RT    PROCEDURE DATE: 2022        INTERPRETATION: Clinical history: 76-year-old male, pain.    Three views of the right knee without comparison demonstrates moderate to severe OA in the medial compartment with marked joint space narrowing. Mild OA in the lateral and patellofemoral compartments.    No suprapatellar effusion, fracture or dislocation.    IMPRESSION:  OA, moderate to severe in the medial compartment with no acute radiographic findings. MRI can be ordered as clinically indicated

## 2022-05-05 LAB
ANION GAP SERPL CALC-SCNC: 6 MMOL/L — SIGNIFICANT CHANGE UP (ref 5–17)
BUN SERPL-MCNC: 22 MG/DL — SIGNIFICANT CHANGE UP (ref 7–23)
CALCIUM SERPL-MCNC: 9 MG/DL — SIGNIFICANT CHANGE UP (ref 8.5–10.1)
CHLORIDE SERPL-SCNC: 107 MMOL/L — SIGNIFICANT CHANGE UP (ref 96–108)
CO2 SERPL-SCNC: 28 MMOL/L — SIGNIFICANT CHANGE UP (ref 22–31)
CREAT SERPL-MCNC: 1.26 MG/DL — SIGNIFICANT CHANGE UP (ref 0.5–1.3)
EGFR: 59 ML/MIN/1.73M2 — LOW
GLUCOSE SERPL-MCNC: 102 MG/DL — HIGH (ref 70–99)
HCT VFR BLD CALC: 37.8 % — LOW (ref 39–50)
HGB BLD-MCNC: 13.2 G/DL — SIGNIFICANT CHANGE UP (ref 13–17)
MCHC RBC-ENTMCNC: 32.1 PG — SIGNIFICANT CHANGE UP (ref 27–34)
MCHC RBC-ENTMCNC: 34.9 GM/DL — SIGNIFICANT CHANGE UP (ref 32–36)
MCV RBC AUTO: 92 FL — SIGNIFICANT CHANGE UP (ref 80–100)
PLATELET # BLD AUTO: 242 K/UL — SIGNIFICANT CHANGE UP (ref 150–400)
POTASSIUM SERPL-MCNC: 4.1 MMOL/L — SIGNIFICANT CHANGE UP (ref 3.5–5.3)
POTASSIUM SERPL-SCNC: 4.1 MMOL/L — SIGNIFICANT CHANGE UP (ref 3.5–5.3)
RBC # BLD: 4.11 M/UL — LOW (ref 4.2–5.8)
RBC # FLD: 14.6 % — HIGH (ref 10.3–14.5)
SODIUM SERPL-SCNC: 141 MMOL/L — SIGNIFICANT CHANGE UP (ref 135–145)
WBC # BLD: 3.99 K/UL — SIGNIFICANT CHANGE UP (ref 3.8–10.5)
WBC # FLD AUTO: 3.99 K/UL — SIGNIFICANT CHANGE UP (ref 3.8–10.5)

## 2022-05-05 PROCEDURE — 99232 SBSQ HOSP IP/OBS MODERATE 35: CPT

## 2022-05-05 RX ORDER — CEFTRIAXONE 500 MG/1
1000 INJECTION, POWDER, FOR SOLUTION INTRAMUSCULAR; INTRAVENOUS EVERY 24 HOURS
Refills: 0 | Status: DISCONTINUED | OUTPATIENT
Start: 2022-05-05 | End: 2022-05-05

## 2022-05-05 RX ORDER — CEFTRIAXONE 500 MG/1
1000 INJECTION, POWDER, FOR SOLUTION INTRAMUSCULAR; INTRAVENOUS EVERY 24 HOURS
Refills: 0 | Status: DISCONTINUED | OUTPATIENT
Start: 2022-05-06 | End: 2022-05-06

## 2022-05-05 RX ADMIN — Medication 25 MILLIGRAM(S): at 09:38

## 2022-05-05 RX ADMIN — ATORVASTATIN CALCIUM 20 MILLIGRAM(S): 80 TABLET, FILM COATED ORAL at 21:37

## 2022-05-05 RX ADMIN — Medication 81 MILLIGRAM(S): at 09:38

## 2022-05-05 RX ADMIN — SODIUM CHLORIDE 50 MILLILITER(S): 9 INJECTION INTRAMUSCULAR; INTRAVENOUS; SUBCUTANEOUS at 09:39

## 2022-05-05 RX ADMIN — Medication 100 MILLIGRAM(S): at 09:41

## 2022-05-05 RX ADMIN — Medication 3 MILLIGRAM(S): at 21:37

## 2022-05-05 RX ADMIN — Medication 1 MILLIGRAM(S): at 09:38

## 2022-05-05 RX ADMIN — QUETIAPINE FUMARATE 50 MILLIGRAM(S): 200 TABLET, FILM COATED ORAL at 21:37

## 2022-05-05 RX ADMIN — TAMSULOSIN HYDROCHLORIDE 0.4 MILLIGRAM(S): 0.4 CAPSULE ORAL at 21:37

## 2022-05-05 RX ADMIN — Medication 1 TABLET(S): at 09:38

## 2022-05-05 RX ADMIN — CEFTRIAXONE 100 MILLIGRAM(S): 500 INJECTION, POWDER, FOR SOLUTION INTRAMUSCULAR; INTRAVENOUS at 13:01

## 2022-05-05 RX ADMIN — ENOXAPARIN SODIUM 40 MILLIGRAM(S): 100 INJECTION SUBCUTANEOUS at 13:01

## 2022-05-05 NOTE — PROGRESS NOTE ADULT - ASSESSMENT
77 y/o M with PMH CAD s/p CABG, A fib not on AC d/t concerns with medication non-compliance and agitation, BPH, alcohol abuse, paranoia/hallucinations recently d/c in 2/2022 for fluctuating encephalopathy with behavioral/mood disorder and diagnosed with neurocognitive decline with delirium and ruled out for Wernicke's encephalopathy now presents with similar features.  Pt extremely tangential during exam and states " you're the doctor, figure out why I am here!"   Believes the hospital is his primary residence and needs constant redirection during exam. States he has been falling alot due to pain in his knees and legs. Walks with a cane however still needs to walk close to sturdy objects to mitigate his falls risk. States  he is a drinker but will not quantify how much he drinks and states " not alot."     Neurocognitive decline  #Likely superimposed with metabolic encephalopathy  #ETOH abuse  #UTI  #FUNMILAYO  - admitted to medicine   - CTH/C scpine negative  - TSH-.53, RPR-neg ,, folate-9.9  - Will resume patients trazodone, seroquel as per psych reccs in Feb   - c/w IV ceftriaxone  - f/u urine cx   - F/u PT consult, may need home care vs placement is pt having significant falls and lives alone  - Pt walked hallway with PT assistance   - X-ray knee and pelvis neg for fracture  - Monitor for s/s of withdrawal. CIWA with ativan prn  - MVI, IV thiamine for 3 days, folate   - Resume all home non nephrotoxic meds  - normal saline 50ccc/hr      DVT px: HSQ  Fall risk       pt presenting with AMS and was likely metabolic encephalopathy in setting of UTI.  continue ceftriaxone.  - continue CIWA protocol - continue mvi/folate/thiamine .

## 2022-05-05 NOTE — PROGRESS NOTE ADULT - SUBJECTIVE AND OBJECTIVE BOX
77 y/o M with PMH CAD s/p CABG, A fib not on AC d/t concerns with medication non-compliance and agitation, BPH, alcohol abuse, paranoia/hallucinations recently d/c in 2/2022 for fluctuating encephalopathy with behavioral/mood disorder and diagnosed with neurocognitive decline with delirium and ruled out for Wernicke's encephalopathy now presents with similar features.  Pt extremely tangential during exam and states " you're the doctor, figure out why I am here!"   Believes the hospital is his primary residence and needs constant redirection during exam. States he has been falling alot due to pain in his knees and legs. Walks with a cane however still needs to walk close to sturdy objects to mitigate his falls risk. States  he is a drinker but will not quantify how much he drinks and states " not alot."     5/5: Pt found resting comfortably in bed. Able to answer questions. AxOx3. Denies any fever, chills, dysuria, hematuria, or other complaints.     ROS:   All 10 systems reviewed and found to be negative with the exception of what has been described above.      PHYSICAL EXAM:    Daily     Daily     ICU Vital Signs Last 24 Hrs  T(C): 36.3 (05 May 2022 15:30), Max: 36.9 (05 May 2022 12:27)  T(F): 97.3 (05 May 2022 15:30), Max: 98.5 (05 May 2022 12:27)  HR: 71 (05 May 2022 16:00) (71 - 78)  BP: 145/76 (05 May 2022 16:00) (131/61 - 157/84)  BP(mean): 81 (04 May 2022 23:02) (81 - 81)  ABP: --  ABP(mean): --  RR: 18 (05 May 2022 16:00) (17 - 18)  SpO2: 100% (05 May 2022 16:00) (98% - 100%)      Constitutional: NAD  HEENT: Atraumatic, RODOLFO, Normal, No congestion  Respiratory: Breath Sounds normal, no rhonchi/wheeze  Cardiovascular: N S1S2;   Gastrointestinal: Abdomen soft, non tender, Bowel Ssounds present  Extremities: No edema, peripheral pulses present  Neurological: awake, alert follows 1 step commands  no gross focal motor deficits  Skin: Non cellulitic, no rash, ulcers                            13.2   3.99  )-----------( 242      ( 05 May 2022 07:02 )             37.8       CBC Full  -  ( 05 May 2022 07:02 )  WBC Count : 3.99 K/uL  RBC Count : 4.11 M/uL  Hemoglobin : 13.2 g/dL  Hematocrit : 37.8 %  Platelet Count - Automated : 242 K/uL  Mean Cell Volume : 92.0 fl  Mean Cell Hemoglobin : 32.1 pg  Mean Cell Hemoglobin Concentration : 34.9 gm/dL  Auto Neutrophil # : x  Auto Lymphocyte # : x  Auto Monocyte # : x  Auto Eosinophil # : x  Auto Basophil # : x  Auto Neutrophil % : x  Auto Lymphocyte % : x  Auto Monocyte % : x  Auto Eosinophil % : x  Auto Basophil % : x      05-05    141  |  107  |  22  ----------------------------<  102<H>  4.1   |  28  |  1.26    Ca    9.0      05 May 2022 07:02  Phos  3.3     05-04  Mg     1.9     05-04                              MEDICATIONS  (STANDING):  aspirin  chewable 81 milliGRAM(s) Oral daily  atorvastatin 20 milliGRAM(s) Oral at bedtime  enoxaparin Injectable 40 milliGRAM(s) SubCutaneous every 24 hours  folic acid 1 milliGRAM(s) Oral daily  metoprolol succinate ER 25 milliGRAM(s) Oral daily  multivitamin 1 Tablet(s) Oral daily  QUEtiapine 50 milliGRAM(s) Oral at bedtime  tamsulosin 0.4 milliGRAM(s) Oral at bedtime  thiamine 100 milliGRAM(s) Oral daily        Radiology:     CT CERVICAL SPINE  ACC: [id number] EXAM: CT BRAIN    PROCEDURE DATE: 05/03/2022        INTERPRETATION: CLINICAL INFORMATION: Trauma. Fall.    TECHNIQUE:  Axial CT images were acquired through the head and cervical spine.  Intravenous contrast: None  Two-dimensional reformats were generated.    COMPARISON STUDY: None    FINDINGS:  CT head:    Diagnostic accuracy is limited secondary to patient motion.    There is no CT evidence of acute intracranial hemorrhage, mass effect, midline shift, or acute territorial infarct. The ventricles and sulci are normal in size and configuration. The basal cisterns are patent.    The visualized paranasal sinuses are clear.  The mastoid air cells and middle ear cavities are grossly clear.    There is no depressed calvarial fracture.      CT cervical spine:    There is preservation of the cervical lordosis.  There is no CT evidence of an acute cervical spine fracture or traumatic malalignment.  There is no suspicious lytic or blastic lesion.  The paraspinous soft tissues are unremarkable within limits of CT scan.  Degenerative changes:  There are multilevel degenerative changes characterized by disc osteophyte complexes and facet and uncinate hypertrophy with resultant mild multilevel central canal and neural foraminal stenosis.    Incidental findings:  Visualized soft tissues of the neck are unremarkable.  Pleural-parenchymal scarring at the lung apices.  IMPRESSION:    CT BRAIN:    Motion limited study. No evidence of acute intracranial hemorrhage, midline shift or CT evidence of acute territorial infarct.    If the patient's symptoms persist, consider short interval follow-up head CT or brain MRI if there are no MRI contraindications.      CT CERVICAL SPINE:    No acute cervical fracture or traumatic malalignment.    MRI would be required to evaluate the ligamentous structures at higher sensitivity as well as for better evaluation of the cervical canal and its contents.  ACC: [id number] EXAM: XR LS SPINE AP LAT 2-3 VIEWS  ACC: [id number] EXAM: XR HIPS BI WITH PELV 2V    PROCEDURE DATE: 05/03/2022    INTERPRETATION: Bilateral hips and pelvis and lumbar spine. Concern is fracture.    Lumbar spine. AP and lateral views. 3 images. There is a mild right upper and a mild left lower lumbar curve slightly increased from November 22, 2011.    There is moderate spondylitic change starting in the mid level proceeding inferiorly. This is somewhat increased from prior.    No bone destruction or fracture. Arterial calcification.    Bilateral hips and pelvis. 5 views.    There is a vascular stent seen in each upper thigh.    There is mild hip degeneration in the laterally greater in the left hip.    No bone destruction or fracture.    IMPRESSION: Increasing mild to and fro lumbar curve an increasing lumbar spondylitic change.    Rather mild degeneration in the outer acetabular corners left greater than right. No fracture.    Vascular stents in each thigh.    XR KNEE 3 VIEWS RT    PROCEDURE DATE: 05/02/2022    INTERPRETATION: Clinical history: 76-year-old male, pain.    Three views of the right knee without comparison demonstrates moderate to severe OA in the medial compartment with marked joint space narrowing. Mild OA in the lateral and patellofemoral compartments.    No suprapatellar effusion, fracture or dislocation.    IMPRESSION:  OA, moderate to severe in the medial compartment with no acute radiographic findings. MRI can be ordered as clinically indicated 75 y/o M with PMH CAD s/p CABG, A fib not on AC d/t concerns with medication non-compliance and agitation, BPH, alcohol abuse, paranoia/hallucinations recently d/c in 2/2022 for fluctuating encephalopathy with behavioral/mood disorder and diagnosed with neurocognitive decline with delirium and ruled out for Wernicke's encephalopathy now presents with similar features.  Pt extremely tangential during exam and states " you're the doctor, figure out why I am here!"   Believes the hospital is his primary residence and needs constant redirection during exam. States he has been falling alot due to pain in his knees and legs. Walks with a cane however still needs to walk close to sturdy objects to mitigate his falls risk. States  he is a drinker but will not quantify how much he drinks and states " not alot."     5/5: Pt found resting comfortably in bed. Able to answer questions.awake, confused follows 1 step commands  Denies any fever, chills, dysuria, hematuria, or other complaints.     ROS:   All 10 systems reviewed and found to be negative with the exception of what has been described above.      PHYSICAL EXAM:    Daily     Daily     ICU Vital Signs Last 24 Hrs  T(C): 36.3 (05 May 2022 15:30), Max: 36.9 (05 May 2022 12:27)  T(F): 97.3 (05 May 2022 15:30), Max: 98.5 (05 May 2022 12:27)  HR: 71 (05 May 2022 16:00) (71 - 78)  BP: 145/76 (05 May 2022 16:00) (131/61 - 157/84)  BP(mean): 81 (04 May 2022 23:02) (81 - 81)  ABP: --  ABP(mean): --  RR: 18 (05 May 2022 16:00) (17 - 18)  SpO2: 100% (05 May 2022 16:00) (98% - 100%)      Constitutional: NAD  HEENT: Atraumatic, RODOLFO, Normal, No congestion  Respiratory: Breath Sounds normal, no rhonchi/wheeze  Cardiovascular: N S1S2;   Gastrointestinal: Abdomen soft, non tender, Bowel Ssounds present  Extremities: No edema, peripheral pulses present  Neurological: awake, alert follows 1 step commands  no gross focal motor deficits  Skin: Non cellulitic, no rash, ulcers                            13.2   3.99  )-----------( 242      ( 05 May 2022 07:02 )             37.8       CBC Full  -  ( 05 May 2022 07:02 )  WBC Count : 3.99 K/uL  RBC Count : 4.11 M/uL  Hemoglobin : 13.2 g/dL  Hematocrit : 37.8 %  Platelet Count - Automated : 242 K/uL  Mean Cell Volume : 92.0 fl  Mean Cell Hemoglobin : 32.1 pg  Mean Cell Hemoglobin Concentration : 34.9 gm/dL  Auto Neutrophil # : x  Auto Lymphocyte # : x  Auto Monocyte # : x  Auto Eosinophil # : x  Auto Basophil # : x  Auto Neutrophil % : x  Auto Lymphocyte % : x  Auto Monocyte % : x  Auto Eosinophil % : x  Auto Basophil % : x      05-05    141  |  107  |  22  ----------------------------<  102<H>  4.1   |  28  |  1.26    Ca    9.0      05 May 2022 07:02  Phos  3.3     05-04  Mg     1.9     05-04                              MEDICATIONS  (STANDING):  aspirin  chewable 81 milliGRAM(s) Oral daily  atorvastatin 20 milliGRAM(s) Oral at bedtime  enoxaparin Injectable 40 milliGRAM(s) SubCutaneous every 24 hours  folic acid 1 milliGRAM(s) Oral daily  metoprolol succinate ER 25 milliGRAM(s) Oral daily  multivitamin 1 Tablet(s) Oral daily  QUEtiapine 50 milliGRAM(s) Oral at bedtime  tamsulosin 0.4 milliGRAM(s) Oral at bedtime  thiamine 100 milliGRAM(s) Oral daily        Radiology:     CT CERVICAL SPINE  ACC: [id number] EXAM: CT BRAIN    PROCEDURE DATE: 05/03/2022        INTERPRETATION: CLINICAL INFORMATION: Trauma. Fall.    TECHNIQUE:  Axial CT images were acquired through the head and cervical spine.  Intravenous contrast: None  Two-dimensional reformats were generated.    COMPARISON STUDY: None    FINDINGS:  CT head:    Diagnostic accuracy is limited secondary to patient motion.    There is no CT evidence of acute intracranial hemorrhage, mass effect, midline shift, or acute territorial infarct. The ventricles and sulci are normal in size and configuration. The basal cisterns are patent.    The visualized paranasal sinuses are clear.  The mastoid air cells and middle ear cavities are grossly clear.    There is no depressed calvarial fracture.      CT cervical spine:    There is preservation of the cervical lordosis.  There is no CT evidence of an acute cervical spine fracture or traumatic malalignment.  There is no suspicious lytic or blastic lesion.  The paraspinous soft tissues are unremarkable within limits of CT scan.  Degenerative changes:  There are multilevel degenerative changes characterized by disc osteophyte complexes and facet and uncinate hypertrophy with resultant mild multilevel central canal and neural foraminal stenosis.    Incidental findings:  Visualized soft tissues of the neck are unremarkable.  Pleural-parenchymal scarring at the lung apices.  IMPRESSION:    CT BRAIN:    Motion limited study. No evidence of acute intracranial hemorrhage, midline shift or CT evidence of acute territorial infarct.    If the patient's symptoms persist, consider short interval follow-up head CT or brain MRI if there are no MRI contraindications.      CT CERVICAL SPINE:    No acute cervical fracture or traumatic malalignment.    MRI would be required to evaluate the ligamentous structures at higher sensitivity as well as for better evaluation of the cervical canal and its contents.  ACC: [id number] EXAM: XR LS SPINE AP LAT 2-3 VIEWS  ACC: [id number] EXAM: XR HIPS BI WITH PELV 2V    PROCEDURE DATE: 05/03/2022    INTERPRETATION: Bilateral hips and pelvis and lumbar spine. Concern is fracture.    Lumbar spine. AP and lateral views. 3 images. There is a mild right upper and a mild left lower lumbar curve slightly increased from November 22, 2011.    There is moderate spondylitic change starting in the mid level proceeding inferiorly. This is somewhat increased from prior.    No bone destruction or fracture. Arterial calcification.    Bilateral hips and pelvis. 5 views.    There is a vascular stent seen in each upper thigh.    There is mild hip degeneration in the laterally greater in the left hip.    No bone destruction or fracture.    IMPRESSION: Increasing mild to and fro lumbar curve an increasing lumbar spondylitic change.    Rather mild degeneration in the outer acetabular corners left greater than right. No fracture.    Vascular stents in each thigh.    XR KNEE 3 VIEWS RT    PROCEDURE DATE: 05/02/2022    INTERPRETATION: Clinical history: 76-year-old male, pain.    Three views of the right knee without comparison demonstrates moderate to severe OA in the medial compartment with marked joint space narrowing. Mild OA in the lateral and patellofemoral compartments.    No suprapatellar effusion, fracture or dislocation.    IMPRESSION:  OA, moderate to severe in the medial compartment with no acute radiographic findings. MRI can be ordered as clinically indicated

## 2022-05-06 LAB
-  AMIKACIN: SIGNIFICANT CHANGE UP
-  AMOXICILLIN/CLAVULANIC ACID: SIGNIFICANT CHANGE UP
-  AMPICILLIN/SULBACTAM: SIGNIFICANT CHANGE UP
-  AMPICILLIN: SIGNIFICANT CHANGE UP
-  AZTREONAM: SIGNIFICANT CHANGE UP
-  CEFAZOLIN: SIGNIFICANT CHANGE UP
-  CEFEPIME: SIGNIFICANT CHANGE UP
-  CEFOXITIN: SIGNIFICANT CHANGE UP
-  CEFTRIAXONE: SIGNIFICANT CHANGE UP
-  CIPROFLOXACIN: SIGNIFICANT CHANGE UP
-  ERTAPENEM: SIGNIFICANT CHANGE UP
-  GENTAMICIN: SIGNIFICANT CHANGE UP
-  IMIPENEM: SIGNIFICANT CHANGE UP
-  LEVOFLOXACIN: SIGNIFICANT CHANGE UP
-  MEROPENEM: SIGNIFICANT CHANGE UP
-  NITROFURANTOIN: SIGNIFICANT CHANGE UP
-  PIPERACILLIN/TAZOBACTAM: SIGNIFICANT CHANGE UP
-  TIGECYCLINE: SIGNIFICANT CHANGE UP
-  TOBRAMYCIN: SIGNIFICANT CHANGE UP
-  TRIMETHOPRIM/SULFAMETHOXAZOLE: SIGNIFICANT CHANGE UP
ANION GAP SERPL CALC-SCNC: 4 MMOL/L — LOW (ref 5–17)
BUN SERPL-MCNC: 21 MG/DL — SIGNIFICANT CHANGE UP (ref 7–23)
CALCIUM SERPL-MCNC: 9.2 MG/DL — SIGNIFICANT CHANGE UP (ref 8.5–10.1)
CHLORIDE SERPL-SCNC: 107 MMOL/L — SIGNIFICANT CHANGE UP (ref 96–108)
CO2 SERPL-SCNC: 30 MMOL/L — SIGNIFICANT CHANGE UP (ref 22–31)
CREAT SERPL-MCNC: 1.13 MG/DL — SIGNIFICANT CHANGE UP (ref 0.5–1.3)
CULTURE RESULTS: SIGNIFICANT CHANGE UP
EGFR: 68 ML/MIN/1.73M2 — SIGNIFICANT CHANGE UP
GLUCOSE SERPL-MCNC: 100 MG/DL — HIGH (ref 70–99)
METHOD TYPE: SIGNIFICANT CHANGE UP
ORGANISM # SPEC MICROSCOPIC CNT: SIGNIFICANT CHANGE UP
ORGANISM # SPEC MICROSCOPIC CNT: SIGNIFICANT CHANGE UP
POTASSIUM SERPL-MCNC: 4.5 MMOL/L — SIGNIFICANT CHANGE UP (ref 3.5–5.3)
POTASSIUM SERPL-SCNC: 4.5 MMOL/L — SIGNIFICANT CHANGE UP (ref 3.5–5.3)
SODIUM SERPL-SCNC: 141 MMOL/L — SIGNIFICANT CHANGE UP (ref 135–145)
SPECIMEN SOURCE: SIGNIFICANT CHANGE UP

## 2022-05-06 PROCEDURE — 99232 SBSQ HOSP IP/OBS MODERATE 35: CPT

## 2022-05-06 RX ORDER — CEFUROXIME AXETIL 250 MG
250 TABLET ORAL EVERY 12 HOURS
Refills: 0 | Status: DISCONTINUED | OUTPATIENT
Start: 2022-05-07 | End: 2022-05-09

## 2022-05-06 RX ADMIN — QUETIAPINE FUMARATE 50 MILLIGRAM(S): 200 TABLET, FILM COATED ORAL at 22:10

## 2022-05-06 RX ADMIN — TAMSULOSIN HYDROCHLORIDE 0.4 MILLIGRAM(S): 0.4 CAPSULE ORAL at 22:10

## 2022-05-06 RX ADMIN — Medication 81 MILLIGRAM(S): at 10:23

## 2022-05-06 RX ADMIN — CEFTRIAXONE 100 MILLIGRAM(S): 500 INJECTION, POWDER, FOR SOLUTION INTRAMUSCULAR; INTRAVENOUS at 13:08

## 2022-05-06 RX ADMIN — ENOXAPARIN SODIUM 40 MILLIGRAM(S): 100 INJECTION SUBCUTANEOUS at 10:24

## 2022-05-06 RX ADMIN — Medication 1 MILLIGRAM(S): at 10:23

## 2022-05-06 RX ADMIN — Medication 1 TABLET(S): at 10:23

## 2022-05-06 RX ADMIN — Medication 25 MILLIGRAM(S): at 10:23

## 2022-05-06 RX ADMIN — ATORVASTATIN CALCIUM 20 MILLIGRAM(S): 80 TABLET, FILM COATED ORAL at 22:09

## 2022-05-06 RX ADMIN — Medication 100 MILLIGRAM(S): at 10:23

## 2022-05-06 RX ADMIN — Medication 3 MILLIGRAM(S): at 22:10

## 2022-05-06 NOTE — PROGRESS NOTE ADULT - ASSESSMENT
75 y/o M with PMH CAD s/p CABG, A fib not on AC d/t concerns with medication non-compliance and agitation, BPH, alcohol abuse, paranoia/hallucinations recently d/c in 2/2022 for fluctuating encephalopathy with behavioral/mood disorder and diagnosed with neurocognitive decline with delirium and ruled out for Wernicke's encephalopathy now presents with similar features.  Pt extremely tangential during exam and states " you're the doctor, figure out why I am here!" Believes the hospital is his primary residence and needs constant redirection during exam. States he has been falling alot due to pain in his knees and legs. Walks with a cane however still needs to walk close to sturdy objects to mitigate his falls risk. States  he is a drinker but will not quantify how much he drinks and states " not alot."     Neurocognitive decline  #Likely superimposed with metabolic encephalopathy  #ETOH abuse  #UTI  #FUNMILAYO  - admitted to medicine   - CTH/C scpine negative  - TSH-.53, RPR-neg ,, folate-9.9  - Will resume patients trazodone, seroquel as per psych reccs in Feb   - c/w IV ceftriaxone  - f/u urine cx   - F/u PT consult, may need home care vs placement is pt having significant falls and lives alone  - Pt walked hallway with PT assistance   - X-ray knee and pelvis neg for fracture  - Monitor for s/s of withdrawal. CIWA with ativan prn  - MVI, IV thiamine for 3 days, folate   - Resume all home non nephrotoxic meds  - normal saline 50ccc/hr      DVT px: HSQ  Fall risk       pt presenting with AMS and was likely metabolic encephalopathy in setting of UTI.  continue ceftriaxone.  - continue CIWA protocol - continue mvi/folate/thiamine .

## 2022-05-06 NOTE — PROGRESS NOTE ADULT - SUBJECTIVE AND OBJECTIVE BOX
75 y/o M with PMH CAD s/p CABG, A fib not on AC d/t concerns with medication non-compliance and agitation, BPH, alcohol abuse, paranoia/hallucinations recently d/c in 2/2022 for fluctuating encephalopathy with behavioral/mood disorder and diagnosed with neurocognitive decline with delirium and ruled out for Wernicke's encephalopathy now presents with similar features.  Pt extremely tangential during exam and states " you're the doctor, figure out why I am here!"   Believes the hospital is his primary residence and needs constant redirection during exam. States he has been falling alot due to pain in his knees and legs. Walks with a cane however still needs to walk close to sturdy objects to mitigate his falls risk. States  he is a drinker but will not quantify how much he drinks and states " not alot."     5/6: Pt found resting comfortably in bed. Able to answer questions. AxOx3. Denies any fever, chills, dysuria, hematuria, or other complaints.     ROS:   All 10 systems reviewed and found to be negative with the exception of what has been described above.    PHYSICAL EXAM:  Constitutional: NAD  HEENT: Atraumatic, RODOLFO, Normal, No congestion  Respiratory: Breath Sounds normal, no rhonchi/wheeze  Cardiovascular: N S1S2;   Gastrointestinal: Abdomen soft, non tender, Bowel Ssounds present  Extremities: No edema, peripheral pulses present  Neurological: awake, alert follows 1 step commands  no gross focal motor deficits  Skin: Non cellulitic, no rash, ulcers    CT CERVICAL SPINE  ACC: [id number] EXAM: CT BRAIN    PROCEDURE DATE: 05/03/2022        INTERPRETATION: CLINICAL INFORMATION: Trauma. Fall.    TECHNIQUE:  Axial CT images were acquired through the head and cervical spine.  Intravenous contrast: None  Two-dimensional reformats were generated.  COMPARISON STUDY: None    FINDINGS:  CT head:    Diagnostic accuracy is limited secondary to patient motion.    There is no CT evidence of acute intracranial hemorrhage, mass effect, midline shift, or acute territorial infarct. The ventricles and sulci are normal in size and configuration. The basal cisterns are patent.    The visualized paranasal sinuses are clear.  The mastoid air cells and middle ear cavities are grossly clear.    There is no depressed calvarial fracture.    CT cervical spine:    There is preservation of the cervical lordosis.  There is no CT evidence of an acute cervical spine fracture or traumatic malalignment.  There is no suspicious lytic or blastic lesion.  The paraspinous soft tissues are unremarkable within limits of CT scan.  Degenerative changes:  There are multilevel degenerative changes characterized by disc osteophyte complexes and facet and uncinate hypertrophy with resultant mild multilevel central canal and neural foraminal stenosis.    Incidental findings:  Visualized soft tissues of the neck are unremarkable.  Pleural-parenchymal scarring at the lung apices.  IMPRESSION:    CT BRAIN:    Motion limited study. No evidence of acute intracranial hemorrhage, midline shift or CT evidence of acute territorial infarct.    If the patient's symptoms persist, consider short interval follow-up head CT or brain MRI if there are no MRI contraindications.  CT CERVICAL SPINE:    No acute cervical fracture or traumatic malalignment.    MRI would be required to evaluate the ligamentous structures at higher sensitivity as well as for better evaluation of the cervical canal and its contents.  ACC: [id number] EXAM: XR LS SPINE AP LAT 2-3 VIEWS  ACC: [id number] EXAM: XR HIPS BI WITH PELV 2V    PROCEDURE DATE: 05/03/2022    INTERPRETATION: Bilateral hips and pelvis and lumbar spine. Concern is fracture.    Lumbar spine. AP and lateral views. 3 images. There is a mild right upper and a mild left lower lumbar curve slightly increased from November 22, 2011.    There is moderate spondylitic change starting in the mid level proceeding inferiorly. This is somewhat increased from prior.    No bone destruction or fracture. Arterial calcification.    Bilateral hips and pelvis. 5 views.    There is a vascular stent seen in each upper thigh.    There is mild hip degeneration in the laterally greater in the left hip.    No bone destruction or fracture.    IMPRESSION: Increasing mild to and fro lumbar curve an increasing lumbar spondylitic change.    Rather mild degeneration in the outer acetabular corners left greater than right. No fracture.    Vascular stents in each thigh.    XR KNEE 3 VIEWS RT    PROCEDURE DATE: 05/02/2022  INTERPRETATION: Clinical history: 76-year-old male, pain.    Three views of the right knee without comparison demonstrates moderate to severe OA in the medial compartment with marked joint space narrowing. Mild OA in the lateral and patellofemoral compartments.    No suprapatellar effusion, fracture or dislocation.    IMPRESSION:  OA, moderate to severe in the medial compartment with no acute radiographic findings. MRI can be ordered as clinically indicated

## 2022-05-07 PROCEDURE — 71045 X-RAY EXAM CHEST 1 VIEW: CPT | Mod: 26

## 2022-05-07 PROCEDURE — 99232 SBSQ HOSP IP/OBS MODERATE 35: CPT

## 2022-05-07 RX ADMIN — Medication 2 MILLIGRAM(S): at 12:55

## 2022-05-07 RX ADMIN — ENOXAPARIN SODIUM 40 MILLIGRAM(S): 100 INJECTION SUBCUTANEOUS at 10:35

## 2022-05-07 RX ADMIN — ATORVASTATIN CALCIUM 20 MILLIGRAM(S): 80 TABLET, FILM COATED ORAL at 22:50

## 2022-05-07 RX ADMIN — Medication 2 MILLIGRAM(S): at 04:53

## 2022-05-07 RX ADMIN — Medication 81 MILLIGRAM(S): at 10:33

## 2022-05-07 RX ADMIN — TAMSULOSIN HYDROCHLORIDE 0.4 MILLIGRAM(S): 0.4 CAPSULE ORAL at 22:49

## 2022-05-07 RX ADMIN — Medication 100 MILLIGRAM(S): at 10:33

## 2022-05-07 RX ADMIN — Medication 1 MILLIGRAM(S): at 10:34

## 2022-05-07 RX ADMIN — Medication 25 MILLIGRAM(S): at 10:34

## 2022-05-07 RX ADMIN — Medication 250 MILLIGRAM(S): at 22:50

## 2022-05-07 RX ADMIN — Medication 250 MILLIGRAM(S): at 10:34

## 2022-05-07 RX ADMIN — Medication 1 TABLET(S): at 10:33

## 2022-05-07 NOTE — CONSULT NOTE ADULT - SUBJECTIVE AND OBJECTIVE BOX
HPI:    76 y.o.m with PMH CAD s/p CABG, A fib not on AC d/t concerns with medication non-compliance and agitation, BPH, alcohol abuse, paranoia/hallucinations recently d/c in 2/2022 for fluctuating encephalopathy with behavioral/mood disorder and diagnosed with neurocognitive decline with delirium and ruled out for Wernicke's encephalopathy now presents with similar features.  Pt extremely tangential during exam and states " you're the doctor, figure out why I am here!" Believes the hospital is his primary residence and needs constant redirection during exam. States he has been falling alot due to pain in his knees and legs. Walks with a cane however still needs to walk close to sturdy objects to mitigate his falls risk. States  he is a drinker but will not quantify how much he drinks and states " not alot." In ED course: UA ++ started on ceftriaxone. Scr 1.3 baseline 1.01. CTH and C spine negative. Pelvic, and knee xrays are pending. Pt is afebrrile and hemodynamically stable without features of active withdrawal, pat today was lathergic was concern for aspiration, seen for pulmonary eval. pat lying in bed, no respiratory complaint, much awake now was hypoxaemic & lathergic post ativan early on.      PAST MEDICAL & SURGICAL HISTORY:  CAD (coronary artery disease)  Atrial fibrillation, Not on AC  History of alcohol use  BPH (benign prostatic hyperplasia)  Paranoia  History of hallucinations  S/P CABG (coronary artery bypass graft)    FAMILY HISTORY:  FH: asthma (Father)      Social Hx:  Smoker +, alcohol use ++     (03 May 2022 08:30)      PAST MEDICAL & SURGICAL HISTORY:  CAD (coronary artery disease)    Atrial fibrillation  Not on AC    History of alcohol use    BPH (benign prostatic hyperplasia)    Paranoia    History of hallucinations    S/P CABG (coronary artery bypass graft)        Home Medications:  aspirin 81 mg oral tablet, chewable: 1 tab(s) orally once a day (03 May 2022 08:29)  atorvastatin 20 mg oral tablet: 1 tab(s) orally once a day (at bedtime) (03 May 2022 08:29)  metoprolol succinate 25 mg oral tablet, extended release: 1 tab(s) orally once a day (03 May 2022 08:29)  Multiple Vitamins oral tablet: 1 tab(s) orally once a day (03 May 2022 08:29)  QUEtiapine 50 mg oral tablet: 1 tab(s) orally once a day (at bedtime) (03 May 2022 08:29)  tamsulosin 0.4 mg oral capsule: 1 cap(s) orally once a day (at bedtime) (03 May 2022 08:29)      MEDICATIONS  (STANDING):  aspirin  chewable 81 milliGRAM(s) Oral daily  atorvastatin 20 milliGRAM(s) Oral at bedtime  cefuroxime   Tablet 250 milliGRAM(s) Oral every 12 hours  enoxaparin Injectable 40 milliGRAM(s) SubCutaneous every 24 hours  folic acid 1 milliGRAM(s) Oral daily  metoprolol succinate ER 25 milliGRAM(s) Oral daily  multivitamin 1 Tablet(s) Oral daily  QUEtiapine 50 milliGRAM(s) Oral at bedtime  tamsulosin 0.4 milliGRAM(s) Oral at bedtime  thiamine 100 milliGRAM(s) Oral daily    MEDICATIONS  (PRN):  acetaminophen     Tablet .. 650 milliGRAM(s) Oral every 6 hours PRN Temp greater or equal to 38C (100.4F), Mild Pain (1 - 3)  aluminum hydroxide/magnesium hydroxide/simethicone Suspension 30 milliLiter(s) Oral every 4 hours PRN Dyspepsia  LORazepam     Tablet 2 milliGRAM(s) Oral every 2 hours PRN CIWA-Ar score increase by 2 points and a total score of 7 or less  LORazepam     Tablet 2 milliGRAM(s) Oral every 1 hour PRN CIWA-Ar score 8 or greater  melatonin 3 milliGRAM(s) Oral at bedtime PRN Insomnia  ondansetron Injectable 4 milliGRAM(s) IV Push every 8 hours PRN Nausea and/or Vomiting      Allergies    No Known Allergies    Intolerances        SOCIAL HISTORY: Denies tobacco, etoh abuse or illicit drug use    FAMILY HISTORY:  FH: asthma (Father)        Vital Signs Last 24 Hrs  T(C): 36.3 (07 May 2022 09:01), Max: 36.9 (06 May 2022 20:10)  T(F): 97.4 (07 May 2022 09:01), Max: 98.4 (06 May 2022 20:10)  HR: 81 (07 May 2022 10:30) (74 - 85)  BP: 124/69 (07 May 2022 10:30) (124/69 - 163/86)  BP(mean): 96 (07 May 2022 09:01) (96 - 96)  RR: 19 (07 May 2022 09:01) (18 - 19)  SpO2: 94% (07 May 2022 09:01) (85% - 100%)        REVIEW OF SYSTEMS:    CONSTITUTIONAL:  As per HPI.  HEENT:  Eyes:  No diplopia or blurred vision. ENT:  No earache, sore throat or runny nose.  CARDIOVASCULAR:  No pressure, squeezing, tightness, heaviness or aching about the chest, neck, axilla or epigastrium.  RESPIRATORY:  No cough, +shortness of breath, PND or orthopnea.  GASTROINTESTINAL:  No nausea, vomiting or diarrhea.  GENITOURINARY:  No dysuria, frequency or urgency.  MUSCULOSKELETAL:  As per HPI.  SKIN:  No change in skin, hair or nails.  NEUROLOGIC:  No paresthesias, fasciculations, seizures or weakness.  PSYCHIATRIC:  No disorder of thought or mood.  ENDOCRINE:  No heat or cold intolerance, polyuria or polydipsia.  HEMATOLOGICAL:  No easy bruising or bleedings:  .     PHYSICAL EXAMINATION:    GENERAL APPEARANCE:  Pt. is not currently dyspneic, in no distress. Pt. is alert, oriented, and pleasant.  HEENT:  Pupils are normal and react normally. No icterus. Mucous membranes well colored.  NECK:  Supple. No lymphadenopathy. Jugular venous pressure not elevated. Carotids equal.   HEART:   The cardiac impulse has a normal quality. Regular. Normal S1 and S2. There are no murmurs, rubs or gallops noted  CHEST:  Chest is clear to auscultation. Normal respiratory effort.  ABDOMEN:  Soft and nontender.   EXTREMITIES:  There is no cyanosis, clubbing or edema.   SKIN:  No rash or significant lesions are noted.    LABS:    05-06    141  |  107  |  21  ----------------------------<  100<H>  4.5   |  30  |  1.13    Ca    9.2      06 May 2022 06:54      CXR clear                RADIOLOGY & ADDITIONAL STUDIES:

## 2022-05-07 NOTE — PROGRESS NOTE ADULT - SUBJECTIVE AND OBJECTIVE BOX
77 y/o M with PMH CAD s/p CABG, A fib not on AC d/t concerns with medication non-compliance and agitation, BPH, alcohol abuse, paranoia/hallucinations recently d/c in 2/2022 for fluctuating encephalopathy with behavioral/mood disorder and diagnosed with neurocognitive decline with delirium and ruled out for Wernicke's encephalopathy now presents with similar features.  Pt extremely tangential during exam and states " you're the doctor, figure out why I am here!"   Believes the hospital is his primary residence and needs constant redirection during exam. States he has been falling alot due to pain in his knees and legs. Walks with a cane however still needs to walk close to sturdy objects to mitigate his falls risk. States  he is a drinker but will not quantify how much he drinks and states " not alot."     5/6: Pt found resting comfortably in bed. Able to answer questions. awake, follows commands . Denies any fever, chills, dysuria, hematuria, or other complaints.   5/7 pt received Iv ativan last night for agitation, this morning was sedated , pulse ox likely dropped due to sedation, now 100% on RA , lungs clear, pt more awake now, still confused     ROS:   All 10 systems reviewed and found to be negative with the exception of what has been described above.    PHYSICAL EXAM:  Constitutional: NAD  HEENT: Atraumatic, RODOLFO, Normal, No congestion  Respiratory: Breath Sounds normal, no rhonchi/wheeze  Cardiovascular: N S1S2;   Gastrointestinal: Abdomen soft, non tender, Bowel Ssounds present  Extremities: No edema, peripheral pulses present  Neurological: awake, alert follows 1 step commands  no gross focal motor deficits  Skin: Non cellulitic, no rash, ulcers      PHYSICAL EXAM:    Daily     Daily     ICU Vital Signs Last 24 Hrs  T(C): 36.3 (07 May 2022 11:45), Max: 36.9 (06 May 2022 20:10)  T(F): 97.4 (07 May 2022 11:45), Max: 98.4 (06 May 2022 20:10)  HR: 73 (07 May 2022 11:45) (73 - 85)  BP: 138/73 (07 May 2022 11:45) (124/69 - 163/86)  BP(mean): 96 (07 May 2022 09:01) (96 - 96)  ABP: --  ABP(mean): --  RR: 18 (07 May 2022 11:45) (18 - 19)  SpO2: 100% (07 May 2022 11:45) (85% - 100%)                05-06    141  |  107  |  21  ----------------------------<  100<H>  4.5   |  30  |  1.13    Ca    9.2      06 May 2022 06:54                              MEDICATIONS  (STANDING):  aspirin  chewable 81 milliGRAM(s) Oral daily  atorvastatin 20 milliGRAM(s) Oral at bedtime  cefuroxime   Tablet 250 milliGRAM(s) Oral every 12 hours  enoxaparin Injectable 40 milliGRAM(s) SubCutaneous every 24 hours  folic acid 1 milliGRAM(s) Oral daily  metoprolol succinate ER 25 milliGRAM(s) Oral daily  multivitamin 1 Tablet(s) Oral daily  QUEtiapine 50 milliGRAM(s) Oral at bedtime  tamsulosin 0.4 milliGRAM(s) Oral at bedtime  thiamine 100 milliGRAM(s) Oral daily          CT CERVICAL SPINE  ACC: [id number] EXAM: CT BRAIN    PROCEDURE DATE: 05/03/2022        INTERPRETATION: CLINICAL INFORMATION: Trauma. Fall.  TECHNIQUE:  Axial CT images were acquired through the head and cervical spine.  Intravenous contrast: None  Two-dimensional reformats were generated.  COMPARISON STUDY: None    FINDINGS:  CT head:    Diagnostic accuracy is limited secondary to patient motion.    There is no CT evidence of acute intracranial hemorrhage, mass effect, midline shift, or acute territorial infarct. The ventricles and sulci are normal in size and configuration. The basal cisterns are patent.    The visualized paranasal sinuses are clear.  The mastoid air cells and middle ear cavities are grossly clear.    There is no depressed calvarial fracture.    CT cervical spine:  There is preservation of the cervical lordosis.  There is no CT evidence of an acute cervical spine fracture or traumatic malalignment.  There is no suspicious lytic or blastic lesion.  The paraspinous soft tissues are unremarkable within limits of CT scan.  Degenerative changes:  There are multilevel degenerative changes characterized by disc osteophyte complexes and facet and uncinate hypertrophy with resultant mild multilevel central canal and neural foraminal stenosis.    Incidental findings:  Visualized soft tissues of the neck are unremarkable.  Pleural-parenchymal scarring at the lung apices.  IMPRESSION:    CT BRAIN:  Motion limited study. No evidence of acute intracranial hemorrhage, midline shift or CT evidence of acute territorial infarct.    If the patient's symptoms persist, consider short interval follow-up head CT or brain MRI if there are no MRI contraindications.  CT CERVICAL SPINE:  No acute cervical fracture or traumatic malalignment.    MRI would be required to evaluate the ligamentous structures at higher sensitivity as well as for better evaluation of the cervical canal and its contents.  ACC: [id number] EXAM: XR LS SPINE AP LAT 2-3 VIEWS  ACC: [id number] EXAM: XR HIPS BI WITH PELV 2V    PROCEDURE DATE: 05/03/2022    INTERPRETATION: Bilateral hips and pelvis and lumbar spine. Concern is fracture.    Lumbar spine. AP and lateral views. 3 images. There is a mild right upper and a mild left lower lumbar curve slightly increased from November 22, 2011.    There is moderate spondylitic change starting in the mid level proceeding inferiorly. This is somewhat increased from prior.    No bone destruction or fracture. Arterial calcification.    Bilateral hips and pelvis. 5 views.  There is a vascular stent seen in each upper thigh.    There is mild hip degeneration in the laterally greater in the left hip.    No bone destruction or fracture.    IMPRESSION: Increasing mild to and fro lumbar curve an increasing lumbar spondylitic change.    Rather mild degeneration in the outer acetabular corners left greater than right. No fracture.    Vascular stents in each thigh.    XR KNEE 3 VIEWS RT    PROCEDURE DATE: 05/02/2022  INTERPRETATION: Clinical history: 76-year-old male, pain.    Three views of the right knee without comparison demonstrates moderate to severe OA in the medial compartment with marked joint space narrowing. Mild OA in the lateral and patellofemoral compartments.    No suprapatellar effusion, fracture or dislocation.    IMPRESSION:  OA, moderate to severe in the medial compartment with no acute radiographic findings. MRI can be ordered as clinically indicated

## 2022-05-07 NOTE — CONSULT NOTE ADULT - ASSESSMENT
77 y/o M with PMH CAD s/p CABG, A fib not on AC d/t concerns with medication non-compliance and agitation, BPH, alcohol abuse, paranoia/hallucinations recently d/c in 2/2022 for fluctuating encephalopathy with behavioral/mood disorder and diagnosed with neurocognitive decline with delirium and ruled out for Wernicke's encephalopathy now presents with similar features.  Pt extremely tangential during exam and states " you're the doctor, figure out why I am here!" Believes the hospital is his primary residence and needs constant redirection during exam. States he has been falling alot due to pain in his knees and legs. Walks with a cane however still needs to walk close to sturdy objects to mitigate his falls risk. States  he is a drinker but will not quantify how much he drinks and states " not alot."     PROBLEMS:    Aspiratiob syndrome  Neurocognitive decline  Likely superimposed with metabolic encephalopathy-ETOH abuse  UTI  FUNMILAYO    PLAN:    Aspiration precaution  CTH/C scpine negative  po ceftin  F/u PT consult, may need home care vs placement is pt having significant falls and lives alone  Monitor for s/s of withdrawal. CIWA with ativan prn  MVI, IV thiamine for 3 days, folate   DVT px: HSQ

## 2022-05-08 PROCEDURE — 99232 SBSQ HOSP IP/OBS MODERATE 35: CPT

## 2022-05-08 PROCEDURE — 93010 ELECTROCARDIOGRAM REPORT: CPT

## 2022-05-08 RX ORDER — THIAMINE MONONITRATE (VIT B1) 100 MG
500 TABLET ORAL THREE TIMES A DAY
Refills: 0 | Status: COMPLETED | OUTPATIENT
Start: 2022-05-08 | End: 2022-05-10

## 2022-05-08 RX ORDER — SODIUM CHLORIDE 9 MG/ML
1000 INJECTION INTRAMUSCULAR; INTRAVENOUS; SUBCUTANEOUS
Refills: 0 | Status: DISCONTINUED | OUTPATIENT
Start: 2022-05-08 | End: 2022-05-08

## 2022-05-08 RX ADMIN — Medication 25 MILLIGRAM(S): at 10:39

## 2022-05-08 RX ADMIN — Medication 1 TABLET(S): at 10:39

## 2022-05-08 RX ADMIN — QUETIAPINE FUMARATE 50 MILLIGRAM(S): 200 TABLET, FILM COATED ORAL at 22:01

## 2022-05-08 RX ADMIN — Medication 1 MILLIGRAM(S): at 10:39

## 2022-05-08 RX ADMIN — Medication 250 MILLIGRAM(S): at 22:02

## 2022-05-08 RX ADMIN — TAMSULOSIN HYDROCHLORIDE 0.4 MILLIGRAM(S): 0.4 CAPSULE ORAL at 22:01

## 2022-05-08 RX ADMIN — Medication 255 MILLIGRAM(S): at 22:01

## 2022-05-08 RX ADMIN — ATORVASTATIN CALCIUM 20 MILLIGRAM(S): 80 TABLET, FILM COATED ORAL at 22:01

## 2022-05-08 RX ADMIN — Medication 250 MILLIGRAM(S): at 10:39

## 2022-05-08 RX ADMIN — Medication 100 MILLIGRAM(S): at 10:38

## 2022-05-08 RX ADMIN — ENOXAPARIN SODIUM 40 MILLIGRAM(S): 100 INJECTION SUBCUTANEOUS at 10:38

## 2022-05-08 RX ADMIN — Medication 81 MILLIGRAM(S): at 10:39

## 2022-05-08 RX ADMIN — Medication 2 MILLIGRAM(S): at 05:32

## 2022-05-08 NOTE — PROGRESS NOTE ADULT - SUBJECTIVE AND OBJECTIVE BOX
75 y/o M with PMH CAD s/p CABG, A fib not on AC d/t concerns with medication non-compliance and agitation, BPH, alcohol abuse, paranoia/hallucinations recently d/c in 2/2022 for fluctuating encephalopathy with behavioral/mood disorder and diagnosed with neurocognitive decline with delirium and ruled out for Wernicke's encephalopathy now presents with similar features.  Pt extremely tangential during exam and states " you're the doctor, figure out why I am here!"   Believes the hospital is his primary residence and needs constant redirection during exam. States he has been falling alot due to pain in his knees and legs. Walks with a cane however still needs to walk close to sturdy objects to mitigate his falls risk. States  he is a drinker but will not quantify how much he drinks and states " not alot."     5/6: Pt found resting comfortably in bed. Able to answer questions. awake, follows commands . Denies any fever, chills, dysuria, hematuria, or other complaints.   5/7 pt received Iv ativan last night for agitation, this morning was sedated , pulse ox likely dropped due to sedation, now 100% on RA , lungs clear, pt more awake now, still confused  5/8  pt still confused , tries to follow 1 step commands  , pt poor historian    ROS:   All 10 systems reviewed and found to be negative with the exception of what has been described above.    PHYSICAL EXAM:  Constitutional: NAD  HEENT: Atraumatic, RODOLFO, Normal, No congestion  Respiratory: Breath Sounds normal, no rhonchi/wheeze  Cardiovascular: N S1S2;   Gastrointestinal: Abdomen soft, non tender, Bowel Ssounds present  Extremities: No edema, peripheral pulses present  Neurological: awake, alert follows 1 step commands  no gross focal motor deficits  Skin: Non cellulitic, no rash, ulcers      PHYSICAL EXAM:    Daily     Daily     ICU Vital Signs Last 24 Hrs  T(C): 36.6 (08 May 2022 08:51), Max: 36.8 (08 May 2022 04:15)  T(F): 97.9 (08 May 2022 08:51), Max: 98.3 (08 May 2022 04:15)  HR: 78 (08 May 2022 08:51) (68 - 96)  BP: 122/79 (08 May 2022 08:51) (122/79 - 145/80)  BP(mean): 92 (08 May 2022 08:51) (92 - 92)  ABP: --  ABP(mean): --  RR: 19 (08 May 2022 08:51) (16 - 19)  SpO2: 100% (08 May 2022 08:51) (99% - 100%)              05-08    139  |  105  |  28<H>  ----------------------------<  129<H>  4.0   |  29  |  1.19    Ca    9.5      08 May 2022 11:05                              MEDICATIONS  (STANDING):  aspirin  chewable 81 milliGRAM(s) Oral daily  atorvastatin 20 milliGRAM(s) Oral at bedtime  cefuroxime   Tablet 250 milliGRAM(s) Oral every 12 hours  enoxaparin Injectable 40 milliGRAM(s) SubCutaneous every 24 hours  folic acid 1 milliGRAM(s) Oral daily  metoprolol succinate ER 25 milliGRAM(s) Oral daily  multivitamin 1 Tablet(s) Oral daily  QUEtiapine 50 milliGRAM(s) Oral at bedtime  tamsulosin 0.4 milliGRAM(s) Oral at bedtime  thiamine 100 milliGRAM(s) Oral daily          CT CERVICAL SPINE  ACC: [id number] EXAM: CT BRAIN    PROCEDURE DATE: 05/03/2022        INTERPRETATION: CLINICAL INFORMATION: Trauma. Fall.  TECHNIQUE:  Axial CT images were acquired through the head and cervical spine.  Intravenous contrast: None  Two-dimensional reformats were generated.  COMPARISON STUDY: None    FINDINGS:  CT head:    Diagnostic accuracy is limited secondary to patient motion.    There is no CT evidence of acute intracranial hemorrhage, mass effect, midline shift, or acute territorial infarct. The ventricles and sulci are normal in size and configuration. The basal cisterns are patent.    The visualized paranasal sinuses are clear.  The mastoid air cells and middle ear cavities are grossly clear.    There is no depressed calvarial fracture.    CT cervical spine:  There is preservation of the cervical lordosis.  There is no CT evidence of an acute cervical spine fracture or traumatic malalignment.  There is no suspicious lytic or blastic lesion.  The paraspinous soft tissues are unremarkable within limits of CT scan.  Degenerative changes:  There are multilevel degenerative changes characterized by disc osteophyte complexes and facet and uncinate hypertrophy with resultant mild multilevel central canal and neural foraminal stenosis.    Incidental findings:  Visualized soft tissues of the neck are unremarkable.  Pleural-parenchymal scarring at the lung apices.  IMPRESSION:    CT BRAIN:  Motion limited study. No evidence of acute intracranial hemorrhage, midline shift or CT evidence of acute territorial infarct.    If the patient's symptoms persist, consider short interval follow-up head CT or brain MRI if there are no MRI contraindications.  CT CERVICAL SPINE:  No acute cervical fracture or traumatic malalignment.    MRI would be required to evaluate the ligamentous structures at higher sensitivity as well as for better evaluation of the cervical canal and its contents.  ACC: [id number] EXAM: XR LS SPINE AP LAT 2-3 VIEWS  ACC: [id number] EXAM: XR HIPS BI WITH PELV 2V    PROCEDURE DATE: 05/03/2022    INTERPRETATION: Bilateral hips and pelvis and lumbar spine. Concern is fracture.    Lumbar spine. AP and lateral views. 3 images. There is a mild right upper and a mild left lower lumbar curve slightly increased from November 22, 2011.    There is moderate spondylitic change starting in the mid level proceeding inferiorly. This is somewhat increased from prior.    No bone destruction or fracture. Arterial calcification.    Bilateral hips and pelvis. 5 views.  There is a vascular stent seen in each upper thigh.  There is mild hip degeneration in the laterally greater in the left hip.    No bone destruction or fracture.    IMPRESSION: Increasing mild to and fro lumbar curve an increasing lumbar spondylitic change.    Rather mild degeneration in the outer acetabular corners left greater than right. No fracture.    Vascular stents in each thigh.    XR KNEE 3 VIEWS RT    PROCEDURE DATE: 05/02/2022  INTERPRETATION: Clinical history: 76-year-old male, pain.  Three views of the right knee without comparison demonstrates moderate to severe OA in the medial compartment with marked joint space narrowing. Mild OA in the lateral and patellofemoral compartments.    No suprapatellar effusion, fracture or dislocation.    IMPRESSION:  OA, moderate to severe in the medial compartment with no acute radiographic findings. MRI can be ordered as clinically indicated

## 2022-05-08 NOTE — PROVIDER CONTACT NOTE (OTHER) - BACKGROUND
Pt with history of hallucinations, paranoia, ETOH abuse, admitted for neurocognitive decline with delirium.  Treated for UTI this admission, currently on CIWA protocol. Periods of agitation

## 2022-05-08 NOTE — PROGRESS NOTE ADULT - ASSESSMENT
75 y/o M with PMH CAD s/p CABG, A fib not on AC d/t concerns with medication non-compliance and agitation, BPH, alcohol abuse, paranoia/hallucinations recently d/c in 2/2022 for fluctuating encephalopathy with behavioral/mood disorder and diagnosed with neurocognitive decline with delirium and ruled out for Wernicke's encephalopathy now presents with similar features.  Pt extremely tangential during exam and states " you're the doctor, figure out why I am here!" Believes the hospital is his primary residence and needs constant redirection during exam. States he has been falling alot due to pain in his knees and legs. Walks with a cane however still needs to walk close to sturdy objects to mitigate his falls risk. States  he is a drinker but will not quantify how much he drinks and states " not alot."     Neurocognitive decline  #Likely superimposed with metabolic encephalopathy vs ? wernickes  #ETOH abuse hx   #UTI ecoli   #FUNMILAYO resolved   - admitted to medicine   - CTH/C scpine negative  - TSH-.53, RPR-neg ,, folate-9.9  - Will resume patients trazodone, seroquel as per psych reccs in Feb   - c/w IV ceftriaxone  - f/u urine cx   - F/u PT consult, may need home care vs placement is pt having significant falls and lives alone  - Pt walked hallway with PT assistance   - X-ray knee and pelvis neg for fracture  - Monitor for s/s of withdrawal. CIWA with ativan prn  - MVI, IV thiamine for 3 days, folate   will check EEG , unable to get MRI brain as unclear if pt has any contraindications, CT head negative   TSH and B12 wnl  neuro consult r/o wernickes encephalopathy vs ? seizures vs  etoh related dementia   - Resume all home non nephrotoxic meds  s/p IVF       DVT px: HSQ  Fall risk       pt presenting with AMS and was likely metabolic encephalopathy in setting of UTI.  continue ceftriaxone.  - continue CIWA protocol - continue mvi/folate/thiamine .
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
## 2022-05-08 NOTE — PROGRESS NOTE ADULT - SUBJECTIVE AND OBJECTIVE BOX
Subjective:    pat better, sitting in bed, no new complaint.    Home Medications:  aspirin 81 mg oral tablet, chewable: 1 tab(s) orally once a day (03 May 2022 08:29)  atorvastatin 20 mg oral tablet: 1 tab(s) orally once a day (at bedtime) (03 May 2022 08:29)  metoprolol succinate 25 mg oral tablet, extended release: 1 tab(s) orally once a day (03 May 2022 08:29)  Multiple Vitamins oral tablet: 1 tab(s) orally once a day (03 May 2022 08:29)  QUEtiapine 50 mg oral tablet: 1 tab(s) orally once a day (at bedtime) (03 May 2022 08:29)  tamsulosin 0.4 mg oral capsule: 1 cap(s) orally once a day (at bedtime) (03 May 2022 08:29)    MEDICATIONS  (STANDING):  aspirin  chewable 81 milliGRAM(s) Oral daily  atorvastatin 20 milliGRAM(s) Oral at bedtime  cefuroxime   Tablet 250 milliGRAM(s) Oral every 12 hours  enoxaparin Injectable 40 milliGRAM(s) SubCutaneous every 24 hours  folic acid 1 milliGRAM(s) Oral daily  metoprolol succinate ER 25 milliGRAM(s) Oral daily  multivitamin 1 Tablet(s) Oral daily  QUEtiapine 50 milliGRAM(s) Oral at bedtime  tamsulosin 0.4 milliGRAM(s) Oral at bedtime  thiamine 100 milliGRAM(s) Oral daily    MEDICATIONS  (PRN):  acetaminophen     Tablet .. 650 milliGRAM(s) Oral every 6 hours PRN Temp greater or equal to 38C (100.4F), Mild Pain (1 - 3)  aluminum hydroxide/magnesium hydroxide/simethicone Suspension 30 milliLiter(s) Oral every 4 hours PRN Dyspepsia  LORazepam     Tablet 2 milliGRAM(s) Oral every 2 hours PRN CIWA-Ar score increase by 2 points and a total score of 7 or less  LORazepam     Tablet 2 milliGRAM(s) Oral every 1 hour PRN CIWA-Ar score 8 or greater  melatonin 3 milliGRAM(s) Oral at bedtime PRN Insomnia  ondansetron Injectable 4 milliGRAM(s) IV Push every 8 hours PRN Nausea and/or Vomiting      Allergies    No Known Allergies    Intolerances        Vital Signs Last 24 Hrs  T(C): 36.6 (08 May 2022 08:51), Max: 36.8 (08 May 2022 04:15)  T(F): 97.9 (08 May 2022 08:51), Max: 98.3 (08 May 2022 04:15)  HR: 78 (08 May 2022 08:51) (68 - 96)  BP: 122/79 (08 May 2022 08:51) (122/79 - 145/80)  BP(mean): 92 (08 May 2022 08:51) (92 - 92)  RR: 19 (08 May 2022 08:51) (16 - 19)  SpO2: 100% (08 May 2022 08:51) (99% - 100%)      PHYSICAL EXAMINATION:    NECK:  Supple. No lymphadenopathy. Jugular venous pressure not elevated. Carotids equal.   HEART:   The cardiac impulse has a normal quality. Reg., Nl S1 and S2.  There are no murmurs, rubs or gallops noted  CHEST:  Chest crackles to auscultation. Normal respiratory effort.  ABDOMEN:  Soft and nontender.   EXTREMITIES:  There is no edema.       LABS:

## 2022-05-08 NOTE — PROGRESS NOTE ADULT - ASSESSMENT
77 y/o M with PMH CAD s/p CABG, A fib not on AC d/t concerns with medication non-compliance and agitation, BPH, alcohol abuse, paranoia/hallucinations recently d/c in 2/2022 for fluctuating encephalopathy with behavioral/mood disorder and diagnosed with neurocognitive decline with delirium and ruled out for Wernicke's encephalopathy now presents with similar features.  Pt extremely tangential during exam and states " you're the doctor, figure out why I am here!" Believes the hospital is his primary residence and needs constant redirection during exam. States he has been falling alot due to pain in his knees and legs. Walks with a cane however still needs to walk close to sturdy objects to mitigate his falls risk. States  he is a drinker but will not quantify how much he drinks and states " not alot."     PROBLEMS:    Aspiratiob syndrome  Neurocognitive decline  Likely superimposed with metabolic encephalopathy-ETOH abuse  UTI  FUNMILAYO    PLAN:    pulmonary stable  Aspiration precaution  CTH/C scpine negative  po ceftin  F/u PT consult, may need home care vs placement is pt having significant falls and lives alone  Monitor for s/s of withdrawal. CIWA with ativan prn  MVI, IV thiamine for 3 days, folate   DVT px: HSQ

## 2022-05-09 LAB
AMMONIA BLD-MCNC: <10 UMOL/L — LOW (ref 11–32)
ANION GAP SERPL CALC-SCNC: 5 MMOL/L — SIGNIFICANT CHANGE UP (ref 5–17)
BUN SERPL-MCNC: 33 MG/DL — HIGH (ref 7–23)
CALCIUM SERPL-MCNC: 9.4 MG/DL — SIGNIFICANT CHANGE UP (ref 8.5–10.1)
CHLORIDE SERPL-SCNC: 104 MMOL/L — SIGNIFICANT CHANGE UP (ref 96–108)
CO2 SERPL-SCNC: 31 MMOL/L — SIGNIFICANT CHANGE UP (ref 22–31)
CREAT SERPL-MCNC: 1.42 MG/DL — HIGH (ref 0.5–1.3)
EGFR: 52 ML/MIN/1.73M2 — LOW
GLUCOSE SERPL-MCNC: 127 MG/DL — HIGH (ref 70–99)
POTASSIUM SERPL-MCNC: 4.2 MMOL/L — SIGNIFICANT CHANGE UP (ref 3.5–5.3)
POTASSIUM SERPL-SCNC: 4.2 MMOL/L — SIGNIFICANT CHANGE UP (ref 3.5–5.3)
SARS-COV-2 RNA SPEC QL NAA+PROBE: SIGNIFICANT CHANGE UP
SODIUM SERPL-SCNC: 140 MMOL/L — SIGNIFICANT CHANGE UP (ref 135–145)

## 2022-05-09 PROCEDURE — 99232 SBSQ HOSP IP/OBS MODERATE 35: CPT

## 2022-05-09 PROCEDURE — 90792 PSYCH DIAG EVAL W/MED SRVCS: CPT

## 2022-05-09 PROCEDURE — 99222 1ST HOSP IP/OBS MODERATE 55: CPT

## 2022-05-09 RX ORDER — SODIUM CHLORIDE 9 MG/ML
1000 INJECTION, SOLUTION INTRAVENOUS
Refills: 0 | Status: DISCONTINUED | OUTPATIENT
Start: 2022-05-09 | End: 2022-05-10

## 2022-05-09 RX ORDER — CEFUROXIME AXETIL 250 MG
250 TABLET ORAL EVERY 12 HOURS
Refills: 0 | Status: COMPLETED | OUTPATIENT
Start: 2022-05-09 | End: 2022-05-10

## 2022-05-09 RX ADMIN — SODIUM CHLORIDE 60 MILLILITER(S): 9 INJECTION, SOLUTION INTRAVENOUS at 21:39

## 2022-05-09 RX ADMIN — Medication 250 MILLIGRAM(S): at 11:47

## 2022-05-09 RX ADMIN — Medication 1 MILLIGRAM(S): at 11:46

## 2022-05-09 RX ADMIN — Medication 255 MILLIGRAM(S): at 06:05

## 2022-05-09 RX ADMIN — Medication 1 TABLET(S): at 11:45

## 2022-05-09 RX ADMIN — TAMSULOSIN HYDROCHLORIDE 0.4 MILLIGRAM(S): 0.4 CAPSULE ORAL at 21:26

## 2022-05-09 RX ADMIN — Medication 81 MILLIGRAM(S): at 11:44

## 2022-05-09 RX ADMIN — Medication 3 MILLIGRAM(S): at 21:38

## 2022-05-09 RX ADMIN — Medication 255 MILLIGRAM(S): at 21:38

## 2022-05-09 RX ADMIN — Medication 25 MILLIGRAM(S): at 11:45

## 2022-05-09 RX ADMIN — Medication 255 MILLIGRAM(S): at 13:00

## 2022-05-09 RX ADMIN — QUETIAPINE FUMARATE 50 MILLIGRAM(S): 200 TABLET, FILM COATED ORAL at 21:25

## 2022-05-09 RX ADMIN — ATORVASTATIN CALCIUM 20 MILLIGRAM(S): 80 TABLET, FILM COATED ORAL at 21:38

## 2022-05-09 RX ADMIN — ENOXAPARIN SODIUM 40 MILLIGRAM(S): 100 INJECTION SUBCUTANEOUS at 11:46

## 2022-05-09 NOTE — PROGRESS NOTE ADULT - ASSESSMENT
77 y/o M with PMH CAD s/p CABG, A fib not on AC d/t concerns with medication non-compliance and agitation, BPH, alcohol abuse, paranoia/hallucinations recently d/c in 2/2022 for fluctuating encephalopathy with behavioral/mood disorder and diagnosed with neurocognitive decline with delirium and ruled out for Wernicke's encephalopathy now presents with similar features.  Pt extremely tangential during exam and states " you're the doctor, figure out why I am here!" Believes the hospital is his primary residence and needs constant redirection during exam. States he has been falling alot due to pain in his knees and legs. Walks with a cane however still needs to walk close to sturdy objects to mitigate his falls risk. States  he is a drinker but will not quantify how much he drinks and states " not alot."     Neurocognitive decline  #Likely superimposed with metabolic encephalopathy  secondary to UTI vs ? wernickes  #ETOH abuse hx - without acute etoh withdrawal/No signs of DT  #UTI ecoli pansensitive     - admitted to medicine   - CTH/C scpine negative  - TSH-.53, RPR-neg ,, folate-9.9  Psych eval appreciated continue quetiapine   s/p 5 days of ceftriaxone , now on ceftin for 2 days then stop abx, course ends 5/10 for ecoli UTI  would need placement at time of  discharge   - Pt walked hallway with PT assistance   - X-ray knee and pelvis neg for fracture  Stop CIWA prn protocol on 5/10   - MVI, started  IV thiamine for 3 days, folate   will check EEG , unable to get MRI brain as unclear if pt has any contraindications, CT head negative ,  NO health care proxy-unable to indentify family  TSH and B12 wnl  neuro consult r/o wernickes encephalopathy vs ? seizures vs  etoh related dementia,       #hx prior afib not on AC  this admission on EKG in sinusrhythm       FUNMILAYO prerenal azotemia due to decreased po intake past few days  started IVF   check BMP in am     DVT prophylaxis   lovenox SC    dispo- awaiting EEG, completion of thiamine IV for 3 days , unable to do  MRI brain, when mentation further improves discharge planning, would need placement

## 2022-05-09 NOTE — BEHAVIORAL HEALTH ASSESSMENT NOTE - ORIENTATION OTHER
Partially oriented in time and place: February 9th, Wednesday, 2022" [facility] in [place]"

## 2022-05-09 NOTE — DIETITIAN INITIAL EVALUATION ADULT - ADD RECOMMEND
1) liberalize diet to regular and encourage additional po supplements in between meals (ensure enlive and gelatein jello) 2) Maintain aspiration precautions, back of bed >35 degrees. 3) Monitor BM if none x > 3 days initiate bowel meds 4) monitor lytes and hydration replete prn 5) GOC- address nutrition support.

## 2022-05-09 NOTE — DIETITIAN INITIAL EVALUATION ADULT - PERTINENT MEDS FT
MEDICATIONS  (STANDING):  aspirin  chewable 81 milliGRAM(s) Oral daily  atorvastatin 20 milliGRAM(s) Oral at bedtime  cefuroxime   Tablet 250 milliGRAM(s) Oral every 12 hours  enoxaparin Injectable 40 milliGRAM(s) SubCutaneous every 24 hours  folic acid 1 milliGRAM(s) Oral daily  metoprolol succinate ER 25 milliGRAM(s) Oral daily  multivitamin 1 Tablet(s) Oral daily  QUEtiapine 50 milliGRAM(s) Oral at bedtime  tamsulosin 0.4 milliGRAM(s) Oral at bedtime  thiamine IVPB 500 milliGRAM(s) IV Intermittent three times a day    MEDICATIONS  (PRN):  acetaminophen     Tablet .. 650 milliGRAM(s) Oral every 6 hours PRN Temp greater or equal to 38C (100.4F), Mild Pain (1 - 3)  aluminum hydroxide/magnesium hydroxide/simethicone Suspension 30 milliLiter(s) Oral every 4 hours PRN Dyspepsia  LORazepam     Tablet 2 milliGRAM(s) Oral every 2 hours PRN CIWA-Ar score increase by 2 points and a total score of 7 or less  LORazepam     Tablet 2 milliGRAM(s) Oral every 1 hour PRN CIWA-Ar score 8 or greater  melatonin 3 milliGRAM(s) Oral at bedtime PRN Insomnia  ondansetron Injectable 4 milliGRAM(s) IV Push every 8 hours PRN Nausea and/or Vomiting

## 2022-05-09 NOTE — DIETITIAN INITIAL EVALUATION ADULT - PERTINENT LABORATORY DATA
05-09    140  |  104  |  33<H>  ----------------------------<  127<H>  4.2   |  31  |  1.42<H>    Ca    9.4      09 May 2022 06:44    A1C with Estimated Average Glucose Result: 5.3 % (02-10-22 @ 08:50)

## 2022-05-09 NOTE — DIETITIAN INITIAL EVALUATION ADULT - OTHER INFO
77 y/o M with PMH CAD s/p CABG, A fib not on AC d/t concerns with medication non-compliance and agitation, BPH, alcohol abuse, paranoia/hallucinations recently d/c in 2/2022 for fluctuating encephalopathy with behavioral/mood disorder and diagnosed with neurocognitive decline with delirium and ruled out for Wernicke's encephalopathy now presents with similar features. Thiamine supplemented 500mg Q 8, MVI w/minerals, and Folic acid. Pt continues with confusion. Observed during lunch consuming ~50% of meal at the time. Pending Neuro consult. Pt with poor appetite since admission x 6 days. NFPE- severe muscle/fat loss. Last hospital weight 2 1/2 months ago 180# current weight 199# suspect inaccurate (unable to weight during assessment sitting in chair). Pt appears thin and malnourished. Due to poor nutritional status suggest liberalizing diet to regular (D/C DASH/TLC diet). Will continue to monitor and follow up prn. See below for recommendations. Criteria met for severe malnutrition.

## 2022-05-09 NOTE — BEHAVIORAL HEALTH ASSESSMENT NOTE - NSBHCHARTREVIEWINVESTIGATE_PSY_A_CORE FT
Ventricular Rate 81 BPM    Atrial Rate 81 BPM    P-R Interval 222 ms    QRS Duration 96 ms    Q-T Interval 414 ms    QTC Calculation(Bazett) 480 ms    P Axis 73 degrees    R Axis 70 degrees    T Axis 101 degrees    Diagnosis Line Sinus rhythm with 1st degree A-V block  Anteroseptal infarct (cited on or before 09-SEP-2012)  Abnormal ECG
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
## 2022-05-09 NOTE — BEHAVIORAL HEALTH ASSESSMENT NOTE - NSBHCHARTREVIEWVS_PSY_A_CORE FT
Vital Signs Last 24 Hrs  T(C): 36.4 (09 May 2022 07:11), Max: 36.7 (09 May 2022 06:11)  T(F): 97.6 (09 May 2022 07:11), Max: 98.1 (09 May 2022 06:11)  HR: 88 (09 May 2022 07:11) (71 - 94)  BP: 112/68 (09 May 2022 07:11) (112/68 - 141/72)  BP(mean): --  RR: 18 (09 May 2022 07:11) (16 - 18)  SpO2: 100% (09 May 2022 07:11) (97% - 100%)

## 2022-05-09 NOTE — BEHAVIORAL HEALTH ASSESSMENT NOTE - RISK ASSESSMENT
Low Acute Suicide Risk Low acute risk: no SI, no HI, not agitated, denies anxiety, no depression.   Low long term risk: no psychosis , no hx fo SI or SA but EtOH abuse.   Protective factors: no  psych hx.

## 2022-05-09 NOTE — BEHAVIORAL HEALTH ASSESSMENT NOTE - NSBHCHARTREVIEWLAB_PSY_A_CORE FT
05-09    140  |  104  |  33<H>  ----------------------------<  127<H>  4.2   |  31  |  1.42<H>    Ca    9.4      09 May 2022 06:44

## 2022-05-09 NOTE — BEHAVIORAL HEALTH ASSESSMENT NOTE - NSBHCHARTREVIEWIMAGING_PSY_A_CORE FT
INTERPRETATION:  CLINICAL INFORMATION: Trauma. Fall.    TECHNIQUE:  Axial CT images were acquired through the head and cervical spine.  Intravenous contrast: None  Two-dimensional reformats were generated.    COMPARISON STUDY: None    FINDINGS:  CT head:    Diagnostic accuracy is limited secondary to patient motion.    There is no CT evidence of acute intracranial hemorrhage, mass effect,   midline shift, or acute territorial infarct.    The ventricles and sulci   are normal in size and configuration. The basal cisterns are patent.    The visualized paranasal sinuses are clear.    The mastoid air cells and middle ear cavities are grossly clear.    There is no depressed calvarial fracture.

## 2022-05-09 NOTE — DIETITIAN NUTRITION RISK NOTIFICATION - ADDITIONAL COMMENTS/DIETITIAN RECOMMENDATIONS
Additional Recommendations  1) liberalize diet to regular and encourage additional po supplements in between meals (ensure enlive and gelatein jello) 2) Maintain aspiration precautions, back of bed >35 degrees. 3) Monitor BM if none x > 3 days initiate bowel meds 4) monitor lytes and hydration replete prn 5) GOC- address nutrition support.

## 2022-05-09 NOTE — PROGRESS NOTE ADULT - SUBJECTIVE AND OBJECTIVE BOX
Subjective:    Home Medications:  aspirin 81 mg oral tablet, chewable: 1 tab(s) orally once a day (03 May 2022 08:29)  atorvastatin 20 mg oral tablet: 1 tab(s) orally once a day (at bedtime) (03 May 2022 08:29)  metoprolol succinate 25 mg oral tablet, extended release: 1 tab(s) orally once a day (03 May 2022 08:29)  Multiple Vitamins oral tablet: 1 tab(s) orally once a day (03 May 2022 08:29)  QUEtiapine 50 mg oral tablet: 1 tab(s) orally once a day (at bedtime) (03 May 2022 08:29)  tamsulosin 0.4 mg oral capsule: 1 cap(s) orally once a day (at bedtime) (03 May 2022 08:29)    MEDICATIONS  (STANDING):  aspirin  chewable 81 milliGRAM(s) Oral daily  atorvastatin 20 milliGRAM(s) Oral at bedtime  cefuroxime   Tablet 250 milliGRAM(s) Oral every 12 hours  enoxaparin Injectable 40 milliGRAM(s) SubCutaneous every 24 hours  folic acid 1 milliGRAM(s) Oral daily  metoprolol succinate ER 25 milliGRAM(s) Oral daily  multivitamin 1 Tablet(s) Oral daily  QUEtiapine 50 milliGRAM(s) Oral at bedtime  tamsulosin 0.4 milliGRAM(s) Oral at bedtime  thiamine IVPB 500 milliGRAM(s) IV Intermittent three times a day    MEDICATIONS  (PRN):  acetaminophen     Tablet .. 650 milliGRAM(s) Oral every 6 hours PRN Temp greater or equal to 38C (100.4F), Mild Pain (1 - 3)  aluminum hydroxide/magnesium hydroxide/simethicone Suspension 30 milliLiter(s) Oral every 4 hours PRN Dyspepsia  LORazepam     Tablet 2 milliGRAM(s) Oral every 2 hours PRN CIWA-Ar score increase by 2 points and a total score of 7 or less  LORazepam     Tablet 2 milliGRAM(s) Oral every 1 hour PRN CIWA-Ar score 8 or greater  melatonin 3 milliGRAM(s) Oral at bedtime PRN Insomnia  ondansetron Injectable 4 milliGRAM(s) IV Push every 8 hours PRN Nausea and/or Vomiting      Allergies    No Known Allergies    Intolerances        Vital Signs Last 24 Hrs  T(C): 36.6 (09 May 2022 15:30), Max: 36.7 (09 May 2022 06:11)  T(F): 97.9 (09 May 2022 15:30), Max: 98.1 (09 May 2022 06:11)  HR: 79 (09 May 2022 15:30) (79 - 94)  BP: 103/62 (09 May 2022 15:30) (103/62 - 141/72)  BP(mean): --  RR: 18 (09 May 2022 15:30) (16 - 18)  SpO2: 99% (09 May 2022 15:30) (97% - 100%)      PHYSICAL EXAMINATION:    NECK:  Supple. No lymphadenopathy. Jugular venous pressure not elevated. Carotids equal.   HEART:   The cardiac impulse has a normal quality. Reg., Nl S1 and S2.  There are no murmurs, rubs or gallops noted  CHEST:  Chest is clear to auscultation. Normal respiratory effort.  ABDOMEN:  Soft and nontender.   EXTREMITIES:  There is no edema.       LABS:    05-09    140  |  104  |  33<H>  ----------------------------<  127<H>  4.2   |  31  |  1.42<H>    Ca    9.4      09 May 2022 06:44

## 2022-05-09 NOTE — PROGRESS NOTE ADULT - NUTRITIONAL ASSESSMENT
This patient has been assessed with a concern for Malnutrition and has been determined to have a diagnosis/diagnoses of Severe protein-calorie malnutrition.    This patient is being managed with:   Diet DASH/TLC-  Sodium & Cholesterol Restricted  Entered: May  3 2022  4:54AM

## 2022-05-09 NOTE — PROGRESS NOTE ADULT - SUBJECTIVE AND OBJECTIVE BOX
75 y/o M with PMH CAD s/p CABG, A fib not on AC d/t concerns with medication non-compliance and agitation, BPH, alcohol abuse, paranoia/hallucinations recently d/c in 2/2022 for fluctuating encephalopathy with behavioral/mood disorder and diagnosed with neurocognitive decline with delirium and ruled out for Wernicke's encephalopathy now presents with similar features.  Pt extremely tangential during exam and states " you're the doctor, figure out why I am here!"   Believes the hospital is his primary residence and needs constant redirection during exam. States he has been falling alot due to pain in his knees and legs. Walks with a cane however still needs to walk close to sturdy objects to mitigate his falls risk. States  he is a drinker but will not quantify how much he drinks and states " not alot."     5/6: Pt found resting comfortably in bed. Able to answer questions. awake, follows commands . Denies any fever, chills, dysuria, hematuria, or other complaints.   5/7 pt received Iv ativan last night for agitation, this morning was sedated , pulse ox likely dropped due to sedation, now 100% on RA , lungs clear, pt more awake now, still confused  5/8  pt still confused , tries to follow 1 step commands  , pt poor historian  5/9 today pt mor alert, less confused  , follows commands, oriented to self, [facility] 2020 year, sitting in chair , pt's mentaion waxes and wanes earlier when nurse asked orientation questions pt replied year at 1977, currently follows commands and is cooperative     ROS:   All 10 systems reviewed and found to be negative with the exception of what has been described above.    PHYSICAL EXAM:  Constitutional: NAD  HEENT: Atraumatic, RODOLFO, Normal, No congestion  Respiratory: Breath Sounds normal, no rhonchi/wheeze  Cardiovascular: N S1S2;   Gastrointestinal: Abdomen soft, non tender, Bowel Ssounds present  Extremities: No edema, peripheral pulses present  Neurological: awake, alert follows 1 step commands  no gross focal motor deficits  Skin: Non cellulitic, no rash, ulcers    PHYSICAL EXAM:    Daily     Daily     ICU Vital Signs Last 24 Hrs  T(C): 36.6 (09 May 2022 15:30), Max: 36.7 (09 May 2022 06:11)  T(F): 97.9 (09 May 2022 15:30), Max: 98.1 (09 May 2022 06:11)  HR: 79 (09 May 2022 15:30) (79 - 94)  BP: 103/62 (09 May 2022 15:30) (103/62 - 141/72)  BP(mean): --  ABP: --  ABP(mean): --  RR: 18 (09 May 2022 15:30) (16 - 18)  SpO2: 99% (09 May 2022 15:30) (97% - 100%)        05-09    140  |  104  |  33<H>  ----------------------------<  127<H>  4.2   |  31  |  1.42<H>    Ca    9.4      09 May 2022 06:44                              MEDICATIONS  (STANDING):  aspirin  chewable 81 milliGRAM(s) Oral daily  atorvastatin 20 milliGRAM(s) Oral at bedtime  cefuroxime   Tablet 250 milliGRAM(s) Oral every 12 hours  enoxaparin Injectable 40 milliGRAM(s) SubCutaneous every 24 hours  folic acid 1 milliGRAM(s) Oral daily  metoprolol succinate ER 25 milliGRAM(s) Oral daily  multivitamin 1 Tablet(s) Oral daily  QUEtiapine 50 milliGRAM(s) Oral at bedtime  sodium chloride 0.45%. 1000 milliLiter(s) (60 mL/Hr) IV Continuous <Continuous>  tamsulosin 0.4 milliGRAM(s) Oral at bedtime  thiamine IVPB 500 milliGRAM(s) IV Intermittent three times a day          CT CERVICAL SPINE  ACC: [id number] EXAM: CT BRAIN    PROCEDURE DATE: 05/03/2022        INTERPRETATION: CLINICAL INFORMATION: Trauma. Fall.  TECHNIQUE:  Axial CT images were acquired through the head and cervical spine.  Intravenous contrast: None  Two-dimensional reformats were generated.  COMPARISON STUDY: None    FINDINGS:  CT head:    Diagnostic accuracy is limited secondary to patient motion.    There is no CT evidence of acute intracranial hemorrhage, mass effect, midline shift, or acute territorial infarct. The ventricles and sulci are normal in size and configuration. The basal cisterns are patent.    The visualized paranasal sinuses are clear.  The mastoid air cells and middle ear cavities are grossly clear.    There is no depressed calvarial fracture.    CT cervical spine:  There is preservation of the cervical lordosis.  There is no CT evidence of an acute cervical spine fracture or traumatic malalignment.  There is no suspicious lytic or blastic lesion.  The paraspinous soft tissues are unremarkable within limits of CT scan.  Degenerative changes:  There are multilevel degenerative changes characterized by disc osteophyte complexes and facet and uncinate hypertrophy with resultant mild multilevel central canal and neural foraminal stenosis.    Incidental findings:  Visualized soft tissues of the neck are unremarkable.  Pleural-parenchymal scarring at the lung apices.  IMPRESSION:    CT BRAIN:  Motion limited study. No evidence of acute intracranial hemorrhage, midline shift or CT evidence of acute territorial infarct.    If the patient's symptoms persist, consider short interval follow-up head CT or brain MRI if there are no MRI contraindications.  CT CERVICAL SPINE:  No acute cervical fracture or traumatic malalignment.    MRI would be required to evaluate the ligamentous structures at higher sensitivity as well as for better evaluation of the cervical canal and its contents.  ACC: [id number] EXAM: XR LS SPINE AP LAT 2-3 VIEWS  ACC: [id number] EXAM: XR HIPS BI WITH PELV 2V    PROCEDURE DATE: 05/03/2022    INTERPRETATION: Bilateral hips and pelvis and lumbar spine. Concern is fracture.    Lumbar spine. AP and lateral views. 3 images. There is a mild right upper and a mild left lower lumbar curve slightly increased from November 22, 2011.    There is moderate spondylitic change starting in the mid level proceeding inferiorly. This is somewhat increased from prior.    No bone destruction or fracture. Arterial calcification.    Bilateral hips and pelvis. 5 views.  There is a vascular stent seen in each upper thigh.  There is mild hip degeneration in the laterally greater in the left hip.  No bone destruction or fracture.    IMPRESSION: Increasing mild to and fro lumbar curve an increasing lumbar spondylitic change.    Rather mild degeneration in the outer acetabular corners left greater than right. No fracture.    Vascular stents in each thigh.  XR KNEE 3 VIEWS RT    PROCEDURE DATE: 05/02/2022  INTERPRETATION: Clinical history: 76-year-old male, pain.  Three views of the right knee without comparison demonstrates moderate to severe OA in the medial compartment with marked joint space narrowing. Mild OA in the lateral and patellofemoral compartments.    No suprapatellar effusion, fracture or dislocation.    IMPRESSION:  OA, moderate to severe in the medial compartment with no acute radiographic findings. MRI can be ordered as clinically indicated
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
## 2022-05-09 NOTE — BEHAVIORAL HEALTH ASSESSMENT NOTE - HPI (INCLUDE ILLNESS QUALITY, SEVERITY, DURATION, TIMING, CONTEXT, MODIFYING FACTORS, ASSOCIATED SIGNS AND SYMPTOMS)
Pt is a 76 YOWAA  man, who resides with wife, and who denies any psych hx in the past, however hx fo EtOH abuse (pt also denies), admitted with A MS stared on CIWA.   Pt denies depressed mood, denies insomnia, appetite problems SI, HI, denies AH, and VH. He is spontaneous  talks about some people  personnel coming to his home abd watching  his neighborhoods.     He is  and resides with wife and grandson.  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

## 2022-05-10 ENCOUNTER — TRANSCRIPTION ENCOUNTER (OUTPATIENT)
Age: 77
End: 2022-05-10

## 2022-05-10 VITALS
RESPIRATION RATE: 18 BRPM | HEART RATE: 71 BPM | SYSTOLIC BLOOD PRESSURE: 118 MMHG | TEMPERATURE: 98 F | OXYGEN SATURATION: 98 % | DIASTOLIC BLOOD PRESSURE: 62 MMHG

## 2022-05-10 LAB
ANION GAP SERPL CALC-SCNC: 3 MMOL/L — LOW (ref 5–17)
BUN SERPL-MCNC: 46 MG/DL — HIGH (ref 7–23)
CALCIUM SERPL-MCNC: 9.1 MG/DL — SIGNIFICANT CHANGE UP (ref 8.5–10.1)
CHLORIDE SERPL-SCNC: 106 MMOL/L — SIGNIFICANT CHANGE UP (ref 96–108)
CO2 SERPL-SCNC: 30 MMOL/L — SIGNIFICANT CHANGE UP (ref 22–31)
CREAT SERPL-MCNC: 1.55 MG/DL — HIGH (ref 0.5–1.3)
EGFR: 46 ML/MIN/1.73M2 — LOW
GLUCOSE SERPL-MCNC: 105 MG/DL — HIGH (ref 70–99)
POTASSIUM SERPL-MCNC: 4.2 MMOL/L — SIGNIFICANT CHANGE UP (ref 3.5–5.3)
POTASSIUM SERPL-SCNC: 4.2 MMOL/L — SIGNIFICANT CHANGE UP (ref 3.5–5.3)
SARS-COV-2 RNA SPEC QL NAA+PROBE: SIGNIFICANT CHANGE UP
SODIUM SERPL-SCNC: 139 MMOL/L — SIGNIFICANT CHANGE UP (ref 135–145)

## 2022-05-10 PROCEDURE — 95816 EEG AWAKE AND DROWSY: CPT | Mod: 26

## 2022-05-10 PROCEDURE — 99239 HOSP IP/OBS DSCHRG MGMT >30: CPT

## 2022-05-10 PROCEDURE — 99232 SBSQ HOSP IP/OBS MODERATE 35: CPT

## 2022-05-10 RX ORDER — LANOLIN ALCOHOL/MO/W.PET/CERES
1 CREAM (GRAM) TOPICAL
Qty: 0 | Refills: 0 | DISCHARGE
Start: 2022-05-10

## 2022-05-10 RX ORDER — ACETAMINOPHEN 500 MG
2 TABLET ORAL
Qty: 0 | Refills: 0 | DISCHARGE
Start: 2022-05-10

## 2022-05-10 RX ORDER — CEFUROXIME AXETIL 250 MG
1 TABLET ORAL
Qty: 0 | Refills: 0 | DISCHARGE
Start: 2022-05-10

## 2022-05-10 RX ORDER — THIAMINE MONONITRATE (VIT B1) 100 MG
100 TABLET ORAL DAILY
Refills: 0 | Status: DISCONTINUED | OUTPATIENT
Start: 2022-05-11 | End: 2022-05-10

## 2022-05-10 RX ORDER — THIAMINE MONONITRATE (VIT B1) 100 MG
1 TABLET ORAL
Qty: 0 | Refills: 0 | DISCHARGE
Start: 2022-05-10

## 2022-05-10 RX ORDER — FOLIC ACID 0.8 MG
1 TABLET ORAL
Qty: 0 | Refills: 0 | DISCHARGE
Start: 2022-05-10

## 2022-05-10 RX ADMIN — Medication 81 MILLIGRAM(S): at 09:21

## 2022-05-10 RX ADMIN — Medication 25 MILLIGRAM(S): at 09:21

## 2022-05-10 RX ADMIN — Medication 250 MILLIGRAM(S): at 06:38

## 2022-05-10 RX ADMIN — Medication 255 MILLIGRAM(S): at 14:31

## 2022-05-10 RX ADMIN — Medication 1 TABLET(S): at 09:21

## 2022-05-10 RX ADMIN — ENOXAPARIN SODIUM 40 MILLIGRAM(S): 100 INJECTION SUBCUTANEOUS at 11:24

## 2022-05-10 RX ADMIN — Medication 1 MILLIGRAM(S): at 09:21

## 2022-05-10 RX ADMIN — Medication 250 MILLIGRAM(S): at 17:04

## 2022-05-10 RX ADMIN — Medication 255 MILLIGRAM(S): at 06:36

## 2022-05-10 NOTE — PROGRESS NOTE ADULT - SUBJECTIVE AND OBJECTIVE BOX
Subjective:    pat better, sitting in bed, no respiratory distress.    Home Medications:  aspirin 81 mg oral tablet, chewable: 1 tab(s) orally once a day (03 May 2022 08:29)  atorvastatin 20 mg oral tablet: 1 tab(s) orally once a day (at bedtime) (03 May 2022 08:29)  metoprolol succinate 25 mg oral tablet, extended release: 1 tab(s) orally once a day (03 May 2022 08:29)  Multiple Vitamins oral tablet: 1 tab(s) orally once a day (03 May 2022 08:29)  QUEtiapine 50 mg oral tablet: 1 tab(s) orally once a day (at bedtime) (03 May 2022 08:29)  tamsulosin 0.4 mg oral capsule: 1 cap(s) orally once a day (at bedtime) (03 May 2022 08:29)    MEDICATIONS  (STANDING):  aspirin  chewable 81 milliGRAM(s) Oral daily  atorvastatin 20 milliGRAM(s) Oral at bedtime  cefuroxime   Tablet 250 milliGRAM(s) Oral every 12 hours  enoxaparin Injectable 40 milliGRAM(s) SubCutaneous every 24 hours  folic acid 1 milliGRAM(s) Oral daily  metoprolol succinate ER 25 milliGRAM(s) Oral daily  multivitamin 1 Tablet(s) Oral daily  QUEtiapine 50 milliGRAM(s) Oral at bedtime  sodium chloride 0.45%. 1000 milliLiter(s) (60 mL/Hr) IV Continuous <Continuous>  tamsulosin 0.4 milliGRAM(s) Oral at bedtime  thiamine IVPB 500 milliGRAM(s) IV Intermittent three times a day    MEDICATIONS  (PRN):  acetaminophen     Tablet .. 650 milliGRAM(s) Oral every 6 hours PRN Temp greater or equal to 38C (100.4F), Mild Pain (1 - 3)  aluminum hydroxide/magnesium hydroxide/simethicone Suspension 30 milliLiter(s) Oral every 4 hours PRN Dyspepsia  LORazepam     Tablet 2 milliGRAM(s) Oral every 2 hours PRN CIWA-Ar score increase by 2 points and a total score of 7 or less  LORazepam     Tablet 2 milliGRAM(s) Oral every 1 hour PRN CIWA-Ar score 8 or greater  melatonin 3 milliGRAM(s) Oral at bedtime PRN Insomnia  ondansetron Injectable 4 milliGRAM(s) IV Push every 8 hours PRN Nausea and/or Vomiting      Allergies    No Known Allergies    Intolerances        Vital Signs Last 24 Hrs  T(C): 36.3 (10 May 2022 08:39), Max: 36.6 (09 May 2022 15:30)  T(F): 97.4 (10 May 2022 08:39), Max: 97.9 (09 May 2022 15:30)  HR: 71 (10 May 2022 08:39) (71 - 79)  BP: 114/59 (10 May 2022 08:39) (103/62 - 119/61)  BP(mean): --  RR: 18 (10 May 2022 08:39) (18 - 18)  SpO2: 97% (10 May 2022 08:39) (97% - 100%)      PHYSICAL EXAMINATION:    NECK:  Supple. No lymphadenopathy. Jugular venous pressure not elevated. Carotids equal.   HEART:   The cardiac impulse has a normal quality. Reg., Nl S1 and S2.  There are no murmurs, rubs or gallops noted  CHEST:  Chest is clear to auscultation. Normal respiratory effort.  ABDOMEN:  Soft and nontender.   EXTREMITIES:  There is no edema.       LABS:    05-10    139  |  106  |  46<H>  ----------------------------<  105<H>  4.2   |  30  |  1.55<H>    Ca    9.1      10 May 2022 06:57

## 2022-05-10 NOTE — DISCHARGE NOTE PROVIDER - HOSPITAL COURSE
77 y/o M with PMH CAD s/p CABG, A fib not on AC d/t concerns with medication non-compliance and agitation, BPH, alcohol abuse, paranoia/hallucinations recently d/c in 2/2022 for fluctuating encephalopathy with behavioral/mood disorder and diagnosed with neurocognitive decline with delirium and ruled out for Wernicke's encephalopathy now presents with similar features.  Pt extremely tangential during exam and states " you're the doctor, figure out why I am here!"   Believes the hospital is his primary residence and needs constant redirection during exam. States he has been falling alot due to pain in his knees and legs. Walks with a cane however still needs to walk close to sturdy objects to mitigate his falls risk. States  he is a drinker but will not quantify how much he drinks and states " not alot."     5/6: Pt found resting comfortably in bed. Able to answer questions. awake, follows commands . Denies any fever, chills, dysuria, hematuria, or other complaints.   5/7 pt received Iv ativan last night for agitation, this morning was sedated , pulse ox likely dropped due to sedation, now 100% on RA , lungs clear, pt more awake now, still confused  5/8  pt still confused , tries to follow 1 step commands  , pt poor historian  5/9 today pt mor alert, less confused  , follows commands, oriented to self, [facility] 2020 year, sitting in chair , pt's mentaion waxes and wanes earlier when nurse asked orientation questions pt replied year at 1977, currently follows commands and is cooperative     Vital Signs Last 24 Hrs  T(C): 36.9 (10 May 2022 15:16), Max: 36.9 (10 May 2022 15:16)  T(F): 98.4 (10 May 2022 15:16), Max: 98.4 (10 May 2022 15:16)  HR: 71 (10 May 2022 15:16) (71 - 79)  BP: 118/62 (10 May 2022 15:16) (103/62 - 119/61)  BP(mean): --  RR: 18 (10 May 2022 15:16) (18 - 18)  SpO2: 98% (10 May 2022 15:16) (97% - 100%)    PHYSICAL EXAM:  Constitutional: NAD  HEENT: Atraumatic, RODOLFO, Normal, No congestion  Respiratory: Breath Sounds normal, no rhonchi/wheeze  Cardiovascular: N S1S2;   Gastrointestinal: Abdomen soft, non tender, Bowel Ssounds present  Extremities: No edema, peripheral pulses present  Neurological: awake, alert follows 1 step commands  no gross focal motor deficits  Skin: Non cellulitic, no rash, ulcers  Neurocognitive decline  #Likely superimposed with metabolic encephalopathy  secondary to UTI vs ? wernickes  #ETOH abuse hx - without acute etoh withdrawal/No signs of DT  #UTI ecoli pansensitive     - admitted to medicine   - CTH/C scpine negative  - TSH-.53, RPR-neg ,, folate-9.9  Psych eval appreciated continue quetiapine   s/p 5 days of ceftriaxone , now on ceftin for 2 days then stop abx, course ends 5/10 for ecoli UTI  would need placement at time of  discharge   - Pt walked hallway with PT assistance   - X-ray knee and pelvis neg for fracture  Stop CIWA prn protocol on 5/10   - MVI, started  IV thiamine for 3 days, folate   will check EEG , unable to get MRI brain as unclear if pt has any contraindications, CT head negative ,  NO health care proxy-unable to indentify family  TSH and B12 wnl  neuro consult r/o wernickes encephalopathy vs ? seizures vs  etoh related dementia,       #hx prior afib not on AC  this admission on EKG in sinusrhythm       FUNMILAYO prerenal azotemia due to decreased po intake past few days  started IVF   check BMP in am     DVT prophylaxis   lovenox SC    dispo- awaiting EEG, completion of thiamine IV for 3 days , unable to do  MRI brain, when mentation further improves discharge planning, would need placement      75 y/o M with PMH CAD s/p CABG, A fib not on AC d/t concerns with medication non-compliance and agitation, BPH, alcohol abuse, paranoia/hallucinations recently d/c in 2022 for fluctuating encephalopathy with behavioral/mood disorder and diagnosed with neurocognitive decline with delirium and ruled out for Wernicke's encephalopathy now presents with similar features.  Pt extremely tangential during exam and states " you're the doctor, figure out why I am here!"   Believes the hospital is his primary residence and needs constant redirection during exam. States he has been falling alot due to pain in his knees and legs. Walks with a cane however still needs to walk close to sturdy objects to mitigate his falls risk. States  he is a drinker but will not quantify how much he drinks and states " not alot."     : Pt found resting comfortably in bed. Able to answer questions. awake, follows commands . Denies any fever, chills, dysuria, hematuria, or other complaints.    pt received Iv ativan last night for agitation, this morning was sedated , pulse ox likely dropped due to sedation, now 100% on RA , lungs clear, pt more awake now, still confused    pt still confused , tries to follow 1 step commands  , pt poor historian   today pt mor alert, less confused  , follows commands, oriented to self, [facility] 2020, sitting in chair , pt's mentaion waxes and wanes earlier when nurse asked orientation questions pt replied year at , currently follows commands and is cooperative   5/10:  Pt seen.  Overall improved.  Eating as per nursing.  Somewhat argumentative and defensive when asking history about home environment.  States wife  .  Was living at home with her and her grandson.      Vital Signs Last 24 Hrs  T(C): 36.9 (10 May 2022 15:16), Max: 36.9 (10 May 2022 15:16)  T(F): 98.4 (10 May 2022 15:16), Max: 98.4 (10 May 2022 15:16)  HR: 71 (10 May 2022 15:16) (71 - 79)  BP: 118/62 (10 May 2022 15:16) (103/62 - 119/61)  BP(mean): --  RR: 18 (10 May 2022 15:16) (18 - 18)  SpO2: 98% (10 May 2022 15:16) (97% - 100%)    PHYSICAL EXAM:  Constitutional: NAD  HEENT: Atraumatic, RODOLFO, Normal, No congestion  Respiratory: Breath Sounds normal, no rhonchi/wheeze  Cardiovascular: N S1S2;   Gastrointestinal: Abdomen soft, non tender, Bowel Ssounds present  Extremities: No edema, peripheral pulses present  Neurological: awake, alert follows 1 step commands  no gross focal motor deficits  Skin: Non cellulitic, no rash, ulcers    PLAN:    #Metabolic Encephalopathy:    Suspect related to UTI/ infection and Wernicke's encephalopathy.    Overall improved.    S/p IV thiamine.    Cont thiamine 100 daily @ d/c.    Psych eval appreciated.  Cont seroquel @ bedtime.    EEG without seizure activity.      #ETOH abuse hx:    No signs of withdrawal.    Off protocol.    AS per notes from march was still recently drinking.    Patient states hasn't drank heavily in years.      #UTI ecoli pansensitive   S/p ABX.    Complete 1 more day of ABX.      #hx prior afib not on AC  this admission on EKG in sinusrhythm     #FUNMILAYO:    S/p IVF.  F/u at renal.    Good intake as per nursing.      DISPO:    D/c to REHAB.  APEX.    Further psych f/u for medication adjustment and assessment of capacity as mentation improves.      Total time spent 45 min.

## 2022-05-10 NOTE — PROGRESS NOTE BEHAVIORAL HEALTH - NSBHCHARTREVIEWLAB_PSY_A_CORE FT
05-10    139  |  106  |  46<H>  ----------------------------<  105<H>  4.2   |  30  |  1.55<H>    Ca    9.1      10 May 2022 06:57

## 2022-05-10 NOTE — PROGRESS NOTE ADULT - ASSESSMENT
75 y/o M with PMH CAD s/p CABG, A fib not on AC d/t concerns with medication non-compliance and agitation, BPH, alcohol abuse, paranoia/hallucinations recently d/c in 2/2022 for fluctuating encephalopathy with behavioral/mood disorder and diagnosed with neurocognitive decline with delirium and ruled out for Wernicke's encephalopathy now presents with similar features.  Pt extremely tangential during exam and states " you're the doctor, figure out why I am here!" Believes the hospital is his primary residence and needs constant redirection during exam. States he has been falling alot due to pain in his knees and legs. Walks with a cane however still needs to walk close to sturdy objects to mitigate his falls risk. States  he is a drinker but will not quantify how much he drinks and states " not alot."     PROBLEMS:    Aspiratiob syndrome  Neurocognitive decline  Likely superimposed with metabolic encephalopathy-ETOH abuse  UTI  FUNMILAYO    PLAN:    pulmonary stable- decd planning  Aspiration precaution  CTH/C spine negative  po ceftin  F/u PT consult, may need home care vs placement is pt having significant falls and lives alone  Monitor for s/s of withdrawal. CIWA with ativan prn  MVI, IV thiamine for 3 days, folate   DVT px: HSQ

## 2022-05-10 NOTE — PROGRESS NOTE BEHAVIORAL HEALTH - RISK ASSESSMENT
Low acute risk: no SI, no HI, not agitated, denies anxiety, no depression.   Low long term risk: no psychosis , no hx fo SI or SA but EtOH abuse.

## 2022-05-10 NOTE — DISCHARGE NOTE NURSING/CASE MANAGEMENT/SOCIAL WORK - PATIENT PORTAL LINK FT
You can access the FollowMyHealth Patient Portal offered by [facility] by registering at the following website: [email or web address]. By joining [employer]’s FollowMyHealth portal, you will also be able to view your health information using other applications (apps) compatible with our system.

## 2022-05-10 NOTE — DISCHARGE NOTE PROVIDER - DETAILS OF MALNUTRITION DIAGNOSIS/DIAGNOSES
This patient has been assessed with a concern for Malnutrition and was treated during this hospitalization for the following Nutrition diagnosis/diagnoses:     -  05/09/2022: Severe protein-calorie malnutrition

## 2022-05-10 NOTE — DISCHARGE NOTE PROVIDER - NSDCCPCAREPLAN_GEN_ALL_CORE_FT
PRINCIPAL DISCHARGE DIAGNOSIS  Diagnosis: AMS (altered mental status)  Assessment and Plan of Treatment: overall improving  suspect related to UTI / etoh use.       PRINCIPAL DISCHARGE DIAGNOSIS  Diagnosis: AMS (altered mental status)  Assessment and Plan of Treatment: overall improving  suspect related to UTI / etoh use.      SECONDARY DISCHARGE DIAGNOSES  Diagnosis: Wernicke's encephalopathy  Assessment and Plan of Treatment: continue thiamine.

## 2022-05-10 NOTE — PROGRESS NOTE BEHAVIORAL HEALTH - ORIENTATION OTHER
Partially oriented in time and place: February 9th, Wednesday, 2022" [facility] [place]"

## 2022-05-10 NOTE — PROGRESS NOTE BEHAVIORAL HEALTH - NSBHROSSYSTEMS_PSY_ALL_CORE
OB Attending Note      S: Pt feeling well. pain controlled. Ambulating, voiding, and tolerating PO. No flatus yet    Physical exam:    Vital Signs Last 24 Hrs  T(C): 36.6 (06 Jun 2020 08:35), Max: 36.8 (05 Jun 2020 17:10)  T(F): 97.8 (06 Jun 2020 08:35), Max: 98.2 (05 Jun 2020 17:10)  HR: 66 (06 Jun 2020 08:35) (55 - 77)  BP: 97/60 (06 Jun 2020 08:35) (92/55 - 108/67)  BP(mean): --  RR: 18 (06 Jun 2020 08:35) (17 - 18)  SpO2: 99% (06 Jun 2020 08:35) (97% - 100%)  I&O's Summary    05 Jun 2020 07:01  -  06 Jun 2020 07:00  --------------------------------------------------------  IN: 3267 mL / OUT: 3400 mL / NET: -133 mL        Gen: NAD  Abdomen: Soft, nontender, no distension , firm uterine fundus at umbilicus.  Incision: Clean, dry, and intact with steri strips  Scant Lochia  Ext: No calf tenderness    LABS:                        10.6   8.99  )-----------( 178      ( 06 Jun 2020 06:19 )             33.1                         13.3   9.43  )-----------( 197      ( 05 Jun 2020 06:43 )             38.3 Neurological...

## 2022-05-10 NOTE — PROGRESS NOTE BEHAVIORAL HEALTH - NSBHFUPINTERVALHXFT_PSY_A_CORE
Pt is in his bed, bright, talkative, spontaneous, slept well. eating is gd. Pt denies being depressed or anxious. There is no low energy . He denies hopeless, he has plan for future and stated he needs o find a solution for his housing since he thinks can not return his previous residence after his wife's death and due to relations with grandson Cliff.  Pt denies  SI and HI ever, denies AH and VH.   PT knows he is in hospital  just can not recall the name, he knows town is [place], but cannot recall the name of the [facility]. he knows state is [place].   He knows the date, but not the day of the week.

## 2022-05-10 NOTE — PROGRESS NOTE BEHAVIORAL HEALTH - SUMMARY
77 yo M with HX of EtOH abuse and paranoia, pmh cabg many years ago no medical followup.  PT has a  hx fop EtOH abuse, placed on ciwa  protocol. Pt denies drinking, he is paranoid about his neighbors but denied depress mood, anxiety, denies SI, HI, AH, VH.  pt is suspicious about his grandson, who is actually grandson of pt-s  wife,     PT presents with resolving delirium, improving.   Pt si not depressed , not suicidal , not homicidal. he si suspicious  about his  wife's grandson.    Pt is  not sure about the reason for hospitalization , he does not know what meds he takes and said nobody told him, PT states that eh agrees with dr and treatment plan and he did not refuse any specific medical procedure or treatment. Pt also state states  nobody discussed with him and d/c planning and he does not know  what options does he have. he state that he coronel return to his previous hosing and he needs to figure out where to live.   he is still on CIWA protocol for EtOH detox.   He takes  QUEtiapine 50 milliGRAM(s) Oral at bedtime as advised.     Mental capacity in health care is task specific, changes over time and it is time limited.   Primary team needs to educate pt and discuss  his treatment and dc/ options prior to suspecting lack of mental capacity.

## 2022-05-10 NOTE — DISCHARGE NOTE PROVIDER - NSDCMRMEDTOKEN_GEN_ALL_CORE_FT
acetaminophen 325 mg oral tablet: 2 tab(s) orally every 6 hours, As needed, Temp greater or equal to 38C (100.4F), Mild Pain (1 - 3)  aspirin 81 mg oral tablet, chewable: 1 tab(s) orally once a day  atorvastatin 20 mg oral tablet: 1 tab(s) orally once a day (at bedtime)  cefuroxime 250 mg oral tablet: 1 tab(s) orally every 12 hours for one more days  folic acid 1 mg oral tablet: 1 tab(s) orally once a day  melatonin 3 mg oral tablet: 1 tab(s) orally once a day (at bedtime), As needed, Insomnia  metoprolol succinate 25 mg oral tablet, extended release: 1 tab(s) orally once a day  Multiple Vitamins oral tablet: 1 tab(s) orally once a day  QUEtiapine 50 mg oral tablet: 1 tab(s) orally once a day (at bedtime)  tamsulosin 0.4 mg oral capsule: 1 cap(s) orally once a day (at bedtime)  thiamine 100 mg oral tablet: 1 tab(s) orally once a day

## 2022-05-10 NOTE — PROGRESS NOTE ADULT - PROVIDER SPECIALTY LIST ADULT
Hospitalist
Pulmonology
Hospitalist
Hospitalist
Pulmonology
Hospitalist
Pulmonology

## 2022-05-18 DIAGNOSIS — I25.10 ATHEROSCLEROTIC HEART DISEASE OF NATIVE CORONARY ARTERY WITHOUT ANGINA PECTORIS: ICD-10-CM

## 2022-05-18 DIAGNOSIS — N39.0 URINARY TRACT INFECTION, SITE NOT SPECIFIED: ICD-10-CM

## 2022-05-18 DIAGNOSIS — Z91.81 HISTORY OF FALLING: ICD-10-CM

## 2022-05-18 DIAGNOSIS — Z20.822 CONTACT WITH AND (SUSPECTED) EXPOSURE TO COVID-19: ICD-10-CM

## 2022-05-18 DIAGNOSIS — I48.91 UNSPECIFIED ATRIAL FIBRILLATION: ICD-10-CM

## 2022-05-18 DIAGNOSIS — E51.2 WERNICKE'S ENCEPHALOPATHY: ICD-10-CM

## 2022-05-18 DIAGNOSIS — G93.41 METABOLIC ENCEPHALOPATHY: ICD-10-CM

## 2022-05-18 DIAGNOSIS — Z95.1 PRESENCE OF AORTOCORONARY BYPASS GRAFT: ICD-10-CM

## 2022-05-18 DIAGNOSIS — E43 UNSPECIFIED SEVERE PROTEIN-CALORIE MALNUTRITION: ICD-10-CM

## 2022-05-18 DIAGNOSIS — F10.10 ALCOHOL ABUSE, UNCOMPLICATED: ICD-10-CM

## 2022-05-18 DIAGNOSIS — N40.0 BENIGN PROSTATIC HYPERPLASIA WITHOUT LOWER URINARY TRACT SYMPTOMS: ICD-10-CM

## 2022-05-18 DIAGNOSIS — R41.9 UNSPECIFIED SYMPTOMS AND SIGNS INVOLVING COGNITIVE FUNCTIONS AND AWARENESS: ICD-10-CM

## 2022-05-18 DIAGNOSIS — N17.9 ACUTE KIDNEY FAILURE, UNSPECIFIED: ICD-10-CM

## 2022-05-31 NOTE — DISCHARGE NOTE PROVIDER - NSDCHHNEEDSERVICE_GEN_ALL_CORE
Medication teaching and assessment/Observation and assessment/Rehabilitation services/Teaching and training Retention Suture Bite Size: 3 mm

## 2022-07-22 NOTE — ED PROVIDER NOTE - NSFOLLOWUPINSTRUCTIONS_ED_ALL_ED_FT
[facility] phone call message- patient requesting a refill:    Full Medication Name: insulin aspart (NOVOLOG PEN) 100 UNIT/ML pen        Pharmacy confirmed as   [id number] - [place], [place] - [address] [place] [address]  Phone: [phone] Fax: [phone]  : Yes    Additional Comments: Pt's dad called and said the pt doesn't have any more refills left.      OK to leave a message on voice mail? Yes    Primary language: English      needed? No    Call taken on July 22, 2022 at 3:26 PM by Andrews Shirley  
1. return for worsening symptoms or anything concerning to you  2. take all home meds as prescribed  3. follow up with your pmd call to make an appointment  4. you were offered admission. If you decide you want to be admitted/chest pain returns return to the ED immediately  5. follow up with cardiology     Chest Pain    WHAT YOU NEED TO KNOW:    Chest pain can be caused by a range of conditions, from not serious to life-threatening. Chest pain can be a symptom of a digestive problem, such as acid reflux or a stomach ulcer. An anxiety attack or a strong emotion, such as anger, can also cause chest pain. Infection, inflammation, or a fracture in the bones or cartilage in your chest can cause pain or discomfort. Sometimes chest pain or pressure is caused by poor blood flow to your heart (angina). Chest pain may also be caused by life-threatening conditions such as a heart attack or blood clot in your lungs.     DISCHARGE INSTRUCTIONS:    Call 911 if:     You have any of the following signs of a heart attack:   Squeezing, pressure, or pain in your chest       and any of the following:   Discomfort or pain in your back, neck, jaw, stomach, or arm       Shortness of breath      Nausea or vomiting      Lightheadedness or a sudden cold sweat        Return to the emergency department if:     You have chest discomfort that gets worse, even with medicine.      You cough or vomit blood.       Your bowel movements are black or bloody.       You cannot stop vomiting, or it hurts to swallow.     Contact your healthcare provider if:     You have questions or concerns about your condition or care.        Medicines:     Medicines may be given to treat the cause of your chest pain. Examples include pain medicine, anxiety medicine, or medicines to increase blood flow to your heart.       Do not take certain medicines without asking your healthcare provider first. These include NSAIDs, herbal or vitamin supplements, or hormones (estrogen or progestin).       Take your medicine as directed. Contact your healthcare provider if you think your medicine is not helping or if you have side effects. Tell him or her if you are allergic to any medicine. Keep a list of the medicines, vitamins, and herbs you take. Include the amounts, and when and why you take them. Bring the list or the pill bottles to follow-up visits. Carry your medicine list with you in case of an emergency.    Follow up with your healthcare provider within 72 hours, or as directed: You may need to return for more tests to find the cause of your chest pain. You may be referred to a specialist, such as a cardiologist or gastroenterologist. Write down your questions so you remember to ask them during your visits.     Healthy living tips: The following are general healthy guidelines. If your chest pain is caused by a heart problem, your healthcare provider will give you specific guidelines to follow.    Do not smoke. Nicotine and other chemicals in cigarettes and cigars can cause lung and heart damage. Ask your healthcare provider for information if you currently smoke and need help to quit. E-cigarettes or smokeless tobacco still contain nicotine. Talk to your healthcare provider before you use these products.       Eat a variety of healthy, low-fat, low-salt foods. Healthy foods include fruits, vegetables, whole-grain breads, low-fat dairy products, beans, lean meats, and fish. Ask for more information about a heart healthy diet.      Drink plenty of water every day. Your body is made of mostly water. Water helps your body to control your temperature and blood pressure. Ask your healthcare provider how much water you should drink every day.      Ask about activity. Your healthcare provider will tell you which activities to limit or avoid. Ask when you can drive, return to work, and have sex. Ask about the best exercise plan for you.      Maintain a healthy weight. Ask your healthcare provider how much you should weigh. Ask him or her to help you create a weight loss plan if you are overweight.       Get the flu and pneumonia vaccines. All adults should get the influenza (flu) vaccine. Get it every year as soon as it becomes available. The pneumococcal vaccine is given to adults aged 65 years or older. The vaccine is given every 5 years to prevent pneumococcal disease, such as pneumonia.    If you have a stent:     Carry your stent card with you at all times.       Let all healthcare providers know that you have a stent.

## 2022-08-07 ENCOUNTER — INPATIENT (INPATIENT)
Facility: HOSPITAL | Age: 77
LOS: 1 days | Discharge: NURSING, REHAB OR LONG-TERM CARE | DRG: 312 | End: 2022-08-09
Attending: INTERNAL MEDICINE | Admitting: INTERNAL MEDICINE
Payer: MEDICARE

## 2022-08-07 VITALS
OXYGEN SATURATION: 100 % | HEIGHT: 73 IN | HEART RATE: 83 BPM | RESPIRATION RATE: 16 BRPM | SYSTOLIC BLOOD PRESSURE: 165 MMHG | DIASTOLIC BLOOD PRESSURE: 75 MMHG | TEMPERATURE: 98 F | WEIGHT: 156.97 LBS

## 2022-08-07 DIAGNOSIS — R55 SYNCOPE AND COLLAPSE: ICD-10-CM

## 2022-08-07 DIAGNOSIS — Z95.1 PRESENCE OF AORTOCORONARY BYPASS GRAFT: Chronic | ICD-10-CM

## 2022-08-07 DIAGNOSIS — I25.10 ATHEROSCLEROTIC HEART DISEASE OF NATIVE CORONARY ARTERY WITHOUT ANGINA PECTORIS: ICD-10-CM

## 2022-08-07 DIAGNOSIS — I44.30 UNSPECIFIED ATRIOVENTRICULAR BLOCK: ICD-10-CM

## 2022-08-07 LAB
ALBUMIN SERPL ELPH-MCNC: 3.7 G/DL — SIGNIFICANT CHANGE UP (ref 3.3–5)
ALP SERPL-CCNC: 84 U/L — SIGNIFICANT CHANGE UP (ref 40–120)
ALT FLD-CCNC: 44 U/L — SIGNIFICANT CHANGE UP (ref 12–78)
ANION GAP SERPL CALC-SCNC: 4 MMOL/L — LOW (ref 5–17)
APTT BLD: 35.7 SEC — HIGH (ref 27.5–35.5)
AST SERPL-CCNC: 32 U/L — SIGNIFICANT CHANGE UP (ref 15–37)
BASOPHILS # BLD AUTO: 0.03 K/UL — SIGNIFICANT CHANGE UP (ref 0–0.2)
BASOPHILS NFR BLD AUTO: 0.6 % — SIGNIFICANT CHANGE UP (ref 0–2)
BILIRUB SERPL-MCNC: 0.5 MG/DL — SIGNIFICANT CHANGE UP (ref 0.2–1.2)
BUN SERPL-MCNC: 24 MG/DL — HIGH (ref 7–23)
CALCIUM SERPL-MCNC: 9.5 MG/DL — SIGNIFICANT CHANGE UP (ref 8.5–10.1)
CHLORIDE SERPL-SCNC: 107 MMOL/L — SIGNIFICANT CHANGE UP (ref 96–108)
CO2 SERPL-SCNC: 32 MMOL/L — HIGH (ref 22–31)
CREAT SERPL-MCNC: 1.32 MG/DL — HIGH (ref 0.5–1.3)
D DIMER BLD IA.RAPID-MCNC: 214 NG/ML DDU — SIGNIFICANT CHANGE UP
EGFR: 56 ML/MIN/1.73M2 — LOW
EOSINOPHIL # BLD AUTO: 0.1 K/UL — SIGNIFICANT CHANGE UP (ref 0–0.5)
EOSINOPHIL NFR BLD AUTO: 2.2 % — SIGNIFICANT CHANGE UP (ref 0–6)
GLUCOSE SERPL-MCNC: 55 MG/DL — LOW (ref 70–99)
HCT VFR BLD CALC: 43.3 % — SIGNIFICANT CHANGE UP (ref 39–50)
HGB BLD-MCNC: 13.8 G/DL — SIGNIFICANT CHANGE UP (ref 13–17)
IMM GRANULOCYTES NFR BLD AUTO: 0 % — SIGNIFICANT CHANGE UP (ref 0–1.5)
INR BLD: 1.09 RATIO — SIGNIFICANT CHANGE UP (ref 0.88–1.16)
LYMPHOCYTES # BLD AUTO: 1.6 K/UL — SIGNIFICANT CHANGE UP (ref 1–3.3)
LYMPHOCYTES # BLD AUTO: 34.4 % — SIGNIFICANT CHANGE UP (ref 13–44)
MCHC RBC-ENTMCNC: 27.3 PG — SIGNIFICANT CHANGE UP (ref 27–34)
MCHC RBC-ENTMCNC: 31.9 GM/DL — LOW (ref 32–36)
MCV RBC AUTO: 85.6 FL — SIGNIFICANT CHANGE UP (ref 80–100)
MONOCYTES # BLD AUTO: 0.33 K/UL — SIGNIFICANT CHANGE UP (ref 0–0.9)
MONOCYTES NFR BLD AUTO: 7.1 % — SIGNIFICANT CHANGE UP (ref 2–14)
NEUTROPHILS # BLD AUTO: 2.59 K/UL — SIGNIFICANT CHANGE UP (ref 1.8–7.4)
NEUTROPHILS NFR BLD AUTO: 55.7 % — SIGNIFICANT CHANGE UP (ref 43–77)
PLATELET # BLD AUTO: 288 K/UL — SIGNIFICANT CHANGE UP (ref 150–400)
POTASSIUM SERPL-MCNC: 4 MMOL/L — SIGNIFICANT CHANGE UP (ref 3.5–5.3)
POTASSIUM SERPL-SCNC: 4 MMOL/L — SIGNIFICANT CHANGE UP (ref 3.5–5.3)
PROT SERPL-MCNC: 7.7 GM/DL — SIGNIFICANT CHANGE UP (ref 6–8.3)
PROTHROM AB SERPL-ACNC: 12.7 SEC — SIGNIFICANT CHANGE UP (ref 10.5–13.4)
RBC # BLD: 5.06 M/UL — SIGNIFICANT CHANGE UP (ref 4.2–5.8)
RBC # FLD: 13.7 % — SIGNIFICANT CHANGE UP (ref 10.3–14.5)
SODIUM SERPL-SCNC: 143 MMOL/L — SIGNIFICANT CHANGE UP (ref 135–145)
TROPONIN I, HIGH SENSITIVITY RESULT: 10.59 NG/L — SIGNIFICANT CHANGE UP
TROPONIN I, HIGH SENSITIVITY RESULT: 13.3 NG/L — SIGNIFICANT CHANGE UP
TROPONIN I, HIGH SENSITIVITY RESULT: 14.37 NG/L — SIGNIFICANT CHANGE UP
WBC # BLD: 4.65 K/UL — SIGNIFICANT CHANGE UP (ref 3.8–10.5)
WBC # FLD AUTO: 4.65 K/UL — SIGNIFICANT CHANGE UP (ref 3.8–10.5)

## 2022-08-07 PROCEDURE — 85027 COMPLETE CBC AUTOMATED: CPT

## 2022-08-07 PROCEDURE — 93306 TTE W/DOPPLER COMPLETE: CPT

## 2022-08-07 PROCEDURE — 99285 EMERGENCY DEPT VISIT HI MDM: CPT

## 2022-08-07 PROCEDURE — 80048 BASIC METABOLIC PNL TOTAL CA: CPT

## 2022-08-07 PROCEDURE — 97162 PT EVAL MOD COMPLEX 30 MIN: CPT | Mod: GP

## 2022-08-07 PROCEDURE — 99223 1ST HOSP IP/OBS HIGH 75: CPT | Mod: AI

## 2022-08-07 PROCEDURE — 99222 1ST HOSP IP/OBS MODERATE 55: CPT

## 2022-08-07 PROCEDURE — 85379 FIBRIN DEGRADATION QUANT: CPT

## 2022-08-07 PROCEDURE — 71046 X-RAY EXAM CHEST 2 VIEWS: CPT | Mod: 26

## 2022-08-07 PROCEDURE — 97116 GAIT TRAINING THERAPY: CPT | Mod: GP

## 2022-08-07 PROCEDURE — 93010 ELECTROCARDIOGRAM REPORT: CPT

## 2022-08-07 PROCEDURE — 70450 CT HEAD/BRAIN W/O DYE: CPT | Mod: 26,ME

## 2022-08-07 PROCEDURE — 84484 ASSAY OF TROPONIN QUANT: CPT

## 2022-08-07 PROCEDURE — 36415 COLL VENOUS BLD VENIPUNCTURE: CPT

## 2022-08-07 PROCEDURE — G1004: CPT

## 2022-08-07 RX ORDER — METOPROLOL TARTRATE 50 MG
25 TABLET ORAL DAILY
Refills: 0 | Status: DISCONTINUED | OUTPATIENT
Start: 2022-08-07 | End: 2022-08-09

## 2022-08-07 RX ORDER — ACETAMINOPHEN 500 MG
650 TABLET ORAL EVERY 6 HOURS
Refills: 0 | Status: DISCONTINUED | OUTPATIENT
Start: 2022-08-07 | End: 2022-08-09

## 2022-08-07 RX ORDER — LANOLIN ALCOHOL/MO/W.PET/CERES
3 CREAM (GRAM) TOPICAL AT BEDTIME
Refills: 0 | Status: DISCONTINUED | OUTPATIENT
Start: 2022-08-07 | End: 2022-08-09

## 2022-08-07 RX ORDER — QUETIAPINE FUMARATE 200 MG/1
1 TABLET, FILM COATED ORAL
Qty: 0 | Refills: 0 | DISCHARGE

## 2022-08-07 RX ORDER — ASPIRIN/CALCIUM CARB/MAGNESIUM 324 MG
162 TABLET ORAL ONCE
Refills: 0 | Status: COMPLETED | OUTPATIENT
Start: 2022-08-07 | End: 2022-08-07

## 2022-08-07 RX ORDER — ASPIRIN/CALCIUM CARB/MAGNESIUM 324 MG
81 TABLET ORAL DAILY
Refills: 0 | Status: DISCONTINUED | OUTPATIENT
Start: 2022-08-07 | End: 2022-08-09

## 2022-08-07 RX ORDER — THIAMINE MONONITRATE (VIT B1) 100 MG
100 TABLET ORAL DAILY
Refills: 0 | Status: DISCONTINUED | OUTPATIENT
Start: 2022-08-07 | End: 2022-08-09

## 2022-08-07 RX ORDER — TAMSULOSIN HYDROCHLORIDE 0.4 MG/1
0.4 CAPSULE ORAL AT BEDTIME
Refills: 0 | Status: DISCONTINUED | OUTPATIENT
Start: 2022-08-07 | End: 2022-08-09

## 2022-08-07 RX ORDER — ATORVASTATIN CALCIUM 80 MG/1
20 TABLET, FILM COATED ORAL AT BEDTIME
Refills: 0 | Status: DISCONTINUED | OUTPATIENT
Start: 2022-08-07 | End: 2022-08-09

## 2022-08-07 RX ORDER — QUETIAPINE FUMARATE 200 MG/1
25 TABLET, FILM COATED ORAL AT BEDTIME
Refills: 0 | Status: DISCONTINUED | OUTPATIENT
Start: 2022-08-07 | End: 2022-08-09

## 2022-08-07 RX ORDER — ENOXAPARIN SODIUM 100 MG/ML
40 INJECTION SUBCUTANEOUS EVERY 24 HOURS
Refills: 0 | Status: DISCONTINUED | OUTPATIENT
Start: 2022-08-07 | End: 2022-08-09

## 2022-08-07 RX ORDER — ONDANSETRON 8 MG/1
4 TABLET, FILM COATED ORAL EVERY 8 HOURS
Refills: 0 | Status: DISCONTINUED | OUTPATIENT
Start: 2022-08-07 | End: 2022-08-09

## 2022-08-07 RX ADMIN — ENOXAPARIN SODIUM 40 MILLIGRAM(S): 100 INJECTION SUBCUTANEOUS at 21:12

## 2022-08-07 RX ADMIN — QUETIAPINE FUMARATE 25 MILLIGRAM(S): 200 TABLET, FILM COATED ORAL at 21:14

## 2022-08-07 RX ADMIN — Medication 162 MILLIGRAM(S): at 10:48

## 2022-08-07 RX ADMIN — ATORVASTATIN CALCIUM 20 MILLIGRAM(S): 80 TABLET, FILM COATED ORAL at 21:13

## 2022-08-07 RX ADMIN — TAMSULOSIN HYDROCHLORIDE 0.4 MILLIGRAM(S): 0.4 CAPSULE ORAL at 21:13

## 2022-08-07 NOTE — ED PROVIDER NOTE - NS_ ATTENDINGSCRIBEDETAILS _ED_A_ED_FT
The scribe's documentation has been prepared under my direction and personally reviewed by me in its entirety. I confirm that the note above accurately reflects all work, treatment, procedures, and medical decision making performed by me.  STEVE Gordillo-MS, MD  Internal/Emergency/Critical Care Medicine

## 2022-08-07 NOTE — ED PROVIDER NOTE - NS ED ROS FT
Gen: No fever, normal appetite  Eyes: No eye irritation or discharge  ENT: No ear pain, congestion, sore throat  Resp: No cough or trouble breathing  Cardiovascular: No chest pain or palpitation  Gastroenteric: No nausea/vomiting, diarrhea, constipation  :  No change in urine output; no dysuria  MS: No joint or muscle pain  Skin: No rashes  Neuro: No headache; no abnormal movements  Remainder negative, except as per the HPI Gen: No fever, normal appetite  Eyes: No eye irritation or discharge  ENT: No ear pain, congestion, sore throat  Resp: No cough. +SOB  Cardiovascular: No palpitation. +Chest pain  Gastroenteric: No nausea/vomiting, diarrhea, constipation  :  No change in urine output; no dysuria  MS: No joint or muscle pain  Skin: No rashes  Neuro: No headache; no abnormal movements.   Remainder negative, except as per the HPI

## 2022-08-07 NOTE — ED PROVIDER NOTE - PHYSICAL EXAMINATION
PHYSICAL EXAM:  GENERAL: in NAD, Sitting comfortable in bed, in no respiratory distress  HEAD: Atraumatic, no nesbitt's sign, no periorbital ecchymosis   EYES: PERRL, EOMs intact b/l w/out deficits  ENMT: Moist membranes, no anterior/posterior, or supraclavicular LAD  CHEST/LUNG: CTAB no wheezes/rhonchi/rales  HEART: RRR no murmur/gallops/rubs  ABDOMEN: +BS, soft, NT, ND  EXTREMITIES: No LE edema, +2 radial pulses b/l  NERVOUS SYSTEM:  A&Ox4, No motor deficits or sensory deficits to b/l UEs  Heme/LYMPH: No ecchymosis or bruising or LAD  SKIN:  No new rashes or DTIs PHYSICAL EXAM:  GENERAL: in NAD, Sitting comfortable in bed, in no respiratory distress  HEAD: Atraumatic, no nesbitt's sign, no periorbital ecchymosis   EYES: PERRL, EOMs intact b/l w/out deficits  ENMT: Moist membranes, no anterior/posterior, or supraclavicular LAD  CHEST/LUNG: CTAB no wheezes/rhonchi/rales  HEART: RRR no murmur/gallops/rubs Midline surgical scar reg rate rhythm.  ABDOMEN: +BS, soft, NT, ND  EXTREMITIES: No LE edema, +2 radial pulses b/l  NERVOUS SYSTEM:  A&Ox2-3 (missed year), No motor deficits or sensory deficits to b/l UEs  Heme/LYMPH: No ecchymosis or bruising or LAD  SKIN:  No new rashes or DTIs

## 2022-08-07 NOTE — CONSULT NOTE ADULT - SUBJECTIVE AND OBJECTIVE BOX
CHIEF COMPLAINT: Patient is a 76y old  Male who presents with a chief complaint of "I was just out of it."    HPI:  75 y/o man with a history of CAD, remote CABG, atrial fibrillation (not chronically anticoagulated), BPH, alcohol abuse, paranoia/hallucinations who was transferred to the ER from the [facility] for the evaluation of an apparent syncopal episode  Details of events prior to admission are not clear - Mr De Jesus says that after breakfast he was "out of it" but cannot provide additional detail.  He cannot recall prodrome prior to reported LOC; unable to identify exacerbating / alleviating factors; doesn't recall palpitations.  He says that he feels "awful" at time of our encounter -- hungry and cannot get comfortable on stretcher in ED.  No cardiac symptoms.    PAST MEDICAL & SURGICAL HISTORY:  CAD (coronary artery disease)  Atrial fibrillation  History of alcohol use  BPH (benign prostatic hyperplasia)  Paranoia  History of hallucinations  S/P CABG (coronary artery bypass graft)    SOCIAL HISTORY:   Alcohol: Past  Smoking: Past smoker    FAMILY HISTORY: asthma (Father)    MEDICATIONS  (STANDING):  aspirin  chewable 81 milliGRAM(s) Oral daily  atorvastatin 20 milliGRAM(s) Oral at bedtime  enoxaparin Injectable 40 milliGRAM(s) SubCutaneous every 24 hours  metoprolol succinate ER 25 milliGRAM(s) Oral daily  QUEtiapine 25 milliGRAM(s) Oral at bedtime  tamsulosin 0.4 milliGRAM(s) Oral at bedtime  thiamine 100 milliGRAM(s) Oral daily    MEDICATIONS  (PRN):  acetaminophen     Tablet .. 650 milliGRAM(s) Oral every 6 hours PRN Temp greater or equal to 38C (100.4F), Mild Pain (1 - 3)  aluminum hydroxide/magnesium hydroxide/simethicone Suspension 30 milliLiter(s) Oral every 4 hours PRN Dyspepsia  melatonin 3 milliGRAM(s) Oral at bedtime PRN Insomnia  ondansetron Injectable 4 milliGRAM(s) IV Push every 8 hours PRN Nausea and/or Vomiting    Allergies: No Known Allergies    REVIEW OF SYSTEMS:  CONSTITUTIONAL: No fevers or chills  Eyes: No visual changes  NECK: No pain or stiffness  RESPIRATORY: No cough, wheezing, hemoptysis; No shortness of breath  CARDIOVASCULAR: Occasional chest pain (none now); no palpitations  GASTROINTESTINAL: No abdominal pain. No nausea, vomiting, or hematemesis; No diarrhea or constipation. No melena or hematochezia.  GENITOURINARY: No dysuria, frequency or hematuria  NEUROLOGICAL: No numbness.  SKIN: No itching or rash  All other review of systems is negative unless indicated above    VITAL SIGNS:   Vital Signs Last 24 Hrs  T(C): 36.4 (07 Aug 2022 10:37), Max: 36.5 (07 Aug 2022 09:51)  T(F): 97.6 (07 Aug 2022 10:37), Max: 97.7 (07 Aug 2022 09:51)  HR: 84 (07 Aug 2022 10:37) (83 - 84)  BP: 150/73 (07 Aug 2022 10:37) (150/73 - 165/75)  BP(mean): 93 (07 Aug 2022 10:37) (93 - 93)  RR: 18 (07 Aug 2022 10:37) (16 - 18)  SpO2: 96% (07 Aug 2022 10:37) (96% - 100%)    PHYSICAL EXAM:  Constitutional: NAD, awake  HEENT:  No oral cyanosis.  Pulmonary: Non-labored, breath sounds are clear bilaterally, Non-labored  Cardiovascular: S1 and S2, regular rate and rhythm  Gastrointestinal: Bowel Sounds present, soft, nontender.   Lymph: No edema.   Neurological: Alert, moves all extremities  Skin: No rash.  Psych:  Mood & affect appropriate    LABS:                  13.8   4.65  )-----------( 288      ( 07 Aug 2022 10:45 )             43.3     143    |  107    |  24     ----------------------------<  55     4.0     |  32     |  1.32     Ca    9.5        07 Aug 2022 10:45    TPro  7.7    /  Alb  3.7    /  TBili  0.5    /  DBili  x      /  AST  32     /  ALT  44     /  AlkPhos  84     07 Aug 2022 10:45    PT/INR - ( 07 Aug 2022 10:45 )   PT: 12.7 sec;   INR: 1.09 ratio    PTT - ( 07 Aug 2022 10:45 )  PTT:35.7 sec    Tele;  SR with occasional Wenckebach     CT Head No Cont (08.07.22 @ 11:19):  Age-appropriate involutional and ischemic gliotic changes. No  hemorrhage. No change from 5/3/2022.

## 2022-08-07 NOTE — H&P ADULT - NSHPLABSRESULTS_GEN_ALL_CORE
13.8   4.65  )-----------( 288      ( 07 Aug 2022 10:45 )             43.3   08-07    143  |  107  |  24<H>  ----------------------------<  55<L>  4.0   |  32<H>  |  1.32<H>    Ca    9.5      07 Aug 2022 10:45    TPro  7.7  /  Alb  3.7  /  TBili  0.5  /  DBili  x   /  AST  32  /  ALT  44  /  AlkPhos  84  08-07      CT Head No Cont (08.07.22 @ 11:19) >    The ventricles and sulci are prominent consistent age appropriate   involutional changes. Small vessel white matter ischemic changes are   noted. There is no hemorrhage, mass, or shift of midline structures. Bone   window examination is unremarkable.    IMPRESSION: Age-appropriate involutional and ischemic gliotic changes. No   hemorrhage. No change from 5/3/2022.      cxr overpenetrated no infiltrates effusion

## 2022-08-07 NOTE — H&P ADULT - ASSESSMENT
* Near syncope cardiac patient the episode was associated with CP  add d dimer if + I will order CTA  consult Cardiology  trend trops  tele  pending TTE  chart recording AF not AC, pt is in NSR with 1avb    * Previous hospitalization for hallucination not an issue anymore  c/w Seroquel     MOLST full code     * Near syncope cardiac patient the episode was associated with CP  add d dimer if + I will order CTA  consult Cardiology  trend trops  tele  pending TTE  chart recording AF not AC, pt is in NSR with 1avb  1.68" pauses pt is on BB    * Previous hospitalization for hallucination not an issue anymore  c/w Seroquel     MOLST full code

## 2022-08-07 NOTE — H&P ADULT - HISTORY OF PRESENT ILLNESS
77 y/o M with PMH CAD s/p CABG, A fib not on AC d/t concerns with medication non-compliance and agitation, BPH, alcohol abuse, paranoia/hallucinations withdrawal presents to the ED s/p near yncopal episode. Pt states he was eating breakfast when he felt a sudden onset of chest tightness, SOB and felt like he will pass out, did  not lose consciousness was aware of what was happening around him but he was unable to react properly. Was brought in CT head negative, non focal exam admit for near-syncope. EKG sinus with 1st AVB STT wave abnormality. Resident at NH.           77 y/o M with PMH CAD s/p CABG, A fib not on AC d/t concerns with medication non-compliance and agitation, BPH, alcohol abuse, paranoia/hallucinations withdrawal presents to the ED s/p near yncopal episode. Pt states he was eating breakfast when he felt a sudden onset of chest tightness, SOB and felt like he will pass out, did  not lose consciousness was aware of what was happening around him but he was unable to react properly. Was brought in CT head negative, non focal exam admit for near-syncope. EKG sinus with 1st AVB STT wave abnormality. Resident at NH. In ED he had few pauses longest 1.68"

## 2022-08-07 NOTE — PATIENT PROFILE ADULT - FALL HARM RISK - HARM RISK INTERVENTIONS
Assistance with ambulation/Assistance OOB with selected safe patient handling equipment/Communicate Risk of Fall with Harm to all staff/Discuss with provider need for PT consult/Monitor gait and stability/Provide patient with walking aids - walker, cane, crutches/Reinforce activity limits and safety measures with patient and family/Reorient to person, place and time as needed/Review medications for side effects contributing to fall risk/Sit up slowly, dangle for a short time, stand at bedside before walking/Tailored Fall Risk Interventions/Visual Cue: Yellow wristband and red socks/Bed in lowest position, wheels locked, appropriate side rails in place/Call bell, personal items and telephone in reach/Instruct patient to call for assistance before getting out of bed or chair/Non-slip footwear when patient is out of bed/[place] to call system/Physically safe environment - no spills, clutter or unnecessary equipment/Purposeful Proactive Rounding/Room/bathroom lighting operational, light cord in reach

## 2022-08-07 NOTE — ED ADULT NURSE NOTE - NSIMPLEMENTINTERV_GEN_ALL_ED
Implemented All Fall Risk Interventions:  [place] to call system. Call bell, personal items and telephone within reach. Instruct patient to call for assistance. Room bathroom lighting operational. Non-slip footwear when patient is off stretcher. Physically safe environment: no spills, clutter or unnecessary equipment. Stretcher in lowest position, wheels locked, appropriate side rails in place. Provide visual cue, wrist band, yellow gown, etc. Monitor gait and stability. Monitor for mental status changes and reorient to person, place, and time. Review medications for side effects contributing to fall risk. Reinforce activity limits and safety measures with patient and family.

## 2022-08-07 NOTE — ED PROVIDER NOTE - OTHER FINDINGS
1st degree AV blocks. narrow QRS and no Bundle Branch Blocks. No STEs, TWIs, Wellen's Brugada, no Delta Waves. QTc of 421

## 2022-08-07 NOTE — ED PROVIDER NOTE - PROGRESS NOTE DETAILS
Spoke to hospitalist and will admit and hospitalist will call cardiology.   -STEVE Gordillo-MS, MD  Internal/Emergency/Critical Medicine

## 2022-08-07 NOTE — CONSULT NOTE ADULT - PROBLEM SELECTOR RECOMMENDATION 2
Baseline ECG with 1st degree AV block; occasional 2nd degree Type 1 AV block on ER telemetry but no sinus pauses or high degree AV block

## 2022-08-07 NOTE — H&P ADULT - NSHPPHYSICALEXAM_GEN_ALL_CORE
Vital Signs Last 24 Hrs  T(C): 36.4 (07 Aug 2022 10:37), Max: 36.5 (07 Aug 2022 09:51)  T(F): 97.6 (07 Aug 2022 10:37), Max: 97.7 (07 Aug 2022 09:51)  HR: 84 (07 Aug 2022 10:37) (83 - 84)  BP: 150/73 (07 Aug 2022 10:37) (150/73 - 165/75)  BP(mean): 93 (07 Aug 2022 10:37) (93 - 93)  RR: 18 (07 Aug 2022 10:37) (16 - 18)  SpO2: 96% (07 Aug 2022 10:37) (96% - 100%)    Parameters below as of 07 Aug 2022 10:37  Patient On (Oxygen Delivery Method): room air          PHYSICAL EXAM:    Constitutional: NAD, awake and alert  HEENT: PERR, EOMI, Normal Hearing, MMM. + arcus senilus b/l  Neck: Soft and supple, No LAD, No JVD  Respiratory: Breath sounds are clear bilaterally, No wheezing, rales or rhonchi  Cardiovascular: S1 and S2, regular rate and rhythm, ii/vi fredy, gallops or rubs; sternotomy scar  Gastrointestinal: Bowel Sounds present, soft, nontender, nondistended, no guarding, no rebound  Extremities: No peripheral edema; chronic venous skin changes b/l, no effusions. Good rom throughout all extremities  Vascular: 2+ peripheral pulses  Neurological: A/O x 2, no focal deficits  Musculoskeletal: 4/5 strength b/l upper and lower extremities  Skin: No rashes

## 2022-08-07 NOTE — PATIENT PROFILE ADULT - VISION (WITH CORRECTIVE LENSES IF THE PATIENT USUALLY WEARS THEM):
oriented to person
Normal vision: sees adequately in most situations; can see medication labels, newsprint

## 2022-08-07 NOTE — ED ADULT TRIAGE NOTE - CHIEF COMPLAINT QUOTE
patient brought in by EMS from [facility] c/o syncopal episode.  patient had unwitnessed syncope after breakfast.   in triage. denies pain.  hx dementia- A&Ox4 in triage.

## 2022-08-07 NOTE — ED ADULT NURSE REASSESSMENT NOTE - NS ED NURSE REASSESS COMMENT FT1
Patient with 1.59 sec pause per telemetry tech. Patient standing at bedside to use urinal at the time. Patient symptomatic. MD Townsend was made aware.

## 2022-08-07 NOTE — CONSULT NOTE ADULT - PROBLEM SELECTOR RECOMMENDATION 3
Remote CABG; normal hs-troponin; continue medical management with aspirin, atorvastatin, metoprolol.

## 2022-08-07 NOTE — ED PROVIDER NOTE - OBJECTIVE STATEMENT
77 y/o male with a PMHx of Afib, BPH, CAD presents to the ED s/p syncopal episode. 75 y/o male with a PMHx of Afib, BPH, CAD presents to the ED s/p syncopal episode. Pt states he was eating breakfast when he felt a sudden onset of chest tightness, SOB and had syncopal episode, unknown length of episode, unknown head strike. Pt states sx have improved since initial onset, recently quit smoking a year ago, smoked a pack a day for over 20 years. Denies fevers, neck pain. Pt is a 75 y/o male with a PMHx of Afib not on AC, BPH, CAD presents to the ED s/p syncopal episode. Pt states he was eating breakfast when he felt a sudden onset of chest tightness, SOB and had syncopal episode, unknown length of episode, unknown head strike. Pt states sx have improved since initial onset, recently quit smoking a year ago, smoked a pack a day for over 20 years. Denies fevers, neck pain.

## 2022-08-07 NOTE — ED PROVIDER NOTE - CLINICAL SUMMARY MEDICAL DECISION MAKING FREE TEXT BOX
Pt is a 75 y/o male with a PMHx of Afib not on AC, BPH, CAD presents to the ED s/p syncopal episode and LOC concerning for possible arrhytmia as cause given on Tele while in ER patient had 1.57s pause (no symptoms at that time).   Will get:  - CBC, CMP, PT/INR, aPTT, Trop  - CXR  - Give ASA  - Will need admission to tele

## 2022-08-07 NOTE — ED ADULT NURSE NOTE - OBJECTIVE STATEMENT
Patient presents to the emergency room from [place] with complaints of syncopal episode. Patient states "I just felt off after breakfast and I was by the table." Patient states he "may have passed out, I don't know how long." Patient unsure if he lost consciousness or not. Patient denies pain, shortness of breath, dizziness, lightheadedness. Patient able to stand at bedside without complaints.

## 2022-08-08 LAB
ANION GAP SERPL CALC-SCNC: 5 MMOL/L — SIGNIFICANT CHANGE UP (ref 5–17)
BUN SERPL-MCNC: 25 MG/DL — HIGH (ref 7–23)
CALCIUM SERPL-MCNC: 9.6 MG/DL — SIGNIFICANT CHANGE UP (ref 8.5–10.1)
CHLORIDE SERPL-SCNC: 106 MMOL/L — SIGNIFICANT CHANGE UP (ref 96–108)
CO2 SERPL-SCNC: 30 MMOL/L — SIGNIFICANT CHANGE UP (ref 22–31)
CREAT SERPL-MCNC: 1.29 MG/DL — SIGNIFICANT CHANGE UP (ref 0.5–1.3)
EGFR: 58 ML/MIN/1.73M2 — LOW
GLUCOSE SERPL-MCNC: 93 MG/DL — SIGNIFICANT CHANGE UP (ref 70–99)
HCT VFR BLD CALC: 36.4 % — LOW (ref 39–50)
HGB BLD-MCNC: 13.3 G/DL — SIGNIFICANT CHANGE UP (ref 13–17)
MCHC RBC-ENTMCNC: 31.7 PG — SIGNIFICANT CHANGE UP (ref 27–34)
MCHC RBC-ENTMCNC: 36.5 GM/DL — HIGH (ref 32–36)
MCV RBC AUTO: 86.9 FL — SIGNIFICANT CHANGE UP (ref 80–100)
PLATELET # BLD AUTO: 278 K/UL — SIGNIFICANT CHANGE UP (ref 150–400)
POTASSIUM SERPL-MCNC: 4.5 MMOL/L — SIGNIFICANT CHANGE UP (ref 3.5–5.3)
POTASSIUM SERPL-SCNC: 4.5 MMOL/L — SIGNIFICANT CHANGE UP (ref 3.5–5.3)
RBC # BLD: 4.19 M/UL — LOW (ref 4.2–5.8)
RBC # FLD: 14.1 % — SIGNIFICANT CHANGE UP (ref 10.3–14.5)
SODIUM SERPL-SCNC: 141 MMOL/L — SIGNIFICANT CHANGE UP (ref 135–145)
WBC # BLD: 5.99 K/UL — SIGNIFICANT CHANGE UP (ref 3.8–10.5)
WBC # FLD AUTO: 5.99 K/UL — SIGNIFICANT CHANGE UP (ref 3.8–10.5)

## 2022-08-08 PROCEDURE — 99233 SBSQ HOSP IP/OBS HIGH 50: CPT

## 2022-08-08 PROCEDURE — 99232 SBSQ HOSP IP/OBS MODERATE 35: CPT

## 2022-08-08 RX ORDER — HALOPERIDOL DECANOATE 100 MG/ML
2 INJECTION INTRAMUSCULAR EVERY 4 HOURS
Refills: 0 | Status: DISCONTINUED | OUTPATIENT
Start: 2022-08-08 | End: 2022-08-09

## 2022-08-08 RX ADMIN — TAMSULOSIN HYDROCHLORIDE 0.4 MILLIGRAM(S): 0.4 CAPSULE ORAL at 21:08

## 2022-08-08 RX ADMIN — Medication 81 MILLIGRAM(S): at 09:27

## 2022-08-08 RX ADMIN — Medication 25 MILLIGRAM(S): at 09:27

## 2022-08-08 RX ADMIN — HALOPERIDOL DECANOATE 2 MILLIGRAM(S): 100 INJECTION INTRAMUSCULAR at 19:23

## 2022-08-08 RX ADMIN — QUETIAPINE FUMARATE 25 MILLIGRAM(S): 200 TABLET, FILM COATED ORAL at 21:08

## 2022-08-08 RX ADMIN — Medication 100 MILLIGRAM(S): at 09:27

## 2022-08-08 RX ADMIN — ENOXAPARIN SODIUM 40 MILLIGRAM(S): 100 INJECTION SUBCUTANEOUS at 21:08

## 2022-08-08 RX ADMIN — ATORVASTATIN CALCIUM 20 MILLIGRAM(S): 80 TABLET, FILM COATED ORAL at 21:08

## 2022-08-08 NOTE — PROGRESS NOTE ADULT - PROBLEM SELECTOR PLAN 3
Remote CABG; abnormal EKG prior MI ,  no chest pain , normal hs-troponin; continue medical management with aspirin, atorvastatin, metoprolol.

## 2022-08-08 NOTE — PROGRESS NOTE ADULT - PROBLEM SELECTOR PLAN 2
Baseline ECG with 1st degree AV block; occasional 2nd degree Type 1 AV block on ER telemetry but no sinus pauses or high degree AV block.

## 2022-08-08 NOTE — PROGRESS NOTE ADULT - SUBJECTIVE AND OBJECTIVE BOX
CHIEF COMPLAINT: Patient is a 76y old  Male who presents with a chief complaint of "I was just out of it."    HPI:  77 y/o man with a history of CAD, remote CABG, atrial fibrillation (not chronically anticoagulated), BPH, alcohol abuse, paranoia/hallucinations who was transferred to the ER from the [facility] for the evaluation of an apparent syncopal episode  Details of events prior to admission are not clear - Mr De Jesus says that after breakfast he was "out of it" but cannot provide additional detail.  He cannot recall prodrome prior to reported LOC; unable to identify exacerbating / alleviating factors; doesn't recall palpitations.  He says that he feels "awful" at time of our encounter -- hungry and cannot get comfortable on stretcher in ED.  No cardiac symptoms.    8/8/22 Patient is feeling well , he was sitting when he passed out , blood work showed elevated bun/creatinine , monitor showed no significant AV blokc BP stable     PAST MEDICAL & SURGICAL HISTORY:  CAD (coronary artery disease)  Atrial fibrillation  History of alcohol use  BPH (benign prostatic hyperplasia)  Paranoia  History of hallucinations  S/P CABG (coronary artery bypass graft)    SOCIAL HISTORY:   Alcohol: Past  Smoking: Past smoker    FAMILY HISTORY: asthma (Father)    MEDICATIONS  (STANDING):  aspirin  chewable 81 milliGRAM(s) Oral daily  atorvastatin 20 milliGRAM(s) Oral at bedtime  enoxaparin Injectable 40 milliGRAM(s) SubCutaneous every 24 hours  metoprolol succinate ER 25 milliGRAM(s) Oral daily  QUEtiapine 25 milliGRAM(s) Oral at bedtime  tamsulosin 0.4 milliGRAM(s) Oral at bedtime  thiamine 100 milliGRAM(s) Oral daily    MEDICATIONS  (PRN):  acetaminophen     Tablet .. 650 milliGRAM(s) Oral every 6 hours PRN Temp greater or equal to 38C (100.4F), Mild Pain (1 - 3)  aluminum hydroxide/magnesium hydroxide/simethicone Suspension 30 milliLiter(s) Oral every 4 hours PRN Dyspepsia  melatonin 3 milliGRAM(s) Oral at bedtime PRN Insomnia  ondansetron Injectable 4 milliGRAM(s) IV Push every 8 hours PRN Nausea and/or Vomiting    Allergies: No Known Allergies    REVIEW OF SYSTEMS:  CONSTITUTIONAL: No fevers or chills  Eyes: No visual changes  NECK: No pain or stiffness  RESPIRATORY: No cough, wheezing, hemoptysis; No shortness of breath  CARDIOVASCULAR: Occasional chest pain (none now); no palpitations  GASTROINTESTINAL: No abdominal pain. No nausea, vomiting, or hematemesis; No diarrhea or constipation. No melena or hematochezia.  GENITOURINARY: No dysuria, frequency or hematuria  NEUROLOGICAL: No numbness.  SKIN: No itching or rash  All other review of systems is negative unless indicated above    VITAL SIGNS:   Vital Signs Last 24 Hrs  T(C): 36.4 (07 Aug 2022 10:37), Max: 36.5 (07 Aug 2022 09:51)  T(F): 97.6 (07 Aug 2022 10:37), Max: 97.7 (07 Aug 2022 09:51)  HR: 84 (07 Aug 2022 10:37) (83 - 84)  BP: 150/73 (07 Aug 2022 10:37) (150/73 - 165/75)  BP(mean): 93 (07 Aug 2022 10:37) (93 - 93)  RR: 18 (07 Aug 2022 10:37) (16 - 18)  SpO2: 96% (07 Aug 2022 10:37) (96% - 100%)    PHYSICAL EXAM:  Constitutional: NAD, awake  HEENT:  No oral cyanosis.  Pulmonary: Non-labored, breath sounds are clear bilaterally, Non-labored  Cardiovascular: S1 and S2, regular rate and rhythm III/VI SM   Gastrointestinal: Bowel Sounds present, soft, nontender.   Lymph: No edema.   Neurological: Alert, moves all extremities  Skin: No rash.  Psych:  Mood & affect appropriate    LABS:                  13.8   4.65  )-----------( 288      ( 07 Aug 2022 10:45 )             43.3     143    |  107    |  24     ----------------------------<  55     4.0     |  32     |  1.32     Ca    9.5        07 Aug 2022 10:45    TPro  7.7    /  Alb  3.7    /  TBili  0.5    /  DBili  x      /  AST  32     /  ALT  44     /  AlkPhos  84     07 Aug 2022 10:45    PT/INR - ( 07 Aug 2022 10:45 )   PT: 12.7 sec;   INR: 1.09 ratio    PTT - ( 07 Aug 2022 10:45 )  PTT:35.7 sec    Tele;  SR with first degree AV block ,     `< from: 12 Lead ECG (08.07.22 @ 10:04) >  Sinus rhythm with 1st degree A-V block with occasional Premature ventricular complexes  Anteroseptal infarct (cited on or before 09-SEP-2012)  ST & T wave abnormality, consider inferior ischemia  Abnormal ECG  When compared with ECG of 08-MAY-2022 06:54,  Premature ventricular complexes are now Present  Questionable change in initial forces of Septal leads  Non-specific change in ST segment in Inferior leads  T wave inversion now evident in Inferior leads    < end of copied text >      CT Head No Cont (08.07.22 @ 11:19):  Age-appropriate involutional and ischemic gliotic changes. No  hemorrhage. No change from 5/3/2022.

## 2022-08-08 NOTE — PROGRESS NOTE ADULT - SUBJECTIVE AND OBJECTIVE BOX
CODE STATUS: dnr  Hospital day # 2    chief complaint: near LOC    Vital Signs Last 24 Hrs  T(C): 36.6 (08 Aug 2022 15:37), Max: 36.6 (08 Aug 2022 15:37)  T(F): 97.9 (08 Aug 2022 15:37), Max: 97.9 (08 Aug 2022 15:37)  HR: 86 (08 Aug 2022 15:37) (86 - 93)  BP: 138/76 (08 Aug 2022 15:37) (126/73 - 172/93)  BP(mean): --  RR: 19 (08 Aug 2022 15:37) (16 - 19)  SpO2: 99% (08 Aug 2022 15:37) (98% - 100%)    Parameters below as of 08 Aug 2022 15:37  Patient On (Oxygen Delivery Method): room air    pt is paranoic talking about woman he loved and will take his $. Called # listed in chart that person's cell was about to die I was instructed to call in 1H      Gen not in acute distress  HEENT nc at perrla  Neck supple no JVD  Chest CTA  CVS s1s2 reg, no m/g/r  Abd soft nt/nd + BS   voiding  Neuro non focal mot str 5/5 all extr  Skin no rash  Musculoskeletal FROM    Home meds:     Labs:     TTE:    EKG:    RADIOLOGY:       Procedures:           A/P:    * Near LOC  Baseline ECG with 1st degree AV block; occasional 2nd degree Type 1 AV block on ER telemetry but no sinus pauses or high degree AV block.  Cardio following and noted the occasional 2nd degree  pt is paranoic        Dispo:

## 2022-08-08 NOTE — PROGRESS NOTE ADULT - SUBJECTIVE AND OBJECTIVE BOX
CODE STATUS:  Hospital day #    chief complaint:     HPI:    O2 delivery and O2 sat:      [place]:    Vitals      Gen not in acute distress  HEENT nc at perrla  Neck supple no JVD  Chest CTA  CVS s1s2 reg, no m/g/r  Abd soft nt/nd + BS   voiding  Neuro non focal mot str 5/5 all extr  Skin no rash  Musculoskeletal FROM    Home meds:     Labs:     TTE:    EKG:    RADIOLOGY:       Procedures:           A/P:            Dispo:

## 2022-08-08 NOTE — PROGRESS NOTE ADULT - PROBLEM SELECTOR PLAN 1
possibly due to dehydration , ? mild hypertensive , no evidence complex arrhythmia , or high degree AV block on monitor , check orthostatic BP , and follow up echo. encourage patient to increase fluid intake ,     abnormal EKG old MI ,  T changes old negative troponin , no chest pain

## 2022-08-09 ENCOUNTER — TRANSCRIPTION ENCOUNTER (OUTPATIENT)
Age: 77
End: 2022-08-09

## 2022-08-09 VITALS
RESPIRATION RATE: 18 BRPM | DIASTOLIC BLOOD PRESSURE: 74 MMHG | SYSTOLIC BLOOD PRESSURE: 125 MMHG | TEMPERATURE: 98 F | HEART RATE: 86 BPM | OXYGEN SATURATION: 99 %

## 2022-08-09 PROCEDURE — 93306 TTE W/DOPPLER COMPLETE: CPT | Mod: 26

## 2022-08-09 PROCEDURE — 99239 HOSP IP/OBS DSCHRG MGMT >30: CPT

## 2022-08-09 RX ADMIN — Medication 25 MILLIGRAM(S): at 09:27

## 2022-08-09 RX ADMIN — Medication 3 MILLIGRAM(S): at 02:42

## 2022-08-09 RX ADMIN — Medication 81 MILLIGRAM(S): at 09:27

## 2022-08-09 RX ADMIN — HALOPERIDOL DECANOATE 2 MILLIGRAM(S): 100 INJECTION INTRAMUSCULAR at 02:27

## 2022-08-09 RX ADMIN — Medication 100 MILLIGRAM(S): at 09:27

## 2022-08-09 NOTE — PHYSICAL THERAPY INITIAL EVALUATION ADULT - GENERAL OBSERVATIONS, REHAB EVAL
Pt was found sitting at eob with his legs between side rails, pt on tele, pleasantly confused, willing to participate in PT

## 2022-08-09 NOTE — DISCHARGE NOTE PROVIDER - HOSPITAL COURSE
A/P:    * Near LOC  Baseline ECG with 1st degree AV block; occasional 2nd degree Type 1 AV block on ER telemetry but no sinus pauses or high degree AV block.  Cardio following and noted the occasional 2nd degree no intervention was suggested  pt is paranoic- chronic condition

## 2022-08-09 NOTE — DISCHARGE NOTE NURSING/CASE MANAGEMENT/SOCIAL WORK - NSDCPEFALRISK_GEN_ALL_CORE
For information on Fall & Injury Prevention, visit: [email or web address] OR  [email or web address] OR  https://www.cdc.gov/steadi/patient.html

## 2022-08-09 NOTE — DISCHARGE NOTE PROVIDER - CARE PROVIDER_API CALL
Palla, Venugopal R (MD)  Cardiovascular Disease; Internal Medicine  [address], [place] [id number]  Phone: [phone]  Fax: [phone]  Follow Up Time: 2 weeks

## 2022-08-09 NOTE — DISCHARGE NOTE NURSING/CASE MANAGEMENT/SOCIAL WORK - PATIENT PORTAL LINK FT
You can access the FollowMyHealth Patient Portal offered by [facility] by registering at the following website: [email or web address]. By joining [employer]’s FollowMyHealth portal, you will also be able to view your health information using other applications (apps) compatible with our system.

## 2022-08-09 NOTE — DISCHARGE NOTE PROVIDER - NSDCMRMEDTOKEN_GEN_ALL_CORE_FT
acetaminophen 325 mg oral tablet: 2 tab(s) orally every 6 hours, As needed,   aspirin 81 mg oral tablet, chewable: 1 tab(s) orally once a day  atorvastatin 20 mg oral tablet: 1 tab(s) orally once a day (at bedtime)  folic acid 1 mg oral tablet: 1 tab(s) orally once a day  melatonin 3 mg oral tablet: 1 tab(s) orally once a day (at bedtime), As needed, Insomnia  metoprolol succinate 25 mg oral tablet, extended release: 1 tab(s) orally once a day  Multiple Vitamins oral tablet: 1 tab(s) orally once a day  QUEtiapine 25 mg oral tablet: 1 tab(s) orally once a day (at bedtime)  tamsulosin 0.4 mg oral capsule: 1 cap(s) orally once a day (at bedtime)  thiamine 100 mg oral tablet: 1 tab(s) orally once a day

## 2022-08-09 NOTE — PHYSICAL THERAPY INITIAL EVALUATION ADULT - PERTINENT HX OF CURRENT PROBLEM, REHAB EVAL
Pt was transferred to the ER from the [facility] for the evaluation of an apparent syncopal episode  Details of events prior to admission are not clear - Mr De Jesus says that after breakfast he was "out of it" but cannot provide additional detail. he was sitting when he passed out ,  He cannot recall prodrome prior to reported LOC; unable to identify exacerbating / alleviating factors; doesn't recall palpitations.  He says that he feels "awful" at time of our encounter -- hungry and cannot get comfortable on stretcher in ED.

## 2022-08-12 DIAGNOSIS — I48.91 UNSPECIFIED ATRIAL FIBRILLATION: ICD-10-CM

## 2022-08-12 DIAGNOSIS — F22 DELUSIONAL DISORDERS: ICD-10-CM

## 2022-08-12 DIAGNOSIS — I25.10 ATHEROSCLEROTIC HEART DISEASE OF NATIVE CORONARY ARTERY WITHOUT ANGINA PECTORIS: ICD-10-CM

## 2022-08-12 DIAGNOSIS — I44.1 ATRIOVENTRICULAR BLOCK, SECOND DEGREE: ICD-10-CM

## 2022-08-12 DIAGNOSIS — Z95.1 PRESENCE OF AORTOCORONARY BYPASS GRAFT: ICD-10-CM

## 2022-08-12 DIAGNOSIS — Z87.891 PERSONAL HISTORY OF NICOTINE DEPENDENCE: ICD-10-CM

## 2022-08-12 DIAGNOSIS — R55 SYNCOPE AND COLLAPSE: ICD-10-CM

## 2022-08-12 DIAGNOSIS — I25.2 OLD MYOCARDIAL INFARCTION: ICD-10-CM

## 2022-08-12 DIAGNOSIS — N40.0 BENIGN PROSTATIC HYPERPLASIA WITHOUT LOWER URINARY TRACT SYMPTOMS: ICD-10-CM

## 2023-03-10 NOTE — PATIENT PROFILE ADULT - SAFE PLACE TO LIVE
Wound Eval / Progress Noted    [facility]     Patient Name: Mariela Aldrich  : 1968  MRN: [id number]  Today's Date: 3/10/2023                 Admit Date: 3/10/2023    Visit Dx:    ICD-10-CM ICD-9-CM   1. Hidradenitis  L73.2 705.83       Patient Active Problem List   Diagnosis   • Anxiety   • Type 2 diabetes mellitus with hyperglycemia, with long-term current use of insulin ([facility])   • Hyperlipidemia   • Hypothyroidism   • Panic attack   • RLS (restless legs syndrome)   • Sinus trouble   • OAB (overactive bladder)   • Mild intermittent asthma without complication   • Hidradenitis        Past Medical History:   Diagnosis Date   • Anxiety    • Asthma     PRN INHALER AND TAKES ALLERGY INJECTIONS   • Diabetes mellitus type 2, controlled ([facility])     AVERAGE    • Hidradenitis    • Hyperlipidemia    • Hypothyroidism    • Murmur     DIAGNOSED WHEN 19 BUT IS ASYMPTOMATIC AND IS FOLLOWED ONLY BY PCP   • Panic attack    • Pilonidal cyst    • Rheumatic fever     AS A CHILD   • RLS (restless legs syndrome)    • Sinus trouble         Past Surgical History:   Procedure Laterality Date   • COLONOSCOPY  2020    exernal hemorrhoids   • GROIN ABSCESS INCISION AND DRAINAGE Right     hidradenitis   • HYSTERECTOMY     • KNEE SURGERY Bilateral    • PILONIDAL CYSTECTOMY N/A 2023    Procedure: Excision Hidradenitis of Inguinal Area and Pilonidal Cystectomy;  Surgeon: Donato You MD;  Location: [facility];  Service: General;  Laterality: N/A;         Physical Assessment:  Wound 23 1333 medial gluteal Incision (Active)   Wound Image   03/10/23 1100   Dressing Appearance dry;intact 03/10/23 1100   Closure None 03/10/23 1100   Base moist;red;granulating;bleeding;other (see comments) 03/10/23 1100   Periwound redness;moist;intact 03/10/23 1100   Periwound Temperature warm 03/10/23 1100   Periwound Skin Turgor soft 03/10/23 1100   Edges open 03/10/23 1100   Wound Length (cm) 4.2 cm 03/10/23 1100    Wound Width (cm) 1.1 cm 03/10/23 1100   Wound Depth (cm) 3.5 cm 03/10/23 1100   Wound Surface Area (cm^2) 4.62 cm^2 03/10/23 1100   Wound Volume (cm^3) 16.17 cm^3 03/10/23 1100   Drainage Characteristics/Odor serosanguineous;bleeding controlled 03/10/23 1100   Drainage Amount moderate 03/10/23 1100   Care, Wound cleansed with;irrigated with;sterile normal saline;negative pressure wound therapy 03/10/23 1100   Dressing Care dressing applied;foam;transparent film;gauze;hydrocolloid;hydrofiber;silver impregnated 03/10/23 1100   Periwound Care absorptive dressing applied;hydrocolloid barrier applied 03/10/23 1100       Wound 02/08/23 1424 Left lower abdomen Incision (Active)   Wound Image   03/10/23 1100   Dressing Appearance moist drainage;intact 03/10/23 1100   Closure None 03/10/23 1100   Base red;dry;scab;moist 03/10/23 1100   Periwound intact;dry;redness 03/10/23 1100   Periwound Temperature warm 03/10/23 1100   Periwound Skin Turgor soft 03/10/23 1100   Edges open;rolled/closed 03/10/23 1100   Drainage Characteristics/Odor serosanguineous;yellow 03/10/23 1100   Drainage Amount small 03/10/23 1100   Care, Wound cleansed with;sterile normal saline 03/10/23 1100   Dressing Care dressing applied;hydrofiber;silver impregnated;abdominal pad 03/10/23 1100   Periwound Care absorptive dressing applied 03/10/23 1100       Wound 02/08/23 1425 Right groin (Active)   Wound Image   03/10/23 1100   Dressing Appearance moist drainage 03/10/23 1100   Closure None 03/10/23 1100   Base moist;red 03/10/23 1100   Periwound intact;redness;dry 03/10/23 1100   Periwound Temperature warm 03/10/23 1100   Periwound Skin Turgor soft 03/10/23 1100   Edges open 03/10/23 1100   Wound Length (cm) 0.3 cm 03/10/23 1100   Wound Width (cm) 1 cm 03/10/23 1100   Wound Depth (cm) 0.8 cm 03/10/23 1100   Wound Surface Area (cm^2) 0.3 cm^2 03/10/23 1100   Wound Volume (cm^3) 0.24 cm^3 03/10/23 1100   Drainage Characteristics/Odor serosanguineous  03/10/23 1100   Drainage Amount small 03/10/23 1100   Care, Wound cleansed with;irrigated with;sterile normal saline 03/10/23 1100   Dressing Care dressing applied;packed with;packing strip;silicone;border dressing 03/10/23 1100   Periwound Care absorptive dressing applied 03/10/23 1100      Wound Check / Follow-up:  Patient seen today for wound follow-up and wound VAC dressing change. Wound VAC foam compressed, VAC is functioning properly without alarms. Patient and spouse report concerns with odor from from wound VAC. Educated patient and spouse regarding odor produced from canister gel that solidifies drainage. They verbalized understanding. Removed dressing. Cleansed and irrigated perirectal wound with normal saline and gauze. Penrose drain remains in place. Wound bed with red, moist tissue. Periwound with some irritated tissue noted. Placed silver impregnated hydrofiber to periwound tissue. Filled wound bed with one piece of black foam and secured with transparent drape. Applied a second piece of black foam for bridge and track pad. Wound VAC connected, foam compressed. Leak noted to distal portion. Applied strip of hydrocolloid. Seal obtained. Surgical incision to abdominal fold with penrose drain in place. Drainage irritating skin surrounding drain. Cleansed with normal saline and gauze. Applied silver impregnated hydrofiber secured with ABD pad and paper tape. Right groin with small open area. Cleansed wound with normal saline and gauze. Filled wound with plain packing strip gauze and secured with silicone border dressing. Patient and spouse express concerns about possible need for daily dressing changes to groin and abdomen. They state they can manage this at home in the in between time between wound VAC dressing changes. Educated them on daily dressing changes. Recommending to continue current care.      Impression: Surgical incision to gluteal crease, right groin, and abdomen with delayed  closure.      Short term goals:  Regain skin integrity, negative pressure wound therapy.      Lanette Cantu RN    3/10/2023    11:37 EST    no
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
## 2024-08-30 NOTE — PROGRESS NOTE ADULT - ASSESSMENT
Home Care is calling regarding an established patient with [facility].       Requesting orders from: Shola Benoit  Provider is following patient: No       Orders Requested  Delay in start of care to date: 9/4/24 due to staffing      Confirmed ok to leave a detailed message with call back.  Contact information confirmed and updated as needed.    Kavita Patel RN    75 y/o M with PMH CAD s/p CABG, A fib not on AC d/t concerns with medication non-compliance and agitation, BPH, alcohol abuse, paranoia/hallucinations recently d/c in 2/2022 for fluctuating encephalopathy with behavioral/mood disorder and diagnosed with neurocognitive decline with delirium and ruled out for Wernicke's encephalopathy now presents with similar features.  Pt extremely tangential during exam and states " you're the doctor, figure out why I am here!" Believes the hospital is his primary residence and needs constant redirection during exam. States he has been falling alot due to pain in his knees and legs. Walks with a cane however still needs to walk close to sturdy objects to mitigate his falls risk. States  he is a drinker but will not quantify how much he drinks and states " not alot."     PROBLEMS:    Aspiratiob syndrome  Neurocognitive decline  Likely superimposed with metabolic encephalopathy-ETOH abuse  UTI  FUNMILAYO    PLAN:    pulmonary stable  Aspiration precaution  CTH/C scpine negative  po ceftin  F/u PT consult, may need home care vs placement is pt having significant falls and lives alone  Monitor for s/s of withdrawal. CIWA with ativan prn  MVI, IV thiamine for 3 days, folate   DVT px: HSQ

## 2024-09-12 NOTE — ED PROVIDER NOTE - NS_EDPROVIDERDISPOUSERTYPE_ED_A_ED
Scribe Attestation (For Scribes USE Only)... yes Attending Attestation (For Attendings USE Only)... Attending Attestation (For Attendings USE Only).../Scribe Attestation (For Scribes USE Only)...

## 2024-12-17 ENCOUNTER — EMERGENCY (EMERGENCY)
Facility: HOSPITAL | Age: 79
LOS: 0 days | Discharge: HOME | End: 2024-12-17
Attending: EMERGENCY MEDICINE
Payer: MEDICARE

## 2024-12-17 VITALS
SYSTOLIC BLOOD PRESSURE: 155 MMHG | DIASTOLIC BLOOD PRESSURE: 76 MMHG | OXYGEN SATURATION: 100 % | RESPIRATION RATE: 16 BRPM | HEART RATE: 59 BPM | TEMPERATURE: 98 F

## 2024-12-17 VITALS
TEMPERATURE: 98 F | HEART RATE: 73 BPM | RESPIRATION RATE: 18 BRPM | SYSTOLIC BLOOD PRESSURE: 151 MMHG | DIASTOLIC BLOOD PRESSURE: 84 MMHG | OXYGEN SATURATION: 99 %

## 2024-12-17 DIAGNOSIS — I25.10 ATHEROSCLEROTIC HEART DISEASE OF NATIVE CORONARY ARTERY WITHOUT ANGINA PECTORIS: ICD-10-CM
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
## 2024-12-17 DIAGNOSIS — M79.672 PAIN IN LEFT FOOT: ICD-10-CM

## 2024-12-17 DIAGNOSIS — N39.0 URINARY TRACT INFECTION, SITE NOT SPECIFIED: ICD-10-CM

## 2024-12-17 DIAGNOSIS — M54.2 CERVICALGIA: ICD-10-CM

## 2024-12-17 DIAGNOSIS — Z79.82 LONG TERM (CURRENT) USE OF ASPIRIN: ICD-10-CM

## 2024-12-17 DIAGNOSIS — N40.0 BENIGN PROSTATIC HYPERPLASIA WITHOUT LOWER URINARY TRACT SYMPTOMS: ICD-10-CM

## 2024-12-17 DIAGNOSIS — Z95.1 PRESENCE OF AORTOCORONARY BYPASS GRAFT: Chronic | ICD-10-CM
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
## 2024-12-17 DIAGNOSIS — Z95.1 PRESENCE OF AORTOCORONARY BYPASS GRAFT: ICD-10-CM

## 2024-12-17 DIAGNOSIS — I48.91 UNSPECIFIED ATRIAL FIBRILLATION: ICD-10-CM

## 2024-12-17 LAB
ALBUMIN SERPL ELPH-MCNC: 3.3 G/DL — SIGNIFICANT CHANGE UP (ref 3.3–5)
ALP SERPL-CCNC: 90 U/L — SIGNIFICANT CHANGE UP (ref 40–120)
ALT FLD-CCNC: 29 U/L — SIGNIFICANT CHANGE UP (ref 12–78)
APPEARANCE UR: CLEAR — SIGNIFICANT CHANGE UP
AST SERPL-CCNC: 27 U/L — SIGNIFICANT CHANGE UP (ref 15–37)
BACTERIA # UR AUTO: ABNORMAL /HPF
BASOPHILS # BLD AUTO: 0.06 K/UL — SIGNIFICANT CHANGE UP (ref 0–0.2)
BASOPHILS NFR BLD AUTO: 0.9 % — SIGNIFICANT CHANGE UP (ref 0–2)
BILIRUB SERPL-MCNC: 0.6 MG/DL — SIGNIFICANT CHANGE UP (ref 0.2–1.2)
BILIRUB UR-MCNC: NEGATIVE — SIGNIFICANT CHANGE UP
BUN SERPL-MCNC: 26 MG/DL — HIGH (ref 7–23)
CALCIUM SERPL-MCNC: 9.5 MG/DL — SIGNIFICANT CHANGE UP (ref 8.5–10.1)
CAST: 0 /LPF — SIGNIFICANT CHANGE UP (ref 0–4)
CHLORIDE SERPL-SCNC: 109 MMOL/L — HIGH (ref 96–108)
CO2 SERPL-SCNC: 30 MMOL/L — SIGNIFICANT CHANGE UP (ref 22–31)
COLOR SPEC: YELLOW — SIGNIFICANT CHANGE UP
CREAT SERPL-MCNC: 1.36 MG/DL — HIGH (ref 0.5–1.3)
DIFF PNL FLD: NEGATIVE — SIGNIFICANT CHANGE UP
EGFR: 53 ML/MIN/1.73M2 — LOW
EOSINOPHIL # BLD AUTO: 0.28 K/UL — SIGNIFICANT CHANGE UP (ref 0–0.5)
EOSINOPHIL NFR BLD AUTO: 4.1 % — SIGNIFICANT CHANGE UP (ref 0–6)
GLUCOSE SERPL-MCNC: 92 MG/DL — SIGNIFICANT CHANGE UP (ref 70–99)
GLUCOSE UR QL: NEGATIVE MG/DL — SIGNIFICANT CHANGE UP
HCT VFR BLD CALC: 38.4 % — LOW (ref 39–50)
HGB BLD-MCNC: 12.2 G/DL — LOW (ref 13–17)
IMM GRANULOCYTES NFR BLD AUTO: 0.1 % — SIGNIFICANT CHANGE UP (ref 0–0.9)
KETONES UR-MCNC: NEGATIVE MG/DL — SIGNIFICANT CHANGE UP
LEUKOCYTE ESTERASE UR-ACNC: ABNORMAL
LYMPHOCYTES # BLD AUTO: 2.44 K/UL — SIGNIFICANT CHANGE UP (ref 1–3.3)
LYMPHOCYTES # BLD AUTO: 35.9 % — SIGNIFICANT CHANGE UP (ref 13–44)
MCHC RBC-ENTMCNC: 28.2 PG — SIGNIFICANT CHANGE UP (ref 27–34)
MCHC RBC-ENTMCNC: 31.8 G/DL — LOW (ref 32–36)
MCV RBC AUTO: 88.9 FL — SIGNIFICANT CHANGE UP (ref 80–100)
MONOCYTES # BLD AUTO: 0.54 K/UL — SIGNIFICANT CHANGE UP (ref 0–0.9)
MONOCYTES NFR BLD AUTO: 7.9 % — SIGNIFICANT CHANGE UP (ref 2–14)
NEUTROPHILS # BLD AUTO: 3.47 K/UL — SIGNIFICANT CHANGE UP (ref 1.8–7.4)
NEUTROPHILS NFR BLD AUTO: 51.1 % — SIGNIFICANT CHANGE UP (ref 43–77)
NITRITE UR-MCNC: POSITIVE
PH UR: 7.5 — SIGNIFICANT CHANGE UP (ref 5–8)
PLATELET # BLD AUTO: 354 K/UL — SIGNIFICANT CHANGE UP (ref 150–400)
POTASSIUM SERPL-MCNC: 5 MMOL/L — SIGNIFICANT CHANGE UP (ref 3.5–5.3)
POTASSIUM SERPL-SCNC: 5 MMOL/L — SIGNIFICANT CHANGE UP (ref 3.5–5.3)
PROT SERPL-MCNC: 7.3 GM/DL — SIGNIFICANT CHANGE UP (ref 6–8.3)
PROT UR-MCNC: SIGNIFICANT CHANGE UP MG/DL
RBC # BLD: 4.32 M/UL — SIGNIFICANT CHANGE UP (ref 4.2–5.8)
RBC # FLD: 13.1 % — SIGNIFICANT CHANGE UP (ref 10.3–14.5)
RBC CASTS # UR COMP ASSIST: 2 /HPF — SIGNIFICANT CHANGE UP (ref 0–4)
SODIUM SERPL-SCNC: 139 MMOL/L — SIGNIFICANT CHANGE UP (ref 135–145)
SP GR SPEC: 1.01 — SIGNIFICANT CHANGE UP (ref 1–1.03)
SQUAMOUS # UR AUTO: 0 /HPF — SIGNIFICANT CHANGE UP (ref 0–5)
UROBILINOGEN FLD QL: 1 MG/DL — SIGNIFICANT CHANGE UP (ref 0.2–1)
WBC # BLD: 6.8 K/UL — SIGNIFICANT CHANGE UP (ref 3.8–10.5)
WBC # FLD AUTO: 6.8 K/UL — SIGNIFICANT CHANGE UP (ref 3.8–10.5)
WBC UR QL: 83 /HPF — HIGH (ref 0–5)

## 2024-12-17 PROCEDURE — 81001 URINALYSIS AUTO W/SCOPE: CPT

## 2024-12-17 PROCEDURE — 71250 CT THORAX DX C-: CPT | Mod: 26,MC

## 2024-12-17 PROCEDURE — 87186 SC STD MICRODIL/AGAR DIL: CPT

## 2024-12-17 PROCEDURE — 72125 CT NECK SPINE W/O DYE: CPT | Mod: 26,MC

## 2024-12-17 PROCEDURE — 36415 COLL VENOUS BLD VENIPUNCTURE: CPT

## 2024-12-17 PROCEDURE — 80053 COMPREHEN METABOLIC PANEL: CPT

## 2024-12-17 PROCEDURE — 71045 X-RAY EXAM CHEST 1 VIEW: CPT | Mod: 26

## 2024-12-17 PROCEDURE — 99285 EMERGENCY DEPT VISIT HI MDM: CPT

## 2024-12-17 PROCEDURE — 74176 CT ABD & PELVIS W/O CONTRAST: CPT | Mod: MC

## 2024-12-17 PROCEDURE — 87086 URINE CULTURE/COLONY COUNT: CPT

## 2024-12-17 PROCEDURE — 85025 COMPLETE CBC W/AUTO DIFF WBC: CPT

## 2024-12-17 PROCEDURE — 73620 X-RAY EXAM OF FOOT: CPT | Mod: LT

## 2024-12-17 PROCEDURE — 71250 CT THORAX DX C-: CPT | Mod: MC

## 2024-12-17 PROCEDURE — 96374 THER/PROPH/DIAG INJ IV PUSH: CPT

## 2024-12-17 PROCEDURE — 73620 X-RAY EXAM OF FOOT: CPT | Mod: 26,LT

## 2024-12-17 PROCEDURE — 71045 X-RAY EXAM CHEST 1 VIEW: CPT

## 2024-12-17 PROCEDURE — 70450 CT HEAD/BRAIN W/O DYE: CPT | Mod: MC

## 2024-12-17 PROCEDURE — 70450 CT HEAD/BRAIN W/O DYE: CPT | Mod: 26,MC

## 2024-12-17 PROCEDURE — 99284 EMERGENCY DEPT VISIT MOD MDM: CPT | Mod: 25

## 2024-12-17 PROCEDURE — 72125 CT NECK SPINE W/O DYE: CPT | Mod: MC

## 2024-12-17 PROCEDURE — 74176 CT ABD & PELVIS W/O CONTRAST: CPT | Mod: 26,MC

## 2024-12-17 RX ORDER — CEPHALEXIN 500 MG
1 CAPSULE ORAL
Qty: 28 | Refills: 0
Start: 2024-12-17 | End: 2024-12-23

## 2024-12-17 RX ORDER — ACETAMINOPHEN 500MG 500 MG/1
650 TABLET, COATED ORAL ONCE
Refills: 0 | Status: COMPLETED | OUTPATIENT
Start: 2024-12-17 | End: 2024-12-17

## 2024-12-17 RX ORDER — CEFTRIAXONE SODIUM 1 G
1000 VIAL (EA) INJECTION ONCE
Refills: 0 | Status: COMPLETED | OUTPATIENT
Start: 2024-12-17 | End: 2024-12-17

## 2024-12-17 RX ORDER — CEFTRIAXONE SODIUM 1 G
1000 VIAL (EA) INJECTION ONCE
Refills: 0 | Status: DISCONTINUED | OUTPATIENT
Start: 2024-12-17 | End: 2024-12-17

## 2024-12-17 RX ADMIN — Medication 1000 MILLIGRAM(S): at 14:34

## 2024-12-17 RX ADMIN — ACETAMINOPHEN 500MG 650 MILLIGRAM(S): 500 TABLET, COATED ORAL at 11:21

## 2024-12-17 NOTE — ED PROVIDER NOTE - NSFOLLOWUPINSTRUCTIONS_ED_ALL_ED_FT
Please take Keflex 500 mg 4 times a day for UTI.  Please take acetaminophen for pain.  Return to ED if any concerning symptoms.

## 2024-12-17 NOTE — ED PROVIDER NOTE - OBJECTIVE STATEMENT
75 y/o M with PMH CAD s/p CABG, A fib BPH, alcohol abuse, paranoia/hallucinations withdrawal presents to the ED from nursing facility for unwitnessed fall.  Patient says he fell out of his chair.  Patient complaining of foot pain back pain and neck pain at this time.  Patient states he takes baby aspirin.

## 2024-12-17 NOTE — ED ADULT NURSE NOTE - OBJECTIVE STATEMENT
77 y/o M with PMH CAD s/p CABG, A fib BPH, alcohol abuse, paranoia/hallucinations withdrawal presents to the ED from nursing facility for unwitnessed fall.  Patient says he fell out of his chair.  Patient complaining of foot pain back pain and neck pain at this time.  Patient states he takes baby aspirin.

## 2024-12-17 NOTE — ED PROVIDER NOTE - CLINICAL SUMMARY MEDICAL DECISION MAKING FREE TEXT BOX
Patient presents status post fall with left foot pain as well as back pain and neck pain.  Will CT.  Will check basic labs will infection.

## 2024-12-17 NOTE — ED ADULT NURSE NOTE - NSFALLHARMRISKINTERV_ED_ALL_ED

## 2024-12-17 NOTE — ED ADULT TRIAGE NOTE - CHIEF COMPLAINT QUOTE
Pt presents to ER from [place] s/p unwitnessed fall. Pt reports he fell off his chair at 0800 this morning onto his right side. Denies LOC. On daily Aspirin. Pt c/o right sided head pain, lower back pain and left toe/foot pain. Skin intact, no obvious sign of injury

## 2024-12-17 NOTE — ED ADULT TRIAGE NOTE - ADDITIONAL SAFETY/BANDS...
I will STOP taking the medications listed below when I get home from the hospital:  None
Additional Safety/Bands:

## 2024-12-17 NOTE — ED PROVIDER NOTE - PATIENT PORTAL LINK FT
You can access the FollowMyHealth Patient Portal offered by [facility] by registering at the following website: [email or web address]. By joining [employer]’s FollowMyHealth portal, you will also be able to view your health information using other applications (apps) compatible with our system.

## 2024-12-17 NOTE — ED ADULT NURSE NOTE - CHIEF COMPLAINT QUOTE
Pt presents to ER from [place] s/p unwitnessed fall. Pt reports he fell off his chair at 0800 this morning onto his right side. Denies LOC. On daily Aspirin. Pt c/o right sided head pain, lower back pain and left toe/foot pain. Skin intact, no obvious sign of injury

## 2024-12-31 ENCOUNTER — EMERGENCY (EMERGENCY)
Facility: HOSPITAL | Age: 79
LOS: 0 days | Discharge: HOME | End: 2025-01-01
Attending: EMERGENCY MEDICINE
Payer: MEDICARE

## 2024-12-31 VITALS
RESPIRATION RATE: 20 BRPM | TEMPERATURE: 98 F | OXYGEN SATURATION: 98 % | WEIGHT: 149.91 LBS | HEIGHT: 68 IN | SYSTOLIC BLOOD PRESSURE: 157 MMHG | HEART RATE: 49 BPM | DIASTOLIC BLOOD PRESSURE: 63 MMHG

## 2024-12-31 DIAGNOSIS — Z95.1 PRESENCE OF AORTOCORONARY BYPASS GRAFT: ICD-10-CM

## 2024-12-31 DIAGNOSIS — I48.91 UNSPECIFIED ATRIAL FIBRILLATION: ICD-10-CM

## 2024-12-31 DIAGNOSIS — R45.1 RESTLESSNESS AND AGITATION: ICD-10-CM

## 2024-12-31 DIAGNOSIS — N40.0 BENIGN PROSTATIC HYPERPLASIA WITHOUT LOWER URINARY TRACT SYMPTOMS: ICD-10-CM

## 2024-12-31 DIAGNOSIS — N39.0 URINARY TRACT INFECTION, SITE NOT SPECIFIED: ICD-10-CM

## 2024-12-31 DIAGNOSIS — F03.90 UNSPECIFIED DEMENTIA, UNSPECIFIED SEVERITY, WITHOUT BEHAVIORAL DISTURBANCE, PSYCHOTIC DISTURBANCE, MOOD DISTURBANCE, AND ANXIETY: ICD-10-CM

## 2024-12-31 DIAGNOSIS — I25.10 ATHEROSCLEROTIC HEART DISEASE OF NATIVE CORONARY ARTERY WITHOUT ANGINA PECTORIS: ICD-10-CM

## 2024-12-31 DIAGNOSIS — Z95.1 PRESENCE OF AORTOCORONARY BYPASS GRAFT: Chronic | ICD-10-CM

## 2024-12-31 LAB
ALBUMIN SERPL ELPH-MCNC: 4.1 G/DL — SIGNIFICANT CHANGE UP (ref 3.3–5)
ALP SERPL-CCNC: 92 U/L — SIGNIFICANT CHANGE UP (ref 40–120)
ALT FLD-CCNC: 19 U/L — SIGNIFICANT CHANGE UP (ref 12–78)
AMPHET UR-MCNC: NEGATIVE — SIGNIFICANT CHANGE UP
ANION GAP SERPL CALC-SCNC: 2 MMOL/L — LOW (ref 5–17)
APAP SERPL-MCNC: < 2 UG/ML (ref 10–30)
APPEARANCE UR: CLEAR — SIGNIFICANT CHANGE UP
AST SERPL-CCNC: 28 U/L — SIGNIFICANT CHANGE UP (ref 15–37)
BACTERIA # UR AUTO: ABNORMAL /HPF
BARBITURATES UR SCN-MCNC: NEGATIVE — SIGNIFICANT CHANGE UP
BASOPHILS # BLD AUTO: 0.06 K/UL — SIGNIFICANT CHANGE UP (ref 0–0.2)
BASOPHILS NFR BLD AUTO: 0.9 % — SIGNIFICANT CHANGE UP (ref 0–2)
BENZODIAZ UR-MCNC: NEGATIVE — SIGNIFICANT CHANGE UP
BILIRUB SERPL-MCNC: 1 MG/DL — SIGNIFICANT CHANGE UP (ref 0.2–1.2)
BILIRUB UR-MCNC: NEGATIVE — SIGNIFICANT CHANGE UP
BUN SERPL-MCNC: 22 MG/DL — SIGNIFICANT CHANGE UP (ref 7–23)
CALCIUM SERPL-MCNC: 9.4 MG/DL — SIGNIFICANT CHANGE UP (ref 8.5–10.1)
CAST: 1 /LPF — SIGNIFICANT CHANGE UP (ref 0–4)
CHLORIDE SERPL-SCNC: 105 MMOL/L — SIGNIFICANT CHANGE UP (ref 96–108)
CO2 SERPL-SCNC: 29 MMOL/L — SIGNIFICANT CHANGE UP (ref 22–31)
COCAINE METAB.OTHER UR-MCNC: NEGATIVE — SIGNIFICANT CHANGE UP
COLOR SPEC: YELLOW — SIGNIFICANT CHANGE UP
CREAT SERPL-MCNC: 1.46 MG/DL — HIGH (ref 0.5–1.3)
DIFF PNL FLD: NEGATIVE — SIGNIFICANT CHANGE UP
EGFR: 49 ML/MIN/1.73M2 — LOW
EOSINOPHIL # BLD AUTO: 0.68 K/UL — HIGH (ref 0–0.5)
EOSINOPHIL NFR BLD AUTO: 9.9 % — HIGH (ref 0–6)
ETHANOL SERPL-MCNC: <10 MG/DL — SIGNIFICANT CHANGE UP (ref 0–10)
FENTANYL UR QL SCN: NEGATIVE — SIGNIFICANT CHANGE UP
FLUAV AG NPH QL: SIGNIFICANT CHANGE UP
FLUBV AG NPH QL: SIGNIFICANT CHANGE UP
GLUCOSE SERPL-MCNC: 90 MG/DL — SIGNIFICANT CHANGE UP (ref 70–99)
GLUCOSE UR QL: NEGATIVE MG/DL — SIGNIFICANT CHANGE UP
HCT VFR BLD CALC: 42.7 % — SIGNIFICANT CHANGE UP (ref 39–50)
HGB BLD-MCNC: 13.7 G/DL — SIGNIFICANT CHANGE UP (ref 13–17)
IMM GRANULOCYTES NFR BLD AUTO: 0.1 % — SIGNIFICANT CHANGE UP (ref 0–0.9)
KETONES UR-MCNC: NEGATIVE MG/DL — SIGNIFICANT CHANGE UP
LACTATE SERPL-SCNC: 1 MMOL/L — SIGNIFICANT CHANGE UP (ref 0.7–2)
LEUKOCYTE ESTERASE UR-ACNC: ABNORMAL
LYMPHOCYTES # BLD AUTO: 2.34 K/UL — SIGNIFICANT CHANGE UP (ref 1–3.3)
LYMPHOCYTES # BLD AUTO: 34.1 % — SIGNIFICANT CHANGE UP (ref 13–44)
MCHC RBC-ENTMCNC: 28.1 PG — SIGNIFICANT CHANGE UP (ref 27–34)
MCHC RBC-ENTMCNC: 32.1 G/DL — SIGNIFICANT CHANGE UP (ref 32–36)
MCV RBC AUTO: 87.7 FL — SIGNIFICANT CHANGE UP (ref 80–100)
METHADONE UR-MCNC: NEGATIVE — SIGNIFICANT CHANGE UP
MONOCYTES # BLD AUTO: 0.67 K/UL — SIGNIFICANT CHANGE UP (ref 0–0.9)
MONOCYTES NFR BLD AUTO: 9.8 % — SIGNIFICANT CHANGE UP (ref 2–14)
NEUTROPHILS # BLD AUTO: 3.11 K/UL — SIGNIFICANT CHANGE UP (ref 1.8–7.4)
NEUTROPHILS NFR BLD AUTO: 45.2 % — SIGNIFICANT CHANGE UP (ref 43–77)
NITRITE UR-MCNC: NEGATIVE — SIGNIFICANT CHANGE UP
OPIATES UR-MCNC: NEGATIVE — SIGNIFICANT CHANGE UP
PCP SPEC-MCNC: SIGNIFICANT CHANGE UP
PCP UR-MCNC: NEGATIVE — SIGNIFICANT CHANGE UP
PH UR: 6.5 — SIGNIFICANT CHANGE UP (ref 5–8)
PLATELET # BLD AUTO: 327 K/UL — SIGNIFICANT CHANGE UP (ref 150–400)
POTASSIUM SERPL-MCNC: 5.1 MMOL/L — SIGNIFICANT CHANGE UP (ref 3.5–5.3)
POTASSIUM SERPL-SCNC: 5.1 MMOL/L — SIGNIFICANT CHANGE UP (ref 3.5–5.3)
PROT SERPL-MCNC: 8.1 GM/DL — SIGNIFICANT CHANGE UP (ref 6–8.3)
PROT UR-MCNC: NEGATIVE MG/DL — SIGNIFICANT CHANGE UP
RBC # BLD: 4.87 M/UL — SIGNIFICANT CHANGE UP (ref 4.2–5.8)
RBC # FLD: 13.2 % — SIGNIFICANT CHANGE UP (ref 10.3–14.5)
RBC CASTS # UR COMP ASSIST: 1 /HPF — SIGNIFICANT CHANGE UP (ref 0–4)
RSV RNA NPH QL NAA+NON-PROBE: SIGNIFICANT CHANGE UP
SALICYLATES SERPL-MCNC: <1.7 MG/DL (ref 2.8–20)
SARS-COV-2 RNA SPEC QL NAA+PROBE: SIGNIFICANT CHANGE UP
SODIUM SERPL-SCNC: 136 MMOL/L — SIGNIFICANT CHANGE UP (ref 135–145)
SP GR SPEC: 1.01 — SIGNIFICANT CHANGE UP (ref 1–1.03)
SQUAMOUS # UR AUTO: 1 /HPF — SIGNIFICANT CHANGE UP (ref 0–5)
THC UR QL: NEGATIVE — SIGNIFICANT CHANGE UP
TSH SERPL-MCNC: 0.82 UU/ML — SIGNIFICANT CHANGE UP (ref 0.34–4.82)
UROBILINOGEN FLD QL: 1 MG/DL — SIGNIFICANT CHANGE UP (ref 0.2–1)
WBC # BLD: 6.87 K/UL — SIGNIFICANT CHANGE UP (ref 3.8–10.5)
WBC # FLD AUTO: 6.87 K/UL — SIGNIFICANT CHANGE UP (ref 3.8–10.5)
WBC UR QL: 24 /HPF — HIGH (ref 0–5)

## 2024-12-31 PROCEDURE — 70450 CT HEAD/BRAIN W/O DYE: CPT | Mod: 26,MC

## 2024-12-31 PROCEDURE — 80307 DRUG TEST PRSMV CHEM ANLYZR: CPT

## 2024-12-31 PROCEDURE — 99284 EMERGENCY DEPT VISIT MOD MDM: CPT

## 2024-12-31 PROCEDURE — 0241U: CPT

## 2024-12-31 PROCEDURE — 96374 THER/PROPH/DIAG INJ IV PUSH: CPT

## 2024-12-31 PROCEDURE — 87086 URINE CULTURE/COLONY COUNT: CPT

## 2024-12-31 PROCEDURE — 70450 CT HEAD/BRAIN W/O DYE: CPT | Mod: MC

## 2024-12-31 PROCEDURE — 93005 ELECTROCARDIOGRAM TRACING: CPT

## 2024-12-31 PROCEDURE — 36415 COLL VENOUS BLD VENIPUNCTURE: CPT

## 2024-12-31 PROCEDURE — 80053 COMPREHEN METABOLIC PANEL: CPT

## 2024-12-31 PROCEDURE — 83605 ASSAY OF LACTIC ACID: CPT

## 2024-12-31 PROCEDURE — 81001 URINALYSIS AUTO W/SCOPE: CPT

## 2024-12-31 PROCEDURE — 87040 BLOOD CULTURE FOR BACTERIA: CPT

## 2024-12-31 PROCEDURE — 99285 EMERGENCY DEPT VISIT HI MDM: CPT | Mod: 25

## 2024-12-31 PROCEDURE — 84443 ASSAY THYROID STIM HORMONE: CPT

## 2024-12-31 PROCEDURE — 85025 COMPLETE CBC W/AUTO DIFF WBC: CPT

## 2024-12-31 PROCEDURE — 93010 ELECTROCARDIOGRAM REPORT: CPT

## 2024-12-31 RX ORDER — CEFTRIAXONE SODIUM 1 G
1000 VIAL (EA) INJECTION ONCE
Refills: 0 | Status: COMPLETED | OUTPATIENT
Start: 2024-12-31 | End: 2024-12-31

## 2024-12-31 RX ORDER — SODIUM CHLORIDE 9 MG/ML
1000 INJECTION, SOLUTION INTRAMUSCULAR; INTRAVENOUS; SUBCUTANEOUS ONCE
Refills: 0 | Status: COMPLETED | OUTPATIENT
Start: 2024-12-31 | End: 2024-12-31

## 2024-12-31 RX ORDER — CEFTRIAXONE SODIUM 1 G
1000 VIAL (EA) INJECTION ONCE
Refills: 0 | Status: DISCONTINUED | OUTPATIENT
Start: 2024-12-31 | End: 2024-12-31

## 2024-12-31 RX ADMIN — SODIUM CHLORIDE 1000 MILLILITER(S): 9 INJECTION, SOLUTION INTRAMUSCULAR; INTRAVENOUS; SUBCUTANEOUS at 16:16

## 2024-12-31 RX ADMIN — Medication 1000 MILLIGRAM(S): at 19:41

## 2024-12-31 NOTE — ED PROVIDER NOTE - CLINICAL SUMMARY MEDICAL DECISION MAKING FREE TEXT BOX
Pt with agitation, history of dementia and EtOH abuse. Will get CT head, labs, urinalysis, r/o UTI and ICH.

## 2024-12-31 NOTE — ED ADULT TRIAGE NOTE - CHIEF COMPLAINT QUOTE
Pt bibems from [facility]. Per ems pt has been increasingly agitatated. pt A&Ox1 at baseline. treated for UTI last week and finished abx. VS stable. place in front of nurses station

## 2024-12-31 NOTE — ED ADULT NURSE NOTE - CCCP TRG CHIEF CMPLNT
Anesthesia PreOp Note    HPI:     Rico Hurst is a 47 year old female who presents for preoperative consultation requested by: Edi Alvarado DO    Date of Surgery: 2/9/2024    Procedure(s):  Xi robotic partial hysterectomy, left salpingo oophorectomy, bilateral salpingectomy, possible left oophorectomy, trachelectomy, possible pelvic and para-aortic lymph node dissection, possible omentectomy, possible bilateral ureterolysis, possible exploratory laparotomy  XI robot-assisted laparoscopic ovarian cystectomy/salpingo-oophorectomy  Indication: Left adnexal mass and dysfunctional uterine bleeding    Relevant Problems   No relevant active problems       NPO:  Last Liquid Consumption Date: 02/08/24  Last Liquid Consumption Time: 2200  Last Solid Consumption Date: 02/08/24  Last Solid Consumption Time: 1400  Last Liquid Consumption Date: 02/08/24          History Review:  Patient Active Problem List    Diagnosis Date Noted    History of hysterectomy, supracervical 02/13/2023       Past Medical History:   Diagnosis Date    History of blood transfusion     [employer]    PONV (postoperative nausea and vomiting)        Past Surgical History:   Procedure Laterality Date    APPENDECTOMY      COLONOSCOPY N/A 3/9/2023    Procedure: COLONOSCOPY;  Surgeon: Kb Yip MD;  Location: [facility] ENDO    HYSTERECTOMY      supracervical hysterectomy, RSO    MYOMECTOMY 5/> INTRAMURAL MYOMAS &/OR TOTAL WT >250 GMS, ABDOMINAL APPROACH      x 4       Medications Prior to Admission   Medication Sig Dispense Refill Last Dose    Multiple Vitamin (MULTI VITAMIN DAILY OR) Take by mouth.   Past Month    SUMAtriptan (IMITREX) 50 MG Oral Tab Take 1 tablet (50 mg total) by mouth every 2 (two) hours as needed for Migraine. Use at onset; repeat once after 2 HRS-ONLY 2 IN 24 HR MAX 9 tablet 1 More than a month     Current Facility-Administered Medications Ordered in Epic   Medication Dose Route Frequency Provider Last Rate Last Admin     lactated ringers infusion   Intravenous Continuous Edi Alvarado DO        ceFAZolin (Ancef) 2 g in 20mL IV syringe premix  2 g Intravenous Once Edi Alvarado DO         No current [facility]-ordered outpatient medications on file.       No Known Allergies    History reviewed. No pertinent family history.  Social History     Socioeconomic History    Marital status:    Tobacco Use    Smoking status: Never    Smokeless tobacco: Never   Vaping Use    Vaping Use: Never used   Substance and Sexual Activity    Alcohol use: Not Currently    Drug use: Not Currently     Types: Cannabis    Sexual activity: Yes     Birth control/protection: Hysterectomy       Available pre-op labs reviewed.  Lab Results   Component Value Date    WBC 5.3 01/18/2024    RBC 4.32 01/18/2024    HGB 12.8 01/18/2024    HCT 37.3 01/18/2024    MCV 86.3 01/18/2024    MCH 29.6 01/18/2024    MCHC 34.3 01/18/2024    RDW 12.5 01/18/2024    .0 01/18/2024    URINEPREG Negative 12/30/2023     Lab Results   Component Value Date     01/18/2024    K 4.3 01/18/2024     01/18/2024    CO2 32.0 01/18/2024    BUN 16 01/18/2024    CREATSERUM 0.86 01/18/2024    GLU 79 01/18/2024    CA 9.5 01/18/2024          Vital Signs:  Body mass index is 24.75 kg/m².   height is 1.549 m (5' 1\") and weight is 59.4 kg (131 lb). Her temperature is 97.7 °F (36.5 °C). Her blood pressure is 99/64 and her pulse is 74. Her respiration is 12 and oxygen saturation is 98%.   Vitals:    02/07/24 0909 02/09/24 0642   BP:  99/64   Pulse:  74   Resp:  12   Temp:  97.7 °F (36.5 °C)   SpO2:  98%   Weight: 56.7 kg (125 lb) 59.4 kg (131 lb)   Height: 1.549 m (5' 1\") 1.549 m (5' 1\")        Anesthesia Evaluation     Patient summary reviewed and Nursing notes reviewed    History of anesthetic complications   Airway   Mallampati: II  TM distance: >3 FB  Neck ROM: full  Dental      Pulmonary - negative ROS    breath sounds clear to auscultation  (-) COPD, asthma, sleep apnea   Cardiovascular - negative ROS  Exercise tolerance: good  (-) hypertension, CAD, CHF    Rhythm: regular  Rate: normal    Neuro/Psych - negative ROS   (-) CVA    GI/Hepatic/Renal - negative ROS   (-) GERD, liver disease, renal disease    Endo/Other    (-) diabetes mellitus, hypothyroidism    Comments: 7.8 cm ovarian mass  Abdominal             PONV with GA     Anesthesia Plan:   ASA:  2  Plan:   General  Airway:  ETT  Post-op Pain Management: IV analgesics and Oral pain medication  Informed Consent Plan and Risks Discussed With:  Patient  Discussed plan with:  CRNA      I have informed Rico PRADEEP Natali and/or legal guardian or family member of the nature of the anesthetic plan, benefits, risks including possible dental damage if relevant, major complications, and any alternative forms of anesthetic management.   All of the patient's questions were answered to the best of my ability. The patient desires the anesthetic management as planned.  Anmol Cohen MD  2/8/2024 9:23 PM  Present on Admission:  **None**       see chief complaint quote

## 2024-12-31 NOTE — ED PROVIDER NOTE - PHYSICAL EXAMINATION
Gen:  lethargic but cooperative  Head:  NC/AT  HEENT: pupils perrl,no pharyngeal erythema, uvula midline  Cardiac: S1S2, RRR  Abd: Soft, non tender  Resp: No distress, CTA   musculoskeletal:: no deformities, no swelling, strength +5/+5  Skin: warm and dry as visualized, no rashes  Neuro: MOULTON, aao x 1  Psych:alert, cooperative, appropriate mood and affect for situation

## 2024-12-31 NOTE — ED PROVIDER NOTE - NSFOLLOWUPINSTRUCTIONS_ED_ALL_ED_FT
Your patient does not need a psychiatric evaluation the reason for his altered mental status with history of dementia is due to a urinary tract infection     he has a history of pansensitive E. coli and will be given Keflex 500 mg 3 times a day for 7 days    return for fever, nausea or vomiting

## 2024-12-31 NOTE — ED ADULT TRIAGE NOTE - AS PAIN REST
Infectious Disease Progress Note      Today's Date: 8/22/2024   Admit Date: 5/6/2024  YOB: 1974    Impression:   Stage 4 chronic sacral wound infection: wound culture +MRSA   Intermittent confusion   Hydrocephalus s/p  shunt placement 10/2023; subdural hematoma 2/2024 s/p removal 5/17/24 for  shunt infection (ESBL Klebsiella pneumoniae and serratia) (CNS portion removal with retained middle portion) treated with Cipro EOT 6/3/24  SDH s/p Poonam Hole 2/2024  CoNS bacteremia 1/4 bottles (5/2024); suspected contamination at that time. Repeat BCX 5/12 negative.   Hepatic flexure cancer s/p 5/19 R hemicolectomy     Plan:     Appreciate surgery's evaluation and noted no surgical debridement needed. Recommend Augmentin 875/125mg po BID and Linezolid 600mg po BID x10 more days to treat for skin soft tissue infection for total of 2 week course of abx.   Continue aggressive wound care and offloading.   ID will sign off at this time. Please reach out with any questions/concerns.     Anti-infectives:   Vancomycin x1 dose  Bactrim 8/18-  Rocephin 8/17-    Subjective:   Interval Events:     Afebrile. No WBC. No increased or purulent drainage the last few days. No surgical intervention per surgery. Pt feels well today. Discussed with RN about pt's wound and states it's looking better with less drainage. Has been continue with daily dressing change.       Patient is a 50 y.o. male who has been admitted to [facility] since May 6, 2024 for complaint of increased confusion with PMH of hx  shunt placement 10/2023 for hydrocephalus, subdural hematoma 2/24/24 s/p poonam hole with subsequent development of VPS infection in May of 6/2024 s/p partial shunt removal from CNS with retained middle portion and ongoing sacral decubitus ulcer with infection s/p debridement by surgery with no cultures obtained. He had CoNS BSI on admission that was thought to be contamination. Pt's known to ID service since May of 2024. His long hospital  Midline incision healing; non-distended   Extremities: No cyanosis or edema   Skin: Skin color, texture, turgor normal. no acute rash or lesions; see pics below for sacral wound; has a dressing on it today   Lymph nodes: Cervical and supraclavicular normal   Musculoskeletal: No swelling or deformity   Lines/Devices:  Intact, no erythema, drainage or tenderness   Psych: Alert and oriented, normal mood affect given the setting       CBC:  No results for input(s): \"WBC\", \"LYMPHS\", \"MONOS\", \"HGB\", \"HCT\", \"PLT\" in the last 72 hours.    Invalid input(s): \"GRANS\", \"EOS\", \"ANEU\", \"ABL\", \"MELLO\"      BMP:  Recent Labs     08/21/24  0510   BUN 23      K 4.2      CO2 27       LFTS:  No results for input(s): \"ALT\", \"TP\" in the last 72 hours.    Invalid input(s): \"TBILI\", \"SGOT\", \"AP\", \"ALB\"    Data Review:   No results found for this or any previous visit (from the past 24 hour(s)).       Microbiology:  Reviewed    Studies:  Reviewed    Signed By: Chris Spann, APRN - CNP     August 22, 2024                  0 (no pain/absence of nonverbal indicators of pain)

## 2024-12-31 NOTE — ED ADULT TRIAGE NOTE - HEIGHT IN INCHES
Patient needs refill and dose increase on Vyvanse.  Ran out of Vyvanse yesterday.  Uses [facility].  Patient has an Rx for Vyvanse dated for 3-14-17.  F/u on 4-17-17  Thank you, please advise.   8

## 2024-12-31 NOTE — ED ADULT NURSE NOTE - NSFALLRISKINTERV_ED_ALL_ED
Assistance OOB with selected safe patient handling equipment if applicable/Assistance with ambulation/Communicate fall risk and risk factors to all staff, patient, and family/Monitor gait and stability/Monitor for mental status changes and reorient to person, place, and time, as needed/Provide patient with walking aids/Provide visual cue: yellow wristband, yellow gown, etc/Reinforce activity limits and safety measures with patient and family/Toileting schedule using arm’s reach rule for commode and bathroom/Use of alarms - bed, stretcher, chair and/or video monitoring/Call bell, personal items and telephone in reach/Instruct patient to call for assistance before getting out of bed/chair/stretcher/Non-slip footwear applied when patient is off stretcher/[place] to call system/Physically safe environment - no spills, clutter or unnecessary equipment/Purposeful Proactive Rounding/Room/bathroom lighting operational, light cord in reach

## 2024-12-31 NOTE — ED PROVIDER NOTE - PATIENT PORTAL LINK FT
You can access the FollowMyHealth Patient Portal offered by [facility] by registering at the following website: [email or web address]. By joining [employer]’s FollowMyHealth portal, you will also be able to view your health information using other applications (apps) compatible with our system.

## 2024-12-31 NOTE — ED PROVIDER NOTE - OBJECTIVE STATEMENT
78 y/o male w/ PMHx dementia, EtOH, Afib, CAD s/p CABG, hallucinations, BPH presents with agitation. Pt was treated for UTI last week and treated with antibiotics, now finished. No other complaints at this time.

## 2025-01-01 VITALS
OXYGEN SATURATION: 100 % | HEART RATE: 55 BPM | TEMPERATURE: 98 F | DIASTOLIC BLOOD PRESSURE: 80 MMHG | RESPIRATION RATE: 19 BRPM | SYSTOLIC BLOOD PRESSURE: 148 MMHG

## 2025-01-02 LAB
CULTURE RESULTS: SIGNIFICANT CHANGE UP
SPECIMEN SOURCE: SIGNIFICANT CHANGE UP

## 2025-01-06 LAB
CULTURE RESULTS: SIGNIFICANT CHANGE UP
CULTURE RESULTS: SIGNIFICANT CHANGE UP
SPECIMEN SOURCE: SIGNIFICANT CHANGE UP
SPECIMEN SOURCE: SIGNIFICANT CHANGE UP

## 2025-04-24 NOTE — ED ADULT NURSE NOTE - NS ED NURSE LEVEL OF CONSCIOUSNESS MENTAL STATUS
Advised patient ok to have pelvic ultrasound.   
Patient just had attempted EMB in office. Was instructed by provider nothing in vagina x1 week, but should have pelvic u/s done ASAP. Patient is able to be seen tomorrow night for pelvic u/s and wondering if this would be okay.   
Yes she can
Awake/Alert/Cooperative

## 2025-05-20 ENCOUNTER — INPATIENT (INPATIENT)
Facility: HOSPITAL | Age: 80
LOS: 8 days | Discharge: NURSING, REHAB OR LONG-TERM CARE | DRG: 872 | End: 2025-05-29
Attending: INTERNAL MEDICINE | Admitting: SURGERY
Payer: MEDICARE

## 2025-05-20 VITALS
SYSTOLIC BLOOD PRESSURE: 94 MMHG | RESPIRATION RATE: 23 BRPM | OXYGEN SATURATION: 100 % | DIASTOLIC BLOOD PRESSURE: 63 MMHG | HEART RATE: 70 BPM | WEIGHT: 149.91 LBS

## 2025-05-20 DIAGNOSIS — Z95.1 PRESENCE OF AORTOCORONARY BYPASS GRAFT: Chronic | ICD-10-CM

## 2025-05-20 DIAGNOSIS — N39.0 URINARY TRACT INFECTION, SITE NOT SPECIFIED: ICD-10-CM

## 2025-05-20 DIAGNOSIS — A41.9 SEPSIS, UNSPECIFIED ORGANISM: ICD-10-CM

## 2025-05-20 LAB
ALBUMIN SERPL ELPH-MCNC: 2.1 G/DL — LOW (ref 3.3–5)
ALP SERPL-CCNC: 94 U/L — SIGNIFICANT CHANGE UP (ref 40–120)
ALT FLD-CCNC: 45 U/L — SIGNIFICANT CHANGE UP (ref 12–78)
ANION GAP SERPL CALC-SCNC: 5 MMOL/L — SIGNIFICANT CHANGE UP (ref 5–17)
APPEARANCE UR: ABNORMAL
APTT BLD: 34.9 SEC — SIGNIFICANT CHANGE UP (ref 26.1–36.8)
AST SERPL-CCNC: 74 U/L — HIGH (ref 15–37)
BACTERIA # UR AUTO: ABNORMAL /HPF
BASE EXCESS BLDA CALC-SCNC: 1.5 MMOL/L — SIGNIFICANT CHANGE UP (ref -2–3)
BASE EXCESS BLDV CALC-SCNC: 6.3 MMOL/L — HIGH (ref -2–3)
BASOPHILS # BLD AUTO: 0.01 K/UL — SIGNIFICANT CHANGE UP (ref 0–0.2)
BASOPHILS NFR BLD AUTO: 0.1 % — SIGNIFICANT CHANGE UP (ref 0–2)
BILIRUB SERPL-MCNC: 0.3 MG/DL — SIGNIFICANT CHANGE UP (ref 0.2–1.2)
BILIRUB UR-MCNC: NEGATIVE — SIGNIFICANT CHANGE UP
BLOOD GAS COMMENTS ARTERIAL: SIGNIFICANT CHANGE UP
BUN SERPL-MCNC: 23 MG/DL — SIGNIFICANT CHANGE UP (ref 7–23)
CALCIUM SERPL-MCNC: 9.2 MG/DL — SIGNIFICANT CHANGE UP (ref 8.5–10.1)
CAST: 5 /LPF — HIGH (ref 0–4)
CHLORIDE SERPL-SCNC: 103 MMOL/L — SIGNIFICANT CHANGE UP (ref 96–108)
CO2 SERPL-SCNC: 30 MMOL/L — SIGNIFICANT CHANGE UP (ref 22–31)
COLOR SPEC: YELLOW — SIGNIFICANT CHANGE UP
CREAT SERPL-MCNC: 0.77 MG/DL — SIGNIFICANT CHANGE UP (ref 0.5–1.3)
DIFF PNL FLD: NEGATIVE — SIGNIFICANT CHANGE UP
EGFR: 92 ML/MIN/1.73M2 — SIGNIFICANT CHANGE UP
EGFR: 92 ML/MIN/1.73M2 — SIGNIFICANT CHANGE UP
EOSINOPHIL # BLD AUTO: 0.08 K/UL — SIGNIFICANT CHANGE UP (ref 0–0.5)
EOSINOPHIL NFR BLD AUTO: 1.2 % — SIGNIFICANT CHANGE UP (ref 0–6)
ETHANOL SERPL-MCNC: <10 MG/DL — SIGNIFICANT CHANGE UP (ref 0–10)
FLUAV AG NPH QL: SIGNIFICANT CHANGE UP
FLUBV AG NPH QL: SIGNIFICANT CHANGE UP
GAS PNL BLDA: SIGNIFICANT CHANGE UP
GAS PNL BLDV: SIGNIFICANT CHANGE UP
GLUCOSE SERPL-MCNC: 120 MG/DL — HIGH (ref 70–99)
GLUCOSE UR QL: NEGATIVE MG/DL — SIGNIFICANT CHANGE UP
HCO3 BLDA-SCNC: 27 MMOL/L — SIGNIFICANT CHANGE UP (ref 21–28)
HCO3 BLDV-SCNC: 33 MMOL/L — HIGH (ref 22–29)
HCT VFR BLD CALC: 32.7 % — LOW (ref 39–50)
HGB BLD-MCNC: 10.8 G/DL — LOW (ref 13–17)
IMM GRANULOCYTES # BLD AUTO: 0.02 K/UL — SIGNIFICANT CHANGE UP (ref 0–0.07)
IMM GRANULOCYTES NFR BLD AUTO: 0.3 % — SIGNIFICANT CHANGE UP (ref 0–0.9)
INR BLD: 1.15 RATIO — SIGNIFICANT CHANGE UP (ref 0.85–1.16)
KETONES UR QL: ABNORMAL MG/DL
LACTATE SERPL-SCNC: 0.9 MMOL/L — SIGNIFICANT CHANGE UP (ref 0.7–2)
LEUKOCYTE ESTERASE UR-ACNC: ABNORMAL
LYMPHOCYTES # BLD AUTO: 0.92 K/UL — LOW (ref 1–3.3)
LYMPHOCYTES NFR BLD AUTO: 13.8 % — SIGNIFICANT CHANGE UP (ref 13–44)
MCHC RBC-ENTMCNC: 27.5 PG — SIGNIFICANT CHANGE UP (ref 27–34)
MCHC RBC-ENTMCNC: 33 G/DL — SIGNIFICANT CHANGE UP (ref 32–36)
MCV RBC AUTO: 83.2 FL — SIGNIFICANT CHANGE UP (ref 80–100)
MONOCYTES # BLD AUTO: 0.54 K/UL — SIGNIFICANT CHANGE UP (ref 0–0.9)
MONOCYTES NFR BLD AUTO: 8.1 % — SIGNIFICANT CHANGE UP (ref 2–14)
NEUTROPHILS # BLD AUTO: 5.11 K/UL — SIGNIFICANT CHANGE UP (ref 1.8–7.4)
NEUTROPHILS NFR BLD AUTO: 76.5 % — SIGNIFICANT CHANGE UP (ref 43–77)
NITRITE UR-MCNC: NEGATIVE — SIGNIFICANT CHANGE UP
NRBC # BLD AUTO: 0 K/UL — SIGNIFICANT CHANGE UP (ref 0–0)
NRBC # FLD: 0 K/UL — SIGNIFICANT CHANGE UP (ref 0–0)
NRBC BLD AUTO-RTO: 0 /100 WBCS — SIGNIFICANT CHANGE UP (ref 0–0)
PCO2 BLDA: 43 MMHG — SIGNIFICANT CHANGE UP (ref 35–48)
PCO2 BLDV: 56 MMHG — HIGH (ref 42–55)
PH BLDA: 7.4 — SIGNIFICANT CHANGE UP (ref 7.35–7.45)
PH BLDV: 7.38 — SIGNIFICANT CHANGE UP (ref 7.32–7.43)
PH UR: 5.5 — SIGNIFICANT CHANGE UP (ref 5–8)
PLATELET # BLD AUTO: 290 K/UL — SIGNIFICANT CHANGE UP (ref 150–400)
PMV BLD: 9.2 FL — SIGNIFICANT CHANGE UP (ref 7–13)
PO2 BLDA: 82 MMHG — LOW (ref 83–108)
PO2 BLDV: 257 MMHG — HIGH (ref 25–45)
POTASSIUM SERPL-MCNC: 3.5 MMOL/L — SIGNIFICANT CHANGE UP (ref 3.5–5.3)
POTASSIUM SERPL-SCNC: 3.5 MMOL/L — SIGNIFICANT CHANGE UP (ref 3.5–5.3)
PROT SERPL-MCNC: 6.5 GM/DL — SIGNIFICANT CHANGE UP (ref 6–8.3)
PROT UR-MCNC: 30 MG/DL
PROTHROM AB SERPL-ACNC: 13.6 SEC — HIGH (ref 9.9–13.4)
RBC # BLD: 3.93 M/UL — LOW (ref 4.2–5.8)
RBC # FLD: 13.4 % — SIGNIFICANT CHANGE UP (ref 10.3–14.5)
RBC CASTS # UR COMP ASSIST: 2 /HPF — SIGNIFICANT CHANGE UP (ref 0–4)
RSV RNA NPH QL NAA+NON-PROBE: SIGNIFICANT CHANGE UP
SAO2 % BLDA: 98 % — SIGNIFICANT CHANGE UP (ref 94–98)
SAO2 % BLDV: 99 % — HIGH (ref 67–88)
SARS-COV-2 RNA SPEC QL NAA+PROBE: SIGNIFICANT CHANGE UP
SODIUM SERPL-SCNC: 138 MMOL/L — SIGNIFICANT CHANGE UP (ref 135–145)
SOURCE RESPIRATORY: SIGNIFICANT CHANGE UP
SP GR SPEC: 1.02 — SIGNIFICANT CHANGE UP (ref 1–1.03)
SQUAMOUS # UR AUTO: 5 /HPF — SIGNIFICANT CHANGE UP (ref 0–5)
UROBILINOGEN FLD QL: 1 MG/DL — SIGNIFICANT CHANGE UP (ref 0.2–1)
WBC # BLD: 6.68 K/UL — SIGNIFICANT CHANGE UP (ref 3.8–10.5)
WBC # FLD AUTO: 6.68 K/UL — SIGNIFICANT CHANGE UP (ref 3.8–10.5)
WBC UR QL: 172 /HPF — HIGH (ref 0–5)

## 2025-05-20 PROCEDURE — 82140 ASSAY OF AMMONIA: CPT

## 2025-05-20 PROCEDURE — 80048 BASIC METABOLIC PNL TOTAL CA: CPT

## 2025-05-20 PROCEDURE — 99291 CRITICAL CARE FIRST HOUR: CPT

## 2025-05-20 PROCEDURE — 71045 X-RAY EXAM CHEST 1 VIEW: CPT | Mod: 26

## 2025-05-20 PROCEDURE — 71260 CT THORAX DX C+: CPT | Mod: 26

## 2025-05-20 PROCEDURE — 70496 CT ANGIOGRAPHY HEAD: CPT | Mod: 26

## 2025-05-20 PROCEDURE — 70496 CT ANGIOGRAPHY HEAD: CPT

## 2025-05-20 PROCEDURE — 71045 X-RAY EXAM CHEST 1 VIEW: CPT | Mod: 26,77

## 2025-05-20 PROCEDURE — 83735 ASSAY OF MAGNESIUM: CPT

## 2025-05-20 PROCEDURE — 87077 CULTURE AEROBIC IDENTIFY: CPT

## 2025-05-20 PROCEDURE — 94003 VENT MGMT INPAT SUBQ DAY: CPT

## 2025-05-20 PROCEDURE — 80053 COMPREHEN METABOLIC PANEL: CPT

## 2025-05-20 PROCEDURE — 87186 SC STD MICRODIL/AGAR DIL: CPT

## 2025-05-20 PROCEDURE — 70498 CT ANGIOGRAPHY NECK: CPT | Mod: 26

## 2025-05-20 PROCEDURE — 87640 STAPH A DNA AMP PROBE: CPT

## 2025-05-20 PROCEDURE — 99291 CRITICAL CARE FIRST HOUR: CPT | Mod: FT,25

## 2025-05-20 PROCEDURE — 70498 CT ANGIOGRAPHY NECK: CPT

## 2025-05-20 PROCEDURE — 93925 LOWER EXTREMITY STUDY: CPT

## 2025-05-20 PROCEDURE — 87641 MR-STAPH DNA AMP PROBE: CPT

## 2025-05-20 PROCEDURE — 73630 X-RAY EXAM OF FOOT: CPT | Mod: 26,50

## 2025-05-20 PROCEDURE — 84100 ASSAY OF PHOSPHORUS: CPT

## 2025-05-20 PROCEDURE — 93010 ELECTROCARDIOGRAM REPORT: CPT

## 2025-05-20 PROCEDURE — 71045 X-RAY EXAM CHEST 1 VIEW: CPT

## 2025-05-20 PROCEDURE — 95819 EEG AWAKE AND ASLEEP: CPT

## 2025-05-20 PROCEDURE — 85027 COMPLETE CBC AUTOMATED: CPT

## 2025-05-20 PROCEDURE — 87070 CULTURE OTHR SPECIMN AEROBIC: CPT

## 2025-05-20 PROCEDURE — 36415 COLL VENOUS BLD VENIPUNCTURE: CPT

## 2025-05-20 PROCEDURE — 74177 CT ABD & PELVIS W/CONTRAST: CPT | Mod: 26

## 2025-05-20 PROCEDURE — 31500 INSERT EMERGENCY AIRWAY: CPT

## 2025-05-20 PROCEDURE — 73630 X-RAY EXAM OF FOOT: CPT | Mod: 50

## 2025-05-20 PROCEDURE — 84443 ASSAY THYROID STIM HORMONE: CPT

## 2025-05-20 PROCEDURE — 70450 CT HEAD/BRAIN W/O DYE: CPT | Mod: 26,XU

## 2025-05-20 PROCEDURE — 84484 ASSAY OF TROPONIN QUANT: CPT

## 2025-05-20 RX ORDER — DOCUSATE SODIUM 100 MG
2 CAPSULE ORAL
Refills: 0 | DISCHARGE

## 2025-05-20 RX ORDER — FOLIC ACID 1 MG/1
1 TABLET ORAL DAILY
Refills: 0 | Status: DISCONTINUED | OUTPATIENT
Start: 2025-05-20 | End: 2025-05-29

## 2025-05-20 RX ORDER — SENNA 187 MG
2 TABLET ORAL AT BEDTIME
Refills: 0 | Status: DISCONTINUED | OUTPATIENT
Start: 2025-05-20 | End: 2025-05-29

## 2025-05-20 RX ORDER — NOREPINEPHRINE BITARTRATE 8 MG
0.05 SOLUTION INTRAVENOUS
Qty: 8 | Refills: 0 | Status: DISCONTINUED | OUTPATIENT
Start: 2025-05-20 | End: 2025-05-22

## 2025-05-20 RX ORDER — NALOXONE HYDROCHLORIDE 0.4 MG/ML
0.4 INJECTION, SOLUTION INTRAMUSCULAR; INTRAVENOUS; SUBCUTANEOUS ONCE
Refills: 0 | Status: COMPLETED | OUTPATIENT
Start: 2025-05-20 | End: 2025-05-20

## 2025-05-20 RX ORDER — POLYETHYLENE GLYCOL 3350 17 G/17G
17 POWDER, FOR SOLUTION ORAL
Refills: 0 | DISCHARGE

## 2025-05-20 RX ORDER — METOPROLOL SUCCINATE 50 MG/1
0.5 TABLET, EXTENDED RELEASE ORAL
Refills: 0 | DISCHARGE

## 2025-05-20 RX ORDER — PIPERACILLIN-TAZO-DEXTROSE,ISO 3.375G/5
3.38 IV SOLUTION, PIGGYBACK PREMIX FROZEN(ML) INTRAVENOUS EVERY 8 HOURS
Refills: 0 | Status: COMPLETED | OUTPATIENT
Start: 2025-05-20 | End: 2025-05-27

## 2025-05-20 RX ORDER — PROPOFOL 10 MG/ML
10 INJECTION, EMULSION INTRAVENOUS
Qty: 1000 | Refills: 0 | Status: DISCONTINUED | OUTPATIENT
Start: 2025-05-20 | End: 2025-05-22

## 2025-05-20 RX ORDER — MIRTAZAPINE 30 MG/1
1 TABLET, FILM COATED ORAL
Refills: 0 | DISCHARGE

## 2025-05-20 RX ORDER — MEMANTINE HYDROCHLORIDE 21 MG/1
2 CAPSULE, EXTENDED RELEASE ORAL
Refills: 0 | DISCHARGE

## 2025-05-20 RX ORDER — PIPERACILLIN-TAZO-DEXTROSE,ISO 3.375G/5
3.38 IV SOLUTION, PIGGYBACK PREMIX FROZEN(ML) INTRAVENOUS EVERY 8 HOURS
Refills: 0 | Status: DISCONTINUED | OUTPATIENT
Start: 2025-05-20 | End: 2025-05-20

## 2025-05-20 RX ORDER — MIDAZOLAM IN 0.9 % SOD.CHLORID 1 MG/ML
3 PLASTIC BAG, INJECTION (ML) INTRAVENOUS ONCE
Refills: 0 | Status: DISCONTINUED | OUTPATIENT
Start: 2025-05-20 | End: 2025-05-20

## 2025-05-20 RX ORDER — NALOXONE HYDROCHLORIDE 0.4 MG/ML
1 INJECTION, SOLUTION INTRAMUSCULAR; INTRAVENOUS; SUBCUTANEOUS ONCE
Refills: 0 | Status: COMPLETED | OUTPATIENT
Start: 2025-05-20 | End: 2025-05-20

## 2025-05-20 RX ORDER — PIPERACILLIN-TAZO-DEXTROSE,ISO 3.375G/5
3.38 IV SOLUTION, PIGGYBACK PREMIX FROZEN(ML) INTRAVENOUS ONCE
Refills: 0 | Status: COMPLETED | OUTPATIENT
Start: 2025-05-20 | End: 2025-05-20

## 2025-05-20 RX ORDER — DONEPEZIL HYDROCHLORIDE 5 MG/1
1 TABLET ORAL
Refills: 0 | DISCHARGE

## 2025-05-20 RX ORDER — VANCOMYCIN HCL IN 5 % DEXTROSE 1.5G/250ML
1000 PLASTIC BAG, INJECTION (ML) INTRAVENOUS ONCE
Refills: 0 | Status: COMPLETED | OUTPATIENT
Start: 2025-05-20 | End: 2025-05-20

## 2025-05-20 RX ORDER — ROCURONIUM BROMIDE 10 MG/ML
100 INJECTION, SOLUTION INTRAVENOUS ONCE
Refills: 0 | Status: COMPLETED | OUTPATIENT
Start: 2025-05-20 | End: 2025-05-20

## 2025-05-20 RX ORDER — ETOMIDATE 2 MG/ML
20 AMPUL (ML) INTRAVENOUS ONCE
Refills: 0 | Status: COMPLETED | OUTPATIENT
Start: 2025-05-20 | End: 2025-05-20

## 2025-05-20 RX ORDER — POLYETHYLENE GLYCOL 3350 17 G/17G
17 POWDER, FOR SOLUTION ORAL DAILY
Refills: 0 | Status: DISCONTINUED | OUTPATIENT
Start: 2025-05-20 | End: 2025-05-29

## 2025-05-20 RX ORDER — ATORVASTATIN CALCIUM 80 MG/1
1 TABLET, FILM COATED ORAL
Refills: 0 | DISCHARGE

## 2025-05-20 RX ORDER — SENNA 187 MG
2 TABLET ORAL
Refills: 0 | DISCHARGE

## 2025-05-20 RX ORDER — ASPIRIN 325 MG
81 TABLET ORAL DAILY
Refills: 0 | Status: DISCONTINUED | OUTPATIENT
Start: 2025-05-20 | End: 2025-05-29

## 2025-05-20 RX ORDER — ATORVASTATIN CALCIUM 80 MG/1
10 TABLET, FILM COATED ORAL AT BEDTIME
Refills: 0 | Status: DISCONTINUED | OUTPATIENT
Start: 2025-05-20 | End: 2025-05-29

## 2025-05-20 RX ORDER — HEPARIN SODIUM 1000 [USP'U]/ML
5000 INJECTION INTRAVENOUS; SUBCUTANEOUS EVERY 8 HOURS
Refills: 0 | Status: DISCONTINUED | OUTPATIENT
Start: 2025-05-20 | End: 2025-05-29

## 2025-05-20 RX ORDER — TRAZODONE HCL 100 MG
1 TABLET ORAL
Refills: 0 | DISCHARGE

## 2025-05-20 RX ADMIN — Medication 2100 MILLILITER(S): at 14:39

## 2025-05-20 RX ADMIN — Medication 200 GRAM(S): at 13:45

## 2025-05-20 RX ADMIN — POLYETHYLENE GLYCOL 3350 17 GRAM(S): 17 POWDER, FOR SOLUTION ORAL at 23:10

## 2025-05-20 RX ADMIN — Medication 15 MILLILITER(S): at 23:10

## 2025-05-20 RX ADMIN — ATORVASTATIN CALCIUM 10 MILLIGRAM(S): 80 TABLET, FILM COATED ORAL at 23:11

## 2025-05-20 RX ADMIN — NALOXONE HYDROCHLORIDE 0.4 MILLIGRAM(S): 0.4 INJECTION, SOLUTION INTRAMUSCULAR; INTRAVENOUS; SUBCUTANEOUS at 13:22

## 2025-05-20 RX ADMIN — HEPARIN SODIUM 5000 UNIT(S): 1000 INJECTION INTRAVENOUS; SUBCUTANEOUS at 23:10

## 2025-05-20 RX ADMIN — Medication 250 MILLIGRAM(S): at 15:44

## 2025-05-20 RX ADMIN — ROCURONIUM BROMIDE 100 MILLIGRAM(S): 10 INJECTION, SOLUTION INTRAVENOUS at 13:36

## 2025-05-20 RX ADMIN — PROPOFOL 4.08 MICROGRAM(S)/KG/MIN: 10 INJECTION, EMULSION INTRAVENOUS at 23:48

## 2025-05-20 RX ADMIN — NOREPINEPHRINE BITARTRATE 6.38 MICROGRAM(S)/KG/MIN: 8 SOLUTION at 14:40

## 2025-05-20 RX ADMIN — NALOXONE HYDROCHLORIDE 1 MILLIGRAM(S): 0.4 INJECTION, SOLUTION INTRAMUSCULAR; INTRAVENOUS; SUBCUTANEOUS at 13:26

## 2025-05-20 RX ADMIN — Medication 25 GRAM(S): at 23:34

## 2025-05-20 RX ADMIN — Medication 20 MILLIGRAM(S): at 13:36

## 2025-05-20 RX ADMIN — Medication 125 MILLIGRAM(S): at 23:34

## 2025-05-20 RX ADMIN — Medication 81 MILLIGRAM(S): at 23:11

## 2025-05-20 RX ADMIN — Medication 2 TABLET(S): at 23:10

## 2025-05-20 RX ADMIN — Medication 100 MILLILITER(S): at 18:23

## 2025-05-20 RX ADMIN — Medication 40 MILLIGRAM(S): at 18:22

## 2025-05-20 NOTE — ED PROVIDER NOTE - OBJECTIVE STATEMENT
79-year-old male history of hypertension, CAD, BPH, dementia, atrial fibrillation presents today  from [facility] after he was found unresponsive in bed.  Per EMS patient was hypotensive and hypoxic upon arrival.  Patient is full code.  On arrival to emergency department patient is GCS 6, unable to provide history.  Per nursing facility documentations patient is full code.

## 2025-05-20 NOTE — H&P ADULT - NSHPLABSRESULTS_GEN_ALL_CORE
ABG - ( 20 May 2025 15:28 )  pH, Arterial: 7.40  pH, Blood: x     /  pCO2: 43    /  pO2: 82    / HCO3: 27    / Base Excess: 1.5   /  SaO2: 98.0                CBC Full  -  ( 20 May 2025 13:40 )  WBC Count : 6.68 K/uL  RBC Count : 3.93 M/uL  Hemoglobin : 10.8 g/dL  Hematocrit : 32.7 %  Platelet Count - Automated : 290 K/uL  Mean Cell Volume : 83.2 fl  Mean Cell Hemoglobin : 27.5 pg  Mean Cell Hemoglobin Concentration : 33.0 g/dL  Auto Neutrophil # : 5.11 K/uL  Auto Lymphocyte # : 0.92 K/uL  Auto Monocyte # : 0.54 K/uL  Auto Eosinophil # : 0.08 K/uL  Auto Basophil # : 0.01 K/uL  Auto Neutrophil % : 76.5 %  Auto Lymphocyte % : 13.8 %  Auto Monocyte % : 8.1 %  Auto Eosinophil % : 1.2 %  Auto Basophil % : 0.1 %      05-20    138  |  103  |  23  ----------------------------<  120[H]  3.5   |  30  |  0.77    Ca    9.2      20 May 2025 13:40    TPro  6.5  /  Alb  2.1[L]  /  TBili  0.3  /  DBili  x   /  AST  74[H]  /  ALT  45  /  AlkPhos  94  05-20

## 2025-05-20 NOTE — ED PROVIDER NOTE - PROGRESS NOTE DETAILS
Case discussed ICU PA who will eval for admission Case discussed with ICU GERI Hyde again, has not yet evaluated pt

## 2025-05-20 NOTE — ED ADULT NURSE NOTE - NS ED NURSE TRANSPORT WITH
Cardiac Monitor/Defib/ACLS/Rescue Kit/O2/BVM/IV pump/pulse ox
PAST MEDICAL HISTORY:  No pertinent past medical history      (normal spontaneous vaginal delivery)

## 2025-05-20 NOTE — H&P ADULT - NSHPPHYSICALEXAM_GEN_ALL_CORE
General lethargic  HEENT nc/at orally intubated  neck supple  lungs clear on vent  cv rrr no murmur  abdomen soft, nontender  gu alva  extremitiy bilaeral heal wounds with purulence left heal with bilateral eschars

## 2025-05-20 NOTE — ED ADULT NURSE NOTE - CHIEF COMPLAINT QUOTE
Pt BIBEMS from [place] w/ hypotension and AMS. Per EMS facility thought pt had coded, sternal rubbed pt and pt was responsive. Pt minimally responsive in triage, taken straight to trauma room for MD jeremiah and RN at beside.

## 2025-05-20 NOTE — PHARMACOTHERAPY INTERVENTION NOTE - COMMENTS
Medication history complete, patient is from [facility], reviewed and confirmed medication with list provided by facility.

## 2025-05-20 NOTE — ED ADULT TRIAGE NOTE - WEIGHT IN LBS
149.9 17 yo white male awoke this morning with lower abdominal non radiating bloating sensation with associated numerous episodes of nausea and vomiting but no diarrhea. In addition there is no fever, chills, dysuria or hematuria. No recent travel and no recent use of AbXs.

## 2025-05-20 NOTE — H&P ADULT - HISTORY OF PRESENT ILLNESS
This patient is a 79 year old male     with PMH signfiiant for             CAD             CABG             afib not on AC due to compliance             behavior health issues              BPH  Alytent is resident of [facility].  Today patient was found unresposnive in bed  Was brought to ED, GCS was 6 , was intubated with rocuronium now intubated  small pupils got narcan in ed  ct chest shows patchy infiltrates  head ct negative

## 2025-05-20 NOTE — ED ADULT NURSE NOTE - NSFALLRISKINTERV_ED_ALL_ED

## 2025-05-20 NOTE — CONSULT NOTE ADULT - ASSESSMENT
A: -- seen for the following:       P:   Chart reviewed and Patient evaluated;  Discussed diagnosis and treatment with patient. Discussed importance of daily foot examinations, proper shoe gear, importance of tight glycemic control.   X-rays reviewed : on wet read showing --  wound was exsanguinateddebrided flush with normal saline  Wound cx taken  Applied --- with dry sterile dressing  WBAT  to ------------  Offload bilateral heels while bedbound  Continue antibiotics as per ID  All additional care per Med [facility]   Podiatry will follow while in house      Case D/W attending  ---       A: 79-year-old male was seen for the followin. Full thickness ulcer to bilateral heel; Left foot infected  2. Difficulty with Ambulation      P:   Chart reviewed and Patient evaluated;  Discussed importance of daily foot examinations, proper shoe gear, importance of tight glycemic control.   X-rays ordered : Will follow up results  Wound was exsanguinated, debrided and flushed with normal saline  Wound cx taken (25) Left Foot: will follow results  Applied betadine with dry sterile dressing  WBAT  to Bilateral foot  Offload bilateral heels while bedbound or place 2 pillows under the calf to prevent further soft tissue breaks  Antibiotics as per ID  All additional care per Med appreciated   Podiatry will follow while in house      Case D/W attending Dr. Lewis       A: 79-year-old male was seen for the followin. Full thickness ulcer to bilateral heel; Left foot infected  2. Difficulty with Ambulation      P:   Chart reviewed and Patient evaluated;  X-rays ordered : Will follow up results  Left Heel Wound was exsanguinated, debrided and flushed with normal saline  Wound cx taken (25) Left Foot: will follow results  Applied betadine with dry sterile dressing  WBAT  to Bilateral foot  Offload bilateral heels while bedbound or place 2 pillows under the calf to prevent further soft tissue breaks  Antibiotics as per ID  All additional care per Med appreciated   Podiatry will follow while in house      Case D/W attending Dr. Lewis       A: 79-year-old male was seen for the followin. Full thickness ulcer to bilateral heel; Left foot infected  2. Difficulty with Ambulation      P:   Chart reviewed and Patient evaluated;  X-rays ordered : Will follow up results  Left Heel Wound was exsanguinated, excisional debridement of devitalized tissues using suture removal kit and flushed with normal saline  Wound cx taken (25) Left Foot: will follow results  Applied betadine with dry sterile dressing  WBAT  to Bilateral foot  Offload bilateral heels while bedbound or place 2 pillows under the calf to prevent further soft tissue breaks  Antibiotics as per ID  All additional care per Med [facility]   Podiatry will follow while in house      Case D/W attending Dr. Lewis

## 2025-05-20 NOTE — ED ADULT NURSE REASSESSMENT NOTE - NS ED NURSE REASSESS COMMENT FT1
pt arrived to ed unresponsive, narcan given with no improvement. 1L NS given by EMS PTA.  upon arrival. pt intubated at 1336 7.5 23 at the lip

## 2025-05-20 NOTE — H&P ADULT - ASSESSMENT
79 year wit hx. afib not on ac found unresponsive in nurising home  intubated  ct chows pneumonia, ? aspiration  hypotenive in ED possible septic shock present on admission  Patient now intubated remains unresposnvie with small pupils    1. altered mental status  2. possible septic shock  3. Pneumonia  4. r/o stroke  5. metabolic encephalopathy  6. infectied heel ulcers  7. acute respiatory failure    Plan  1. Neuro ct head negative will do ct a to r/o LO, no known time of onset, continue aspirin, check ammonia, tox screen if no improvement will need MRI  2. respiatory on vent likley pneumonia, ? aspiation, zosyn, check sputum culture  3. cv hx. afib , now nsr not on ac on low dose levophed likley sepsis, hydratio  4. renal creatinine normal , alva hydration  5. GI PPI, npo will place og tube  6. ID zosyn to cover lung and heels will need podiatry consult    informed by family who spoke to SNF pateint has no family

## 2025-05-20 NOTE — ED PROVIDER NOTE - CLINICAL SUMMARY MEDICAL DECISION MAKING FREE TEXT BOX
presentation concerning for opiate overdose, metabolic encephalopathy.  BG WNL. EKG shows sinus rhythm, 1st degree AVB, normal axis, no MYA or STD (time 1318). Narcan given x 2,  patient making unintelligible noises after second dose without significant improvement in mentation.  RSI was performed for air way protection presentation concerning for opiate overdose, metabolic encephalopathy.  BG WNL. EKG shows sinus rhythm, 1st degree AVB, normal axis, no MYA or STD (time 1318). Narcan given x 2,  patient making unintelligible noises after second dose without significant improvement in mentation.  RSI was performed for air way protection. CXR shows left lung infiltrate concerning for PNA. presentation concerning for opiate overdose, metabolic encephalopathy.  BG WNL. EKG shows sinus rhythm, 1st degree AVB, normal axis, no MYA or STD (time 1318). Narcan given x 2,  patient making unintelligible noises after second dose without significant improvement in mentation.  RSI was performed for air way protection. Pt given empiric antibiotics, 3L NS here in ER and found to be hypotensive, started on peripheral levophed. CXR shows left lung infiltrate concerning for PNA. +UTI. Admit to ICU for septic shock 2/2 UTI, PNA, AHRF presentation concerning for opiate overdose, metabolic encephalopathy.  BG WNL. EKG shows sinus rhythm, 1st degree AVB, normal axis, no MYA or STD (time 1318). Narcan given x 2,  patient making unintelligible noises after second dose without significant improvement in mentation.  RSI was performed for air way protection. Pt given empiric antibiotics, 3L NS here in ER and found to be hypotensive, started on peripheral levophed. CXR shows left lung infiltrate concerning for PNA. +UTI. Admitted to Dr. Nugent CCU for septic shock 2/2 UTI, PNA, AHRF presentation concerning for opiate overdose, metabolic encephalopathy.  BG WNL. EKG shows sinus rhythm, 1st degree AVB, normal axis, no MYA or STD (time 1318). Narcan given x 2,  patient making unintelligible noises after second dose without significant improvement in mentation.  RSI was performed for air way protection. Pt given empiric antibiotics, 3L NS here in ER and found to be hypotensive, started on peripheral levophed. CXR shows left lung infiltrate concerning for PNA. +UTI. Pan CT shows PNA, CT brain no acute actionable findings.  Admitted to Dr. Nugent CCU for septic shock 2/2 UTI, PNA, AHRF

## 2025-05-20 NOTE — ED ADULT TRIAGE NOTE - CHIEF COMPLAINT QUOTE
Pt BIBEMS from [place] w/ hypotension and AMS. Per EMS facility thought pt had coded, sternal rubbed pt and pt was responsive. Pt minimally responsive in triage, taken straight to trauma room for MD jeremiah and RN at beside.

## 2025-05-20 NOTE — ED ADULT NURSE NOTE - OBJECTIVE STATEMENT
BIBEMS from [facility] found unresponsive in bed. Per EMS, pt was found to be hypotensive and hypoxic upon arrival. Given 1L NS and 15L NRB upon arrival. Pt remains unresponsive upon arrival. GCS 6

## 2025-05-20 NOTE — ED PROVIDER NOTE - PHYSICAL EXAMINATION
Constitutional: GCS 6  Eyes: EOMI,  pupils pinpoint, 1 mm bilaterally  Head: Normocephalic atraumatic  Mouth: no airway obstruction, posterior oropharynx clear without erythema or exudate  Cardiac: regular rate and rhythm, no MRG  Resp: Lungs CTAB  GI: Abd nd  Neuro:  GCS 6  Skin: No rashes Constitutional: GCS 6, ill appearing  Eyes: EOMI,  pupils pinpoint, 1 mm bilaterally  Head: Normocephalic atraumatic  Mouth: no airway obstruction, posterior oropharynx clear without erythema or exudate, dry MM  Cardiac: regular rate and rhythm, no MRG  Resp: Lungs CTAB  GI: Abd nd  Neuro:  GCS 6  Skin: No rashes, b/l heel ulcers

## 2025-05-20 NOTE — CONSULT NOTE ADULT - SUBJECTIVE AND OBJECTIVE BOX
Date of consult: 5/20/25    HPI:    HPI : infiltrates  head ct negative (20 May 2025 16:14)            PMH: CAD (coronary artery disease)    Atrial fibrillation    History of alcohol use    BPH (benign prostatic hyperplasia)    Paranoia    History of hallucinations      PSH:S/P CABG (coronary artery bypass graft)        Allergies:No Known Allergies      Labs:                          10.8   6.68  )-----------( 290      ( 20 May 2025 13:40 )             32.7     WBC Trend  6.68 Date (05-20 @ 13:40)      Chem  05-20    138  |  103  |  23  ----------------------------<  120[H]  3.5   |  30  |  0.77    Ca    9.2      20 May 2025 13:40    TPro  6.5  /  Alb  2.1[L]  /  TBili  0.3  /  DBili  x   /  AST  74[H]  /  ALT  45  /  AlkPhos  94  05-20          T(F): 92.3 (05-20-25 @ 16:15), Max: 92.3 (05-20-25 @ 16:15)  HR: 65 (05-20-25 @ 16:15) (60 - 72)  BP: 105/66 (05-20-25 @ 16:15) (69/50 - 128/75)  RR: 16 (05-20-25 @ 16:06) (15 - 24)  SpO2: 94% (05-20-25 @ 16:06) (94% - 100%)  Wt(kg): --    REVIEW OF SYSTEMS:    CONSTITUTIONAL: No weakness, fevers or chills  EYES: No visual changes  RESPIRATORY: No cough, wheezing; No shortness of breath  CARDIOVASCULAR: No chest pain or palpitations  GASTROINTESTINAL: No abdominal or epigastric pain. No nausea, vomiting; No diarrhea or constipation.   GENITOURINARY: No dysuria, frequency or hematuria  NEUROLOGICAL: No numbness or weakness  SKIN: See physical examination.  All other review of systems is negative unless indicated above    Physical Exam:   Constitutional: NAD, alert;  Lower Extremity Focus  Derm:  Skin warm, dry and supple bilateral.    Ulceration Right/Left ---- in nature, +/- hyperkeratotic border, wound base Granular/Fibrogranular/Necrotic patches/ , wound size (-- cm X – cm X –cm) +/- edema, +/- valerie-wound erythema, +/- purulence, +/- fluctuance, +/- tracking/tunneling, +/- probe to bone.   Vascular: Dorsalis Pedis and Posterior Tibial pulses --/4.  Capillary re-fill time less then 3 seconds digits 1-5 bilateral.    Neuro: Protective sensation intact/diminished to the level of the digits bilateral.  MSK: Muscle strength 5/5 all major muscle groups bilateral.      A: -- seen for the following:       P:   Chart reviewed and Patient evaluated;  Discussed diagnosis and treatment with patient. Discussed importance of daily foot examinations, proper shoe gear, importance of tight glycemic control.   X-rays reviewed : on wet read showing --  Wound flush with normal saline  Wound cx taken  Applied --- with dry sterile dressing  WBAT  to ------------  Offload bilateral heels while bedbound  Continue antibiotics as per ID  All additional care per Med appreciated  Patient demonstrated verbal understanding of all interventions and tolerated interventions well without any complications.   Podiatry will follow while in house      Case D/W attending  ---     Date of consult: 5/20/25    HPI:  79-year-old male history of hypertension, CAD, BPH, dementia, atrial fibrillation presents today  from [facility] after he was found unresponsive in bed.  Per EMS patient was hypotensive and hypoxic upon arrival.  Patient is full code.  On arrival to emergency department patient is GCS 6, unable to provide history.  Per nursing facility documentations patient is full code.  Podiatry was consulted for bilateral heel ulcers. it's unclear when it started. Pt is is non-verbal, intubated and HPI could not be obtained      PMH:   CAD (coronary artery disease)  Atrial fibrillation  Alcohol use  BPH (benign prostatic hyperplasia)  Paranoia  Hallucinations      PSH:  S/P CABG (coronary artery bypass graft)        Allergies:  No Known Allergies      Labs:                  10.8   6.68  )-----------( 290      ( 20 May 2025 13:40 )             32.7     WBC Trend  6.68 Date (05-20 @ 13:40)      Chem  05-20    138  |  103  |  23  ----------------------------<  120[H]  3.5   |  30  |  0.77    Ca    9.2      20 May 2025 13:40    TPro  6.5  /  Alb  2.1[L]  /  TBili  0.3  /  DBili  x   /  AST  74[H]  /  ALT  45  /  AlkPhos  94  05-20    T(F): 92.3 (05-20-25 @ 16:15), Max: 92.3 (05-20-25 @ 16:15)  HR: 65 (05-20-25 @ 16:15) (60 - 72)  BP: 105/66 (05-20-25 @ 16:15) (69/50 - 128/75)  RR: 16 (05-20-25 @ 16:06) (15 - 24)  SpO2: 94% (05-20-25 @ 16:06) (94% - 100%)  Wt(kg): --    REVIEW OF SYSTEMS:    CONSTITUTIONAL: No weakness, fevers or chills  EYES: No visual changes  RESPIRATORY: No cough, wheezing; No shortness of breath  CARDIOVASCULAR: No chest pain or palpitations  GASTROINTESTINAL: No abdominal or epigastric pain. No nausea, vomiting; No diarrhea or constipation.   GENITOURINARY: No dysuria, frequency or hematuria  NEUROLOGICAL: No numbness or weakness  SKIN: See physical examination.  All other review of systems is negative unless indicated above    Physical Exam:   Constitutional: NAD, alert;  Lower Extremity Focus  Derm:  Skin warm, dry and supple bilateral.    Right:, +/- hyperkeratotic border, wound base Granular/Fibrogranular/Necrotic patches/ , wound size (-- cm X – cm X –cm) +/- edema, +/- valerie-wound erythema, +/- purulence, +/- fluctuance, +/- tracking/tunneling, +/- probe to bone.   Left ---- in nature, +/- hyperkeratotic border, wound base Granular/Fibrogranular/Necrotic patches/ , wound size (-- cm X – cm X –cm) +/- edema, +/- valerie-wound erythema, +/- purulence, +/- fluctuance, +/- tracking/tunneling, +/- probe to bone.   Vascular: Dorsalis Pedis and Posterior Tibial pulses --/4.  Capillary re-fill time less then 3 seconds digits 1-5 bilateral.    Neuro: Protective sensation intact/diminished to the level of the digits bilateral.  MSK: Muscle strength 5/5 all major muscle groups bilateral.       Date of consult: 5/20/25    HPI:  79-year-old male history of hypertension, CAD, BPH, dementia, atrial fibrillation presents today  from [facility] after he was found unresponsive in bed.  Per EMS patient was hypotensive and hypoxic upon arrival.  Patient is full code.  On arrival to emergency department patient is GCS 6, unable to provide history.  Per nursing facility documentations patient is full code.  Podiatry was consulted for bilateral heel ulcers. It's unclear when it started. Pt is is non-verbal, intubated and HPI could not be obtained.      PMH:   CAD (coronary artery disease)  Atrial fibrillation  Alcohol use  BPH (benign prostatic hyperplasia)  Paranoia  Hallucinations      PSH:  S/P CABG (coronary artery bypass graft)        Allergies:  No Known Allergies      Labs:                  10.8   6.68  )-----------( 290      ( 20 May 2025 13:40 )             32.7     WBC Trend  6.68 Date (05-20 @ 13:40)      Chem  05-20    138  |  103  |  23  ----------------------------<  120[H]  3.5   |  30  |  0.77    Ca    9.2      20 May 2025 13:40    TPro  6.5  /  Alb  2.1[L]  /  TBili  0.3  /  DBili  x   /  AST  74[H]  /  ALT  45  /  AlkPhos  94  05-20    T(F): 92.3 (05-20-25 @ 16:15), Max: 92.3 (05-20-25 @ 16:15)  HR: 65 (05-20-25 @ 16:15) (60 - 72)  BP: 105/66 (05-20-25 @ 16:15) (69/50 - 128/75)  RR: 16 (05-20-25 @ 16:06) (15 - 24)  SpO2: 94% (05-20-25 @ 16:06) (94% - 100%)  Wt(kg): --    REVIEW OF SYSTEMS:    CONSTITUTIONAL: No weakness, fevers or chills  EYES: No visual changes  RESPIRATORY: No cough, wheezing; No shortness of breath  CARDIOVASCULAR: No chest pain or palpitations  GASTROINTESTINAL: No abdominal or epigastric pain. No nausea, vomiting; No diarrhea or constipation.   GENITOURINARY: No dysuria, frequency or hematuria  NEUROLOGICAL: No numbness or weakness  SKIN: See physical examination.  All other review of systems is negative unless indicated above    Physical Exam:   Constitutional: NAD, alert;  Lower Extremity Focus  Derm:  Skin warm, dry and supple bilateral.    Right: Well-adhered eschar lesion, wound base Necrotic patches, wound size (7 cm X 7 cm), - edema, - valerie-wound erythema, no malodor, no active discharge, - purulence, - fluctuance, - tracking/tunneling, - probe to bone.   Left:  Well-adhered eschar lesion, wound base Necrotic patches, wound size (8 cm X 7 cm), - edema, - valerie-wound erythema, +malodor, no active discharge, + purulence, - fluctuance, - tracking/tunneling, + probe to bone.  Vascular: Dorsalis Pedis and Posterior Tibial pulses nonpalpable.   Neuro: could not be obtained because of disposition  MSK: could not be obtained because of disposition

## 2025-05-21 LAB
ALBUMIN SERPL ELPH-MCNC: 1.8 G/DL — LOW (ref 3.3–5)
ALP SERPL-CCNC: 117 U/L — SIGNIFICANT CHANGE UP (ref 40–120)
ALT FLD-CCNC: 85 U/L — HIGH (ref 12–78)
AMMONIA BLD-MCNC: 20 UMOL/L — SIGNIFICANT CHANGE UP (ref 11–32)
ANION GAP SERPL CALC-SCNC: 4 MMOL/L — LOW (ref 5–17)
AST SERPL-CCNC: 143 U/L — HIGH (ref 15–37)
BILIRUB SERPL-MCNC: 0.7 MG/DL — SIGNIFICANT CHANGE UP (ref 0.2–1.2)
BUN SERPL-MCNC: 21 MG/DL — SIGNIFICANT CHANGE UP (ref 7–23)
CALCIUM SERPL-MCNC: 8.6 MG/DL — SIGNIFICANT CHANGE UP (ref 8.5–10.1)
CHLORIDE SERPL-SCNC: 105 MMOL/L — SIGNIFICANT CHANGE UP (ref 96–108)
CO2 SERPL-SCNC: 25 MMOL/L — SIGNIFICANT CHANGE UP (ref 22–31)
CREAT SERPL-MCNC: 1.08 MG/DL — SIGNIFICANT CHANGE UP (ref 0.5–1.3)
EGFR: 70 ML/MIN/1.73M2 — SIGNIFICANT CHANGE UP
EGFR: 70 ML/MIN/1.73M2 — SIGNIFICANT CHANGE UP
GLUCOSE SERPL-MCNC: 100 MG/DL — HIGH (ref 70–99)
GRAM STN FLD: ABNORMAL
GRAM STN FLD: ABNORMAL
HCT VFR BLD CALC: 34.3 % — LOW (ref 39–50)
HGB BLD-MCNC: 11.1 G/DL — LOW (ref 13–17)
MAGNESIUM SERPL-MCNC: 1.8 MG/DL — SIGNIFICANT CHANGE UP (ref 1.6–2.6)
MCHC RBC-ENTMCNC: 26.5 PG — LOW (ref 27–34)
MCHC RBC-ENTMCNC: 32.4 G/DL — SIGNIFICANT CHANGE UP (ref 32–36)
MCV RBC AUTO: 81.9 FL — SIGNIFICANT CHANGE UP (ref 80–100)
MRSA PCR RESULT.: SIGNIFICANT CHANGE UP
NRBC # BLD AUTO: 0 K/UL — SIGNIFICANT CHANGE UP (ref 0–0)
NRBC # FLD: 0 K/UL — SIGNIFICANT CHANGE UP (ref 0–0)
NRBC BLD AUTO-RTO: 0 /100 WBCS — SIGNIFICANT CHANGE UP (ref 0–0)
PHOSPHATE SERPL-MCNC: 3.5 MG/DL — SIGNIFICANT CHANGE UP (ref 2.5–4.5)
PLATELET # BLD AUTO: 277 K/UL — SIGNIFICANT CHANGE UP (ref 150–400)
PMV BLD: 10.7 FL — SIGNIFICANT CHANGE UP (ref 7–13)
POTASSIUM SERPL-MCNC: 4.1 MMOL/L — SIGNIFICANT CHANGE UP (ref 3.5–5.3)
POTASSIUM SERPL-SCNC: 4.1 MMOL/L — SIGNIFICANT CHANGE UP (ref 3.5–5.3)
PROT SERPL-MCNC: 6.1 GM/DL — SIGNIFICANT CHANGE UP (ref 6–8.3)
RBC # BLD: 4.19 M/UL — LOW (ref 4.2–5.8)
RBC # FLD: 13.9 % — SIGNIFICANT CHANGE UP (ref 10.3–14.5)
S AUREUS DNA NOSE QL NAA+PROBE: DETECTED
SODIUM SERPL-SCNC: 134 MMOL/L — LOW (ref 135–145)
SPECIMEN SOURCE: SIGNIFICANT CHANGE UP
SPECIMEN SOURCE: SIGNIFICANT CHANGE UP
TROPONIN I, HIGH SENSITIVITY RESULT: 17.46 NG/L — SIGNIFICANT CHANGE UP
WBC # BLD: 28.83 K/UL — HIGH (ref 3.8–10.5)
WBC # FLD AUTO: 28.83 K/UL — HIGH (ref 3.8–10.5)

## 2025-05-21 PROCEDURE — 99291 CRITICAL CARE FIRST HOUR: CPT | Mod: 25

## 2025-05-21 PROCEDURE — 31645 BRNCHSC W/THER ASPIR 1ST: CPT

## 2025-05-21 PROCEDURE — 95819 EEG AWAKE AND ASLEEP: CPT | Mod: 26

## 2025-05-21 RX ORDER — MUPIROCIN CALCIUM 20 MG/G
1 CREAM TOPICAL
Refills: 0 | Status: COMPLETED | OUTPATIENT
Start: 2025-05-21 | End: 2025-05-26

## 2025-05-21 RX ORDER — ETOMIDATE 2 MG/ML
20 AMPUL (ML) INTRAVENOUS ONCE
Refills: 0 | Status: COMPLETED | OUTPATIENT
Start: 2025-05-21 | End: 2025-05-21

## 2025-05-21 RX ORDER — SODIUM HYPOCHLORITE 0.12 MG/ML
1 SOLUTION TOPICAL ONCE
Refills: 0 | Status: COMPLETED | OUTPATIENT
Start: 2025-05-21 | End: 2025-05-21

## 2025-05-21 RX ADMIN — SODIUM HYPOCHLORITE 1 APPLICATION(S): 0.12 SOLUTION TOPICAL at 15:09

## 2025-05-21 RX ADMIN — ATORVASTATIN CALCIUM 10 MILLIGRAM(S): 80 TABLET, FILM COATED ORAL at 21:34

## 2025-05-21 RX ADMIN — Medication 20 MILLIGRAM(S): at 15:56

## 2025-05-21 RX ADMIN — Medication 125 MILLIGRAM(S): at 21:34

## 2025-05-21 RX ADMIN — MUPIROCIN CALCIUM 1 APPLICATION(S): 20 CREAM TOPICAL at 23:26

## 2025-05-21 RX ADMIN — Medication 40 MILLIGRAM(S): at 10:56

## 2025-05-21 RX ADMIN — Medication 100 MILLILITER(S): at 22:55

## 2025-05-21 RX ADMIN — Medication 15 MILLILITER(S): at 23:26

## 2025-05-21 RX ADMIN — Medication 25 GRAM(S): at 21:34

## 2025-05-21 RX ADMIN — Medication 25 GRAM(S): at 15:09

## 2025-05-21 RX ADMIN — Medication 25 GRAM(S): at 05:57

## 2025-05-21 RX ADMIN — HEPARIN SODIUM 5000 UNIT(S): 1000 INJECTION INTRAVENOUS; SUBCUTANEOUS at 21:33

## 2025-05-21 RX ADMIN — Medication 100 MILLILITER(S): at 15:09

## 2025-05-21 RX ADMIN — HEPARIN SODIUM 5000 UNIT(S): 1000 INJECTION INTRAVENOUS; SUBCUTANEOUS at 05:57

## 2025-05-21 RX ADMIN — Medication 81 MILLIGRAM(S): at 10:56

## 2025-05-21 RX ADMIN — Medication 100 MILLILITER(S): at 03:57

## 2025-05-21 RX ADMIN — HEPARIN SODIUM 5000 UNIT(S): 1000 INJECTION INTRAVENOUS; SUBCUTANEOUS at 15:19

## 2025-05-21 RX ADMIN — POLYETHYLENE GLYCOL 3350 17 GRAM(S): 17 POWDER, FOR SOLUTION ORAL at 10:56

## 2025-05-21 RX ADMIN — FOLIC ACID 1 MILLIGRAM(S): 1 TABLET ORAL at 10:56

## 2025-05-21 RX ADMIN — Medication 125 MILLIGRAM(S): at 15:17

## 2025-05-21 RX ADMIN — NOREPINEPHRINE BITARTRATE 6.38 MICROGRAM(S)/KG/MIN: 8 SOLUTION at 06:45

## 2025-05-21 RX ADMIN — Medication 1 APPLICATION(S): at 15:18

## 2025-05-21 RX ADMIN — Medication 2 TABLET(S): at 21:34

## 2025-05-21 RX ADMIN — Medication 15 MILLILITER(S): at 15:17

## 2025-05-21 NOTE — PROGRESS NOTE ADULT - ASSESSMENT
A: 79-year-old male was seen for the followin. Full thickness ulcer to bilateral heel; Left foot infected  2. Difficulty with Ambulation      P:   Chart reviewed and Patient evaluated;  X-rays ordered : on wet reading no acute radiographic finding  Left Heel Wound was exsanguinated, debrided and flushed with Dakins solution  Wound cx taken (25) Left Foot: GPC, GNP  Applied betadine with dry sterile dressing  WBAT  to Bilateral foot  Offload bilateral heels while bedbound or place 2 pillows under the calf to prevent further soft tissue breaks  Antibiotics as per ID  All additional care per Med appreciated   Podiatry will follow while in house      Case D/W attending Dr. Lewis

## 2025-05-21 NOTE — DIETITIAN INITIAL EVALUATION ADULT - PERTINENT MEDS FT
MEDICATIONS  (STANDING):  aspirin  chewable 81 milliGRAM(s) Oral daily  atorvastatin 10 milliGRAM(s) Oral at bedtime  chlorhexidine 0.12% Liquid 15 milliLiter(s) Oral Mucosa every 12 hours  Dakins Solution - 1/2 Strength 1 Application(s) Topical once  folic acid 1 milliGRAM(s) Oral daily  heparin   Injectable 5000 Unit(s) SubCutaneous every 8 hours  norepinephrine Infusion 0.05 MICROgram(s)/kG/Min (6.38 mL/Hr) IV Continuous <Continuous>  pantoprazole  Injectable 40 milliGRAM(s) IV Push daily  piperacillin/tazobactam IVPB.. 3.375 Gram(s) IV Intermittent every 8 hours  polyethylene glycol 3350 17 Gram(s) Oral daily  propofol Infusion 10 MICROgram(s)/kG/Min (4.08 mL/Hr) IV Continuous <Continuous>  senna 2 Tablet(s) Oral at bedtime  sodium chloride 0.9%. 1000 milliLiter(s) (100 mL/Hr) IV Continuous <Continuous>  valproic  acid Syrup 125 milliGRAM(s) Oral two times a day    Home Medications:  acetaminophen 325 mg oral tablet: 2 tab(s) orally every 8 hours as needed for pain (20 May 2025 13:54)  aspirin 81 mg oral tablet, chewable: 1 tab(s) orally once a day (20 May 2025 13:55)  atorvastatin 10 mg oral tablet: 1 tab(s) orally once a day (at bedtime) (20 May 2025 15:52)  docusate sodium 100 mg oral capsule: 2 cap(s) orally once a day (at bedtime) (20 May 2025 15:52)  donepezil 10 mg oral tablet: 1 tab(s) orally once a day (at bedtime) (20 May 2025 15:52)  folic acid 1 mg oral tablet: 1 tab(s) orally once a day (20 May 2025 13:55)  melatonin 3 mg oral tablet: 1 tab(s) orally once a day (at bedtime) (20 May 2025 13:54)  memantine 10 mg oral tablet: 2 tab(s) orally once a day (at bedtime) (20 May 2025 15:52)  metoprolol tartrate 25 mg oral tablet: 0.5 tab(s) orally once a day ***hold for sbp &lt; 110*** (20 May 2025 15:52)  mirtazapine 45 mg oral tablet: 1 tab(s) orally once a day (at bedtime) (20 May 2025 15:52)  polyethylene glycol 3350 oral powder for reconstitution: 17 gram(s) orally once a day (20 May 2025 15:52)  senna (sennosides) 8.6 mg oral tablet: 2 tab(s) orally once a day (20 May 2025 15:52)  tamsulosin 0.4 mg oral capsule: 1 cap(s) orally once a day (in the evening) (20 May 2025 13:55)  traZODone 50 mg oral tablet: 1 tab(s) orally once a day (at bedtime) (20 May 2025 15:52)  valproic acid 250 mg/5 mL oral liquid: 2.5 milliliter(s) orally 2 times a day (20 May 2025 15:52)

## 2025-05-21 NOTE — DIETITIAN INITIAL EVALUATION ADULT - ENTERAL
Initiate [place] 1.5 @ 20 cc/hr, increase by 10 cc/hr q6 hours until goal rate of 70cc/hr met with 1 packets of MD.Voice TF. Will provide ~ 2246 kcal, 119 g protein, and 980 mL free water.

## 2025-05-21 NOTE — PROCEDURE NOTE - NSBRONCHPROCDETAILS_GEN_A_CORE_FT
Patient given etomidate for sedation placed on 100% o2  Fiberoptic bronchoscopy donw  thick secretions aspirated  cultures sent  airways clear at end of procedure

## 2025-05-21 NOTE — DIETITIAN INITIAL EVALUATION ADULT - OTHER INFO
80 y/o M with a PMHx of CAD, CABG, dementia, AF not on AC due to compliance, behavior health issues, BPH who was found unresponsive in bed at [facility]. Was intubated in ED. Small pupils, got Narcan in ED. CT chest shows patchy infiltrates. Admitted for AMS, possible septic shock, PNA, metabolic encephalopathy, infected heel ulcers, and acute respiratory failure; admitted to CCU.    Unable to obtain meaningful information 2/2 pt intubated and sedated. NPO with propofol on hold at time of visit. RD obtained bedscale wt on 5/21 - 170#. No recent weight hx to review. Pt thin, frail appearing. NFPE reveals moderate to severe muscle/ fat wasting, pt meets criteria for PCM at this time. Discussed in IDR this morning, plan to hold on initiating nutrition support as possible extubation today. Will provide TF recs below. If pt is extubated, consider SLP eval to assess for safest/ least restrictive diet consistency/ texture. Advance diet to Regular + SLP recs as soon as medically feasible. Please see additional recommendations below.

## 2025-05-21 NOTE — DIETITIAN NUTRITION RISK NOTIFICATION - ADDITIONAL COMMENTS/DIETITIAN RECOMMENDATIONS
1) Initiate TF as per recommendations above as soon as medically feasible  2) consider checking B6, B12, thiamine, folate, carnitine, and copper levels as malnutrition in cause these to be deficient   3) monitor hydration status; adjust free water flushes prn, consider free water flushes per MD to maintain hydration  4) monitor TF tolerance; keep back of bed > 35 degrees  5) monitor BM: if > 3 days without BM, add bowel regimen prn  6) daily wt checks to track/trend changes  7) Consider adding thiamine 100 mg daily 2/2 poor PO intake/ malnutrition  8) MVI w/ minerals daily to ensure 100% RDA met  9) Obtain TG level  10) Monitor propofol infusion and adjust TF regimen accordingly   11) Consider SLP eval to assess for safest/ least restrictive diet consistency/ texture if pt is extubated   - advance diet to Regular + SLP recs as soon as medically feasible   12) Confirm goals of care regarding nutrition support   Nutrition recommendations to continue upon discharge. Goal to continue to meet >/= 80% ENN via tolerated route. RD to F/U prn for changes to nutrition dc plan.   RD will continue to monitor PO intake, labs, hydration, and wt prn.

## 2025-05-21 NOTE — PROGRESS NOTE ADULT - SUBJECTIVE AND OBJECTIVE BOX
5/21/25: Pt was seen by Podiatry team. Pt was resting comfortably bedside. Pt is still intubated        PMH:   CAD (coronary artery disease)  Atrial fibrillation  Alcohol use  BPH (benign prostatic hyperplasia)  Paranoia  Hallucinations      PSH:  S/P CABG (coronary artery bypass graft)    Allergies:  No Known Allergies      Labs:                               11.1   28.83 )-----------( 277      ( 21 May 2025 06:06 )             34.3                05-21    134[L]  |  105  |  21  ----------------------------<  100[H]  4.1   |  25  |  1.08    Ca    8.6      21 May 2025 06:06  Phos  3.5     05-21  Mg     1.8     05-21    TPro  6.1  /  Alb  1.8[L]  /  TBili  0.7  /  DBili  x   /  AST  143[H]  /  ALT  85[H]  /  AlkPhos  117  05-21    Vital Signs Last 24 Hrs  T(C): 36.6 (21 May 2025 08:00), Max: 36.9 (21 May 2025 05:00)  T(F): 97.9 (21 May 2025 08:00), Max: 98.4 (21 May 2025 05:00)  HR: 72 (21 May 2025 08:25) (44 - 84)  BP: 116/63 (21 May 2025 08:00) (69/50 - 133/72)  BP(mean): 80 (21 May 2025 08:00) (57 - 94)  RR: 21 (21 May 2025 08:00) (15 - 26)  SpO2: 99% (21 May 2025 08:25) (91% - 100%)    Parameters below as of 20 May 2025 19:15  Patient On (Oxygen Delivery Method): ventilator  O2 Flow (L/min): 40      REVIEW OF SYSTEMS:    CONSTITUTIONAL: No weakness, fevers or chills  EYES: No visual changes  RESPIRATORY: No cough, wheezing; No shortness of breath  CARDIOVASCULAR: No chest pain or palpitations  GASTROINTESTINAL: No abdominal or epigastric pain. No nausea, vomiting; No diarrhea or constipation.   GENITOURINARY: No dysuria, frequency or hematuria  NEUROLOGICAL: No numbness or weakness  SKIN: See physical examination.  All other review of systems is negative unless indicated above    Physical Exam:   Constitutional: NAD, alert;  Lower Extremity Focus  Derm:  Skin warm, dry and supple bilateral.    Right: Well-adhered eschar lesion, wound base Necrotic patches, wound size (7 cm X 7 cm), - edema, - valerie-wound erythema, no malodor, no active discharge, - purulence, - fluctuance, - tracking/tunneling, - probe to bone.   Left:  Well-adhered eschar lesion, wound base Necrotic patches, wound size (8 cm X 7 cm), - edema, - valerie-wound erythema, +malodor, no active discharge, + purulence, - fluctuance, - tracking/tunneling, + probe to bone.  Vascular: Dorsalis Pedis and Posterior Tibial pulses nonpalpable.   Neuro: could not be obtained because of disposition  MSK: could not be obtained because of disposition      Culture - Abscess with Gram Stain (collected 20 May 2025 18:25)  Source: Abscess Leg - Left  Gram Stain (21 May 2025 06:29):    Few polymorphonuclear leukocytes seen per low power field    Rare Gram positive cocci in pairs seen per oil power field    Moderate Gram Negative Rods seen per oil power field    Urinalysis with Rflx Culture (collected 20 May 2025 13:47)

## 2025-05-21 NOTE — PROGRESS NOTE ADULT - ASSESSMENT
1. UTI  2. septic shock  3. Pneumonia  4. FUNMILAYO  5. metabolic encephalopathy  6. infected heel ulcers  7. Type 1 RF  8. Transaminitis    Neuro: Sedated on propofol. Actively titrating to RAAS 0 to -2. CTA negative for CVA. May require MRI. EEG negative for seizure     CV: Septic shock requiring Levophed. Actively titrating for MAP >65    Pulm: Intubated for pna/AMS. Actively titrating for PEEP/FIO2 for SPO2 >92%. MV adequate on ABG. CT with pictographic evidence of aspiration. S/p bronchoscopy.    Renal: Worsening renal failure. Continue mIVF    GI: Start TFs now. Iv PPI. Increase in LFTs, will trend. If increasing will obtain RUQ    ID: On Zosyn. MRSA negative in nares. Ecoli in urine. wound culture pending. F/u cultures and sensitivities    Heme: SQH for dvt ppx    _37___ minutes of critical care time spent providing medical care for patient's acute illness/conditions that impairs at least one vital organ system and/or poses a high risk of imminent or life threatening deterioration in the patient's condition. It includes time spent evaluating and treating the patient's acute illness as well as time spent reviewing labs, radiology, discussing goals of care with patient and/or patient's family, and discussing the case with a multidisciplinary team in an effort to prevent further life threatening deterioration or end organ damage. This time is independent of any procedures performed.

## 2025-05-21 NOTE — PROGRESS NOTE ADULT - SUBJECTIVE AND OBJECTIVE BOX
Interval History:         Patient remains intubated         on 15 on propofol         had debridement of left heal         leg wound culture with gram negative rods         remains on 0.12 of Levophed         troponins negative    HPI:         This patient is a 79 year old male     with PMH signfiiant for             CAD             CABG             afib not on AC due to compliance             behavior health issues              BPH  Roney is resident of [facility].  On day of admission patient was found unresponsive in bed  Was brought to ED, GCS was 6 , was intubated  now intubated  small pupils got narcan in ed  ct chest shows patchy infiltrates, with debris in bronchus  head ct negative    Review of Systems:  Unable to obtain due to: Altered mentation    MEDICATIONS  (STANDING):  aspirin  chewable 81 milliGRAM(s) Oral daily  atorvastatin 10 milliGRAM(s) Oral at bedtime  chlorhexidine 0.12% Liquid 15 milliLiter(s) Oral Mucosa every 12 hours  folic acid 1 milliGRAM(s) Oral daily  heparin   Injectable 5000 Unit(s) SubCutaneous every 8 hours  norepinephrine Infusion 0.05 MICROgram(s)/kG/Min (6.38 mL/Hr) IV Continuous <Continuous>  pantoprazole  Injectable 40 milliGRAM(s) IV Push daily  piperacillin/tazobactam IVPB.. 3.375 Gram(s) IV Intermittent every 8 hours  polyethylene glycol 3350 17 Gram(s) Oral daily  propofol Infusion 10 MICROgram(s)/kG/Min (4.08 mL/Hr) IV Continuous <Continuous>  senna 2 Tablet(s) Oral at bedtime  sodium chloride 0.9%. 1000 milliLiter(s) (100 mL/Hr) IV Continuous <Continuous>  valproic  acid Syrup 125 milliGRAM(s) Oral two times a day    Weight (kg): 68 (05-20 @ 13:23)    ICU Vital Signs Last 24 Hrs  T(C): 36.8 (21 May 2025 06:00), Max: 36.9 (21 May 2025 05:00)  T(F): 98.2 (21 May 2025 06:00), Max: 98.4 (21 May 2025 05:00)  HR: 77 (21 May 2025 06:50) (44 - 84)  BP: 100/60 (21 May 2025 06:00) (69/50 - 133/72)  BP(mean): 73 (21 May 2025 06:00) (57 - 94)  ABP: --  ABP(mean): --  RR: 21 (21 May 2025 06:00) (15 - 26)  SpO2: 98% (21 May 2025 06:50) (91% - 100%)    O2 Parameters below as of 20 May 2025 19:15  Patient On (Oxygen Delivery Method): ventilator  O2 Flow (L/min): 40    Mode: AC/ CMV (Assist Control/ Continuous Mandatory Ventilation)  RR (machine): 16  TV (machine): 420  FiO2: 40  PEEP: 6  ITime: 1  MAP: 11  PIP: 19      I&O's Summary    20 May 2025 07:01  -  21 May 2025 07:00  --------------------------------------------------------  IN: 1575.5 mL / OUT: 700 mL / NET: 875.5 mL    Physical Exam    General lethargic no distress  HEENT nc/at orally intubated, OG tube,    Neck - supple  Lungs - clear  cv - RRR  abdomen - soft, non tender   alva  extremities heals wrapped  Neuro pupils small reactive                        11.1   28.83 )-----------( 277      ( 21 May 2025 06:06 )             34.3       05-21    134[L]  |  105  |  21  ----------------------------<  100[H]  4.1   |  25  |  1.08    Ca    8.6      21 May 2025 06:06  Phos  3.5     05-21  Mg     1.8     05-21    TPro  6.1  /  Alb  1.8[L]  /  TBili  0.7  /  DBili  x   /  AST  143[H]  /  ALT  85[H]  /  AlkPhos  117  05-21    ABG - ( 20 May 2025 15:28 )  pH, Arterial: 7.40  pH, Blood: x     /  pCO2: 43    /  pO2: 82    / HCO3: 27    / Base Excess: 1.5   /  SaO2: 98.0      Urinalysis Basic - ( 21 May 2025 06:06 )    Color: x / Appearance: x / SG: x / pH: x  Gluc: 100 mg/dL / Ketone: x  / Bili: x / Urobili: x   Blood: x / Protein: x / Nitrite: x   Leuk Esterase: x / RBC: x / WBC x   Sq Epi: x / Non Sq Epi: x / Bacteria: x    DVT Prophylaxis:  Heparin subq                                                               Advanced Directives: FUll Code     Sepsis Criteria Met - YEs    Labs, Notes, Orders, radiologic studies reviewed and care coordinated with multidisciplinary team

## 2025-05-21 NOTE — DIETITIAN INITIAL EVALUATION ADULT - NSFNSPHYEXAMSKINFT_GEN_A_CORE
Pressure Injury 1: Right:, buttocks, coccyx, Stage II  Pressure Injury 2: Right:, heel, Unstageable  Pressure Injury 3: Left:, heel, Unstageable    Eddie Score 11.

## 2025-05-21 NOTE — DIETITIAN INITIAL EVALUATION ADULT - ORAL INTAKE PTA/DIET HISTORY
Unable to obtain intake/ diet hx pta 2/2 pt intubated and sedated. From [facility]: Regular, Ground Texture, Thin Liquids and Boost TID and LPS BID.

## 2025-05-21 NOTE — DIETITIAN INITIAL EVALUATION ADULT - NSFNSGIIOFT_GEN_A_CORE
I&O's Detail    20 May 2025 07:01  -  21 May 2025 07:00  --------------------------------------------------------  IN:    IV PiggyBack: 100 mL    Norepinephrine: 226.6 mL    Propofol: 48.9 mL    sodium chloride 0.9%: 1200 mL  Total IN: 1575.5 mL    OUT:    Indwelling Catheter - Urethral (mL): 600 mL    Voided (mL): 100 mL  Total OUT: 700 mL    Total NET: 875.5 mL

## 2025-05-21 NOTE — PROGRESS NOTE ADULT - SUBJECTIVE AND OBJECTIVE BOX
Date of entry of this note is equal to the date of services rendered    Patient is a 79y old  Male who presents with a chief complaint of found unresponsive (21 May 2025 10:42)      BRIEF HOSPITAL COURSE: ***    Events last 24 hours: ***    PAST MEDICAL & SURGICAL HISTORY:  CAD (coronary artery disease)      Atrial fibrillation  Not on AC      History of alcohol use      BPH (benign prostatic hyperplasia)      Paranoia      History of hallucinations      S/P CABG (coronary artery bypass graft)          Review of Systems:  unable to obtain       Medications:  piperacillin/tazobactam IVPB.. 3.375 Gram(s) IV Intermittent every 8 hours    norepinephrine Infusion 0.05 MICROgram(s)/kG/Min IV Continuous <Continuous>      propofol Infusion 10 MICROgram(s)/kG/Min IV Continuous <Continuous>  valproic  acid Syrup 125 milliGRAM(s) Oral two times a day      aspirin  chewable 81 milliGRAM(s) Oral daily  heparin   Injectable 5000 Unit(s) SubCutaneous every 8 hours    pantoprazole  Injectable 40 milliGRAM(s) IV Push daily  polyethylene glycol 3350 17 Gram(s) Oral daily  senna 2 Tablet(s) Oral at bedtime      atorvastatin 10 milliGRAM(s) Oral at bedtime    folic acid 1 milliGRAM(s) Oral daily  sodium chloride 0.9%. 1000 milliLiter(s) IV Continuous <Continuous>      chlorhexidine 0.12% Liquid 15 milliLiter(s) Oral Mucosa every 12 hours  chlorhexidine 2% Cloths 1 Application(s) Topical <User Schedule>  mupirocin 2% Nasal 1 Application(s) Both Nostrils two times a day        Mode: AC/ CMV (Assist Control/ Continuous Mandatory Ventilation)  RR (machine): 16  TV (machine): 420  FiO2: 30  PEEP: 6  ITime: 1  MAP: 10  PIP: 16      ICU Vital Signs Last 24 Hrs  T(C): 36.8 (21 May 2025 16:00), Max: 36.9 (21 May 2025 05:00)  T(F): 98.2 (21 May 2025 16:00), Max: 98.4 (21 May 2025 05:00)  HR: 86 (21 May 2025 20:45) (72 - 92)  BP: 92/75 (21 May 2025 18:46) (81/56 - 127/63)  BP(mean): 81 (21 May 2025 18:46) (65 - 91)  ABP: --  ABP(mean): --  RR: 23 (21 May 2025 18:46) (16 - 24)  SpO2: 98% (21 May 2025 20:45) (94% - 100%)        ABG - ( 20 May 2025 15:28 )  pH, Arterial: 7.40  pH, Blood: x     /  pCO2: 43    /  pO2: 82    / HCO3: 27    / Base Excess: 1.5   /  SaO2: 98.0                I&O's Detail    20 May 2025 07:01  -  21 May 2025 07:00  --------------------------------------------------------  IN:    IV PiggyBack: 100 mL    Norepinephrine: 226.6 mL    Propofol: 48.9 mL    sodium chloride 0.9%: 1200 mL  Total IN: 1575.5 mL    OUT:    Indwelling Catheter - Urethral (mL): 600 mL    Voided (mL): 100 mL  Total OUT: 700 mL    Total NET: 875.5 mL            LABS:                        11.1   28.83 )-----------( 277      ( 21 May 2025 06:06 )             34.3     05-21    134[L]  |  105  |  21  ----------------------------<  100[H]  4.1   |  25  |  1.08    Ca    8.6      21 May 2025 06:06  Phos  3.5     05-21  Mg     1.8     05-21    TPro  6.1  /  Alb  1.8[L]  /  TBili  0.7  /  DBili  x   /  AST  143[H]  /  ALT  85[H]  /  AlkPhos  117  05-21          CAPILLARY BLOOD GLUCOSE      POCT Blood Glucose.: 109 mg/dL (20 May 2025 13:20)    PT/INR - ( 20 May 2025 13:40 )   PT: 13.6 sec;   INR: 1.15 ratio         PTT - ( 20 May 2025 13:40 )  PTT:34.9 sec  Urinalysis Basic - ( 21 May 2025 06:06 )    Color: x / Appearance: x / SG: x / pH: x  Gluc: 100 mg/dL / Ketone: x  / Bili: x / Urobili: x   Blood: x / Protein: x / Nitrite: x   Leuk Esterase: x / RBC: x / WBC x   Sq Epi: x / Non Sq Epi: x / Bacteria: x      CULTURES:  Culture Results:   >100,000 CFU/ml Escherichia coli (05-20 @ 13:47)  Culture Results:   No growth at 24 hours (05-20 @ 13:40)  Culture Results:   No growth at 24 hours (05-20 @ 13:40)      Physical Examination:    General: intubated    PULM: Clear to auscultation bilaterally, no significant sputum production    NECK: Supple, no lymphadenopathy, trachea midline    CVS: Regular rate and rhythm, no murmurs, rubs, or gallops    ABD: Soft, nondistended, nontender, normoactive bowel sounds, no masses    SKIN: Warm and well perfused, B/L heal wounds    NEURO: sedated, withdraws to pain             Date of entry of this note is equal to the date of services rendered    Patient is a 79y old  Male who presents with a chief complaint of found unresponsive (21 May 2025 10:42)      Events last 24 hours: Remains intubated. S/p bronch    PAST MEDICAL & SURGICAL HISTORY:  CAD (coronary artery disease)      Atrial fibrillation  Not on AC      History of alcohol use      BPH (benign prostatic hyperplasia)      Paranoia      History of hallucinations      S/P CABG (coronary artery bypass graft)          Review of Systems:  unable to obtain       Medications:  piperacillin/tazobactam IVPB.. 3.375 Gram(s) IV Intermittent every 8 hours    norepinephrine Infusion 0.05 MICROgram(s)/kG/Min IV Continuous <Continuous>      propofol Infusion 10 MICROgram(s)/kG/Min IV Continuous <Continuous>  valproic  acid Syrup 125 milliGRAM(s) Oral two times a day      aspirin  chewable 81 milliGRAM(s) Oral daily  heparin   Injectable 5000 Unit(s) SubCutaneous every 8 hours    pantoprazole  Injectable 40 milliGRAM(s) IV Push daily  polyethylene glycol 3350 17 Gram(s) Oral daily  senna 2 Tablet(s) Oral at bedtime      atorvastatin 10 milliGRAM(s) Oral at bedtime    folic acid 1 milliGRAM(s) Oral daily  sodium chloride 0.9%. 1000 milliLiter(s) IV Continuous <Continuous>      chlorhexidine 0.12% Liquid 15 milliLiter(s) Oral Mucosa every 12 hours  chlorhexidine 2% Cloths 1 Application(s) Topical <User Schedule>  mupirocin 2% Nasal 1 Application(s) Both Nostrils two times a day        Mode: AC/ CMV (Assist Control/ Continuous Mandatory Ventilation)  RR (machine): 16  TV (machine): 420  FiO2: 30  PEEP: 6  ITime: 1  MAP: 10  PIP: 16      ICU Vital Signs Last 24 Hrs  T(C): 36.8 (21 May 2025 16:00), Max: 36.9 (21 May 2025 05:00)  T(F): 98.2 (21 May 2025 16:00), Max: 98.4 (21 May 2025 05:00)  HR: 86 (21 May 2025 20:45) (72 - 92)  BP: 92/75 (21 May 2025 18:46) (81/56 - 127/63)  BP(mean): 81 (21 May 2025 18:46) (65 - 91)  ABP: --  ABP(mean): --  RR: 23 (21 May 2025 18:46) (16 - 24)  SpO2: 98% (21 May 2025 20:45) (94% - 100%)        ABG - ( 20 May 2025 15:28 )  pH, Arterial: 7.40  pH, Blood: x     /  pCO2: 43    /  pO2: 82    / HCO3: 27    / Base Excess: 1.5   /  SaO2: 98.0                I&O's Detail    20 May 2025 07:01  -  21 May 2025 07:00  --------------------------------------------------------  IN:    IV PiggyBack: 100 mL    Norepinephrine: 226.6 mL    Propofol: 48.9 mL    sodium chloride 0.9%: 1200 mL  Total IN: 1575.5 mL    OUT:    Indwelling Catheter - Urethral (mL): 600 mL    Voided (mL): 100 mL  Total OUT: 700 mL    Total NET: 875.5 mL            LABS:                        11.1   28.83 )-----------( 277      ( 21 May 2025 06:06 )             34.3     05-21    134[L]  |  105  |  21  ----------------------------<  100[H]  4.1   |  25  |  1.08    Ca    8.6      21 May 2025 06:06  Phos  3.5     05-21  Mg     1.8     05-21    TPro  6.1  /  Alb  1.8[L]  /  TBili  0.7  /  DBili  x   /  AST  143[H]  /  ALT  85[H]  /  AlkPhos  117  05-21          CAPILLARY BLOOD GLUCOSE      POCT Blood Glucose.: 109 mg/dL (20 May 2025 13:20)    PT/INR - ( 20 May 2025 13:40 )   PT: 13.6 sec;   INR: 1.15 ratio         PTT - ( 20 May 2025 13:40 )  PTT:34.9 sec  Urinalysis Basic - ( 21 May 2025 06:06 )    Color: x / Appearance: x / SG: x / pH: x  Gluc: 100 mg/dL / Ketone: x  / Bili: x / Urobili: x   Blood: x / Protein: x / Nitrite: x   Leuk Esterase: x / RBC: x / WBC x   Sq Epi: x / Non Sq Epi: x / Bacteria: x      CULTURES:  Culture Results:   >100,000 CFU/ml Escherichia coli (05-20 @ 13:47)  Culture Results:   No growth at 24 hours (05-20 @ 13:40)  Culture Results:   No growth at 24 hours (05-20 @ 13:40)      Physical Examination:    General: intubated    PULM: Clear to auscultation bilaterally, no significant sputum production    NECK: Supple, no lymphadenopathy, trachea midline    CVS: Regular rate and rhythm, no murmurs, rubs, or gallops    ABD: Soft, nondistended, nontender, normoactive bowel sounds, no masses    SKIN: Warm and well perfused, B/L heal wounds    NEURO: sedated, withdraws to pain

## 2025-05-21 NOTE — PROGRESS NOTE ADULT - ASSESSMENT
· Assessment	  79 year wit hx. afib not on ac found unresponsive in nurising home  intubated  ct chows pneumonia, ? aspiration  hypotenive in ED possible septic shock present on admission  Patient now intubated remains unresposnvie with small pupils    1. altered mental status  2.  septic shock  3. Pneumonia  4. r/o stroke  5. metabolic encephalopathy  6. infectied heel ulcers  7. acute respiatory failure    Plan  1. Neuro ct head /CTA negative , ammonia ok, will d/c propofol and access neuro status  2. respiatory on vent likley pneumonia, ? aspiation, zosyn, check sputum culture, ct had shown debris in bronchus, may consider bronch  3. cv hx. afib , now nsr not on ac on low dose levophed tanialey sepsis, hydration, check echo  4. renal creatinine normal , d/c nida  5. GI PPI, npo  og tube  6. ID zosyn to cover lung and heels will need podiatry consult s/p debridement

## 2025-05-21 NOTE — DIETITIAN INITIAL EVALUATION ADULT - PERTINENT LABORATORY DATA
05-21    134[L]  |  105  |  21  ----------------------------<  100[H]  4.1   |  25  |  1.08    Ca    8.6      21 May 2025 06:06  Phos  3.5     05-21  Mg     1.8     05-21    TPro  6.1  /  Alb  1.8[L]  /  TBili  0.7  /  DBili  x   /  AST  143[H]  /  ALT  85[H]  /  AlkPhos  117  05-21  POCT Blood Glucose.: 109 mg/dL (05-20-25 @ 13:20)

## 2025-05-21 NOTE — PROCEDURE NOTE - NSICDXIRLAUNCH_GEN_ALL_CORE
PHYSICIAN NEXT STEPS:  Call the Patient    CHIEF COMPLAINT:  Chief Complaint/Protocol Used: Back Pain  Onset: 4-23-20      ASSESSMENT:  ? Onset: 4-23-20  ? Normal True  ? Normal, no trouble breathing True  ? Onset: 5-2-20  ? Location: Mid-back   ? Severity: Mild-Moderate 3 to 6 / 10   ? Cause: Positive for Covid 19   ? Medications: No  -------------------------------------------------------    DISPOSITION:  Disposition Recommendation: See Physician within 24 Hours  Questions that led to disposition:  ? Rash in same area as pain (may be described as \"small blisters\")  Patient Directed To: Unspecified  Patient Intended Action: Other      CALL NOTES:  05/09/2020 at 10:11 AM by Magdalena Castillo  ? Spoke to back line at office and they agreed to put her thru for video appt today as she has a patient portal     DISPOSITION OVERRIDE/PROVIDER CONSULT:  Disposition Override: N/A  Override Source: Unspecified  Consulted with PCP: No  Consulted with On-Call Physician: No    CALLER CONTACT INFO:  Name: Marlen Li (Self)  Phone 1: [phone] (Home Phone)  Phone 2: [phone] (Work Phone)  Phone 3: [phone] (Mobile) - Preferred      ENCOUNTER STARTED:  05/09/20 09:48:15 AM  ENCOUNTER ASSIGNED TO/CLOSED BY:  Magdalena Castillo @ 05/09/20 10:12:29 AM      -------------------------------------------------------    CARE ADVICE given per Back Pain guideline.  SEE PHYSICIAN WITHIN 24 HOURS:   * IF OFFICE WILL BE OPEN: You need to be seen within the next 24 hours. Call your doctor when the office opens, and make an appointment.  * IF OFFICE WILL BE CLOSED AND NO PCP TRIAGE: You need to be seen within the next 24 hours. An urgent care center is often a good source of care if your doctor's office is closed.  * IF OFFICE WILL BE CLOSED AND PCP TRIAGE REQUIRED: You may need to be seen within the next 24 hours. Your doctor will want to talk with you to decide what's best. I'll page the doctor now. NOTE: Since this isn't serious,  hold the page between 10 pm and   7 am. Page the doctor in the morning.  * IF PATIENT HAS NO PCP: Refer patient to an Urgent Care Center or Retail Clinic. Also try to help caller find a PCP (medical home) for their future care. ?; PAIN MEDICINES:  * For pain relief, take acetaminophen, ibuprofen, or naproxen.  * Use the lowest amount that makes your pain feel better.  ACETAMINOPHEN (E.G., TYLENOL):   * Take 650 mg (two 325 mg pills) by mouth every 4-6 hours as needed. Each Regular Strength Tylenol pill has 325 mg of acetaminophen. The most you should take each day is 3,250 mg (10 Regular Strength pills a day).  * Another choice is to take 1,000 mg (two 500 mg pills) every 8 hours as needed. Each Extra Strength Tylenol pill has 500 mg of acetaminophen. The most you should take each day is 3,000 mg (6 Extra Strength pills a day).  IBUPROFEN (E.G., MOTRIN, ADVIL):  * Take 400 mg (two 200 mg pills) by mouth every 6 hours as needed.  * Another choice is to take 600 mg (three 200 mg pills) by mouth every 8 hours as needed.  * The most you should take each day is 1,200 mg (six 200 mg pills a day), unless your doctor has told you to take more.  NAPROXEN (E.G., ALEVE):  * Take 220 mg (one 220 mg pill) by mouth every 8 hours as needed. You may take 440 mg (two 220 mg pills) for your first dose.  * The most you should take each day is 660 mg (three 220 mg pills a day), unless your doctor has told you to take more.  EXTRA NOTES:  * Acetaminophen is thought to be safer than ibuprofen or naproxen for people over 65 years old. Acetaminophen is in many OTC and prescription medicines. It might be in more than one medicine that you are taking. You need to be careful and not take an   overdose. An acetaminophen overdose can hurt the liver.  * [employer], the company that makes Tylenol, has different dosage instructions for Tylenol in [place] and the [place]. In [place], the maximum recommended dose per day is 4,000 mg or twelve (12)  Regular-Strength (325 mg) pills. In the [place],   [employer] recommends a maximum dose of ten (10) Regular-Strength (325 mg) pills.  * Before taking any medicine, read all the instructions on the package.; CAUTION - NSAIDS (E.G., IBUPROFEN, NAPROXEN):   * Do not take nonsteroidal anti-inflammatory drugs (NSAIDs) if you have stomach problems, kidney disease, heart failure, or other contraindications to using this type of medicine.   * Do not take NSAID medicines for over 7 days without consulting your PCP.   * Do not take NSAID medicines if you are pregnant.  * Do not take NSAID medicines if you are also taking blood thinners.   * You may take this medicine with or without food. Taking it with food or milk may lessen the chance the drug will upset your stomach.   * GASTROINTESTINAL RISK: There is an increased risk of stomach ulcers, GI bleeding, perforation.   * CARDIOVASCULAR RISK: There may be an increased risk of heart attack and stroke.; AVOID CONTACT: With pregnant women, infants and people with weakened immune systems (e.g., transplant patient, chemotherapy, AIDS) until the cause of the rash is known.;   CALL BACK IF:    * You become worse.      UNDERSTANDS CARE ADVICE: Yes    AGREES WITH CARE ADVICE: No    WILL FOLLOW CARE ADVICE: No    -------------------------------------------------------   <---Click to Launch ICDx for Pre-Op and Post-Op

## 2025-05-21 NOTE — EEG REPORT - NS EEG TEXT BOX
[facility]  REPORT OF ROUTINE EEG    [address] [id number]    Patient Name: SARAHI CRENSHAW    Age: 79 year, : 1945  MRN #: [id number]  Referring Physician: JESSENIA  EEG #:     Study Date: 2025     Study Information:    EEG Recording Technique:  The patient underwent routine EEG using Telemetry System hardware on the [employer] Digital System.  EEG data was stored on a computer hard drive.     EEG Placement and Labeling of Electrodes:  The EEG was performed utilizing 16  channel referential EEG connections (coronal over temporal over parasagittal montage) using all standard 10-20 electrode placements with EKG.  Recording was at a sampling rate of 256 samples per second per channel.    History:  79 year old man found unresponsive, now intubated.      Medication  VALPROIC ACID    PROPOFOL    NOREPINEPHRINE    PIPERACILLIN    ASPIRIN    ATORVASTATIN      Interpretation:        FINDINGS:  The background was continuous and minimally reactive.   There is no waking record.  There is voltage suppression with low amplitude delta activity.      Background Slowing:  Marked generalized background slowing was present.    Focal Slowing:   None was present.    Sleep Background:  Normal sleep features are not seen.    Other Non-Epileptiform Findings:  None were present.    Interictal Epileptiform Activity:   None were present.    Activation Procedures:   Hyperventilation was not performed.  Photic stimulation was performed and did not induce any abnormalities.     Artifacts:  Intermittent myogenic and movement artifacts were noted.    ECG:   The single channel ECG recording did not show any significant arrhythmias.      EEG Summary/Classification:  This was an abnormal EEG study due to:  -Voltage suppression and marked generalized slowing.    EEG Clinical Correlate/Impression:  The findings are consistent with encephalopathy.  Medication effects (propofol) may also be contributing to the EEG findings.  No epileptiform activity was seen and no clinical events or seizures were recorded. Consider a repeat study if clinically indicated.       ________________________________________  Miryam Javier MD  Attending Physician  Director of Epilepsy at [facility]

## 2025-05-21 NOTE — DIETITIAN INITIAL EVALUATION ADULT - ETIOLOGY
r/t decreased ability to meet increased nutrient needs 2/2 septic shock, AHRF, wound healing, dementia

## 2025-05-22 LAB
ALBUMIN SERPL ELPH-MCNC: 1.7 G/DL — LOW (ref 3.3–5)
ALP SERPL-CCNC: 101 U/L — SIGNIFICANT CHANGE UP (ref 40–120)
ALT FLD-CCNC: 53 U/L — SIGNIFICANT CHANGE UP (ref 12–78)
ANION GAP SERPL CALC-SCNC: 6 MMOL/L — SIGNIFICANT CHANGE UP (ref 5–17)
AST SERPL-CCNC: 56 U/L — HIGH (ref 15–37)
BILIRUB SERPL-MCNC: 0.6 MG/DL — SIGNIFICANT CHANGE UP (ref 0.2–1.2)
BUN SERPL-MCNC: 19 MG/DL — SIGNIFICANT CHANGE UP (ref 7–23)
CALCIUM SERPL-MCNC: 8.6 MG/DL — SIGNIFICANT CHANGE UP (ref 8.5–10.1)
CHLORIDE SERPL-SCNC: 109 MMOL/L — HIGH (ref 96–108)
CO2 SERPL-SCNC: 25 MMOL/L — SIGNIFICANT CHANGE UP (ref 22–31)
CREAT SERPL-MCNC: 0.98 MG/DL — SIGNIFICANT CHANGE UP (ref 0.5–1.3)
EGFR: 79 ML/MIN/1.73M2 — SIGNIFICANT CHANGE UP
EGFR: 79 ML/MIN/1.73M2 — SIGNIFICANT CHANGE UP
GLUCOSE SERPL-MCNC: 110 MG/DL — HIGH (ref 70–99)
GRAM STN FLD: SIGNIFICANT CHANGE UP
HCT VFR BLD CALC: 29.6 % — LOW (ref 39–50)
HGB BLD-MCNC: 9.5 G/DL — LOW (ref 13–17)
MAGNESIUM SERPL-MCNC: 2 MG/DL — SIGNIFICANT CHANGE UP (ref 1.6–2.6)
MCHC RBC-ENTMCNC: 26.4 PG — LOW (ref 27–34)
MCHC RBC-ENTMCNC: 32.1 G/DL — SIGNIFICANT CHANGE UP (ref 32–36)
MCV RBC AUTO: 82.2 FL — SIGNIFICANT CHANGE UP (ref 80–100)
NRBC # BLD AUTO: 0 K/UL — SIGNIFICANT CHANGE UP (ref 0–0)
NRBC # FLD: 0 K/UL — SIGNIFICANT CHANGE UP (ref 0–0)
NRBC BLD AUTO-RTO: 0 /100 WBCS — SIGNIFICANT CHANGE UP (ref 0–0)
PHOSPHATE SERPL-MCNC: 3.1 MG/DL — SIGNIFICANT CHANGE UP (ref 2.5–4.5)
PLATELET # BLD AUTO: 263 K/UL — SIGNIFICANT CHANGE UP (ref 150–400)
PMV BLD: 9.5 FL — SIGNIFICANT CHANGE UP (ref 7–13)
POTASSIUM SERPL-MCNC: 3.6 MMOL/L — SIGNIFICANT CHANGE UP (ref 3.5–5.3)
POTASSIUM SERPL-SCNC: 3.6 MMOL/L — SIGNIFICANT CHANGE UP (ref 3.5–5.3)
PROT SERPL-MCNC: 5.6 GM/DL — LOW (ref 6–8.3)
RBC # BLD: 3.6 M/UL — LOW (ref 4.2–5.8)
RBC # FLD: 13.9 % — SIGNIFICANT CHANGE UP (ref 10.3–14.5)
SODIUM SERPL-SCNC: 140 MMOL/L — SIGNIFICANT CHANGE UP (ref 135–145)
SPECIMEN SOURCE: SIGNIFICANT CHANGE UP
WBC # BLD: 17.33 K/UL — HIGH (ref 3.8–10.5)
WBC # FLD AUTO: 17.33 K/UL — HIGH (ref 3.8–10.5)

## 2025-05-22 PROCEDURE — 99291 CRITICAL CARE FIRST HOUR: CPT

## 2025-05-22 RX ADMIN — MUPIROCIN CALCIUM 1 APPLICATION(S): 20 CREAM TOPICAL at 10:44

## 2025-05-22 RX ADMIN — Medication 2 TABLET(S): at 21:34

## 2025-05-22 RX ADMIN — Medication 15 MILLILITER(S): at 10:44

## 2025-05-22 RX ADMIN — Medication 100 MILLILITER(S): at 14:48

## 2025-05-22 RX ADMIN — Medication 125 MILLIGRAM(S): at 21:34

## 2025-05-22 RX ADMIN — Medication 40 MILLIGRAM(S): at 10:44

## 2025-05-22 RX ADMIN — FOLIC ACID 1 MILLIGRAM(S): 1 TABLET ORAL at 11:55

## 2025-05-22 RX ADMIN — Medication 15 MILLILITER(S): at 21:34

## 2025-05-22 RX ADMIN — POLYETHYLENE GLYCOL 3350 17 GRAM(S): 17 POWDER, FOR SOLUTION ORAL at 10:44

## 2025-05-22 RX ADMIN — Medication 25 GRAM(S): at 21:29

## 2025-05-22 RX ADMIN — Medication 125 MILLIGRAM(S): at 10:45

## 2025-05-22 RX ADMIN — HEPARIN SODIUM 5000 UNIT(S): 1000 INJECTION INTRAVENOUS; SUBCUTANEOUS at 10:47

## 2025-05-22 RX ADMIN — Medication 25 GRAM(S): at 06:00

## 2025-05-22 RX ADMIN — Medication 25 GRAM(S): at 13:00

## 2025-05-22 RX ADMIN — HEPARIN SODIUM 5000 UNIT(S): 1000 INJECTION INTRAVENOUS; SUBCUTANEOUS at 21:34

## 2025-05-22 RX ADMIN — Medication 1 APPLICATION(S): at 06:00

## 2025-05-22 RX ADMIN — ATORVASTATIN CALCIUM 10 MILLIGRAM(S): 80 TABLET, FILM COATED ORAL at 21:33

## 2025-05-22 RX ADMIN — Medication 81 MILLIGRAM(S): at 11:55

## 2025-05-22 RX ADMIN — PROPOFOL 4.08 MICROGRAM(S)/KG/MIN: 10 INJECTION, EMULSION INTRAVENOUS at 01:52

## 2025-05-22 RX ADMIN — MUPIROCIN CALCIUM 1 APPLICATION(S): 20 CREAM TOPICAL at 21:34

## 2025-05-22 NOTE — PROGRESS NOTE ADULT - ASSESSMENT
A: 79-year-old male was seen for the followin. Full thickness ulcer to bilateral heel; Left foot infected  2. Difficulty with Ambulation      P:   Chart reviewed and Patient evaluated;  X-rays ordered : on wet reading no acute radiographic finding. official read above  Left Heel Wound was exsanguinated, debrided and flushed with Dakins solution  Wound cx taken (25) Left Foot: GPC, GNP  Applied betadine with dry sterile dressing  WBAT  to Bilateral foot  Offload bilateral heels while bedbound or place 2 pillows under the calf to prevent further soft tissue breaks  Antibiotics as per ID  All additional care per Med [facility]   Podiatry will follow while in house      Case D/W attending Dr. Lewis       A: 79-year-old male was seen for the followin. Full thickness ulcer to bilateral heel; Left foot infected  2. Difficulty with Ambulation      P:   Chart reviewed and Patient evaluated;  X-rays ordered : on wet reading no acute radiographic finding. Official read above  Left Heel Wound was exsanguinated and flushed with Dakins solution. Wound appears to be with improved malodor and drainage  Recc MRI of the Left foot to r/o osteomyelitis or evaluate the extent of calcaneus osteomyelitis.  Wound cx taken (25) Left Foot: GPC, GNP  Applied betadine with dry sterile dressing  WBAT  to Bilateral foot  Offload bilateral heels while bedbound or place 2 pillows under the calf to prevent further soft tissue breaks  Antibiotics as per ID  Pt is not ideal surgical candidate  due to disposition and comorbidities  All additional care per Med appreciated   Podiatry will follow while in house      Case D/W attending Dr. Lewis

## 2025-05-22 NOTE — CDI QUERY NOTE - NSCDIOTHERTXTBX_GEN_ALL_CORE_HH
Clinical documentation and/or evidence in the medical record indicates that this patient had a debridement.  In order to ensure accurate coding and accuracy of the clinical record, the documentation in this patient’s medical record requires additional clarification.      Please include more specific documentation as to the type of debridement in your progress note and/or discharge summary.      Please clarify the depth of debridement such as:    Depth of tissue debrided (by inclusion) - down to and including:  skin, subcutaneous, fascia, muscle, and/or bone.      Supporting documentation and/or clinical evidence:      Consult Note Adult Posiatry Resident 5-20-25 @ 18:12  Left Heel Wound was exsanguinated, excisional debridement of devitalized tissues using suture removal kit and flushed with normal saline  Wound cx taken (5/20/25) Left Foot: will follow results

## 2025-05-22 NOTE — PROGRESS NOTE ADULT - SUBJECTIVE AND OBJECTIVE BOX
Interval History:          Patient remains intubated          opens eyes, lookng around but not following commands          weak          no fevers          wbc 17          had bronch yesterday    HPI:        History of Present Illness:   This patient is a 79 year old male     with PMH signfiiant for             CAD             CABG             afib not on AC due to compliance             behavior health issues              BPH  Roney is resident of [facility].  Today patient was found unresposnive in bed  Was brought to ED, GCS was 6 , was intubated with rocuronium now intubated  small pupils got narcan in ed  ct chest shows patchy infiltrates  head ct negative  EEG c/w encephalopathy    MEDICATIONS  (STANDING):  aspirin  chewable 81 milliGRAM(s) Oral daily  atorvastatin 10 milliGRAM(s) Oral at bedtime  chlorhexidine 0.12% Liquid 15 milliLiter(s) Oral Mucosa every 12 hours  chlorhexidine 2% Cloths 1 Application(s) Topical <User Schedule>  folic acid 1 milliGRAM(s) Oral daily  heparin   Injectable 5000 Unit(s) SubCutaneous every 8 hours  mupirocin 2% Nasal 1 Application(s) Both Nostrils two times a day  pantoprazole  Injectable 40 milliGRAM(s) IV Push daily  piperacillin/tazobactam IVPB.. 3.375 Gram(s) IV Intermittent every 8 hours  polyethylene glycol 3350 17 Gram(s) Oral daily  propofol Infusion 10 MICROgram(s)/kG/Min (4.08 mL/Hr) IV Continuous <Continuous>  senna 2 Tablet(s) Oral at bedtime  sodium chloride 0.9%. 1000 milliLiter(s) (100 mL/Hr) IV Continuous <Continuous>  valproic  acid Syrup 125 milliGRAM(s) Oral two times a day      ICU Vital Signs Last 24 Hrs  T(C): 35.5 (22 May 2025 09:00), Max: 36.8 (21 May 2025 16:00)  T(F): 95.9 (22 May 2025 09:00), Max: 98.2 (21 May 2025 16:00)  HR: 71 (22 May 2025 09:00) (63 - 92)  BP: 117/68 (22 May 2025 09:00) (89/65 - 127/63)  BP(mean): 83 (22 May 2025 09:00) (71 - 91)  ABP: --  ABP(mean): --  RR: 17 (22 May 2025 09:00) (16 - 24)  SpO2: 100% (22 May 2025 09:00) (94% - 100%)    O2 Parameters below as of 21 May 2025 21:00  Patient On (Oxygen Delivery Method): ventilator    Mode: AC/ CMV (Assist Control/ Continuous Mandatory Ventilation)  RR (machine): 16  TV (machine): 420  FiO2: 30  PEEP: 6  ITime: 1  MAP: 7  PIP: 18      I&O's Summary    21 May 2025 07:01  -  22 May 2025 07:00  --------------------------------------------------------  IN: 1470 mL / OUT: 325 mL / NET: 1145 mL    Physical Exam    General - Lethargic  HEENT - nc/at orally intubated, OG tube  neck supple  lungs - scattered rhonchi  cv - rrr  abdomen soft, non tender  extremtieis - foot wrapped, dressing changed by podiatry   voiding                          9.5    17.33 )-----------( 263      ( 22 May 2025 05:48 )             29.6       05-22    140  |  109[H]  |  19  ----------------------------<  110[H]  3.6   |  25  |  0.98    Ca    8.6      22 May 2025 05:48  Phos  3.1     05-22  Mg     2.0     05-22    TPro  5.6[L]  /  Alb  1.7[L]  /  TBili  0.6  /  DBili  x   /  AST  56[H]  /  ALT  53  /  AlkPhos  101  05-22    ABG - ( 20 May 2025 15:28 )  pH, Arterial: 7.40  pH, Blood: x     /  pCO2: 43    /  pO2: 82    / HCO3: 27    / Base Excess: 1.5   /  SaO2: 98.0      Urinalysis Basic - ( 22 May 2025 05:48 )    Color: x / Appearance: x / SG: x / pH: x  Gluc: 110 mg/dL / Ketone: x  / Bili: x / Urobili: x   Blood: x / Protein: x / Nitrite: x   Leuk Esterase: x / RBC: x / WBC x   Sq Epi: x / Non Sq Epi: x / Bacteria: x    DVT Prophylaxis:  Heparin subq                                                                Advanced Directives: Full Code(no family)     Sepsis Criteria Met - YEs    Labs, Notes, Orders, radiologic studies reviewed and care coordinated with multidisciplinary team

## 2025-05-22 NOTE — PROGRESS NOTE ADULT - ASSESSMENT
79 year wit hx. afib not on ac found unresponsive in nurising home  intubated  ct chows pneumonia, ? aspiration  hypotenive in ED possible septic shock present on admission  Patient now intubated remains unresposnvie with small pupils    1. altered mental status  2.  septic shock  3. Pneumonia  4. r/o stroke  5. metabolic encephalopathy  6. infectied heel ulcers  7. acute respiatory failure    Plan  1. Neuro ct head /CTA negative , ammonia ok, will d/c propofol, non focal looks around, not following cmmands likley metabolic encephalopathy  2. respiatory on vent likley pneumonia, ? aspiation, zosyn,  sputum culture/bronch culture pending, ct had shown debris in bronchus, clear after bronch      will give weaning trial may be able to give weaning trial  3. cv hx. afib , now nsr levophed  titrated off  4. renal creatinine normal , d/c alva  5. GI PPI, tube feeds  og tube  6. ID zosyn to cover lung and heels will need podiatry consult s/p debridement

## 2025-05-22 NOTE — PROGRESS NOTE ADULT - SUBJECTIVE AND OBJECTIVE BOX
5/22/25: Pt was seen by Podiatry team. Pt was resting comfortably bedside. No acute distress        PMH:   CAD (coronary artery disease)  Atrial fibrillation  Alcohol use  BPH (benign prostatic hyperplasia)  Paranoia  Hallucinations      PSH:  S/P CABG (coronary artery bypass graft)    Allergies:  No Known Allergies      Labs:                             9.5    17.33 )-----------( 263      ( 22 May 2025 05:48 )             29.6   05-22    140  |  109[H]  |  19  ----------------------------<  110[H]  3.6   |  25  |  0.98    Ca    8.6      22 May 2025 05:48  Phos  3.1     05-22  Mg     2.0     05-22    TPro  5.6[L]  /  Alb  1.7[L]  /  TBili  0.6  /  DBili  x   /  AST  56[H]  /  ALT  53  /  AlkPhos  101  05-22           Vital Signs Last 24 Hrs  T(C): 35.5 (22 May 2025 09:00), Max: 36.8 (21 May 2025 16:00)  T(F): 95.9 (22 May 2025 09:00), Max: 98.2 (21 May 2025 16:00)  HR: 71 (22 May 2025 09:00) (63 - 92)  BP: 117/68 (22 May 2025 09:00) (89/65 - 127/63)  BP(mean): 83 (22 May 2025 09:00) (71 - 91)  RR: 17 (22 May 2025 09:00) (16 - 24)  SpO2: 100% (22 May 2025 09:00) (94% - 100%)    Parameters below as of 21 May 2025 21:00  Patient On (Oxygen Delivery Method): ventilator          REVIEW OF SYSTEMS:    CONSTITUTIONAL: No weakness, fevers or chills  EYES: No visual changes  RESPIRATORY: No cough, wheezing; No shortness of breath  CARDIOVASCULAR: No chest pain or palpitations  GASTROINTESTINAL: No abdominal or epigastric pain. No nausea, vomiting; No diarrhea or constipation.   GENITOURINARY: No dysuria, frequency or hematuria  NEUROLOGICAL: No numbness or weakness  SKIN: See physical examination.  All other review of systems is negative unless indicated above    Physical Exam:   Constitutional: NAD, alert;  Lower Extremity Focus  Derm:  Skin warm, dry and supple bilateral.    Right: Well-adhered eschar lesion, wound base Necrotic patches, wound size (7 cm X 7 cm), - edema, - valerie-wound erythema, no malodor, no active discharge, - purulence, - fluctuance, - tracking/tunneling, - probe to bone.   Left:  Well-adhered eschar lesion, wound base Necrotic patches, wound size (8 cm X 7 cm), - edema, - valerie-wound erythema, +malodor (improving), no active discharge, no purulence, - fluctuance, - tracking/tunneling, + probe to bone.  Vascular: Dorsalis Pedis and Posterior Tibial pulses nonpalpable.   Neuro: could not be obtained because of disposition  MSK: could not be obtained because of disposition      Culture - Abscess with Gram Stain (collected 20 May 2025 18:25)  Source: Abscess Leg - Left  Gram Stain (21 May 2025 06:29):    Few polymorphonuclear leukocytes seen per low power field    Rare Gram positive cocci in pairs seen per oil power field    Moderate Gram Negative Rods seen per oil power field    Urinalysis with Rflx Culture (collected 20 May 2025 13:47)      Culture - Sputum (collected 21 May 2025 15:30)  Source: ET Tube ET Tube  Gram Stain (21 May 2025 23:10):    Numerous polymorphonuclear leukocytes per low power field    Few Squamous epithelial cells per low power field    Few Gram positive cocci in pairs per oil power field    Few Gram Positive Rods per oil power field    Culture - Sputum (collected 21 May 2025 08:15)  Source: ET Tube ET Tube  Gram Stain (22 May 2025 00:43):    No polymorphonuclear leukocytes per low power field    No Squamous epithelial cells per low power field    No organisms seen per oil power field    Culture - Abscess with Gram Stain (collected 20 May 2025 18:25)  Source: Abscess Leg - Left  Gram Stain (21 May 2025 06:29):    Few polymorphonuclear leukocytes seen per low power field    Rare Gram positive cocci in pairs seen per oil power field    Moderate Gram Negative Rods seen per oil power field    Culture - Urine (collected 20 May 2025 13:47)  Source: Clean Catch None  Preliminary Report (21 May 2025 20:42):    >100,000 CFU/ml Escherichia coli    Urinalysis with Rflx Culture (collected 20 May 2025 13:47)    Culture - Blood (collected 20 May 2025 13:40)  Source: Blood Blood-Peripheral  Preliminary Report (21 May 2025 19:02):    No growth at 24 hours    Culture - Blood (collected 20 May 2025 13:40)  Source: Blood Blood-Peripheral  Preliminary Report (21 May 2025 19:02):    No growth at 24 hours    < from: Xray Foot AP + Lateral + Oblique, Bilat (05.20.25 @ 22:45) >  COMPARISON: 12/17/2024     3 views of the left foot and 3 views ofthe right foot show bony erosion   or postoperative changes involving the dorsal surface of the left   calcaneus. Loss of adjacent soft tissue either represents ulceration or   evidence of surgical debridement.    The right foot shows no evidence of calcaneal erosion. The joint spaces   are maintained.    IMPRESSION: Bone and soft tissue loss of and near left calcaneus as noted.      < end of copied text >

## 2025-05-23 LAB
-  AMOXICILLIN/CLAVULANIC ACID: SIGNIFICANT CHANGE UP
-  AMPICILLIN/SULBACTAM: SIGNIFICANT CHANGE UP
-  AMPICILLIN: SIGNIFICANT CHANGE UP
-  AZTREONAM: SIGNIFICANT CHANGE UP
-  CEFAZOLIN: SIGNIFICANT CHANGE UP
-  CEFEPIME: SIGNIFICANT CHANGE UP
-  CEFOXITIN: SIGNIFICANT CHANGE UP
-  CEFTRIAXONE: SIGNIFICANT CHANGE UP
-  CEFUROXIME: SIGNIFICANT CHANGE UP
-  CIPROFLOXACIN: SIGNIFICANT CHANGE UP
-  ERTAPENEM: SIGNIFICANT CHANGE UP
-  GENTAMICIN: SIGNIFICANT CHANGE UP
-  IMIPENEM: SIGNIFICANT CHANGE UP
-  LEVOFLOXACIN: SIGNIFICANT CHANGE UP
-  MEROPENEM: SIGNIFICANT CHANGE UP
-  NITROFURANTOIN: SIGNIFICANT CHANGE UP
-  PIPERACILLIN/TAZOBACTAM: SIGNIFICANT CHANGE UP
-  TIGECYCLINE: SIGNIFICANT CHANGE UP
-  TOBRAMYCIN: SIGNIFICANT CHANGE UP
-  TRIMETHOPRIM/SULFAMETHOXAZOLE: SIGNIFICANT CHANGE UP
ALBUMIN SERPL ELPH-MCNC: 1.7 G/DL — LOW (ref 3.3–5)
ALP SERPL-CCNC: 110 U/L — SIGNIFICANT CHANGE UP (ref 40–120)
ALT FLD-CCNC: 44 U/L — SIGNIFICANT CHANGE UP (ref 12–78)
ANION GAP SERPL CALC-SCNC: 5 MMOL/L — SIGNIFICANT CHANGE UP (ref 5–17)
AST SERPL-CCNC: 47 U/L — HIGH (ref 15–37)
BILIRUB SERPL-MCNC: 0.6 MG/DL — SIGNIFICANT CHANGE UP (ref 0.2–1.2)
BUN SERPL-MCNC: 16 MG/DL — SIGNIFICANT CHANGE UP (ref 7–23)
CALCIUM SERPL-MCNC: 8.6 MG/DL — SIGNIFICANT CHANGE UP (ref 8.5–10.1)
CHLORIDE SERPL-SCNC: 112 MMOL/L — HIGH (ref 96–108)
CO2 SERPL-SCNC: 27 MMOL/L — SIGNIFICANT CHANGE UP (ref 22–31)
CREAT SERPL-MCNC: 1.02 MG/DL — SIGNIFICANT CHANGE UP (ref 0.5–1.3)
CULTURE RESULTS: ABNORMAL
CULTURE RESULTS: ABNORMAL
CULTURE RESULTS: SIGNIFICANT CHANGE UP
EGFR: 75 ML/MIN/1.73M2 — SIGNIFICANT CHANGE UP
EGFR: 75 ML/MIN/1.73M2 — SIGNIFICANT CHANGE UP
GLUCOSE SERPL-MCNC: 69 MG/DL — LOW (ref 70–99)
HCT VFR BLD CALC: 29.8 % — LOW (ref 39–50)
HGB BLD-MCNC: 9.5 G/DL — LOW (ref 13–17)
MAGNESIUM SERPL-MCNC: 2 MG/DL — SIGNIFICANT CHANGE UP (ref 1.6–2.6)
MCHC RBC-ENTMCNC: 25.9 PG — LOW (ref 27–34)
MCHC RBC-ENTMCNC: 31.9 G/DL — LOW (ref 32–36)
MCV RBC AUTO: 81.2 FL — SIGNIFICANT CHANGE UP (ref 80–100)
METHOD TYPE: SIGNIFICANT CHANGE UP
NRBC # BLD AUTO: 0 K/UL — SIGNIFICANT CHANGE UP (ref 0–0)
NRBC # FLD: 0 K/UL — SIGNIFICANT CHANGE UP (ref 0–0)
NRBC BLD AUTO-RTO: 0 /100 WBCS — SIGNIFICANT CHANGE UP (ref 0–0)
ORGANISM # SPEC MICROSCOPIC CNT: ABNORMAL
ORGANISM # SPEC MICROSCOPIC CNT: SIGNIFICANT CHANGE UP
PHOSPHATE SERPL-MCNC: 2.8 MG/DL — SIGNIFICANT CHANGE UP (ref 2.5–4.5)
PLATELET # BLD AUTO: 250 K/UL — SIGNIFICANT CHANGE UP (ref 150–400)
PMV BLD: 9.6 FL — SIGNIFICANT CHANGE UP (ref 7–13)
POTASSIUM SERPL-MCNC: 3.5 MMOL/L — SIGNIFICANT CHANGE UP (ref 3.5–5.3)
POTASSIUM SERPL-SCNC: 3.5 MMOL/L — SIGNIFICANT CHANGE UP (ref 3.5–5.3)
PROT SERPL-MCNC: 5.9 GM/DL — LOW (ref 6–8.3)
RBC # BLD: 3.67 M/UL — LOW (ref 4.2–5.8)
RBC # FLD: 13.9 % — SIGNIFICANT CHANGE UP (ref 10.3–14.5)
SODIUM SERPL-SCNC: 144 MMOL/L — SIGNIFICANT CHANGE UP (ref 135–145)
SPECIMEN SOURCE: SIGNIFICANT CHANGE UP
TSH SERPL-MCNC: 0.86 UU/ML — SIGNIFICANT CHANGE UP (ref 0.34–4.82)
WBC # BLD: 10.33 K/UL — SIGNIFICANT CHANGE UP (ref 3.8–10.5)
WBC # FLD AUTO: 10.33 K/UL — SIGNIFICANT CHANGE UP (ref 3.8–10.5)

## 2025-05-23 PROCEDURE — 99233 SBSQ HOSP IP/OBS HIGH 50: CPT

## 2025-05-23 RX ORDER — HYDROMORPHONE/SOD CHLOR,ISO/PF 2 MG/10 ML
0.2 SYRINGE (ML) INJECTION EVERY 8 HOURS
Refills: 0 | Status: DISCONTINUED | OUTPATIENT
Start: 2025-05-23 | End: 2025-05-29

## 2025-05-23 RX ORDER — MIRTAZAPINE 30 MG/1
45 TABLET, FILM COATED ORAL AT BEDTIME
Refills: 0 | Status: DISCONTINUED | OUTPATIENT
Start: 2025-05-23 | End: 2025-05-29

## 2025-05-23 RX ORDER — DONEPEZIL HYDROCHLORIDE 5 MG/1
10 TABLET ORAL AT BEDTIME
Refills: 0 | Status: DISCONTINUED | OUTPATIENT
Start: 2025-05-23 | End: 2025-05-29

## 2025-05-23 RX ORDER — MEMANTINE HYDROCHLORIDE 21 MG/1
20 CAPSULE, EXTENDED RELEASE ORAL AT BEDTIME
Refills: 0 | Status: DISCONTINUED | OUTPATIENT
Start: 2025-05-23 | End: 2025-05-29

## 2025-05-23 RX ADMIN — Medication 100 MILLILITER(S): at 05:26

## 2025-05-23 RX ADMIN — DONEPEZIL HYDROCHLORIDE 10 MILLIGRAM(S): 5 TABLET ORAL at 21:57

## 2025-05-23 RX ADMIN — Medication 125 MILLIGRAM(S): at 21:56

## 2025-05-23 RX ADMIN — Medication 40 MILLIGRAM(S): at 09:20

## 2025-05-23 RX ADMIN — Medication 40 MILLIEQUIVALENT(S): at 11:24

## 2025-05-23 RX ADMIN — Medication 25 GRAM(S): at 05:11

## 2025-05-23 RX ADMIN — FOLIC ACID 1 MILLIGRAM(S): 1 TABLET ORAL at 09:20

## 2025-05-23 RX ADMIN — Medication 125 MILLIGRAM(S): at 09:20

## 2025-05-23 RX ADMIN — Medication 25 GRAM(S): at 14:55

## 2025-05-23 RX ADMIN — MEMANTINE HYDROCHLORIDE 20 MILLIGRAM(S): 21 CAPSULE, EXTENDED RELEASE ORAL at 21:57

## 2025-05-23 RX ADMIN — Medication 25 GRAM(S): at 21:56

## 2025-05-23 RX ADMIN — HEPARIN SODIUM 5000 UNIT(S): 1000 INJECTION INTRAVENOUS; SUBCUTANEOUS at 14:55

## 2025-05-23 RX ADMIN — Medication 2 TABLET(S): at 21:57

## 2025-05-23 RX ADMIN — MUPIROCIN CALCIUM 1 APPLICATION(S): 20 CREAM TOPICAL at 10:00

## 2025-05-23 RX ADMIN — MUPIROCIN CALCIUM 1 APPLICATION(S): 20 CREAM TOPICAL at 21:58

## 2025-05-23 RX ADMIN — ATORVASTATIN CALCIUM 10 MILLIGRAM(S): 80 TABLET, FILM COATED ORAL at 21:58

## 2025-05-23 RX ADMIN — HEPARIN SODIUM 5000 UNIT(S): 1000 INJECTION INTRAVENOUS; SUBCUTANEOUS at 05:14

## 2025-05-23 RX ADMIN — POLYETHYLENE GLYCOL 3350 17 GRAM(S): 17 POWDER, FOR SOLUTION ORAL at 09:20

## 2025-05-23 RX ADMIN — HEPARIN SODIUM 5000 UNIT(S): 1000 INJECTION INTRAVENOUS; SUBCUTANEOUS at 21:56

## 2025-05-23 RX ADMIN — Medication 81 MILLIGRAM(S): at 09:20

## 2025-05-23 RX ADMIN — Medication 15 MILLILITER(S): at 09:21

## 2025-05-23 RX ADMIN — MIRTAZAPINE 45 MILLIGRAM(S): 30 TABLET, FILM COATED ORAL at 21:57

## 2025-05-23 RX ADMIN — Medication 1 APPLICATION(S): at 05:13

## 2025-05-23 NOTE — PROGRESS NOTE ADULT - SUBJECTIVE AND OBJECTIVE BOX
Chief Complaint: Patient is a 79y old  Male who presents with a chief complaint of found unresponsive (23 May 2025 10:01)      Interval events:   - No acute events overnight, VSS, pt afebrile   - Pt being downgraded out of CCU to med/surg today, chart reviewed. Overall improving off pressors, s/p intubation now on room air, remains on IV zosyn for PNA + infected left heel ulcer   - Pending MRI Left foot/ankle to r/o acute OM    ROS:   Patient denies any current chest pain, SOB, cough, f/c/n/v/d, abd pain, LE edema, myalgias, dysuria, HA, dizziness  All other review of systems is negative unless indicated above    PAST MEDICAL & SURGICAL HISTORY:  CAD (coronary artery disease)    Atrial fibrillation  Not on AC    History of alcohol use    BPH (benign prostatic hyperplasia)    Paranoia    History of hallucinations    S/P CABG (coronary artery bypass graft)    Social History:  from [facility] no family or surrogate decision maker (20 May 2025 16:14)  No current alcohol, smoking or drug use    FAMILY HISTORY:  FH: asthma (Father)    Physical Exam:  Vital Signs Last 24 Hrs  T(C): 36.2 (23 May 2025 07:35), Max: 37 (22 May 2025 19:38)  T(F): 97.1 (23 May 2025 07:35), Max: 98.6 (22 May 2025 19:38)  HR: 85 (23 May 2025 12:00) (69 - 103)  BP: 120/65 (23 May 2025 12:00) (91/59 - 134/56)  BP(mean): 80 (23 May 2025 12:00) (63 - 102)  RR: 26 (23 May 2025 12:00) (13 - 27)  SpO2: 95% (23 May 2025 12:00) (95% - 100%)    Parameters below as of 23 May 2025 12:00  Patient On (Oxygen Delivery Method): room air    Constitutional: NAD, awake, deconditioned/frail  HEENT: PERRLA, EOMI, MMM  Respiratory: Breath sounds are clear bilaterally, No wheezing, rales or rhonchi  Cardiovascular: S1 and S2, RRR, no murmurs, gallops or rubs  Gastrointestinal: +BS, soft, non-tender, non-distended, no CVA tenderness  Extremities: No peripheral edema, +DP pulses b/l  Neurological: A&O x2, no focal deficits  Musculoskeletal: 5/5 strength b/l upper and lower extremities  Skin: +left heel ulcer, skin warm and dry    Labs:  05-23 @ 05:02  Glucose 69 mg/dL  HCO3 27 mmol/L  Chloride 112 mmol/L  Sodium 144 mmol/L>   Potassium 3.5 mmol/L  Creatinine 1.02 mg/dL  Calcium 8.6 mg/dL  BUN 16 mg/dL  eGFR 75 mL/min/1.73m2  Anion gap 5 mmol/L    WBC 10.33  Hemoglobin 9.5  Hemoatocrit 29.8  Platelet count 250    Micro:  BCx 5/20 -- NGTD  UCx 5/20 -- prelim with >100K E. Coli     Left heel wound Cx 5/20 -- pan sensitive Proteus mirabilis, MDR Morganella morganii    Sputum Cx 5/21 -- commensal jesenia     Radiology:  MRI Foot + Ankle with IV contrast -- Pending     < from: CT Head No Cont (05.20.25 @ 14:05) >  IMPRESSION:  No significant change    No acute intracranial  hemorrhage, mass effect or midline shift.    Mild white matter small vessel ischemic disease.      < from: CT Chest, abdomen and pelvis w/ IV Cont (05.20.25 @ 14:05) >  IMPRESSION:  Significant debris in the left mainstem bronchus extending into the left   pulmonary bronchi with associated patchy nodular airspace opacities,   tree-in-bud nodularity, and bronchial wall thickening throughout the left   lung, most pronounced in the left lower lobe. Minimal, similar findings   in the right upper and right lower lobes. Findings suggest aspiration   pneumonitis.    No acute CT findings in the abdomen or pelvis. No bowel obstruction or   inflammation.      < from: CT Angio Head w/ IV Cont (05.20.25 @ 16:49) >  IMPRESSION:  CT HEAD:  1. No acute intracranial hemorrhage, mass effect, or shift of the midline   structures.  2. Similar-appearing mild chronic white matter microvascular type changes.    CTA NECK:  1. No large vessel occlusion or major stenosis.  2. Imaging findings suspicious for the presence of multifocal pneumonia.   Please refer to the report for the concurrent dedicated chest, abdomen,   pelvis CT for findings regarding the thoracic, abdomen, and pelvic   structures.    CTA HEAD:  1. Atherosclerosis affecting the right V4 segment with tandem areas of   mild stenosis.  2. No large vessel occlusion or major stenosis.      < from: Xray Chest 1 View- PORTABLE-Urgent (Xray Chest 1 View- PORTABLE-Urgent .) (05.20.25 @ 19:05) >  IMPRESSION: NG tube inserted. Increasing scattered left mid lower lung   field infiltrates. Endotracheal tube present on the earlier study has   been removed.      < from: Xray Foot AP + Lateral + Oblique, Bilat (05.20.25 @ 22:45) >  IMPRESSION: Bone and soft tissue loss of and near left calcaneus as noted.    Medications:  MEDICATIONS  (STANDING):  aspirin  chewable 81 milliGRAM(s) Oral daily  atorvastatin 10 milliGRAM(s) Oral at bedtime  chlorhexidine 2% Cloths 1 Application(s) Topical <User Schedule>  folic acid 1 milliGRAM(s) Oral daily  heparin   Injectable 5000 Unit(s) SubCutaneous every 8 hours  mupirocin 2% Nasal 1 Application(s) Both Nostrils two times a day  pantoprazole  Injectable 40 milliGRAM(s) IV Push daily  piperacillin/tazobactam IVPB.. 3.375 Gram(s) IV Intermittent every 8 hours  polyethylene glycol 3350 17 Gram(s) Oral daily  senna 2 Tablet(s) Oral at bedtime  valproic  acid Syrup 125 milliGRAM(s) Oral two times a day    MEDICATIONS  (PRN):      Time based billing:   > 51 minutes of total time met or exceeded on this patient encounter. Time spent excludes time spent on separately reportable services/teaching during the encounter.  Chief Complaint: Patient is a 79y old  Male who presents with a chief complaint of found unresponsive (23 May 2025 10:01)      Interval events:   - No acute events overnight, VSS, pt afebrile   - Pt being downgraded out of CCU to med/surg today, chart reviewed. Overall improving off pressors, s/p intubation now on room air, remains on IV zosyn for PNA + infected left heel ulcer     ROS:   Patient denies any current chest pain, SOB, cough, f/c/n/v/d, abd pain, LE edema, myalgias, dysuria, HA, dizziness  All other review of systems is negative unless indicated above    PAST MEDICAL & SURGICAL HISTORY:  CAD (coronary artery disease)    Atrial fibrillation  Not on AC    History of alcohol use    BPH (benign prostatic hyperplasia)    Paranoia    History of hallucinations    S/P CABG (coronary artery bypass graft)    Social History:  from [facility] no family or surrogate decision maker (20 May 2025 16:14)  No current alcohol, smoking or drug use    FAMILY HISTORY:  FH: asthma (Father)    Physical Exam:  Vital Signs Last 24 Hrs  T(C): 36.2 (23 May 2025 07:35), Max: 37 (22 May 2025 19:38)  T(F): 97.1 (23 May 2025 07:35), Max: 98.6 (22 May 2025 19:38)  HR: 85 (23 May 2025 12:00) (69 - 103)  BP: 120/65 (23 May 2025 12:00) (91/59 - 134/56)  BP(mean): 80 (23 May 2025 12:00) (63 - 102)  RR: 26 (23 May 2025 12:00) (13 - 27)  SpO2: 95% (23 May 2025 12:00) (95% - 100%)    Parameters below as of 23 May 2025 12:00  Patient On (Oxygen Delivery Method): room air    Constitutional: NAD, awake, deconditioned/frail  HEENT: PERRLA, EOMI, MMM  Respiratory: Breath sounds are clear bilaterally, No wheezing, rales or rhonchi  Cardiovascular: S1 and S2, RRR, no murmurs, gallops or rubs  Gastrointestinal: +BS, soft, non-tender, non-distended, no CVA tenderness  Extremities: No peripheral edema, +DP pulses b/l  Neurological: A&O x2, no focal deficits  Musculoskeletal: 5/5 strength b/l upper and lower extremities  Skin: +left heel ulcer, skin warm and dry    Labs:  05-23 @ 05:02  Glucose 69 mg/dL  HCO3 27 mmol/L  Chloride 112 mmol/L  Sodium 144 mmol/L>   Potassium 3.5 mmol/L  Creatinine 1.02 mg/dL  Calcium 8.6 mg/dL  BUN 16 mg/dL  eGFR 75 mL/min/1.73m2  Anion gap 5 mmol/L    WBC 10.33  Hemoglobin 9.5  Hemoatocrit 29.8  Platelet count 250    Micro:  BCx 5/20 -- NGTD  UCx 5/20 -- prelim with >100K E. Coli     Left heel wound Cx 5/20 -- pan sensitive Proteus mirabilis, MDR Morganella morganii    Sputum Cx 5/21 -- commensal jesenia     Radiology:  MRI Foot + Ankle with IV contrast -- Pending     < from: CT Head No Cont (05.20.25 @ 14:05) >  IMPRESSION:  No significant change    No acute intracranial  hemorrhage, mass effect or midline shift.    Mild white matter small vessel ischemic disease.      < from: CT Chest, abdomen and pelvis w/ IV Cont (05.20.25 @ 14:05) >  IMPRESSION:  Significant debris in the left mainstem bronchus extending into the left   pulmonary bronchi with associated patchy nodular airspace opacities,   tree-in-bud nodularity, and bronchial wall thickening throughout the left   lung, most pronounced in the left lower lobe. Minimal, similar findings   in the right upper and right lower lobes. Findings suggest aspiration   pneumonitis.    No acute CT findings in the abdomen or pelvis. No bowel obstruction or   inflammation.      < from: CT Angio Head w/ IV Cont (05.20.25 @ 16:49) >  IMPRESSION:  CT HEAD:  1. No acute intracranial hemorrhage, mass effect, or shift of the midline   structures.  2. Similar-appearing mild chronic white matter microvascular type changes.    CTA NECK:  1. No large vessel occlusion or major stenosis.  2. Imaging findings suspicious for the presence of multifocal pneumonia.   Please refer to the report for the concurrent dedicated chest, abdomen,   pelvis CT for findings regarding the thoracic, abdomen, and pelvic   structures.    CTA HEAD:  1. Atherosclerosis affecting the right V4 segment with tandem areas of   mild stenosis.  2. No large vessel occlusion or major stenosis.      < from: Xray Chest 1 View- PORTABLE-Urgent (Xray Chest 1 View- PORTABLE-Urgent .) (05.20.25 @ 19:05) >  IMPRESSION: NG tube inserted. Increasing scattered left mid lower lung   field infiltrates. Endotracheal tube present on the earlier study has   been removed.      < from: Xray Foot AP + Lateral + Oblique, Bilat (05.20.25 @ 22:45) >  IMPRESSION: Bone and soft tissue loss of and near left calcaneus as noted.    Medications:  MEDICATIONS  (STANDING):  aspirin  chewable 81 milliGRAM(s) Oral daily  atorvastatin 10 milliGRAM(s) Oral at bedtime  chlorhexidine 2% Cloths 1 Application(s) Topical <User Schedule>  folic acid 1 milliGRAM(s) Oral daily  heparin   Injectable 5000 Unit(s) SubCutaneous every 8 hours  mupirocin 2% Nasal 1 Application(s) Both Nostrils two times a day  pantoprazole  Injectable 40 milliGRAM(s) IV Push daily  piperacillin/tazobactam IVPB.. 3.375 Gram(s) IV Intermittent every 8 hours  polyethylene glycol 3350 17 Gram(s) Oral daily  senna 2 Tablet(s) Oral at bedtime  valproic  acid Syrup 125 milliGRAM(s) Oral two times a day    MEDICATIONS  (PRN):      Time based billing:   > 51 minutes of total time met or exceeded on this patient encounter. Time spent excludes time spent on separately reportable services/teaching during the encounter.

## 2025-05-23 NOTE — PROGRESS NOTE ADULT - ASSESSMENT
79 year wit hx. afib not on ac found unresponsive in nurising home  intubated  ct chows pneumonia, ? aspiration  hypotenive in ED possible septic shock present on admission  Patient now intubated remains unresposnvie with small pupils    1. altered mental status  2.  septic shock  3. Pneumonia  4. r/o stroke  5. metabolic encephalopathy  6. infectied heel ulcers  7. acute respiatory failure    Plan  1. Neuro ct head /CTA negative , ammonia ok,  mental status improving, may be close to baseline  2. respiratory on vent likley pneumonia, ? aspiation, zosyn,  sputum culture/bronch culture negative, , ct had shown debris in bronchus, clear after bronch      now extubated on room air  3. cv hx. afib was not on ac, , now nsr levophed  titrated off  4. renal creatinine normal , d/c alva voiding  5. tolerating diet  6. ID zosyn to cover lung and heels  morganella and proteus in heel wound    Social hx.  Patient has no family from SNF

## 2025-05-23 NOTE — PROGRESS NOTE ADULT - ASSESSMENT
A: 79-year-old male was seen for the following:  -Left heel with large pressure ulcer  -Right heel pressure lesion with resolving eschar      P:   Chart reviewed and Patient evaluated;  X-rays ordered : on wet reading no acute radiographic finding. Official read above  Wound cx taken (5/20/25) Left Foot: GPC, GNP  Lt heel large pressure wound with necrotic base patching, +PTB, no active discharge, malodor has improved. Rt heel previously covered with black eschar, which now has mostly sloughed off, revealing healthy/pink/epipthelialized skin underneath.   Will continue with local wound care  Left heel with necrosis secondary to pressure; given pt's functional status and comorbidities, pt is not a surgical candidate. Rec conservative medical management. Rec MRI of Left foot to assess extent of OM of calcaneus.   Continue to offload bilateral heels while bedbound/chairbound or place 2 pillows under the calf to prevent further soft tissue damage.   Antibiotics as per ID  All additional care per Med appreciated   Podiatry will follow while in house      Case D/W attending Dr. Lewis       A: 79-year-old male was seen for the following:  -Left heel with large pressure ulcer  -Right heel pressure lesion with resolving eschar      P:   Chart reviewed and Patient evaluated;  X-rays ordered : on wet reading no acute radiographic finding. Official read above  Wound cx taken (5/20/25) Left Foot: GPC, GNP  Lt heel large pressure wound with necrotic base patching, +PTB, no active discharge, malodor has improved. Rt heel previously covered with black eschar, which now has mostly sloughed off, revealing healthy/pink/epipthelialized skin underneath.   Will continue with local wound care  Left heel with necrosis secondary to pressure; given pt's functional status and comorbidities, pt is not a surgical candidate. Rec conservative medical management. Rec MRI of Left foot to assess extent of OM of calcaneus.   Continue to offload bilateral heels while bedbound/chairbound or place 2 pillows under the calf to prevent further soft tissue damage.   Antibiotics as per ID  All additional care per Med appreciated   Podiatry will follow while in house      Case D/W attending Dr. Lewis          Wound care Instructions for Right and Left Heel Ulcers to be changed Everyday:  1. Please Gently remove the old dressings  2. Apply betadine-soaked gauze to the Right Heel, cover ulcerative lesions with a 4x4 gauze wrap with kerlix and secure with tape  3. Clean Left Heel ulcer with Dakins solutions, apply  Dakin-soaked gauze to the wound, cover with sterilized gauze, wrap with tape and secure with tape  4. Please, have the both heel offloaded in the provided Z-flex boot at all times, while the patient is bedbound.    Thank you

## 2025-05-23 NOTE — PROGRESS NOTE ADULT - SUBJECTIVE AND OBJECTIVE BOX
5/23/25: Pt was seen by Podiatry team. Resting comfortably bedside, NAD        PMH:   CAD (coronary artery disease)  Atrial fibrillation  Alcohol use  BPH (benign prostatic hyperplasia)  Paranoia  Hallucinations      PSH:  S/P CABG (coronary artery bypass graft)    Allergies:  No Known Allergies      Labs:                                        9.5    10.33 )-----------( 250      ( 23 May 2025 05:02 )             29.8   05-23    144  |  112[H]  |  16  ----------------------------<  69[L]  3.5   |  27  |  1.02    Ca    8.6      23 May 2025 05:02  Phos  2.8     05-23  Mg     2.0     05-23    TPro  5.9[L]  /  Alb  1.7[L]  /  TBili  0.6  /  DBili  x   /  AST  47[H]  /  ALT  44  /  AlkPhos  110  05-23    Vital Signs Last 24 Hrs  T(C): 36.2 (23 May 2025 07:35), Max: 37 (22 May 2025 19:38)  T(F): 97.1 (23 May 2025 07:35), Max: 98.6 (22 May 2025 19:38)  HR: 86 (23 May 2025 08:00) (69 - 103)  BP: 119/61 (23 May 2025 08:00) (91/59 - 134/56)  BP(mean): 77 (23 May 2025 08:00) (63 - 102)  RR: 26 (23 May 2025 08:00) (13 - 27)  SpO2: 100% (23 May 2025 08:00) (98% - 100%)    Parameters below as of 23 May 2025 08:00  Patient On (Oxygen Delivery Method): nasal cannula  O2 Flow (L/min): 2            REVIEW OF SYSTEMS:    CONSTITUTIONAL: No weakness, fevers or chills  EYES: No visual changes  RESPIRATORY: No cough, wheezing; No shortness of breath  CARDIOVASCULAR: No chest pain or palpitations  GASTROINTESTINAL: No abdominal or epigastric pain. No nausea, vomiting; No diarrhea or constipation.   GENITOURINARY: No dysuria, frequency or hematuria  NEUROLOGICAL: No numbness or weakness  SKIN: See physical examination.  All other review of systems is negative unless indicated above    Physical Exam:   Constitutional: NAD, alert;  Lower Extremity Focus  Derm:  Skin warm, dry and supple bilateral.    Right: Well-adhered brown scab lesion to medial aspect of heel, noted full epithelialized wound bed, no edema, no valerie-wound erythema, no malodor, no active discharge, no purulence, no fluctuance, no tracking/tunneling, no probe to bone.   Left: heel wound with necrotic base patching, wound size (8 cm X 7 cm), no edema, no valerie-wound erythema, +malodor (improving), no active discharge, no purulence, no fluctuance, + probe to bone.  Vascular: Dorsalis Pedis and Posterior Tibial pulses nonpalpable.   Neuro: could not be obtained because of disposition  MSK: could not be obtained because of disposition      Culture - Abscess with Gram Stain (collected 20 May 2025 18:25)  Source: Abscess Leg - Left  Gram Stain (21 May 2025 06:29):    Few polymorphonuclear leukocytes seen per low power field    Rare Gram positive cocci in pairs seen per oil power field    Moderate Gram Negative Rods seen per oil power field    Urinalysis with Rflx Culture (collected 20 May 2025 13:47)      Culture - Sputum (collected 21 May 2025 15:30)  Source: ET Tube ET Tube  Gram Stain (21 May 2025 23:10):    Numerous polymorphonuclear leukocytes per low power field    Few Squamous epithelial cells per low power field    Few Gram positive cocci in pairs per oil power field    Few Gram Positive Rods per oil power field    Culture - Sputum (collected 21 May 2025 08:15)  Source: ET Tube ET Tube  Gram Stain (22 May 2025 00:43):    No polymorphonuclear leukocytes per low power field    No Squamous epithelial cells per low power field    No organisms seen per oil power field    Culture - Abscess with Gram Stain (collected 20 May 2025 18:25)  Source: Abscess Leg - Left  Gram Stain (21 May 2025 06:29):    Few polymorphonuclear leukocytes seen per low power field    Rare Gram positive cocci in pairs seen per oil power field    Moderate Gram Negative Rods seen per oil power field    Culture - Urine (collected 20 May 2025 13:47)  Source: Clean Catch None  Preliminary Report (21 May 2025 20:42):    >100,000 CFU/ml Escherichia coli    Urinalysis with Rflx Culture (collected 20 May 2025 13:47)    Culture - Blood (collected 20 May 2025 13:40)  Source: Blood Blood-Peripheral  Preliminary Report (21 May 2025 19:02):    No growth at 24 hours    Culture - Blood (collected 20 May 2025 13:40)  Source: Blood Blood-Peripheral  Preliminary Report (21 May 2025 19:02):    No growth at 24 hours    < from: Xray Foot AP + Lateral + Oblique, Bilat (05.20.25 @ 22:45) >  COMPARISON: 12/17/2024     3 views of the left foot and 3 views ofthe right foot show bony erosion   or postoperative changes involving the dorsal surface of the left   calcaneus. Loss of adjacent soft tissue either represents ulceration or   evidence of surgical debridement.    The right foot shows no evidence of calcaneal erosion. The joint spaces   are maintained.    IMPRESSION: Bone and soft tissue loss of and near left calcaneus as noted.      < end of copied text >

## 2025-05-23 NOTE — PROGRESS NOTE ADULT - ASSESSMENT
80 y/o M with PMHx of CAD s/p CABG, paroxysmal afib (reportedly not on AC due to compliance issues), dementia, BPH, hx paranoia/hallucinations with mood disorder, , hx alcohol abuse, presented to  sent in from SNF for unresponsiveness, intubated for airway protection, found to be in septic shock requiring pressors for BP support and admitted to CCU, now stable for downgrade to med/surg    #Acute hypoxic respiratory failure s/p intubation   #Septic shock (POA) likely 2/2 infected L. heel ulcer + multifocal aspiration PNA + UTI   #Acute metabolic encephalopathy superimposed on baseline dementia   - s/p CCU care requiring pressors and ventilator support, now extubated on room air and off pressors   - CT head and CTA H/N negative for acute pathology  - CT chest/abd/pelvis as above  - Left foot Xray as above  - EEG with encephalopathy, no seizure activity  - Podiatry following s/p debridement , recs appreciated  - BCx NGTD   - Left heel wound Cx 5/20 -- pan sensitive Proteus mirabilis, MDR Morganella morganii  - UA positive, UCx 5/20 -- prelim with >100K E. Coli , f/u final  - Continue IV zosyn  - Continue wound care to b/l heel ulcers   - ID consulted to assist  - Per podiatry rec MRI Left foot/ankle with IV contrast to eval for osteomyelitis   - Continue home psych / dementia meds     #CAD s/p CABG  - continue aspirin, statin     #Hx paroxysmal Afib not on AC  - will try to determine why patient is not on AC  - Lopressor held currently , resume as BP allows    #Social hx  - admitted from SNF, pt reportedly without family / HCP / surrogate decision maker with history of dementia  - CM/SW consulted to assist    #DVT ppx  - heparin SQ 80 y/o M with PMHx of CAD s/p CABG, paroxysmal afib (reportedly not on AC due to compliance issues), dementia, BPH, hx paranoia/hallucinations with mood disorder, , hx alcohol abuse, presented to  sent in from SNF for unresponsiveness, intubated for airway protection, found to be in septic shock requiring pressors for BP support and admitted to CCU, now stable for downgrade to med/surg    #Acute hypoxic respiratory failure s/p intubation   #Septic shock (POA) likely 2/2 infected L. heel ulcer, suspected OM + multifocal aspiration PNA + UTI   #Acute metabolic encephalopathy superimposed on baseline dementia   - s/p CCU care requiring pressors and ventilator support, now extubated on room air and off pressors   - CT head and CTA H/N negative for acute pathology  - CT chest/abd/pelvis as above  - Left foot Xray as above  - EEG with encephalopathy, no seizure activity  - Podiatry following s/p debridement , recs appreciated  - BCx NGTD   - Left heel wound Cx 5/20 -- pan sensitive Proteus mirabilis, MDR Morganella morganii  - UA positive, UCx 5/20 -- prelim with >100K E. Coli , f/u final  - Continue IV zosyn  - Continue wound care to b/l heel ulcers   - ID consulted to assist  - D/w podiatry and ID -- given patient's history and unable to safely complete MRI form, will hold off on MRI ankle/foot at this time. Clinically picture is consistent with osteomyelitis and plan to treat with long term antibiotics, he is not a surgical candidate   - Continue home psych / dementia meds     #CAD s/p CABG  - continue aspirin, statin     #Hx paroxysmal Afib not on AC  - will try to determine why patient is not on AC  - Lopressor held currently , resume as BP allows    #Social hx  - admitted from SNF, pt reportedly without family / HCP / surrogate decision maker with history of dementia  - CM/SW consulted to assist    #DVT ppx  - heparin SQ

## 2025-05-23 NOTE — PROGRESS NOTE ADULT - SUBJECTIVE AND OBJECTIVE BOX
Interval History:        Off Pressors bp better        More awake, eating conversing        wbc better        sputum culture negative        On room air          HPI:        This patient is a 79 year old male     with PMH signfiiant for             CAD             CABG             afib not on AC due to compliance             behavior health issues              BPH  Alysaraht is resident of [facility].  Today patient was found unresposnive in bed  was 9ntubated now extubated  ct chest shows patchy infiltrates was bronched cultures better  head ct negative  EEG c/w encephalopathy    ROS no specific complinats       MEDICATIONS  (STANDING):  aspirin  chewable 81 milliGRAM(s) Oral daily  atorvastatin 10 milliGRAM(s) Oral at bedtime  chlorhexidine 0.12% Liquid 15 milliLiter(s) Oral Mucosa every 12 hours  chlorhexidine 2% Cloths 1 Application(s) Topical <User Schedule>  folic acid 1 milliGRAM(s) Oral daily  heparin   Injectable 5000 Unit(s) SubCutaneous every 8 hours  mupirocin 2% Nasal 1 Application(s) Both Nostrils two times a day  pantoprazole  Injectable 40 milliGRAM(s) IV Push daily  piperacillin/tazobactam IVPB.. 3.375 Gram(s) IV Intermittent every 8 hours  polyethylene glycol 3350 17 Gram(s) Oral daily  potassium chloride   Powder 40 milliEquivalent(s) Oral once  senna 2 Tablet(s) Oral at bedtime  sodium chloride 0.9%. 1000 milliLiter(s) (100 mL/Hr) IV Continuous <Continuous>  valproic  acid Syrup 125 milliGRAM(s) Oral two times a day    MEDICATIONS  (PRN):                    ICU Vital Signs Last 24 Hrs  T(C): 36.9 (23 May 2025 04:30), Max: 37 (22 May 2025 19:38)  T(F): 98.4 (23 May 2025 04:30), Max: 98.6 (22 May 2025 19:38)  HR: 86 (23 May 2025 08:00) (69 - 103)  BP: 119/61 (23 May 2025 08:00) (91/59 - 134/56)  BP(mean): 77 (23 May 2025 08:00) (63 - 102)  ABP: --  ABP(mean): --  RR: 26 (23 May 2025 08:00) (13 - 27)  SpO2: 100% (23 May 2025 08:00) (98% - 100%)    O2 Parameters below as of 23 May 2025 08:00  Patient On (Oxygen Delivery Method): nasal cannula  O2 Flow (L/min): 2          Mode: PS (Pressure Support)/ Spontaneous  FiO2: 30  PEEP: 6  PS: 10      I&O's Summary    22 May 2025 07:01  -  23 May 2025 07:00  --------------------------------------------------------  IN: 2550 mL / OUT: 1250 mL / NET: 1300 mL                                       9.5    10.33 )-----------( 250      ( 23 May 2025 05:02 )             29.8       05-23    144  |  112[H]  |  16  ----------------------------<  69[L]  3.5   |  27  |  1.02    Ca    8.6      23 May 2025 05:02  Phos  2.8     05-23  Mg     2.0     05-23    TPro  5.9[L]  /  Alb  1.7[L]  /  TBili  0.6  /  DBili  x   /  AST  47[H]  /  ALT  44  /  AlkPhos  110  05-23                Urinalysis Basic - ( 23 May 2025 05:02 )    Color: x / Appearance: x / SG: x / pH: x  Gluc: 69 mg/dL / Ketone: x  / Bili: x / Urobili: x   Blood: x / Protein: x / Nitrite: x   Leuk Esterase: x / RBC: x / WBC x   Sq Epi: x / Non Sq Epi: x / Bacteria: x        I personally reviewed the CXR:    DVT Prophylaxis:                                                                 Advanced Directives:     Sepsis Criteria Met -     Labs, Notes, Orders, radiologic studies reviewed and care coordinated with multidisciplinary team               Interval History:        Off Pressors bp better        More awake, eating conversing        wbc better        sputum culture negative        On room air          HPI:        This patient is a 79 year old male     with PMH signfiiant for             CAD             CABG             afib not on AC due to compliance             behavior health issues              BPH  Alysaraht is resident of [facility].  Today patient was found unresposnive in bed  was 9ntubated now extubated  ct chest shows patchy infiltrates was bronched cultures better  head ct negative  EEG c/w encephalopathy  left heal wound s/p debridement    ROS no specific complaint      MEDICATIONS  (STANDING):  aspirin  chewable 81 milliGRAM(s) Oral daily  atorvastatin 10 milliGRAM(s) Oral at bedtime  chlorhexidine 0.12% Liquid 15 milliLiter(s) Oral Mucosa every 12 hours  chlorhexidine 2% Cloths 1 Application(s) Topical <User Schedule>  folic acid 1 milliGRAM(s) Oral daily  heparin   Injectable 5000 Unit(s) SubCutaneous every 8 hours  mupirocin 2% Nasal 1 Application(s) Both Nostrils two times a day  pantoprazole  Injectable 40 milliGRAM(s) IV Push daily  piperacillin/tazobactam IVPB.. 3.375 Gram(s) IV Intermittent every 8 hours  polyethylene glycol 3350 17 Gram(s) Oral daily  potassium chloride   Powder 40 milliEquivalent(s) Oral once  senna 2 Tablet(s) Oral at bedtime  sodium chloride 0.9%. 1000 milliLiter(s) (100 mL/Hr) IV Continuous <Continuous>  valproic  acid Syrup 125 milliGRAM(s) Oral two times a day    ICU Vital Signs Last 24 Hrs  T(C): 36.9 (23 May 2025 04:30), Max: 37 (22 May 2025 19:38)  T(F): 98.4 (23 May 2025 04:30), Max: 98.6 (22 May 2025 19:38)  HR: 86 (23 May 2025 08:00) (69 - 103)  BP: 119/61 (23 May 2025 08:00) (91/59 - 134/56)  BP(mean): 77 (23 May 2025 08:00) (63 - 102)  ABP: --  ABP(mean): --  RR: 26 (23 May 2025 08:00) (13 - 27)  SpO2: 100% (23 May 2025 08:00) (98% - 100%)    O2 Parameters below as of 23 May 2025 08:00  Patient On (Oxygen Delivery Method): nasal cannula  O2 Flow (L/min): 2    Mode: PS (Pressure Support)/ Spontaneous  FiO2: 30  PEEP: 6  PS: 10      I&O's Summary    22 May 2025 07:01  -  23 May 2025 07:00  --------------------------------------------------------  IN: 2550 mL / OUT: 1250 mL / NET: 1300 mL    Physical Exam    General - no distress, weak  Neuro awake and oriented x 2  HEENT nc/at   neck supple  lungs clear  cv rrr  abdomen soft, non tender  gu voiding, alva out  extremiteis left heal ulcers                        9.5    10.33 )-----------( 250      ( 23 May 2025 05:02 )             29.8       05-23    144  |  112[H]  |  16  ----------------------------<  69[L]  3.5   |  27  |  1.02    Ca    8.6      23 May 2025 05:02  Phos  2.8     05-23  Mg     2.0     05-23    TPro  5.9[L]  /  Alb  1.7[L]  /  TBili  0.6  /  DBili  x   /  AST  47[H]  /  ALT  44  /  AlkPhos  110  05-23    Urinalysis Basic - ( 23 May 2025 05:02 )    Color: x / Appearance: x / SG: x / pH: x  Gluc: 69 mg/dL / Ketone: x  / Bili: x / Urobili: x   Blood: x / Protein: x / Nitrite: x   Leuk Esterase: x / RBC: x / WBC x   Sq Epi: x / Non Sq Epi: x / Bacteria: x    I personally reviewed the CXR: clear    DVT Prophylaxis:  Heparin subq                                                               Advanced Directives: Full Code     Sepsis Criteria Met - no     Labs, Notes, Orders, radiologic studies reviewed and care coordinated with multidisciplinary team

## 2025-05-24 LAB
ALBUMIN SERPL ELPH-MCNC: 1.8 G/DL — LOW (ref 3.3–5)
ALP SERPL-CCNC: 119 U/L — SIGNIFICANT CHANGE UP (ref 40–120)
ALT FLD-CCNC: 35 U/L — SIGNIFICANT CHANGE UP (ref 12–78)
ANION GAP SERPL CALC-SCNC: 5 MMOL/L — SIGNIFICANT CHANGE UP (ref 5–17)
AST SERPL-CCNC: 36 U/L — SIGNIFICANT CHANGE UP (ref 15–37)
BILIRUB SERPL-MCNC: 0.8 MG/DL — SIGNIFICANT CHANGE UP (ref 0.2–1.2)
BUN SERPL-MCNC: 14 MG/DL — SIGNIFICANT CHANGE UP (ref 7–23)
CALCIUM SERPL-MCNC: 9.3 MG/DL — SIGNIFICANT CHANGE UP (ref 8.5–10.1)
CHLORIDE SERPL-SCNC: 109 MMOL/L — HIGH (ref 96–108)
CO2 SERPL-SCNC: 30 MMOL/L — SIGNIFICANT CHANGE UP (ref 22–31)
CREAT SERPL-MCNC: 0.85 MG/DL — SIGNIFICANT CHANGE UP (ref 0.5–1.3)
EGFR: 89 ML/MIN/1.73M2 — SIGNIFICANT CHANGE UP
EGFR: 89 ML/MIN/1.73M2 — SIGNIFICANT CHANGE UP
GLUCOSE SERPL-MCNC: 71 MG/DL — SIGNIFICANT CHANGE UP (ref 70–99)
HCT VFR BLD CALC: 29.6 % — LOW (ref 39–50)
HGB BLD-MCNC: 9.9 G/DL — LOW (ref 13–17)
MCHC RBC-ENTMCNC: 27.7 PG — SIGNIFICANT CHANGE UP (ref 27–34)
MCHC RBC-ENTMCNC: 33.4 G/DL — SIGNIFICANT CHANGE UP (ref 32–36)
MCV RBC AUTO: 82.9 FL — SIGNIFICANT CHANGE UP (ref 80–100)
NRBC # BLD AUTO: 0 K/UL — SIGNIFICANT CHANGE UP (ref 0–0)
NRBC # FLD: 0 K/UL — SIGNIFICANT CHANGE UP (ref 0–0)
NRBC BLD AUTO-RTO: 0 /100 WBCS — SIGNIFICANT CHANGE UP (ref 0–0)
PLATELET # BLD AUTO: 333 K/UL — SIGNIFICANT CHANGE UP (ref 150–400)
PMV BLD: 9.5 FL — SIGNIFICANT CHANGE UP (ref 7–13)
POTASSIUM SERPL-MCNC: 3.3 MMOL/L — LOW (ref 3.5–5.3)
POTASSIUM SERPL-SCNC: 3.3 MMOL/L — LOW (ref 3.5–5.3)
PROT SERPL-MCNC: 6.1 GM/DL — SIGNIFICANT CHANGE UP (ref 6–8.3)
RBC # BLD: 3.57 M/UL — LOW (ref 4.2–5.8)
RBC # FLD: 14 % — SIGNIFICANT CHANGE UP (ref 10.3–14.5)
SODIUM SERPL-SCNC: 144 MMOL/L — SIGNIFICANT CHANGE UP (ref 135–145)
WBC # BLD: 6.18 K/UL — SIGNIFICANT CHANGE UP (ref 3.8–10.5)
WBC # FLD AUTO: 6.18 K/UL — SIGNIFICANT CHANGE UP (ref 3.8–10.5)

## 2025-05-24 PROCEDURE — 99233 SBSQ HOSP IP/OBS HIGH 50: CPT

## 2025-05-24 RX ADMIN — POLYETHYLENE GLYCOL 3350 17 GRAM(S): 17 POWDER, FOR SOLUTION ORAL at 10:05

## 2025-05-24 RX ADMIN — MUPIROCIN CALCIUM 1 APPLICATION(S): 20 CREAM TOPICAL at 10:10

## 2025-05-24 RX ADMIN — HEPARIN SODIUM 5000 UNIT(S): 1000 INJECTION INTRAVENOUS; SUBCUTANEOUS at 22:26

## 2025-05-24 RX ADMIN — HEPARIN SODIUM 5000 UNIT(S): 1000 INJECTION INTRAVENOUS; SUBCUTANEOUS at 14:22

## 2025-05-24 RX ADMIN — MIRTAZAPINE 45 MILLIGRAM(S): 30 TABLET, FILM COATED ORAL at 22:27

## 2025-05-24 RX ADMIN — MUPIROCIN CALCIUM 1 APPLICATION(S): 20 CREAM TOPICAL at 22:27

## 2025-05-24 RX ADMIN — Medication 81 MILLIGRAM(S): at 10:05

## 2025-05-24 RX ADMIN — Medication 40 MILLIGRAM(S): at 10:05

## 2025-05-24 RX ADMIN — ATORVASTATIN CALCIUM 10 MILLIGRAM(S): 80 TABLET, FILM COATED ORAL at 22:27

## 2025-05-24 RX ADMIN — DONEPEZIL HYDROCHLORIDE 10 MILLIGRAM(S): 5 TABLET ORAL at 22:26

## 2025-05-24 RX ADMIN — FOLIC ACID 1 MILLIGRAM(S): 1 TABLET ORAL at 10:05

## 2025-05-24 RX ADMIN — Medication 125 MILLIGRAM(S): at 22:27

## 2025-05-24 RX ADMIN — Medication 2 TABLET(S): at 22:26

## 2025-05-24 RX ADMIN — HEPARIN SODIUM 5000 UNIT(S): 1000 INJECTION INTRAVENOUS; SUBCUTANEOUS at 06:10

## 2025-05-24 RX ADMIN — Medication 25 GRAM(S): at 06:10

## 2025-05-24 RX ADMIN — Medication 25 GRAM(S): at 22:26

## 2025-05-24 RX ADMIN — Medication 40 MILLIEQUIVALENT(S): at 14:22

## 2025-05-24 RX ADMIN — Medication 125 MILLIGRAM(S): at 10:02

## 2025-05-24 RX ADMIN — Medication 25 GRAM(S): at 14:21

## 2025-05-24 RX ADMIN — MEMANTINE HYDROCHLORIDE 20 MILLIGRAM(S): 21 CAPSULE, EXTENDED RELEASE ORAL at 22:27

## 2025-05-24 NOTE — PROGRESS NOTE ADULT - SUBJECTIVE AND OBJECTIVE BOX
5/24/25: Pt was seen by Podiatry team today AM. Resting bedside, NAD, dressings to STEVE LE dry, clean and intact.    PMH:   CAD (coronary artery disease)  Atrial fibrillation  Alcohol use  BPH (benign prostatic hyperplasia)  Paranoia  Hallucinations      PSH:  S/P CABG (coronary artery bypass graft)    Allergies:  No Known Allergies      Labs:                             9.9    6.18  )-----------( 333      ( 24 May 2025 06:42 )             29.6   05-24    144  |  109[H]  |  14  ----------------------------<  71  3.3[L]   |  30  |  0.85    Ca    9.3      24 May 2025 06:42  Phos  2.8     05-23  Mg     2.0     05-23    TPro  6.1  /  Alb  1.8[L]  /  TBili  0.8  /  DBili  x   /  AST  36  /  ALT  35  /  AlkPhos  119  05-24  Vital Signs Last 24 Hrs  T(C): 36.3 (24 May 2025 00:04), Max: 36.4 (23 May 2025 19:40)  T(F): 97.3 (24 May 2025 00:04), Max: 97.5 (23 May 2025 19:40)  HR: 81 (24 May 2025 07:55) (72 - 88)  BP: 155/78 (24 May 2025 07:55) (112/68 - 155/78)  BP(mean): 82 (23 May 2025 16:00) (80 - 100)  RR: 19 (24 May 2025 07:55) (19 - 26)  SpO2: 100% (24 May 2025 07:55) (95% - 100%)    Parameters below as of 24 May 2025 00:04  Patient On (Oxygen Delivery Method): room air    REVIEW OF SYSTEMS:    CONSTITUTIONAL: No weakness, fevers or chills  EYES: No visual changes  RESPIRATORY: No cough, wheezing; No shortness of breath  CARDIOVASCULAR: No chest pain or palpitations  GASTROINTESTINAL: No abdominal or epigastric pain. No nausea, vomiting; No diarrhea or constipation.   GENITOURINARY: No dysuria, frequency or hematuria  NEUROLOGICAL: No numbness or weakness  SKIN: See physical examination.  All other review of systems is negative unless indicated above    Physical Exam:   Constitutional: NAD, alert;  Lower Extremity Focus  Derm:  Skin warm, dry and supple bilateral.    Right: Well-adhered brown scab lesion to medial aspect of heel, noted full epithelialized wound bed, no edema, no valerie-wound erythema, no malodor, no active discharge, no purulence, no fluctuance, no tracking/tunneling, no probe to bone.   Left: heel wound with necrotic base patching, wound size (8 cm X 7 cm), no edema, no valerie-wound erythema, +malodor (improving), no active discharge, no purulence, no fluctuance, + probe to bone.  Vascular: Dorsalis Pedis and Posterior Tibial pulses nonpalpable.   Neuro: could not be obtained because of disposition  MSK: could not be obtained because of disposition      Culture - Abscess with Gram Stain (collected 20 May 2025 18:25)  Source: Abscess Leg - Left  Gram Stain (21 May 2025 06:29):    Few polymorphonuclear leukocytes seen per low power field    Rare Gram positive cocci in pairs seen per oil power field    Moderate Gram Negative Rods seen per oil power field    Urinalysis with Rflx Culture (collected 20 May 2025 13:47)      Culture - Sputum (collected 21 May 2025 15:30)  Source: ET Tube ET Tube  Gram Stain (21 May 2025 23:10):    Numerous polymorphonuclear leukocytes per low power field    Few Squamous epithelial cells per low power field    Few Gram positive cocci in pairs per oil power field    Few Gram Positive Rods per oil power field    Culture - Sputum (collected 21 May 2025 08:15)  Source: ET Tube ET Tube  Gram Stain (22 May 2025 00:43):    No polymorphonuclear leukocytes per low power field    No Squamous epithelial cells per low power field    No organisms seen per oil power field    Culture - Abscess with Gram Stain (collected 20 May 2025 18:25)  Source: Abscess Leg - Left  Gram Stain (21 May 2025 06:29):    Few polymorphonuclear leukocytes seen per low power field    Rare Gram positive cocci in pairs seen per oil power field    Moderate Gram Negative Rods seen per oil power field    Culture - Urine (collected 20 May 2025 13:47)  Source: Clean Catch None  Preliminary Report (21 May 2025 20:42):    >100,000 CFU/ml Escherichia coli    Urinalysis with Rflx Culture (collected 20 May 2025 13:47)    Culture - Blood (collected 20 May 2025 13:40)  Source: Blood Blood-Peripheral  Preliminary Report (21 May 2025 19:02):    No growth at 24 hours    Culture - Blood (collected 20 May 2025 13:40)  Source: Blood Blood-Peripheral  Preliminary Report (21 May 2025 19:02):    No growth at 24 hours    < from: Xray Foot AP + Lateral + Oblique, Bilat (05.20.25 @ 22:45) >  COMPARISON: 12/17/2024     3 views of the left foot and 3 views ofthe right foot show bony erosion   or postoperative changes involving the dorsal surface of the left   calcaneus. Loss of adjacent soft tissue either represents ulceration or   evidence of surgical debridement.    The right foot shows no evidence of calcaneal erosion. The joint spaces   are maintained.    IMPRESSION: Bone and soft tissue loss of and near left calcaneus as noted.      < end of copied text >     5/24/25: Pt was seen by Podiatry team today AM. Resting bedside, NAD, dressings to STEVE LE dry, clean and intact.    PMH:   CAD (coronary artery disease)  Atrial fibrillation  Alcohol use  BPH (benign prostatic hyperplasia)  Paranoia  Hallucinations      PSH:  S/P CABG (coronary artery bypass graft)    Allergies:  No Known Allergies      Labs:                             9.9    6.18  )-----------( 333      ( 24 May 2025 06:42 )             29.6   05-24    144  |  109[H]  |  14  ----------------------------<  71  3.3[L]   |  30  |  0.85    Ca    9.3      24 May 2025 06:42  Phos  2.8     05-23  Mg     2.0     05-23    TPro  6.1  /  Alb  1.8[L]  /  TBili  0.8  /  DBili  x   /  AST  36  /  ALT  35  /  AlkPhos  119  05-24  Vital Signs Last 24 Hrs  T(C): 36.3 (24 May 2025 00:04), Max: 36.4 (23 May 2025 19:40)  T(F): 97.3 (24 May 2025 00:04), Max: 97.5 (23 May 2025 19:40)  HR: 81 (24 May 2025 07:55) (72 - 88)  BP: 155/78 (24 May 2025 07:55) (112/68 - 155/78)  BP(mean): 82 (23 May 2025 16:00) (80 - 100)  RR: 19 (24 May 2025 07:55) (19 - 26)  SpO2: 100% (24 May 2025 07:55) (95% - 100%)    Parameters below as of 24 May 2025 00:04  Patient On (Oxygen Delivery Method): room air    REVIEW OF SYSTEMS:    CONSTITUTIONAL: No weakness, fevers or chills  EYES: No visual changes  RESPIRATORY: No cough, wheezing; No shortness of breath  CARDIOVASCULAR: No chest pain or palpitations  GASTROINTESTINAL: No abdominal or epigastric pain. No nausea, vomiting; No diarrhea or constipation.   GENITOURINARY: No dysuria, frequency or hematuria  NEUROLOGICAL: No numbness or weakness  SKIN: See physical examination.  All other review of systems is negative unless indicated above    Physical Exam:   Constitutional: NAD, alert;  Lower Extremity Focus  Derm:  Skin warm, dry and supple bilateral.    Right: Well-adhered brown scab lesion to medial aspect of heel, noted full epithelialized wound bed, no edema, no valerie-wound erythema, no malodor, no active discharge, no purulence, no fluctuance, no tracking/tunneling, no probe to bone.   Left: heel wound with necrotic base patching, wound size (8 cm X 7 cm), no edema, no valerie-wound erythema, +malodor, no active discharge, no purulence, no fluctuance, + probe to bone with calcaneus exposed  Vascular: Dorsalis Pedis and Posterior Tibial pulses nonpalpable.   Neuro: could not be obtained because of disposition  MSK: could not be obtained because of disposition      Culture - Abscess with Gram Stain (collected 20 May 2025 18:25)  Source: Abscess Leg - Left  Gram Stain (21 May 2025 06:29):    Few polymorphonuclear leukocytes seen per low power field    Rare Gram positive cocci in pairs seen per oil power field    Moderate Gram Negative Rods seen per oil power field    Urinalysis with Rflx Culture (collected 20 May 2025 13:47)      Culture - Sputum (collected 21 May 2025 15:30)  Source: ET Tube ET Tube  Gram Stain (21 May 2025 23:10):    Numerous polymorphonuclear leukocytes per low power field    Few Squamous epithelial cells per low power field    Few Gram positive cocci in pairs per oil power field    Few Gram Positive Rods per oil power field    Culture - Sputum (collected 21 May 2025 08:15)  Source: ET Tube ET Tube  Gram Stain (22 May 2025 00:43):    No polymorphonuclear leukocytes per low power field    No Squamous epithelial cells per low power field    No organisms seen per oil power field    Culture - Abscess with Gram Stain (collected 20 May 2025 18:25)  Source: Abscess Leg - Left  Gram Stain (21 May 2025 06:29):    Few polymorphonuclear leukocytes seen per low power field    Rare Gram positive cocci in pairs seen per oil power field    Moderate Gram Negative Rods seen per oil power field    Culture - Urine (collected 20 May 2025 13:47)  Source: Clean Catch None  Preliminary Report (21 May 2025 20:42):    >100,000 CFU/ml Escherichia coli    Urinalysis with Rflx Culture (collected 20 May 2025 13:47)    Culture - Blood (collected 20 May 2025 13:40)  Source: Blood Blood-Peripheral  Preliminary Report (21 May 2025 19:02):    No growth at 24 hours    Culture - Blood (collected 20 May 2025 13:40)  Source: Blood Blood-Peripheral  Preliminary Report (21 May 2025 19:02):    No growth at 24 hours    < from: Xray Foot AP + Lateral + Oblique, Bilat (05.20.25 @ 22:45) >  COMPARISON: 12/17/2024     3 views of the left foot and 3 views ofthe right foot show bony erosion   or postoperative changes involving the dorsal surface of the left   calcaneus. Loss of adjacent soft tissue either represents ulceration or   evidence of surgical debridement.    The right foot shows no evidence of calcaneal erosion. The joint spaces   are maintained.    IMPRESSION: Bone and soft tissue loss of and near left calcaneus as noted.      < end of copied text >

## 2025-05-24 NOTE — CONSULT NOTE ADULT - SUBJECTIVE AND OBJECTIVE BOX
Patient is a 79y old  Male who presents with a chief complaint of found unresponsive (24 May 2025 13:30)    HPI:  79 year old male with PMH significant for CAD CABG afib not on AC due to compliance behavior health issues BPH Patient is resident of [facility]. patient was found unresponsive in bed Was brought to ED, GCS was 6 , was intubated with rocuronium now intubated small pupils got narcan in ed ct chest shows patchy infiltrates head ct negative, Ct chest shows aspiration pneumonia, extubated, urine cx grew ecoli, noted with R heel wound infection, necrosis, probe to bone, eval by podiatry s/p excisional debridement, wound cx grew MDR ANDRES, Proteus mirabilis, started on IV zosyn since      PMH: as above  PSH: as above  Meds: per reconciliation sheet, noted below  MEDICATIONS  (STANDING):  aspirin  chewable 81 milliGRAM(s) Oral daily  atorvastatin 10 milliGRAM(s) Oral at bedtime  chlorhexidine 2% Cloths 1 Application(s) Topical <User Schedule>  donepezil 10 milliGRAM(s) Oral at bedtime  folic acid 1 milliGRAM(s) Oral daily  heparin   Injectable 5000 Unit(s) SubCutaneous every 8 hours  memantine 20 milliGRAM(s) Oral at bedtime  mirtazapine 45 milliGRAM(s) Oral at bedtime  mupirocin 2% Nasal 1 Application(s) Both Nostrils two times a day  pantoprazole  Injectable 40 milliGRAM(s) IV Push daily  piperacillin/tazobactam IVPB.. 3.375 Gram(s) IV Intermittent every 8 hours  polyethylene glycol 3350 17 Gram(s) Oral daily  potassium chloride   Powder 40 milliEquivalent(s) Oral once  senna 2 Tablet(s) Oral at bedtime  valproic  acid Syrup 125 milliGRAM(s) Oral two times a day    Allergies    No Known Allergies    Intolerances      Social: no smoking, no alcohol, no illegal drugs; no recent travel, no exposure to TB  FAMILY HISTORY:  FH: asthma (Father)       no history of premature cardiovascular disease in first degree relatives    ROS: unable to obtain d/t medical condition     All other systems reviewed and are negative    Vital Signs Last 24 Hrs  T(C): 36.3 (24 May 2025 00:04), Max: 36.4 (23 May 2025 19:40)  T(F): 97.3 (24 May 2025 00:04), Max: 97.5 (23 May 2025 19:40)  HR: 81 (24 May 2025 07:55) (72 - 87)  BP: 155/78 (24 May 2025 07:55) (112/68 - 155/78)  BP(mean): 82 (23 May 2025 16:00) (82 - 82)  RR: 19 (24 May 2025 07:55) (19 - 19)  SpO2: 100% (24 May 2025 07:55) (95% - 100%)    Parameters below as of 24 May 2025 00:04  Patient On (Oxygen Delivery Method): room air      Daily     Daily Weight in k.5 (23 May 2025 19:40)    PE:  Constitutional: NAD  HEENT: NC/AT, EOMI, PERRLA, conjunctivae clear; ears and nose atraumatic; pharynx benign  Neck: supple; thyroid not palpable  Back: no tenderness  Respiratory: decreased breath sounds rhonchi  Cardiovascular: S1S2 regular, no murmurs  Abdomen: soft, not tender, not distended, positive BS; liver and spleen WNL  Genitourinary: no suprapubic tenderness  Lymphatic: no LN palpable  Musculoskeletal: no muscle tenderness, no joint swelling or tenderness  Extremities: no pedal edema R heel wound L heel necrotic ulcer   Neurological/ Psychiatric: moving all extremities  Skin: no rashes; no palpable lesions    Labs: all available labs reviewed                        9.9    6.18  )-----------( 333      ( 24 May 2025 06:42 )             29.6     05-24    144  |  109[H]  |  14  ----------------------------<  71  3.3[L]   |  30  |  0.85    Ca    9.3      24 May 2025 06:42  Phos  2.8     05-  Mg     2.0     -    TPro  6.1  /  Alb  1.8[L]  /  TBili  0.8  /  DBili  x   /  AST  36  /  ALT  35  /  AlkPhos  119  05-24     LIVER FUNCTIONS - ( 24 May 2025 06:42 )  Alb: 1.8 g/dL / Pro: 6.1 gm/dL / ALK PHOS: 119 U/L / ALT: 35 U/L / AST: 36 U/L / GGT: x           Urinalysis Basic - ( 24 May 2025 06:42 )    Color: x / Appearance: x / SG: x / pH: x  Gluc: 71 mg/dL / Ketone: x  / Bili: x / Urobili: x   Blood: x / Protein: x / Nitrite: x   Leuk Esterase: x / RBC: x / WBC x   Sq Epi: x / Non Sq Epi: x / Bacteria: x          Radiology: all available radiological tests reviewed  < from: Xray Foot AP + Lateral + Oblique, Bilat (25 @ 22:45) >  ACC: [id number] EXAM:  XR FOOT COMP MIN 3 VIEWS BI   ORDERED BY: EVIN RIVAS     PROCEDURE DATE:  2025          INTERPRETATION:  Bilateral feet    HISTORY: Heel ulcers    COMPARISON: 2024     3 views of the left foot and 3 views ofthe right foot show bony erosion   or postoperative changes involving the dorsal surface of the left   calcaneus. Loss of adjacent soft tissue either represents ulceration or   evidence of surgical debridement.    The right foot shows no evidence of calcaneal erosion. The joint spaces   are maintained.    IMPRESSION: Bone and soft tissue loss of and near left calcaneus as noted.        Thank you for this referral.    --- End of Report ---    < end of copied text >  < from: Xray Chest 1 View- PORTABLE-Urgent (Xray Chest 1 View- PORTABLE-Urgent .) (25 @ 19:05) >    IMPRESSION: NG tube inserted. Increasing scattered left mid lower lung   field infiltrates. Endotracheal tube present on the earlier study has   been removed.    --- End of Report ---    < end of copied text >  < from: CT Abdomen and Pelvis w/ IV Cont (25 @ 14:06) >  IMPRESSION:  Significant debris in the left mainstem bronchus extending into the left   pulmonary bronchi with associated patchy nodular airspace opacities,   tree-in-bud nodularity, and bronchial wall thickening throughout the left   lung, most pronounced in the left lower lobe. Minimal, similar findings   in the right upper and right lower lobes. Findings suggest aspiration   pneumonitis.    No acute CT findings in the abdomen or pelvis. No bowel obstruction or   inflammation.      < end of copied text >    Advanced directives addressed: full resuscitation

## 2025-05-24 NOTE — PROGRESS NOTE ADULT - ASSESSMENT
80 y/o M with PMHx of CAD s/p CABG, paroxysmal afib (reportedly not on AC due to compliance issues), dementia, BPH, hx paranoia/hallucinations with mood disorder, , hx alcohol abuse, presented to  sent in from SNF for unresponsiveness, intubated for airway protection, found to be in septic shock requiring pressors for BP support and admitted to CCU, now stable for downgrade to med/surg    #Acute hypoxic respiratory failure s/p intubation, resolved  #Septic shock (POA) likely 2/2 infected L. heel ulcer, suspected OM + multifocal aspiration PNA + UTI   #Acute metabolic encephalopathy superimposed on baseline dementia, improved  - s/p CCU care requiring pressors and ventilator support, now extubated on room air and off pressors   - CT head and CTA H/N negative for acute pathology  - CT chest/abd/pelvis as above  - Left foot Xray noted  - EEG with encephalopathy, no seizure activity  - Podiatry following s/p debridement , recs appreciated-->Vascular Sx consult recommended  - BCx NGTD   - Left heel wound Cx 5/20 -- pan sensitive Proteus mirabilis, MDR Morganella morganii  - UA positive, UCx 5/20 -- >100K E. Coli   - Continue IV zosyn (5/20 - )  - Continue wound care to b/l heel ulcers   - ID consulted to assist  - D/w podiatry and ID -- given patient's history and unable to safely complete MRI form, will hold off on MRI ankle/foot at this time. Clinically picture is consistent with osteomyelitis and plan to treat with long term antibiotics, he is not a surgical candidate   - Continue home psych / dementia meds     #CAD s/p CABG  - continue aspirin, statin     #Hx paroxysmal Afib not on AC  - Lopressor held currently , resume as BP allows    #Social hx  - admitted from SNF, pt reportedly without family / HCP / surrogate decision maker with history of dementia  - CM/SW consulted to assist    #DVT ppx  - heparin SQ    Dispo: Pending clinical improvement, Vascular Sx and ID consultations

## 2025-05-24 NOTE — PROGRESS NOTE ADULT - SUBJECTIVE AND OBJECTIVE BOX
Chief Complaint: Patient is a 79y old  Male who presents with a chief complaint of found unresponsive (23 May 2025 10:01)      Subjective:   Patient seen and examined at the bedside. Does not offer any acute complaints. NAEON.      PAST MEDICAL & SURGICAL HISTORY:  CAD (coronary artery disease)    Atrial fibrillation  Not on AC    History of alcohol use    BPH (benign prostatic hyperplasia)    Paranoia    History of hallucinations    S/P CABG (coronary artery bypass graft)    Social History:  from [facility] no family or surrogate decision maker (20 May 2025 16:14)  No current alcohol, smoking or drug use    FAMILY HISTORY:  FH: asthma (Father)    Physical Exam:  Vital Signs Last 24 Hrs  T(C): 36.2 (23 May 2025 07:35), Max: 37 (22 May 2025 19:38)  T(F): 97.1 (23 May 2025 07:35), Max: 98.6 (22 May 2025 19:38)  HR: 85 (23 May 2025 12:00) (69 - 103)  BP: 120/65 (23 May 2025 12:00) (91/59 - 134/56)  BP(mean): 80 (23 May 2025 12:00) (63 - 102)  RR: 26 (23 May 2025 12:00) (13 - 27)  SpO2: 95% (23 May 2025 12:00) (95% - 100%)    Parameters below as of 23 May 2025 12:00  Patient On (Oxygen Delivery Method): room air    Constitutional: NAD, awake, deconditioned/frail  Respiratory: Breath sounds are clear bilaterally, No wheezing, rales or rhonchi  Cardiovascular: S1 and S2, RRR, no murmurs, gallops or rubs  Gastrointestinal: +BS, soft, non-tender, non-distended, no CVA tenderness  Extremities: No peripheral edema, +DP pulses b/l  Skin: +left heel ulcer--covered in surgical dressing, skin warm and dry    Labs:  05-23 @ 05:02  Glucose 69 mg/dL  HCO3 27 mmol/L  Chloride 112 mmol/L  Sodium 144 mmol/L>   Potassium 3.5 mmol/L  Creatinine 1.02 mg/dL  Calcium 8.6 mg/dL  BUN 16 mg/dL  eGFR 75 mL/min/1.73m2  Anion gap 5 mmol/L    WBC 10.33  Hemoglobin 9.5  Hemoatocrit 29.8  Platelet count 250    Micro:  BCx 5/20 -- NGTD  UCx 5/20 -- prelim with >100K E. Coli     Left heel wound Cx 5/20 -- pan sensitive Proteus mirabilis, MDR Morganella morganii    Sputum Cx 5/21 -- commensal jesenia     Radiology:  MRI Foot + Ankle with IV contrast -- Pending     < from: CT Head No Cont (05.20.25 @ 14:05) >  IMPRESSION:  No significant change    No acute intracranial  hemorrhage, mass effect or midline shift.    Mild white matter small vessel ischemic disease.      < from: CT Chest, abdomen and pelvis w/ IV Cont (05.20.25 @ 14:05) >  IMPRESSION:  Significant debris in the left mainstem bronchus extending into the left   pulmonary bronchi with associated patchy nodular airspace opacities,   tree-in-bud nodularity, and bronchial wall thickening throughout the left   lung, most pronounced in the left lower lobe. Minimal, similar findings   in the right upper and right lower lobes. Findings suggest aspiration   pneumonitis.    No acute CT findings in the abdomen or pelvis. No bowel obstruction or   inflammation.      < from: CT Angio Head w/ IV Cont (05.20.25 @ 16:49) >  IMPRESSION:  CT HEAD:  1. No acute intracranial hemorrhage, mass effect, or shift of the midline   structures.  2. Similar-appearing mild chronic white matter microvascular type changes.    CTA NECK:  1. No large vessel occlusion or major stenosis.  2. Imaging findings suspicious for the presence of multifocal pneumonia.   Please refer to the report for the concurrent dedicated chest, abdomen,   pelvis CT for findings regarding the thoracic, abdomen, and pelvic   structures.    CTA HEAD:  1. Atherosclerosis affecting the right V4 segment with tandem areas of   mild stenosis.  2. No large vessel occlusion or major stenosis.      < from: Xray Chest 1 View- PORTABLE-Urgent (Xray Chest 1 View- PORTABLE-Urgent .) (05.20.25 @ 19:05) >  IMPRESSION: NG tube inserted. Increasing scattered left mid lower lung   field infiltrates. Endotracheal tube present on the earlier study has   been removed.      < from: Xray Foot AP + Lateral + Oblique, Bilat (05.20.25 @ 22:45) >  IMPRESSION: Bone and soft tissue loss of and near left calcaneus as noted.    Medications:  MEDICATIONS  (STANDING):  aspirin  chewable 81 milliGRAM(s) Oral daily  atorvastatin 10 milliGRAM(s) Oral at bedtime  chlorhexidine 2% Cloths 1 Application(s) Topical <User Schedule>  folic acid 1 milliGRAM(s) Oral daily  heparin   Injectable 5000 Unit(s) SubCutaneous every 8 hours  mupirocin 2% Nasal 1 Application(s) Both Nostrils two times a day  pantoprazole  Injectable 40 milliGRAM(s) IV Push daily  piperacillin/tazobactam IVPB.. 3.375 Gram(s) IV Intermittent every 8 hours  polyethylene glycol 3350 17 Gram(s) Oral daily  senna 2 Tablet(s) Oral at bedtime  valproic  acid Syrup 125 milliGRAM(s) Oral two times a day    MEDICATIONS  (PRN):      Time based billing:   > 51 minutes of total time met or exceeded on this patient encounter. Time spent excludes time spent on separately reportable services/teaching during the encounter.

## 2025-05-24 NOTE — PROGRESS NOTE ADULT - ASSESSMENT
A: 79-year-old male was seen for the following:  -Left heel with large pressure ulcer  -Right heel pressure lesion with resolving eschar      P:   Chart reviewed and Patient evaluated;  X-rays ordered : on wet reading no acute radiographic finding. Official read above  Wound cx taken (5/20/25) Left Foot: GPC, GNP  Lt heel large pressure wound with necrotic base patching, +PTB, no active discharge, malodor improving.   Rt heel previously covered with black eschar, which now has mostly sloughed off, revealing healthy/pink/epipthelialized skin underneath.   Podiatry to continue with local wound care  Left heel with necrosis secondary to pressure; given pt's functional status and comorbidities, pt is not a surgical candidate. Rec conservative medical management. Rec MRI of Left foot to assess extent of OM of calcaneus.   Continue to offload bilateral heels while bedbound/chairbound using CAIR boots or place 2 pillows under the calf to prevent further soft tissue damage.   Antibiotics as per ID  All additional care per Med appreciated   Podiatry will follow while in house  Case D/W attending Dr. Lewis          Wound care Instructions for Right and Left Heel Ulcers to be changed Everyday:  1. Please Gently remove the old dressings  2. Apply betadine-soaked gauze to the Right Heel, cover ulcerative lesions with a 4x4 gauze wrap with kerlix and secure with tape  3. Clean Left Heel ulcer with Dakins solutions, apply  Dakin-soaked gauze to the wound, cover with sterilized gauze, wrap with tape and secure with tape  4. Please, have the both heel offloaded in the provided Z-flex boot at all times, while the patient is bedbound.    Thank you     A: 79-year-old male was seen for the following:  -Left heel with large pressure ulcer; infected  -Right heel pressure lesion with resolving eschar      P:   Chart reviewed and Patient evaluated;  X-rays ordered : on wet reading no acute radiographic finding. Official read above  Wound cx taken (5/20/25) Left Foot: GPC, GNP  Rt heel: previously covered with black eschar, which now has mostly sloughed off, revealing healthy/pink/epipthelialized skin underneath.   Lt heel: large pressure wound with necrotic base patching, +PTB with calcaneus bone exposed, no active discharge, still continues with some malodor  Left heel with necrosis  of heel bone and surrounding soft tissue secondary to pressure; given pt's functional status and comorbidities, pt is not a surgical candidate from podiatry stand point.  Continue to offload bilateral heels while bedbound/chairbound using CAIR boots or place 2 pillows under the calf to prevent further soft tissue damage.   Recc vascular eval given the extensive loss of heel soft tissue and necrosis  Antibiotics as per ID  All additional care per Med [facility]   Podiatry will follow while in house  Case D/W attending Dr. Lewis      Wound care Instructions for Right and Left Heel Ulcers to be changed Everyday:  1. Please Gently remove the old dressings  2. Apply betadine-soaked gauze to the Right Heel, cover ulcerative lesions with a 4x4 gauze wrap with kerlix and secure with tape  3. Clean Left Heel ulcer with Dakins solutions, apply  Dakin-soaked gauze to the wound, cover with sterilized gauze, wrap with tape and secure with tape  4. Please, have the both heel offloaded in the provided Z-flex boot at all times, while the patient is bedbound.  Thank you     A: 79-year-old male was seen for the following:  -Left heel with large pressure ulcer; infected-->s/p bedside excisional debridement (05/20/2025)  -Right heel pressure lesion with resolving eschar      P:   Pt seen and evaluated  Afebrile, No leekucytosis  s/p bedside Right heel excisional debridement (5/20/2025)to remove all the malodorous necrotic/infected tissue to the right heel  Post excisional debridement; leukocytosis resolved, drainage resolved, malodor improving but still noted  High concern for residual infection and viability  Wound cx taken (5/20/25) Left Foot: proteus and morganella  ID reccs appreciated  Left heel continues with necrosis of heel bone and surrounding soft tissue secondary to pressure; given pt's functional status and comorbidities, pt is not a surgical candidate from podiatry stand point  MRI was recommended to eval for extent of OM but could not be obtained due to pt not able to complete the MRI screening form and due to no information available for NOK  Strict decubitus offloading precautions using CAIR boots  to prevent further soft tissue damage.   Recc vascular eval given the extensive loss of heel soft tissue and necrosis  All additional care per Med appreciated   Podiatry will follow while in house  Case D/W attending Dr. Lewis    Wound care Instructions for Right and Left Heel Ulcers to be changed Everyday:  1. Please Gently remove the old dressings  2. Apply betadine-soaked gauze to the Right Heel, cover ulcerative lesions with a 4x4 gauze wrap with kerlix and secure with tape  3. Clean Left Heel ulcer with Dakins solutions, apply  Dakin-soaked gauze to the wound, cover with sterilized gauze, wrap with tape and secure with tape  4. Please, have the both heel offloaded in the provided Z-flex boot at all times, while the patient is bedbound.  Thank you     A: 79-year-old male was seen for the following:  -Left heel with large pressure ulcer; infected-->s/p bedside excisional debridement (05/20/2025)  -Right heel pressure lesion with resolving eschar      P:   Pt seen and evaluated  Afebrile, No leekucytosis  s/p bedside LEft heel excisional debridement (5/20/2025)to remove all the malodorous necrotic/infected tissue to the left heel  Post excisional debridement; leukocytosis resolved, drainage resolved, malodor improving but still noted  High concern for residual infection and viability  Wound cx taken (5/20/25) Left Foot: proteus and morganella  ID reccs appreciated  Left heel continues with necrosis of heel bone and surrounding soft tissue secondary to pressure; given pt's functional status and comorbidities, pt is not a surgical candidate from podiatry stand point  MRI was recommended to eval for extent of OM in the left heel but could not be obtained due to pt not able to complete the MRI screening form and due to no information available for NOK  Strict decubitus offloading precautions to bilateral heels using CAIR boots  to prevent further soft tissue damage.   Recc vascular eval given the extensive loss of heel soft tissue and necrosis  All additional care per Med [facility]   Podiatry will follow while in house  Case D/W attending Dr. Lewis    Wound care Instructions for Right and Left Heel Ulcers to be changed Everyday:  1. Please Gently remove the old dressings  2. Apply betadine-soaked gauze to the Right Heel, cover ulcerative lesions with a 4x4 gauze wrap with kerlix and secure with tape  3. Clean Left Heel ulcer with Dakins solutions, apply  Dakin-soaked gauze to the wound, cover with sterilized gauze, wrap with tape and secure with tape  4. Please, have the both heel offloaded in the provided Z-flex boot at all times, while the patient is bedbound.  Thank you     no discrete location documentation necessary

## 2025-05-24 NOTE — CONSULT NOTE ADULT - ASSESSMENT
79 year old male with PMH significant for CAD CABG afib not on AC due to compliance behavior health issues BPH Patient is resident of [facility]. patient was found unresponsive in bed Was brought to ED, GCS was 6 , was intubated with rocuronium now intubated small pupils got narcan in ed ct chest shows patchy infiltrates head ct negative, Ct chest shows aspiration pneumonia, extubated, urine cx grew ecoli, noted with R heel wound infection, necrosis, probe to bone, eval by podiatry s/p excisional debridement, wound cx grew MDR ANDRES, Proteus mirabilis, started on IV zosyn since 5/20. Has L heel necrosis/Om as well.     Sepsis. Acute respiratory failure. Aspiration pneumonia. R heel wound infection/L heel necrosis/Osteomyelitis. Ecoli UTI. Metabolic encephalopathy  - imaging reviewed  - on IV zosyn [id number]  #4-5  - continue with IV antibiotic coverage   - urine cx growing Ecoli S to above; blood cx no growth   - wound cx growing MDR ANDRES, Proteus mirabilis   - podiatry eval noted s/p bedside debridement of R heel  - wound care  - on dc plan for picc line and 6 weeks IV invanz 1gm daily  - fu cbc  - tolerating abx well so far; no side effects noted  - reason for abx use and side effects reviewed with patient  - supportive care    IV to PO token is not indicated    Concern for infection with multi-resistant organisms was raised. Prior cultures reviewed. An epidemiologic assessment was performed. There is a significant risk for resistant microorganisms to spread to family members, and/or healthcare staff. The patient will be placed on isolation according to infection control policy. Will reconsider isolation measures based on new culture results and other clinical data as appropriate. Appropriate cultures collected and an appropriate broad spectrum antibiotic therapy will be considered.

## 2025-05-25 LAB
ANION GAP SERPL CALC-SCNC: 4 MMOL/L — LOW (ref 5–17)
BUN SERPL-MCNC: 11 MG/DL — SIGNIFICANT CHANGE UP (ref 7–23)
CALCIUM SERPL-MCNC: 9 MG/DL — SIGNIFICANT CHANGE UP (ref 8.5–10.1)
CHLORIDE SERPL-SCNC: 104 MMOL/L — SIGNIFICANT CHANGE UP (ref 96–108)
CO2 SERPL-SCNC: 31 MMOL/L — SIGNIFICANT CHANGE UP (ref 22–31)
CREAT SERPL-MCNC: 0.77 MG/DL — SIGNIFICANT CHANGE UP (ref 0.5–1.3)
CULTURE RESULTS: SIGNIFICANT CHANGE UP
CULTURE RESULTS: SIGNIFICANT CHANGE UP
EGFR: 92 ML/MIN/1.73M2 — SIGNIFICANT CHANGE UP
EGFR: 92 ML/MIN/1.73M2 — SIGNIFICANT CHANGE UP
GLUCOSE SERPL-MCNC: 83 MG/DL — SIGNIFICANT CHANGE UP (ref 70–99)
HCT VFR BLD CALC: 29.5 % — LOW (ref 39–50)
HGB BLD-MCNC: 10 G/DL — LOW (ref 13–17)
MCHC RBC-ENTMCNC: 28.2 PG — SIGNIFICANT CHANGE UP (ref 27–34)
MCHC RBC-ENTMCNC: 33.9 G/DL — SIGNIFICANT CHANGE UP (ref 32–36)
MCV RBC AUTO: 83.3 FL — SIGNIFICANT CHANGE UP (ref 80–100)
NRBC # BLD AUTO: 0 K/UL — SIGNIFICANT CHANGE UP (ref 0–0)
NRBC # FLD: 0 K/UL — SIGNIFICANT CHANGE UP (ref 0–0)
NRBC BLD AUTO-RTO: 0 /100 WBCS — SIGNIFICANT CHANGE UP (ref 0–0)
PLATELET # BLD AUTO: 310 K/UL — SIGNIFICANT CHANGE UP (ref 150–400)
PMV BLD: 9.2 FL — SIGNIFICANT CHANGE UP (ref 7–13)
POTASSIUM SERPL-MCNC: 3.3 MMOL/L — LOW (ref 3.5–5.3)
POTASSIUM SERPL-SCNC: 3.3 MMOL/L — LOW (ref 3.5–5.3)
RBC # BLD: 3.54 M/UL — LOW (ref 4.2–5.8)
RBC # FLD: 13.6 % — SIGNIFICANT CHANGE UP (ref 10.3–14.5)
SODIUM SERPL-SCNC: 139 MMOL/L — SIGNIFICANT CHANGE UP (ref 135–145)
SPECIMEN SOURCE: SIGNIFICANT CHANGE UP
SPECIMEN SOURCE: SIGNIFICANT CHANGE UP
WBC # BLD: 4.58 K/UL — SIGNIFICANT CHANGE UP (ref 3.8–10.5)
WBC # FLD AUTO: 4.58 K/UL — SIGNIFICANT CHANGE UP (ref 3.8–10.5)

## 2025-05-25 PROCEDURE — 99233 SBSQ HOSP IP/OBS HIGH 50: CPT

## 2025-05-25 RX ADMIN — Medication 125 MILLIGRAM(S): at 09:52

## 2025-05-25 RX ADMIN — Medication 81 MILLIGRAM(S): at 09:52

## 2025-05-25 RX ADMIN — HEPARIN SODIUM 5000 UNIT(S): 1000 INJECTION INTRAVENOUS; SUBCUTANEOUS at 06:36

## 2025-05-25 RX ADMIN — Medication 40 MILLIEQUIVALENT(S): at 10:19

## 2025-05-25 RX ADMIN — Medication 2 TABLET(S): at 22:33

## 2025-05-25 RX ADMIN — MUPIROCIN CALCIUM 1 APPLICATION(S): 20 CREAM TOPICAL at 22:48

## 2025-05-25 RX ADMIN — MEMANTINE HYDROCHLORIDE 20 MILLIGRAM(S): 21 CAPSULE, EXTENDED RELEASE ORAL at 22:45

## 2025-05-25 RX ADMIN — MIRTAZAPINE 45 MILLIGRAM(S): 30 TABLET, FILM COATED ORAL at 22:46

## 2025-05-25 RX ADMIN — DONEPEZIL HYDROCHLORIDE 10 MILLIGRAM(S): 5 TABLET ORAL at 22:34

## 2025-05-25 RX ADMIN — Medication 40 MILLIEQUIVALENT(S): at 17:44

## 2025-05-25 RX ADMIN — Medication 25 GRAM(S): at 06:36

## 2025-05-25 RX ADMIN — Medication 125 MILLIGRAM(S): at 22:35

## 2025-05-25 RX ADMIN — FOLIC ACID 1 MILLIGRAM(S): 1 TABLET ORAL at 09:52

## 2025-05-25 RX ADMIN — Medication 25 GRAM(S): at 22:45

## 2025-05-25 RX ADMIN — ATORVASTATIN CALCIUM 10 MILLIGRAM(S): 80 TABLET, FILM COATED ORAL at 22:34

## 2025-05-25 RX ADMIN — Medication 25 GRAM(S): at 13:41

## 2025-05-25 RX ADMIN — HEPARIN SODIUM 5000 UNIT(S): 1000 INJECTION INTRAVENOUS; SUBCUTANEOUS at 13:35

## 2025-05-25 RX ADMIN — Medication 40 MILLIGRAM(S): at 09:52

## 2025-05-25 RX ADMIN — Medication 1 APPLICATION(S): at 09:53

## 2025-05-25 RX ADMIN — HEPARIN SODIUM 5000 UNIT(S): 1000 INJECTION INTRAVENOUS; SUBCUTANEOUS at 22:45

## 2025-05-25 RX ADMIN — MUPIROCIN CALCIUM 1 APPLICATION(S): 20 CREAM TOPICAL at 09:44

## 2025-05-25 RX ADMIN — POLYETHYLENE GLYCOL 3350 17 GRAM(S): 17 POWDER, FOR SOLUTION ORAL at 09:51

## 2025-05-25 NOTE — PROGRESS NOTE ADULT - ASSESSMENT
79 year old male with PMH significant for CAD CABG afib not on AC due to compliance behavior health issues BPH Patient is resident of [facility]. patient was found unresponsive in bed Was brought to ED, GCS was 6 , was intubated with rocuronium now intubated small pupils got narcan in ed ct chest shows patchy infiltrates head ct negative, Ct chest shows aspiration pneumonia, extubated, urine cx grew ecoli, noted with R heel wound infection, necrosis, probe to bone, eval by podiatry s/p excisional debridement, wound cx grew MDR ANDRES, Proteus mirabilis, started on IV zosyn since 5/20. Has L heel necrosis/Om as well.     Sepsis. Acute respiratory failure. Aspiration pneumonia. R heel wound infection/L heel necrosis/Osteomyelitis. Ecoli UTI. Metabolic encephalopathy  - imaging reviewed  - on IV zosyn [id number]  #5-6   - continue with IV antibiotic coverage   - urine cx growing Ecoli S to above; blood cx no growth   - wound cx growing MDR ANDRES, Proteus mirabilis   - podiatry eval noted s/p bedside debridement of R heel  - wound care  - on dc plan for picc line and 6 weeks IV invanz 1gm daily until 6/30/25   - fu cbc  - tolerating abx well so far; no side effects noted  - reason for abx use and side effects reviewed with patient  - supportive care    IV to PO token is not indicated    Concern for infection with multi-resistant organisms was raised. Prior cultures reviewed. An epidemiologic assessment was performed. There is a significant risk for resistant microorganisms to spread to family members, and/or healthcare staff. The patient will be placed on isolation according to infection control policy. Will reconsider isolation measures based on new culture results and other clinical data as appropriate. Appropriate cultures collected and an appropriate broad spectrum antibiotic therapy will be considered.
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
## 2025-05-25 NOTE — PROGRESS NOTE ADULT - SUBJECTIVE AND OBJECTIVE BOX
5/25/25: Pt was seen by Podiatry team today with attending present. Resting bedside, NAD, dressings to STEVE LE dry, clean and intact.    PMH:   CAD (coronary artery disease)  Atrial fibrillation  Alcohol use  BPH (benign prostatic hyperplasia)  Paranoia  Hallucinations      PSH:  S/P CABG (coronary artery bypass graft)    Allergies:  No Known Allergies      Labs:                             10.0   4.58  )-----------( 310      ( 25 May 2025 08:11 )             29.5   05-25    139  |  104  |  11  ----------------------------<  83  3.3[L]   |  31  |  0.77    Ca    9.0      25 May 2025 08:11    TPro  6.1  /  Alb  1.8[L]  /  TBili  0.8  /  DBili  x   /  AST  36  /  ALT  35  /  AlkPhos  119  05-24  Vital Signs Last 24 Hrs  T(C): 36.3 (25 May 2025 07:19), Max: 36.5 (24 May 2025 17:00)  T(F): 97.4 (25 May 2025 07:19), Max: 97.7 (24 May 2025 17:00)  HR: 69 (25 May 2025 07:19) (69 - 92)  BP: 147/79 (25 May 2025 07:19) (140/80 - 157/77)  BP(mean): --  RR: 19 (25 May 2025 07:19) (18 - 19)  SpO2: 96% (25 May 2025 07:19) (94% - 96%)    Parameters below as of 25 May 2025 07:19  Patient On (Oxygen Delivery Method): room air    REVIEW OF SYSTEMS:    CONSTITUTIONAL: No weakness, fevers or chills  EYES: No visual changes  RESPIRATORY: No cough, wheezing; No shortness of breath  CARDIOVASCULAR: No chest pain or palpitations  GASTROINTESTINAL: No abdominal or epigastric pain. No nausea, vomiting; No diarrhea or constipation.   GENITOURINARY: No dysuria, frequency or hematuria  NEUROLOGICAL: No numbness or weakness  SKIN: See physical examination.  All other review of systems is negative unless indicated above    Physical Exam:   Constitutional: NAD, alert;  Lower Extremity Focus  Derm:  Skin warm, dry and supple bilateral.    Right: Well-adhered brown scab lesion to medial aspect of heel, noted full epithelialized wound bed, no edema, no valerie-wound erythema, no malodor, no active discharge, no purulence, no fluctuance, no tracking/tunneling, no probe to bone.   Left: heel wound with necrotic base patching, wound size (8 cm X 7 cm), no edema, no valerie-wound erythema, +malodor, no active discharge, no purulence, no fluctuance, + probe to bone with calcaneus exposed  Vascular: Dorsalis Pedis and Posterior Tibial pulses nonpalpable.   Neuro: could not be obtained because of disposition  MSK: could not be obtained because of disposition      Culture - Abscess with Gram Stain (collected 20 May 2025 18:25)  Source: Abscess Leg - Left  Gram Stain (21 May 2025 06:29):    Few polymorphonuclear leukocytes seen per low power field    Rare Gram positive cocci in pairs seen per oil power field    Moderate Gram Negative Rods seen per oil power field    Urinalysis with Rflx Culture (collected 20 May 2025 13:47)      Culture - Sputum (collected 21 May 2025 15:30)  Source: ET Tube ET Tube  Gram Stain (21 May 2025 23:10):    Numerous polymorphonuclear leukocytes per low power field    Few Squamous epithelial cells per low power field    Few Gram positive cocci in pairs per oil power field    Few Gram Positive Rods per oil power field    Culture - Sputum (collected 21 May 2025 08:15)  Source: ET Tube ET Tube  Gram Stain (22 May 2025 00:43):    No polymorphonuclear leukocytes per low power field    No Squamous epithelial cells per low power field    No organisms seen per oil power field    Culture - Abscess with Gram Stain (collected 20 May 2025 18:25)  Source: Abscess Leg - Left  Gram Stain (21 May 2025 06:29):    Few polymorphonuclear leukocytes seen per low power field    Rare Gram positive cocci in pairs seen per oil power field    Moderate Gram Negative Rods seen per oil power field    Culture - Urine (collected 20 May 2025 13:47)  Source: Clean Catch None  Preliminary Report (21 May 2025 20:42):    >100,000 CFU/ml Escherichia coli    Urinalysis with Rflx Culture (collected 20 May 2025 13:47)    Culture - Blood (collected 20 May 2025 13:40)  Source: Blood Blood-Peripheral  Preliminary Report (21 May 2025 19:02):    No growth at 24 hours    Culture - Blood (collected 20 May 2025 13:40)  Source: Blood Blood-Peripheral  Preliminary Report (21 May 2025 19:02):    No growth at 24 hours    < from: Xray Foot AP + Lateral + Oblique, Bilat (05.20.25 @ 22:45) >  COMPARISON: 12/17/2024     3 views of the left foot and 3 views ofthe right foot show bony erosion   or postoperative changes involving the dorsal surface of the left   calcaneus. Loss of adjacent soft tissue either represents ulceration or   evidence of surgical debridement.    The right foot shows no evidence of calcaneal erosion. The joint spaces   are maintained.    IMPRESSION: Bone and soft tissue loss of and near left calcaneus as noted.      < end of copied text >

## 2025-05-25 NOTE — PROGRESS NOTE ADULT - ASSESSMENT
80 y/o M with PMHx of CAD s/p CABG, paroxysmal afib (reportedly not on AC due to compliance issues), dementia, BPH, hx paranoia/hallucinations with mood disorder, , hx alcohol abuse, presented to  sent in from SNF for unresponsiveness, intubated for airway protection, found to be in septic shock requiring pressors for BP support and admitted to CCU, now stable for downgrade to med/surg    #Acute hypoxic respiratory failure s/p intubation, resolved  #Septic shock (POA) likely 2/2 infected L. heel ulcer, suspected OM + multifocal aspiration PNA + UTI   #Acute metabolic encephalopathy superimposed on baseline dementia, improved  - s/p CCU care requiring pressors and ventilator support, now extubated on room air and off pressors   - CT head and CTA H/N negative for acute pathology  - CT chest/abd/pelvis noted  - Left foot Xray noted  - EEG with encephalopathy, no seizure activity  - Podiatry following s/p debridement , recs appreciated-->Vascular Sx consult recommended  - BCx NGTD   - Left heel wound Cx 5/20 -- pan sensitive Proteus mirabilis, MDR Morganella morganii  - UA positive, UCx 5/20 -- >100K E. Coli   - Continue IV zosyn (5/20 - )  - Continue wound care to b/l heel ulcers   - ID consulted to assist  - D/w podiatry and ID -- given patient's history and unable to safely complete MRI form, will hold off on MRI ankle/foot at this time. Clinically picture is consistent with osteomyelitis and plan to treat with long term antibiotics, he is not a surgical candidate   - Continue home psych / dementia meds     #CAD s/p CABG  - continue aspirin, statin     #Hx paroxysmal Afib not on AC  - Lopressor held currently , resume as BP allows    #Social hx  - admitted from SNF, pt reportedly without family / HCP / surrogate decision maker with history of dementia  - CM/SW consulted to assist    #DVT ppx  - heparin SQ    Dispo: Pending clinical improvement, will need long term outpt IV Abx

## 2025-05-25 NOTE — PROGRESS NOTE ADULT - SUBJECTIVE AND OBJECTIVE BOX
Chief Complaint: Patient is a 79y old  Male who presents with a chief complaint of found unresponsive (23 May 2025 10:01)      Subjective:   Patient seen and examined at the bedside. Does not offer any acute complaints. NAEON.      PAST MEDICAL & SURGICAL HISTORY:  CAD (coronary artery disease)    Atrial fibrillation  Not on AC    History of alcohol use    BPH (benign prostatic hyperplasia)    Paranoia    History of hallucinations    S/P CABG (coronary artery bypass graft)    Social History:  from [facility] no family or surrogate decision maker (20 May 2025 16:14)  No current alcohol, smoking or drug use    FAMILY HISTORY:  FH: asthma (Father)    Physical Exam:  Vital Signs Last 24 Hrs  T(C): 36.2 (23 May 2025 07:35), Max: 37 (22 May 2025 19:38)  T(F): 97.1 (23 May 2025 07:35), Max: 98.6 (22 May 2025 19:38)  HR: 85 (23 May 2025 12:00) (69 - 103)  BP: 120/65 (23 May 2025 12:00) (91/59 - 134/56)  BP(mean): 80 (23 May 2025 12:00) (63 - 102)  RR: 26 (23 May 2025 12:00) (13 - 27)  SpO2: 95% (23 May 2025 12:00) (95% - 100%)    Parameters below as of 23 May 2025 12:00  Patient On (Oxygen Delivery Method): room air    Constitutional: NAD, awake, deconditioned/frail  Respiratory: Breath sounds are clear bilaterally, No wheezing, rales or rhonchi  Cardiovascular: S1 and S2, RRR, no murmurs, gallops or rubs  Gastrointestinal: +BS, soft, non-tender, non-distended, no CVA tenderness  Extremities: No peripheral edema, +DP pulses b/l  Skin: +left heel ulcer--covered in surgical dressing, skin warm and dry    Labs:  05-23 @ 05:02  Glucose 69 mg/dL  HCO3 27 mmol/L  Chloride 112 mmol/L  Sodium 144 mmol/L>   Potassium 3.5 mmol/L  Creatinine 1.02 mg/dL  Calcium 8.6 mg/dL  BUN 16 mg/dL  eGFR 75 mL/min/1.73m2  Anion gap 5 mmol/L    WBC 10.33  Hemoglobin 9.5  Hemoatocrit 29.8  Platelet count 250    Micro:  BCx 5/20 -- NGTD  UCx 5/20 -- prelim with >100K E. Coli     Left heel wound Cx 5/20 -- pan sensitive Proteus mirabilis, MDR Morganella morganii    Sputum Cx 5/21 -- commensal jesenia     Radiology:  MRI Foot + Ankle with IV contrast -- Pending     < from: CT Head No Cont (05.20.25 @ 14:05) >  IMPRESSION:  No significant change    No acute intracranial  hemorrhage, mass effect or midline shift.    Mild white matter small vessel ischemic disease.      < from: CT Chest, abdomen and pelvis w/ IV Cont (05.20.25 @ 14:05) >  IMPRESSION:  Significant debris in the left mainstem bronchus extending into the left   pulmonary bronchi with associated patchy nodular airspace opacities,   tree-in-bud nodularity, and bronchial wall thickening throughout the left   lung, most pronounced in the left lower lobe. Minimal, similar findings   in the right upper and right lower lobes. Findings suggest aspiration   pneumonitis.    No acute CT findings in the abdomen or pelvis. No bowel obstruction or   inflammation.      < from: CT Angio Head w/ IV Cont (05.20.25 @ 16:49) >  IMPRESSION:  CT HEAD:  1. No acute intracranial hemorrhage, mass effect, or shift of the midline   structures.  2. Similar-appearing mild chronic white matter microvascular type changes.    CTA NECK:  1. No large vessel occlusion or major stenosis.  2. Imaging findings suspicious for the presence of multifocal pneumonia.   Please refer to the report for the concurrent dedicated chest, abdomen,   pelvis CT for findings regarding the thoracic, abdomen, and pelvic   structures.    CTA HEAD:  1. Atherosclerosis affecting the right V4 segment with tandem areas of   mild stenosis.  2. No large vessel occlusion or major stenosis.      < from: Xray Chest 1 View- PORTABLE-Urgent (Xray Chest 1 View- PORTABLE-Urgent .) (05.20.25 @ 19:05) >  IMPRESSION: NG tube inserted. Increasing scattered left mid lower lung   field infiltrates. Endotracheal tube present on the earlier study has   been removed.      < from: Xray Foot AP + Lateral + Oblique, Bilat (05.20.25 @ 22:45) >  IMPRESSION: Bone and soft tissue loss of and near left calcaneus as noted.    Medications:  MEDICATIONS  (STANDING):  aspirin  chewable 81 milliGRAM(s) Oral daily  atorvastatin 10 milliGRAM(s) Oral at bedtime  chlorhexidine 2% Cloths 1 Application(s) Topical <User Schedule>  folic acid 1 milliGRAM(s) Oral daily  heparin   Injectable 5000 Unit(s) SubCutaneous every 8 hours  mupirocin 2% Nasal 1 Application(s) Both Nostrils two times a day  pantoprazole  Injectable 40 milliGRAM(s) IV Push daily  piperacillin/tazobactam IVPB.. 3.375 Gram(s) IV Intermittent every 8 hours  polyethylene glycol 3350 17 Gram(s) Oral daily  senna 2 Tablet(s) Oral at bedtime  valproic  acid Syrup 125 milliGRAM(s) Oral two times a day    MEDICATIONS  (PRN):      Time based billing:   > 51 minutes of total time met or exceeded on this patient encounter. Time spent excludes time spent on separately reportable services/teaching during the encounter.

## 2025-05-25 NOTE — PROGRESS NOTE ADULT - ASSESSMENT
Detail Level: Zone Nicotinamide Supplementation Recommendations: Nicotinamide is purchased over-the-counter in 500 mg capsules.  Nicotinamide should be taken as one 500 mg capsule twice a day. Supplementation with Nicotinamide does not replace sunscreen application. A: 79-year-old male was seen for the following:  -Left heel with large pressure ulcer; infected-->s/p bedside excisional debridement (05/20/2025)  -Right heel pressure lesion with resolving eschar      P:   Pt seen and evaluated  Afebrile, No leukocytosis  s/p bedside LEft heel excisional debridement (5/20/2025)to remove all the malodorous necrotic/infected tissue to the left heel  Post excisional debridement; leukocytosis resolved, drainage resolved, malodor improving but still noted  High concern for residual infection and viability  Wound cx taken (5/20/25) Left Foot: proteus and morganella  ID reccs appreciated  Left heel continues with necrosis of heel bone and surrounding soft tissue secondary to pressure;   given pt's functional status and comorbidities, pt is not a surgical candidate from podiatry stand point; recc continue with local wound care and close f/u  MRI was recommended to eval for extent of OM in the left heel but could not be obtained due to pt not able to complete the MRI screening form and due to no information available for NOK  Strict decubitus offloading precautions to bilateral heels using CAIR boots  to prevent further soft tissue damage.   Recc vascular eval given the extensive loss of heel soft tissue and necrosis  All additional care per Med appreciated   Podiatry will follow while in house  Case seen with attending Dr. Lewis        Wound care Instructions for Right and Left Heel Ulcers to be changed Everyday:  1. Please Gently remove the old dressings  2. Apply betadine-soaked gauze to the Right Heel, cover ulcerative lesions with a 4x4 gauze wrap with kerlix and secure with tape  3. Clean Left Heel ulcer with Dakins solutions, apply  Dakin-soaked gauze to the wound, cover with sterilized gauze, wrap with tape and secure with tape  4. Please, have the both heel offloaded in the provided Z-flex boot at all times, while the patient is bedbound.  Thank you     Detail Level: Detailed Sunscreen Recommendations: SPF 50 or higher, applied daily or hourly when heavy sun exposure is anticipated

## 2025-05-26 LAB
ANION GAP SERPL CALC-SCNC: 4 MMOL/L — LOW (ref 5–17)
BUN SERPL-MCNC: 11 MG/DL — SIGNIFICANT CHANGE UP (ref 7–23)
CALCIUM SERPL-MCNC: 9.6 MG/DL — SIGNIFICANT CHANGE UP (ref 8.5–10.1)
CHLORIDE SERPL-SCNC: 106 MMOL/L — SIGNIFICANT CHANGE UP (ref 96–108)
CO2 SERPL-SCNC: 32 MMOL/L — HIGH (ref 22–31)
CREAT SERPL-MCNC: 0.92 MG/DL — SIGNIFICANT CHANGE UP (ref 0.5–1.3)
CULTURE RESULTS: ABNORMAL
EGFR: 85 ML/MIN/1.73M2 — SIGNIFICANT CHANGE UP
EGFR: 85 ML/MIN/1.73M2 — SIGNIFICANT CHANGE UP
GLUCOSE SERPL-MCNC: 68 MG/DL — LOW (ref 70–99)
HCT VFR BLD CALC: 34.4 % — LOW (ref 39–50)
HGB BLD-MCNC: 11.3 G/DL — LOW (ref 13–17)
MCHC RBC-ENTMCNC: 27.5 PG — SIGNIFICANT CHANGE UP (ref 27–34)
MCHC RBC-ENTMCNC: 32.8 G/DL — SIGNIFICANT CHANGE UP (ref 32–36)
MCV RBC AUTO: 83.7 FL — SIGNIFICANT CHANGE UP (ref 80–100)
NRBC # BLD AUTO: 0 K/UL — SIGNIFICANT CHANGE UP (ref 0–0)
NRBC # FLD: 0 K/UL — SIGNIFICANT CHANGE UP (ref 0–0)
NRBC BLD AUTO-RTO: 0 /100 WBCS — SIGNIFICANT CHANGE UP (ref 0–0)
ORGANISM # SPEC MICROSCOPIC CNT: ABNORMAL
ORGANISM # SPEC MICROSCOPIC CNT: ABNORMAL
ORGANISM # SPEC MICROSCOPIC CNT: SIGNIFICANT CHANGE UP
PLATELET # BLD AUTO: 389 K/UL — SIGNIFICANT CHANGE UP (ref 150–400)
PMV BLD: 9.1 FL — SIGNIFICANT CHANGE UP (ref 7–13)
POTASSIUM SERPL-MCNC: 4.7 MMOL/L — SIGNIFICANT CHANGE UP (ref 3.5–5.3)
POTASSIUM SERPL-SCNC: 4.7 MMOL/L — SIGNIFICANT CHANGE UP (ref 3.5–5.3)
RBC # BLD: 4.11 M/UL — LOW (ref 4.2–5.8)
RBC # FLD: 13.7 % — SIGNIFICANT CHANGE UP (ref 10.3–14.5)
SODIUM SERPL-SCNC: 142 MMOL/L — SIGNIFICANT CHANGE UP (ref 135–145)
SPECIMEN SOURCE: SIGNIFICANT CHANGE UP
WBC # BLD: 6.12 K/UL — SIGNIFICANT CHANGE UP (ref 3.8–10.5)
WBC # FLD AUTO: 6.12 K/UL — SIGNIFICANT CHANGE UP (ref 3.8–10.5)

## 2025-05-26 PROCEDURE — 99232 SBSQ HOSP IP/OBS MODERATE 35: CPT

## 2025-05-26 PROCEDURE — 99222 1ST HOSP IP/OBS MODERATE 55: CPT

## 2025-05-26 RX ADMIN — MIRTAZAPINE 45 MILLIGRAM(S): 30 TABLET, FILM COATED ORAL at 21:18

## 2025-05-26 RX ADMIN — FOLIC ACID 1 MILLIGRAM(S): 1 TABLET ORAL at 10:25

## 2025-05-26 RX ADMIN — Medication 125 MILLIGRAM(S): at 10:26

## 2025-05-26 RX ADMIN — Medication 2 TABLET(S): at 21:18

## 2025-05-26 RX ADMIN — Medication 125 MILLIGRAM(S): at 21:19

## 2025-05-26 RX ADMIN — HEPARIN SODIUM 5000 UNIT(S): 1000 INJECTION INTRAVENOUS; SUBCUTANEOUS at 13:42

## 2025-05-26 RX ADMIN — Medication 40 MILLIGRAM(S): at 10:25

## 2025-05-26 RX ADMIN — Medication 1 APPLICATION(S): at 06:19

## 2025-05-26 RX ADMIN — Medication 81 MILLIGRAM(S): at 10:25

## 2025-05-26 RX ADMIN — Medication 25 GRAM(S): at 13:44

## 2025-05-26 RX ADMIN — DONEPEZIL HYDROCHLORIDE 10 MILLIGRAM(S): 5 TABLET ORAL at 21:18

## 2025-05-26 RX ADMIN — Medication 25 GRAM(S): at 21:17

## 2025-05-26 RX ADMIN — HEPARIN SODIUM 5000 UNIT(S): 1000 INJECTION INTRAVENOUS; SUBCUTANEOUS at 06:13

## 2025-05-26 RX ADMIN — Medication 25 GRAM(S): at 06:06

## 2025-05-26 RX ADMIN — HEPARIN SODIUM 5000 UNIT(S): 1000 INJECTION INTRAVENOUS; SUBCUTANEOUS at 21:19

## 2025-05-26 RX ADMIN — MEMANTINE HYDROCHLORIDE 20 MILLIGRAM(S): 21 CAPSULE, EXTENDED RELEASE ORAL at 21:18

## 2025-05-26 RX ADMIN — POLYETHYLENE GLYCOL 3350 17 GRAM(S): 17 POWDER, FOR SOLUTION ORAL at 10:25

## 2025-05-26 RX ADMIN — MUPIROCIN CALCIUM 1 APPLICATION(S): 20 CREAM TOPICAL at 10:26

## 2025-05-26 RX ADMIN — ATORVASTATIN CALCIUM 10 MILLIGRAM(S): 80 TABLET, FILM COATED ORAL at 21:18

## 2025-05-26 NOTE — CONSULT NOTE ADULT - ATTENDING COMMENTS
nonambulatory patient, AOx0  only options are above knee amputation vs local wound care  however given the patient's comorbidities I am not sure he would be a surgical candidate  recommend palliative and ethics consults as he cannot make decisions for himself and has no family

## 2025-05-26 NOTE — PROGRESS NOTE ADULT - ASSESSMENT
A: 79-year-old male was seen for the following:  -Left heel with large pressure ulcer; infected-->s/p bedside excisional debridement (05/20/2025)  -Right heel pressure lesion with resolving eschar      P:   Pt seen and evaluated  Afebrile, No leukocytosis  s/p bedside Left heel excisional debridement (5/20/2025)to remove all the malodorous necrotic/infected tissue to the left heel  Post excisional debridement; leukocytosis resolved, drainage resolved, malodor improving but still noted  High concern for residual infection and viability  Wound cx taken (5/20/25) Left Foot: Proteus mirabilis and Morganella morganii  ID reccs appreciated  Left heel continues with necrosis of heel bone and surrounding soft tissue secondary to pressure.  Given pt's functional status and comorbidities, pt is not a surgical candidate from podiatry stand point; recc continue with local wound care and close f/u  MRI was recommended to eval for extent of OM in the left heel but could not be obtained due to pt's inability to complete the MRI screening form and due to no information available for NOK  Strict decubitus offloading precautions to bilateral heels using CAIR boots  to prevent further soft tissue damage.   Recc vascular eval given the extensive loss of heel soft tissue and necrosis  All additional care per Med appreciated   Podiatry will follow while in house    Case seen with attending Dr. Lewis        Wound care Instructions for Right and Left Heel Ulcers to be changed Everyday:  1. Please Gently remove the old dressings  2. Apply betadine-soaked gauze to the Right Heel, cover ulcerative lesions with a 4x4 gauze wrap with kerlix and secure with tape  3. Clean Left Heel ulcer with Dakins solutions, apply  Dakin-soaked gauze to the wound, cover with sterilized gauze, wrap with tape and secure with tape  4. Please, have the both heel offloaded in the provided Z-flex boot at all times, while the patient is bedbound.  Thank you

## 2025-05-26 NOTE — PROGRESS NOTE ADULT - ASSESSMENT
78 y/o M with PMHx of CAD s/p CABG, paroxysmal afib (reportedly not on AC due to compliance issues), dementia, BPH, hx paranoia/hallucinations with mood disorder, , hx alcohol abuse, presented to  sent in from SNF for unresponsiveness, intubated for airway protection, found to be in septic shock requiring pressors for BP support and admitted to CCU, now stable for downgrade to med/surg    #Acute hypoxic respiratory failure s/p intubation, resolved  #Septic shock (POA) likely 2/2 infected L. heel ulcer, suspected OM + multifocal aspiration PNA + UTI   #Acute metabolic encephalopathy superimposed on baseline dementia, improved  - s/p CCU care requiring pressors and ventilator support, now extubated on room air and off pressors   - CT head and CTA H/N negative for acute pathology  - CT chest/abd/pelvis noted  - Left foot Xray noted  - EEG with encephalopathy, no seizure activity  - Podiatry following s/p debridement , recs appreciated-->Vascular Sx consult recommended  - BCx NGTD   - Left heel wound Cx 5/20 -- pan sensitive Proteus mirabilis, MDR Morganella morganii  - UA positive, UCx 5/20 -- >100K E. Coli   - ID on board; Continue IV zosyn (5/20 - )  - Continue wound care to b/l heel ulcers   - ID consulted to assist  - Podiatry input noted; Wound Care recs appreciated   - Vascular Sx on board; only options are above knee amputation vs local wound care--patient unable to make decisions due to underlying dementia, and he does not have family    #CAD s/p CABG  - continue aspirin, statin     #Hx paroxysmal Afib not on AC  - Lopressor held currently , resume as BP allows    #Social hx  - admitted from SNF, pt reportedly without family / HCP / surrogate decision maker with history of dementia  - CM/SW consulted to assist    #DVT ppx  - heparin SQ    Dispo: Pending clinical improvement, will need long term outpt IV Abx

## 2025-05-26 NOTE — CONSULT NOTE ADULT - SUBJECTIVE AND OBJECTIVE BOX
79M w/ PMHx of CAD, s/p CABG, a fib (not on AC due to compliance) BPH, BIBEMS from [place] for AMS. GCS was 6 thus intubated in ED. Was diagnosed with sepsis due to pneumonia. The patient also was found to have bilateral heel wound that has been taking care of by podiatry team. Vascular surgery called for severely deformed Rt heel wound that might warrant surgical intervention    The patient is a poor historian. non verbal, non-ambulatory. Bilateral heel wound is dressed & heel protector boots are on. Has palpable right DP.     ROS: Negative except written above      PHYSICAL EXAM:  - GENERAL: No acute distress.  - EYES: EOMI. Anicteric.  - HENT: Moist mucous membranes. No scleral icterus. No cervical lymphadenopathy.  - LUNGS:  No accessory muscle use.  - CARDIOVASCULAR: Regular rate and rhythm.   - ABDOMEN: Soft, non-tender and non-distended. No palpable masses.  - EXTREMITIES: Bilateral heel wound is dressed & heel protector boots are on. Warm to touch bilaterally. Has a palpable right DP.   - SKIN: No rashes or lesions. Warm.  - NEUROLOGIC: No focal neurological deficits. CN II-XII grossly intact, but not individually tested.  - PSYCHIATRIC: Cooperative. Appropriate mood and affect.        Chief complaint:      PMHx:  CAD (coronary artery disease)    Atrial fibrillation    History of alcohol use    BPH (benign prostatic hyperplasia)    Paranoia    History of hallucinations        PSHx:  S/P CABG (coronary artery bypass graft)        FHx:  FH: asthma (Father)        Vitals:  T(C): 36.3 (05-25 @ 23:43), Max: 36.3 (05-25 @ 07:19)  HR: 80 (05-25 @ 23:43) (64 - 80)  BP: 162/92 (05-25 @ 23:43) (147/79 - 162/92)  RR: 19 (05-25 @ 23:43) (18 - 19)  SpO2: 96% (05-25 @ 23:43) (96% - 100%)      I&Os    05-24 @ 07:01  -  05-25 @ 07:00  --------------------------------------------------------  IN:    IV PiggyBack: 200 mL  Total IN: 200 mL    OUT:  Total OUT: 0 mL    Total NET: 200 mL        .    Labs:  05-25 @ 08:11                    10.0  CBC: 4.58>)-------(<310                     29.5                 139 | 104 | 11    CMP:  ----------------------< 83               3.3 | 31 | 0.77                      Ca:9.0  Phos:-  Mg:-               -|      |-        LFTs:  ------|-|-----             -|      |-        Cultures:    Culture - Sputum (collected 05-21-25 @ 15:30)  Source: ET Tube ET Tube  Gram Stain (05-21-25 @ 23:10):    Numerous polymorphonuclear leukocytes per low power field    Few Squamous epithelial cells per low power field    Few Gram positive cocci in pairs per oil power field    Few Gram Positive Rods per oil power field  Final Report (05-23-25 @ 08:33):    Commensal jesenia consistent with body site    Culture - Sputum (collected 05-21-25 @ 08:15)  Source: ET Tube ET Tube  Gram Stain (05-22-25 @ 00:43):    No polymorphonuclear leukocytes per low power field    No Squamous epithelial cells per low power field    No organisms seen per oil power field  Final Report (05-23-25 @ 08:25):    Commensal jesenia consistent with body site          Current Inpatient Medications:  aspirin  chewable 81 milliGRAM(s) Oral daily  atorvastatin 10 milliGRAM(s) Oral at bedtime  chlorhexidine 2% Cloths 1 Application(s) Topical <User Schedule>  donepezil 10 milliGRAM(s) Oral at bedtime  folic acid 1 milliGRAM(s) Oral daily  heparin   Injectable 5000 Unit(s) SubCutaneous every 8 hours  HYDROmorphone  Injectable 0.2 milliGRAM(s) IV Push every 8 hours PRN  memantine 20 milliGRAM(s) Oral at bedtime  mirtazapine 45 milliGRAM(s) Oral at bedtime  mupirocin 2% Nasal 1 Application(s) Both Nostrils two times a day  pantoprazole  Injectable 40 milliGRAM(s) IV Push daily  piperacillin/tazobactam IVPB.. 3.375 Gram(s) IV Intermittent every 8 hours  polyethylene glycol 3350 17 Gram(s) Oral daily  senna 2 Tablet(s) Oral at bedtime  valproic  acid Syrup 125 milliGRAM(s) Oral two times a day

## 2025-05-26 NOTE — PROGRESS NOTE ADULT - SUBJECTIVE AND OBJECTIVE BOX
Chief Complaint: Patient is a 79y old  Male who presents with a chief complaint of found unresponsive (23 May 2025 10:01)      Subjective:   Patient seen and examined at the bedside. Does not offer any acute complaints. NAEON.      PAST MEDICAL & SURGICAL HISTORY:  CAD (coronary artery disease)    Atrial fibrillation  Not on AC    History of alcohol use    BPH (benign prostatic hyperplasia)    Paranoia    History of hallucinations    S/P CABG (coronary artery bypass graft)    Social History:  from [facility] no family or surrogate decision maker (20 May 2025 16:14)  No current alcohol, smoking or drug use    FAMILY HISTORY:  FH: asthma (Father)    Physical Exam:  Vital Signs Last 24 Hrs  T(C): 36.2 (23 May 2025 07:35), Max: 37 (22 May 2025 19:38)  T(F): 97.1 (23 May 2025 07:35), Max: 98.6 (22 May 2025 19:38)  HR: 85 (23 May 2025 12:00) (69 - 103)  BP: 120/65 (23 May 2025 12:00) (91/59 - 134/56)  BP(mean): 80 (23 May 2025 12:00) (63 - 102)  RR: 26 (23 May 2025 12:00) (13 - 27)  SpO2: 95% (23 May 2025 12:00) (95% - 100%)    Parameters below as of 23 May 2025 12:00  Patient On (Oxygen Delivery Method): room air    Constitutional: NAD, awake, deconditioned/frail  Respiratory: Breath sounds are clear bilaterally, No wheezing, rales or rhonchi  Cardiovascular: S1 and S2, RRR, no murmurs, gallops or rubs  Gastrointestinal: +BS, soft, non-tender, non-distended, no CVA tenderness  Extremities: No peripheral edema, +DP pulses b/l  Skin: +left heel ulcer--covered in surgical dressing, skin warm and dry    Labs:  05-23 @ 05:02  Glucose 69 mg/dL  HCO3 27 mmol/L  Chloride 112 mmol/L  Sodium 144 mmol/L>   Potassium 3.5 mmol/L  Creatinine 1.02 mg/dL  Calcium 8.6 mg/dL  BUN 16 mg/dL  eGFR 75 mL/min/1.73m2  Anion gap 5 mmol/L    WBC 10.33  Hemoglobin 9.5  Hemoatocrit 29.8  Platelet count 250    Micro:  BCx 5/20 -- NGTD  UCx 5/20 -- prelim with >100K E. Coli     Left heel wound Cx 5/20 -- pan sensitive Proteus mirabilis, MDR Morganella morganii    Sputum Cx 5/21 -- commensal jesenia     Radiology:  MRI Foot + Ankle with IV contrast -- Pending     < from: CT Head No Cont (05.20.25 @ 14:05) >  IMPRESSION:  No significant change    No acute intracranial  hemorrhage, mass effect or midline shift.    Mild white matter small vessel ischemic disease.      < from: CT Chest, abdomen and pelvis w/ IV Cont (05.20.25 @ 14:05) >  IMPRESSION:  Significant debris in the left mainstem bronchus extending into the left   pulmonary bronchi with associated patchy nodular airspace opacities,   tree-in-bud nodularity, and bronchial wall thickening throughout the left   lung, most pronounced in the left lower lobe. Minimal, similar findings   in the right upper and right lower lobes. Findings suggest aspiration   pneumonitis.    No acute CT findings in the abdomen or pelvis. No bowel obstruction or   inflammation.      < from: CT Angio Head w/ IV Cont (05.20.25 @ 16:49) >  IMPRESSION:  CT HEAD:  1. No acute intracranial hemorrhage, mass effect, or shift of the midline   structures.  2. Similar-appearing mild chronic white matter microvascular type changes.    CTA NECK:  1. No large vessel occlusion or major stenosis.  2. Imaging findings suspicious for the presence of multifocal pneumonia.   Please refer to the report for the concurrent dedicated chest, abdomen,   pelvis CT for findings regarding the thoracic, abdomen, and pelvic   structures.    CTA HEAD:  1. Atherosclerosis affecting the right V4 segment with tandem areas of   mild stenosis.  2. No large vessel occlusion or major stenosis.      < from: Xray Chest 1 View- PORTABLE-Urgent (Xray Chest 1 View- PORTABLE-Urgent .) (05.20.25 @ 19:05) >  IMPRESSION: NG tube inserted. Increasing scattered left mid lower lung   field infiltrates. Endotracheal tube present on the earlier study has   been removed.      < from: Xray Foot AP + Lateral + Oblique, Bilat (05.20.25 @ 22:45) >  IMPRESSION: Bone and soft tissue loss of and near left calcaneus as noted.    Medications:  MEDICATIONS  (STANDING):  aspirin  chewable 81 milliGRAM(s) Oral daily  atorvastatin 10 milliGRAM(s) Oral at bedtime  chlorhexidine 2% Cloths 1 Application(s) Topical <User Schedule>  folic acid 1 milliGRAM(s) Oral daily  heparin   Injectable 5000 Unit(s) SubCutaneous every 8 hours  mupirocin 2% Nasal 1 Application(s) Both Nostrils two times a day  pantoprazole  Injectable 40 milliGRAM(s) IV Push daily  piperacillin/tazobactam IVPB.. 3.375 Gram(s) IV Intermittent every 8 hours  polyethylene glycol 3350 17 Gram(s) Oral daily  senna 2 Tablet(s) Oral at bedtime  valproic  acid Syrup 125 milliGRAM(s) Oral two times a day    MEDICATIONS  (PRN):      Time based billing:   > 51 minutes of total time met or exceeded on this patient encounter. Time spent excludes time spent on separately reportable services/teaching during the encounter.

## 2025-05-26 NOTE — CONSULT NOTE ADULT - ASSESSMENT
79 non ambultory man w/ bilateral heel wounds  Right heel almost disfigured    Recs:  - GOC discussion.   - f/u LE CLEO/PVR & Art duplex  - The patient will most benefit from AKA given non-ambulatory status.  - Can c/w local wound care if not agreeable with AKA  - Rest of the care as per primary team    Plan will be discussed with Vascular surgery attending, Dr. Harris   79 non ambultory man w/ bilateral heel wounds  Right heel almost disfigured    Recs:  - GOC discussion.   - patient is nonambulatory, only options are local wound care or above knee amputation    Plan will be discussed with Vascular surgery attending, Dr. Harris

## 2025-05-26 NOTE — PROGRESS NOTE ADULT - SUBJECTIVE AND OBJECTIVE BOX
5/26/25: Pt was seen by Podiatry team today. Pt was resting bedside, NAD, dressings to STEVE LE dry, clean and intact.    PMH:   CAD (coronary artery disease)  Atrial fibrillation  Alcohol use  BPH (benign prostatic hyperplasia)  Paranoia  Hallucinations      PSH:  S/P CABG (coronary artery bypass graft)    Allergies:  No Known Allergies      Labs:                             11.3   6.12  )-----------( 389      ( 26 May 2025 06:43 )             34.4              05-26    142  |  106  |  11  ----------------------------<  68[L]  4.7   |  32[H]  |  0.92    Ca    9.6      26 May 2025 06:43    Vital Signs Last 24 Hrs  T(C): 36.3 (26 May 2025 07:58), Max: 36.3 (25 May 2025 23:43)  T(F): 97.3 (26 May 2025 07:58), Max: 97.3 (25 May 2025 23:43)  HR: 87 (26 May 2025 07:58) (64 - 87)  BP: 132/86 (26 May 2025 07:58) (132/86 - 162/92)  BP(mean): 100 (26 May 2025 07:58) (100 - 100)  RR: 17 (26 May 2025 07:58) (17 - 19)  SpO2: 96% (26 May 2025 07:58) (96% - 100%)    Parameters below as of 26 May 2025 07:58  Patient On (Oxygen Delivery Method): room air        REVIEW OF SYSTEMS:    CONSTITUTIONAL: No weakness, fevers or chills  EYES: No visual changes  RESPIRATORY: No cough, wheezing; No shortness of breath  CARDIOVASCULAR: No chest pain or palpitations  GASTROINTESTINAL: No abdominal or epigastric pain. No nausea, vomiting; No diarrhea or constipation.   GENITOURINARY: No dysuria, frequency or hematuria  NEUROLOGICAL: No numbness or weakness  SKIN: See physical examination.  All other review of systems is negative unless indicated above    Physical Exam:   Constitutional: NAD, alert;  Lower Extremity Focus  Derm:  Skin warm, dry and supple bilateral.    Right: Well-adhered brown scab lesion to medial aspect of heel, noted full epithelialized wound bed, no edema, no valerie-wound erythema, no malodor, no active discharge, no purulence, no fluctuance, no tracking/tunneling, no probe to bone.   Left: heel wound with necrotic base patching, wound size (8 cm X 7 cm), no edema, no valerie-wound erythema, +malodor,(improving), no active discharge, no purulence, no fluctuance, + probe to bone with calcaneus exposed  Vascular: Dorsalis Pedis and Posterior Tibial pulses nonpalpable.   Neuro: could not be obtained because of disposition  MSK: could not be obtained because of disposition      Culture - Abscess with Gram Stain (collected 20 May 2025 18:25)  Source: Abscess Leg - Left  Gram Stain (21 May 2025 06:29):    Few polymorphonuclear leukocytes seen per low power field    Rare Gram positive cocci in pairs seen per oil power field    Moderate Gram Negative Rods seen per oil power field    Urinalysis with Rflx Culture (collected 20 May 2025 13:47)      Culture - Sputum (collected 21 May 2025 15:30)  Source: ET Tube ET Tube  Gram Stain (21 May 2025 23:10):    Numerous polymorphonuclear leukocytes per low power field    Few Squamous epithelial cells per low power field    Few Gram positive cocci in pairs per oil power field    Few Gram Positive Rods per oil power field    Culture - Sputum (collected 21 May 2025 08:15)  Source: ET Tube ET Tube  Gram Stain (22 May 2025 00:43):    No polymorphonuclear leukocytes per low power field    No Squamous epithelial cells per low power field    No organisms seen per oil power field    Culture - Abscess with Gram Stain (collected 20 May 2025 18:25)  Source: Abscess Leg - Left  Gram Stain (21 May 2025 06:29):    Few polymorphonuclear leukocytes seen per low power field    Rare Gram positive cocci in pairs seen per oil power field    Moderate Gram Negative Rods seen per oil power field    Culture - Urine (collected 20 May 2025 13:47)  Source: Clean Catch None  Preliminary Report (21 May 2025 20:42):    >100,000 CFU/ml Escherichia coli    Urinalysis with Rflx Culture (collected 20 May 2025 13:47)    Culture - Blood (collected 20 May 2025 13:40)  Source: Blood Blood-Peripheral  Preliminary Report (21 May 2025 19:02):    No growth at 24 hours    Culture - Blood (collected 20 May 2025 13:40)  Source: Blood Blood-Peripheral  Preliminary Report (21 May 2025 19:02):    No growth at 24 hours    < from: Xray Foot AP + Lateral + Oblique, Bilat (05.20.25 @ 22:45) >  COMPARISON: 12/17/2024     3 views of the left foot and 3 views ofthe right foot show bony erosion   or postoperative changes involving the dorsal surface of the left   calcaneus. Loss of adjacent soft tissue either represents ulceration or   evidence of surgical debridement.    The right foot shows no evidence of calcaneal erosion. The joint spaces   are maintained.    IMPRESSION: Bone and soft tissue loss of and near left calcaneus as noted.      < end of copied text >

## 2025-05-27 DIAGNOSIS — M86.10 OTHER ACUTE OSTEOMYELITIS, UNSPECIFIED SITE: ICD-10-CM

## 2025-05-27 LAB
ANION GAP SERPL CALC-SCNC: 6 MMOL/L — SIGNIFICANT CHANGE UP (ref 5–17)
BUN SERPL-MCNC: 11 MG/DL — SIGNIFICANT CHANGE UP (ref 7–23)
CALCIUM SERPL-MCNC: 9.2 MG/DL — SIGNIFICANT CHANGE UP (ref 8.5–10.1)
CHLORIDE SERPL-SCNC: 104 MMOL/L — SIGNIFICANT CHANGE UP (ref 96–108)
CO2 SERPL-SCNC: 30 MMOL/L — SIGNIFICANT CHANGE UP (ref 22–31)
CREAT SERPL-MCNC: 0.92 MG/DL — SIGNIFICANT CHANGE UP (ref 0.5–1.3)
EGFR: 85 ML/MIN/1.73M2 — SIGNIFICANT CHANGE UP
EGFR: 85 ML/MIN/1.73M2 — SIGNIFICANT CHANGE UP
GLUCOSE SERPL-MCNC: 77 MG/DL — SIGNIFICANT CHANGE UP (ref 70–99)
HCT VFR BLD CALC: 34 % — LOW (ref 39–50)
HGB BLD-MCNC: 10.9 G/DL — LOW (ref 13–17)
MCHC RBC-ENTMCNC: 26.3 PG — LOW (ref 27–34)
MCHC RBC-ENTMCNC: 32.1 G/DL — SIGNIFICANT CHANGE UP (ref 32–36)
MCV RBC AUTO: 81.9 FL — SIGNIFICANT CHANGE UP (ref 80–100)
NRBC # BLD AUTO: 0 K/UL — SIGNIFICANT CHANGE UP (ref 0–0)
NRBC # FLD: 0 K/UL — SIGNIFICANT CHANGE UP (ref 0–0)
NRBC BLD AUTO-RTO: 0 /100 WBCS — SIGNIFICANT CHANGE UP (ref 0–0)
PLATELET # BLD AUTO: 412 K/UL — HIGH (ref 150–400)
PMV BLD: 8.9 FL — SIGNIFICANT CHANGE UP (ref 7–13)
POTASSIUM SERPL-MCNC: 3.7 MMOL/L — SIGNIFICANT CHANGE UP (ref 3.5–5.3)
POTASSIUM SERPL-SCNC: 3.7 MMOL/L — SIGNIFICANT CHANGE UP (ref 3.5–5.3)
RBC # BLD: 4.15 M/UL — LOW (ref 4.2–5.8)
RBC # FLD: 13.5 % — SIGNIFICANT CHANGE UP (ref 10.3–14.5)
SODIUM SERPL-SCNC: 140 MMOL/L — SIGNIFICANT CHANGE UP (ref 135–145)
WBC # BLD: 7.33 K/UL — SIGNIFICANT CHANGE UP (ref 3.8–10.5)
WBC # FLD AUTO: 7.33 K/UL — SIGNIFICANT CHANGE UP (ref 3.8–10.5)

## 2025-05-27 PROCEDURE — 99232 SBSQ HOSP IP/OBS MODERATE 35: CPT

## 2025-05-27 PROCEDURE — 99223 1ST HOSP IP/OBS HIGH 75: CPT

## 2025-05-27 RX ADMIN — HEPARIN SODIUM 5000 UNIT(S): 1000 INJECTION INTRAVENOUS; SUBCUTANEOUS at 05:19

## 2025-05-27 RX ADMIN — Medication 125 MILLIGRAM(S): at 22:49

## 2025-05-27 RX ADMIN — ATORVASTATIN CALCIUM 10 MILLIGRAM(S): 80 TABLET, FILM COATED ORAL at 22:49

## 2025-05-27 RX ADMIN — Medication 40 MILLIGRAM(S): at 10:32

## 2025-05-27 RX ADMIN — HEPARIN SODIUM 5000 UNIT(S): 1000 INJECTION INTRAVENOUS; SUBCUTANEOUS at 22:50

## 2025-05-27 RX ADMIN — MIRTAZAPINE 45 MILLIGRAM(S): 30 TABLET, FILM COATED ORAL at 22:49

## 2025-05-27 RX ADMIN — MEMANTINE HYDROCHLORIDE 20 MILLIGRAM(S): 21 CAPSULE, EXTENDED RELEASE ORAL at 22:49

## 2025-05-27 RX ADMIN — Medication 2 TABLET(S): at 22:49

## 2025-05-27 RX ADMIN — HEPARIN SODIUM 5000 UNIT(S): 1000 INJECTION INTRAVENOUS; SUBCUTANEOUS at 13:45

## 2025-05-27 RX ADMIN — DONEPEZIL HYDROCHLORIDE 10 MILLIGRAM(S): 5 TABLET ORAL at 22:49

## 2025-05-27 RX ADMIN — Medication 25 GRAM(S): at 05:19

## 2025-05-27 RX ADMIN — Medication 25 GRAM(S): at 13:44

## 2025-05-27 RX ADMIN — Medication 125 MILLIGRAM(S): at 10:31

## 2025-05-27 RX ADMIN — Medication 81 MILLIGRAM(S): at 10:31

## 2025-05-27 RX ADMIN — POLYETHYLENE GLYCOL 3350 17 GRAM(S): 17 POWDER, FOR SOLUTION ORAL at 10:32

## 2025-05-27 RX ADMIN — FOLIC ACID 1 MILLIGRAM(S): 1 TABLET ORAL at 10:32

## 2025-05-27 NOTE — PROGRESS NOTE ADULT - ASSESSMENT
79 year old male with PMH significant for CAD CABG afib not on AC due to compliance behavior health issues BPH Patient is resident of [facility]. patient was found unresponsive in bed Was brought to ED, GCS was 6 , was intubated with rocuronium now intubated small pupils got narcan in ed ct chest shows patchy infiltrates head ct negative, Ct chest shows aspiration pneumonia, extubated, urine cx grew ecoli, noted with R heel wound infection, necrosis, probe to bone, eval by podiatry s/p excisional debridement, wound cx grew MDR ANDRES, Proteus mirabilis, started on IV zosyn since 5/20. Has L heel necrosis/Om as well.     Sepsis. Acute respiratory failure. Aspiration pneumonia. R heel wound infection/L heel necrosis/Osteomyelitis. Ecoli UTI. Metabolic encephalopathy  - imaging reviewed  - on IV zosyn [id number]  #7-8  - continue with IV antibiotic coverage   - urine cx growing Ecoli S to above; blood cx no growth   - wound cx growing MDR ANDRES, Proteus mirabilis   - podiatry eval noted s/p bedside debridement of R heel  - wound care  - on dc plan for picc line and 6 weeks IV invanz 1gm daily until 6/30/25   - fu cbc  - tolerating abx well so far; no side effects noted  - reason for abx use and side effects reviewed with patient  - supportive care    IV to PO token is not indicated    Concern for infection with multi-resistant organisms was raised. Prior cultures reviewed. An epidemiologic assessment was performed. There is a significant risk for resistant microorganisms to spread to family members, and/or healthcare staff. The patient will be placed on isolation according to infection control policy. Will reconsider isolation measures based on new culture results and other clinical data as appropriate. Appropriate cultures collected and an appropriate broad spectrum antibiotic therapy will be considered.

## 2025-05-27 NOTE — PROGRESS NOTE ADULT - ASSESSMENT
Ethics will follow up with the primary, Palliative, and Vascular Surgery teams tomorrow, 5/28/25, to further discuss plan of care for the patient.      Court MADISON RN, MBE  Ethics Fellow  [phone]

## 2025-05-27 NOTE — PROGRESS NOTE ADULT - SUBJECTIVE AND OBJECTIVE BOX
Date of service: 05-27-25 @ 11:07    pt seen and examined  feels better  weak appearing   no o/n events    ROS: no fever or chills; denies dizziness, no HA, no SOB or cough, no abdominal pain, no diarrhea or constipation;  no legs pain, no rashes      MEDICATIONS  (STANDING):  aspirin  chewable 81 milliGRAM(s) Oral daily  atorvastatin 10 milliGRAM(s) Oral at bedtime  chlorhexidine 2% Cloths 1 Application(s) Topical <User Schedule>  donepezil 10 milliGRAM(s) Oral at bedtime  folic acid 1 milliGRAM(s) Oral daily  heparin   Injectable 5000 Unit(s) SubCutaneous every 8 hours  memantine 20 milliGRAM(s) Oral at bedtime  mirtazapine 45 milliGRAM(s) Oral at bedtime  pantoprazole  Injectable 40 milliGRAM(s) IV Push daily  piperacillin/tazobactam IVPB.. 3.375 Gram(s) IV Intermittent every 8 hours  polyethylene glycol 3350 17 Gram(s) Oral daily  senna 2 Tablet(s) Oral at bedtime  valproic  acid Syrup 125 milliGRAM(s) Oral two times a day    Vital Signs Last 24 Hrs  T(C): 36.7 (27 May 2025 09:15), Max: 37.1 (26 May 2025 16:01)  T(F): 98.1 (27 May 2025 09:15), Max: 98.8 (26 May 2025 16:01)  HR: 92 (27 May 2025 09:15) (71 - 102)  BP: 152/86 (27 May 2025 09:15) (117/92 - 152/86)  BP(mean): 10 (27 May 2025 09:15) (10 - 10)  RR: 19 (27 May 2025 09:15) (18 - 19)  SpO2: 96% (27 May 2025 09:15) (95% - 98%)    Parameters below as of 27 May 2025 09:15  Patient On (Oxygen Delivery Method): room air      PE:  Constitutional: NAD  HEENT: NC/AT, EOMI, PERRLA, conjunctivae clear; ears and nose atraumatic; pharynx benign  Neck: supple; thyroid not palpable  Back: no tenderness  Respiratory: decreased breath sounds rhonchi  Cardiovascular: S1S2 regular, no murmurs  Abdomen: soft, not tender, not distended, positive BS; liver and spleen WNL  Genitourinary: no suprapubic tenderness  Lymphatic: no LN palpable  Musculoskeletal: no muscle tenderness, no joint swelling or tenderness  Extremities: no pedal edema R heel wound L heel necrotic ulcer   Neurological/ Psychiatric: moving all extremities  Skin: no rashes; no palpable lesions    Labs: all available labs reviewed                                   10.0   4.58  )-----------( 310      ( 25 May 2025 08:11 )             29.5     05-25    139  |  104  |  11  ----------------------------<  83  3.3[L]   |  31  |  0.77    Ca    9.0      25 May 2025 08:11    TPro  6.1  /  Alb  1.8[L]  /  TBili  0.8  /  DBili  x   /  AST  36  /  ALT  35  /  AlkPhos  119  05-24      Urinalysis Basic - ( 24 May 2025 06:42 )    Color: x / Appearance: x / SG: x / pH: x  Gluc: 71 mg/dL / Ketone: x  / Bili: x / Urobili: x   Blood: x / Protein: x / Nitrite: x   Leuk Esterase: x / RBC: x / WBC x   Sq Epi: x / Non Sq Epi: x / Bacteria: x    Culture - Sputum . (05.21.25 @ 15:30)   Gram Stain:   Numerous polymorphonuclear leukocytes per low power field   Few Squamous epithelial cells per low power field   Few Gram positive cocci in pairs per oil power field   Few Gram Positive Rods per oil power field  Specimen Source: ET Tube ET Tube  Culture Results:   Commensal jesenia consistent with body site  Culture - Sputum . (05.21.25 @ 08:15)   Gram Stain:   No polymorphonuclear leukocytes per low power field   No Squamous epithelial cells per low power field   No organisms seen per oil power field  Specimen Source: ET Tube ET Tube  Culture Results:   Commensal jesenia consistent with body siteSpecimen   Source: Abscess Leg - Left  Culture Results:   Moderate Proteus mirabilis   Moderate Morganella morganii  Organism Identification: Proteus mirabilis   Morganella morganii      Radiology: all available radiological tests reviewed  < from: Xray Foot AP + Lateral + Oblique, Bilat (05.20.25 @ 22:45) >  ACC: [id number] EXAM:  XR FOOT COMP MIN 3 VIEWS BI   ORDERED BY: EVIN RIVAS     PROCEDURE DATE:  05/20/2025          INTERPRETATION:  Bilateral feet    HISTORY: Heel ulcers    COMPARISON: 12/17/2024     3 views of the left foot and 3 views ofthe right foot show bony erosion   or postoperative changes involving the dorsal surface of the left   calcaneus. Loss of adjacent soft tissue either represents ulceration or   evidence of surgical debridement.    The right foot shows no evidence of calcaneal erosion. The joint spaces   are maintained.    IMPRESSION: Bone and soft tissue loss of and near left calcaneus as noted.        Thank you for this referral.    --- End of Report ---    < end of copied text >  < from: Xray Chest 1 View- PORTABLE-Urgent (Xray Chest 1 View- PORTABLE-Urgent .) (05.20.25 @ 19:05) >    IMPRESSION: NG tube inserted. Increasing scattered left mid lower lung   field infiltrates. Endotracheal tube present on the earlier study has   been removed.    --- End of Report ---    < end of copied text >  < from: CT Abdomen and Pelvis w/ IV Cont (05.20.25 @ 14:06) >  IMPRESSION:  Significant debris in the left mainstem bronchus extending into the left   pulmonary bronchi with associated patchy nodular airspace opacities,   tree-in-bud nodularity, and bronchial wall thickening throughout the left   lung, most pronounced in the left lower lobe. Minimal, similar findings   in the right upper and right lower lobes. Findings suggest aspiration   pneumonitis.    No acute CT findings in the abdomen or pelvis. No bowel obstruction or   inflammation.      < end of copied text >    Advanced directives addressed: full resuscitation

## 2025-05-27 NOTE — CONSULT NOTE ADULT - SUBJECTIVE AND OBJECTIVE BOX
HPI:   " 80 y/o M with PMHx of CAD s/p CABG, paroxysmal afib (reportedly not on AC due to compliance issues), dementia, BPH, hx paranoia/hallucinations with mood disorder, , hx alcohol abuse, presented to  sent in from SNF for unresponsiveness, intubated for airway protection, found to be in septic shock requiring pressors for BP support and admitted to CCU, now stable for downgrade to med/surg"     pt does not have family or friends  no hcp or nok to help with medical deicsions.     Upon review of chart: HIE included a mention of a wife (or girlfriend) who has apparently passed since 2022 and a "Grandson" who appears may be grandsono f the former wife/grilfriend "Go Toribio" [phone] which was "nto accepting calls at this time"      I could not find an alternate number in chart or after a search.    I so not have a record of previous gf/wife name .     Ethics is supporting and reviewing if any further surrogates/friends are found.       pt appears comfortable- if surgery is not pursued I would recommend comfort/hospice at that stage.         PAIN: ( ) YES  ( X)  NO  Pt unable to characterise d/t clinical status     DYSPNEA ( ) Yes ( X) No  Patient unable to characterise d/t clinical status     PAST MEDICAL & SURGICAL HISTORY:  CAD (coronary artery disease)      Atrial fibrillation  Not on AC      History of alcohol use      BPH (benign prostatic hyperplasia)      Paranoia      History of hallucinations      S/P CABG (coronary artery bypass graft)          SOCIAL HX:    FAMILY HISTORY:  FH: asthma (Father)        Review of Systems:     Unable to obtain/Limited due to: AMS      PHYSICAL EXAM:    Vital Signs Last 24 Hrs  T(C): 36.7 (27 May 2025 09:15), Max: 37.1 (26 May 2025 16:01)  T(F): 98.1 (27 May 2025 09:15), Max: 98.8 (26 May 2025 16:01)  HR: 92 (27 May 2025 09:15) (71 - 102)  BP: 152/86 (27 May 2025 09:15) (117/92 - 152/86)  BP(mean): 10 (27 May 2025 09:15) (10 - 10)  RR: 19 (27 May 2025 09:15) (18 - 19)  SpO2: 96% (27 May 2025 09:15) (95% - 98%)    Parameters below as of 27 May 2025 09:15  Patient On (Oxygen Delivery Method): room air      Daily     Daily     PPSV2: 30   %  FAST:    General: calm   Mental Status: aarousable confused  HEENT: eomi  Lungs: ctabl b/l bs  Cardiac: s1s2    GI: soft nontender +BS  Ext: no swelling   Neuro: AMS wo capacity 2/2 to demetnia willl likely not regain.       LABS:  Reviewed    RADIOLOGY/ADDITIONAL STUDIES:

## 2025-05-27 NOTE — PROGRESS NOTE ADULT - ATTENDING COMMENTS
I agree with the assessment and plan
I agree with Assessment and Treatment
I agree with the assessment and plan
I agree with the assessment and Treatment Plans
I agree with the assessment and plan

## 2025-05-27 NOTE — PROGRESS NOTE ADULT - SUBJECTIVE AND OBJECTIVE BOX
Chief Complaint: Patient is a 79y old  Male who presents with a chief complaint of found unresponsive (23 May 2025 10:01)      Subjective:   Patient seen and examined at the bedside. Does not offer any acute complaints. NAEON.      PAST MEDICAL & SURGICAL HISTORY:  CAD (coronary artery disease)    Atrial fibrillation  Not on AC    History of alcohol use    BPH (benign prostatic hyperplasia)    Paranoia    History of hallucinations    S/P CABG (coronary artery bypass graft)    Social History:  from [facility] no family or surrogate decision maker (20 May 2025 16:14)  No current alcohol, smoking or drug use    FAMILY HISTORY:  FH: asthma (Father)    Physical Exam:  Vital Signs Last 24 Hrs  T(C): 36.2 (23 May 2025 07:35), Max: 37 (22 May 2025 19:38)  T(F): 97.1 (23 May 2025 07:35), Max: 98.6 (22 May 2025 19:38)  HR: 85 (23 May 2025 12:00) (69 - 103)  BP: 120/65 (23 May 2025 12:00) (91/59 - 134/56)  BP(mean): 80 (23 May 2025 12:00) (63 - 102)  RR: 26 (23 May 2025 12:00) (13 - 27)  SpO2: 95% (23 May 2025 12:00) (95% - 100%)    Parameters below as of 23 May 2025 12:00  Patient On (Oxygen Delivery Method): room air    Constitutional: NAD, awake, deconditioned/frail  Respiratory: Breath sounds are clear bilaterally, No wheezing, rales or rhonchi  Cardiovascular: S1 and S2, RRR, no murmurs, gallops or rubs  Gastrointestinal: +BS, soft, non-tender, non-distended, no CVA tenderness  Extremities: No peripheral edema, +DP pulses b/l  Skin: +left heel ulcer--covered in surgical dressing, skin warm and dry    Labs:  05-23 @ 05:02  Glucose 69 mg/dL  HCO3 27 mmol/L  Chloride 112 mmol/L  Sodium 144 mmol/L>   Potassium 3.5 mmol/L  Creatinine 1.02 mg/dL  Calcium 8.6 mg/dL  BUN 16 mg/dL  eGFR 75 mL/min/1.73m2  Anion gap 5 mmol/L    WBC 10.33  Hemoglobin 9.5  Hemoatocrit 29.8  Platelet count 250    Micro:  BCx 5/20 -- NGTD  UCx 5/20 -- prelim with >100K E. Coli     Left heel wound Cx 5/20 -- pan sensitive Proteus mirabilis, MDR Morganella morganii    Sputum Cx 5/21 -- commensal jesenia     Radiology:  MRI Foot + Ankle with IV contrast -- Pending     < from: CT Head No Cont (05.20.25 @ 14:05) >  IMPRESSION:  No significant change    No acute intracranial  hemorrhage, mass effect or midline shift.    Mild white matter small vessel ischemic disease.      < from: CT Chest, abdomen and pelvis w/ IV Cont (05.20.25 @ 14:05) >  IMPRESSION:  Significant debris in the left mainstem bronchus extending into the left   pulmonary bronchi with associated patchy nodular airspace opacities,   tree-in-bud nodularity, and bronchial wall thickening throughout the left   lung, most pronounced in the left lower lobe. Minimal, similar findings   in the right upper and right lower lobes. Findings suggest aspiration   pneumonitis.    No acute CT findings in the abdomen or pelvis. No bowel obstruction or   inflammation.      < from: CT Angio Head w/ IV Cont (05.20.25 @ 16:49) >  IMPRESSION:  CT HEAD:  1. No acute intracranial hemorrhage, mass effect, or shift of the midline   structures.  2. Similar-appearing mild chronic white matter microvascular type changes.    CTA NECK:  1. No large vessel occlusion or major stenosis.  2. Imaging findings suspicious for the presence of multifocal pneumonia.   Please refer to the report for the concurrent dedicated chest, abdomen,   pelvis CT for findings regarding the thoracic, abdomen, and pelvic   structures.    CTA HEAD:  1. Atherosclerosis affecting the right V4 segment with tandem areas of   mild stenosis.  2. No large vessel occlusion or major stenosis.      < from: Xray Chest 1 View- PORTABLE-Urgent (Xray Chest 1 View- PORTABLE-Urgent .) (05.20.25 @ 19:05) >  IMPRESSION: NG tube inserted. Increasing scattered left mid lower lung   field infiltrates. Endotracheal tube present on the earlier study has   been removed.      < from: Xray Foot AP + Lateral + Oblique, Bilat (05.20.25 @ 22:45) >  IMPRESSION: Bone and soft tissue loss of and near left calcaneus as noted.    Medications:  MEDICATIONS  (STANDING):  aspirin  chewable 81 milliGRAM(s) Oral daily  atorvastatin 10 milliGRAM(s) Oral at bedtime  chlorhexidine 2% Cloths 1 Application(s) Topical <User Schedule>  folic acid 1 milliGRAM(s) Oral daily  heparin   Injectable 5000 Unit(s) SubCutaneous every 8 hours  mupirocin 2% Nasal 1 Application(s) Both Nostrils two times a day  pantoprazole  Injectable 40 milliGRAM(s) IV Push daily  piperacillin/tazobactam IVPB.. 3.375 Gram(s) IV Intermittent every 8 hours  polyethylene glycol 3350 17 Gram(s) Oral daily  senna 2 Tablet(s) Oral at bedtime  valproic  acid Syrup 125 milliGRAM(s) Oral two times a day    MEDICATIONS  (PRN):      Time based billing:   > 51 minutes of total time met or exceeded on this patient encounter. Time spent excludes time spent on separately reportable services/teaching during the encounter.

## 2025-05-27 NOTE — PROGRESS NOTE ADULT - ASSESSMENT
80 y/o M with PMHx of CAD s/p CABG, paroxysmal afib (reportedly not on AC due to compliance issues), dementia, BPH, hx paranoia/hallucinations with mood disorder, , hx alcohol abuse, presented to  sent in from SNF for unresponsiveness, intubated for airway protection, found to be in septic shock requiring pressors for BP support and admitted to CCU, now stable for downgrade to med/surg    #Acute hypoxic respiratory failure s/p intubation, resolved  #Septic shock (POA) likely 2/2 infected L. heel ulcer, suspected OM + multifocal aspiration PNA + UTI   #Acute metabolic encephalopathy superimposed on baseline dementia, improved  - s/p CCU care requiring pressors and ventilator support, now extubated on room air and off pressors   - CT head and CTA H/N negative for acute pathology  - CT chest/abd/pelvis noted  - Left foot Xray noted  - EEG with encephalopathy, no seizure activity  - Podiatry following s/p debridement , recs appreciated-->Vascular Sx consult recommended  - BCx NGTD   - Left heel wound Cx 5/20 -- pan sensitive Proteus mirabilis, MDR Morganella morganii  - UA positive, UCx 5/20 -- >100K E. Coli   - ID on board; Continue IV zosyn (5/20 - )  - Continue wound care to b/l heel ulcers   - ID consulted to assist  - Podiatry input noted; Wound Care recs appreciated   - Vascular Sx on board; only options are above knee amputation vs local wound care--patient unable to make decisions due to underlying dementia, and he does not have family  - Palliative Care and Ethics Consulted  - 2 Physician consent may need to be considered for surgery, will f/u with PC/Ethics recs  - Discussed case with Vascular Surgery; will obtain Cardiology consult for pre-op optimization    #CAD s/p CABG  - continue aspirin, statin     #Hx paroxysmal Afib not on AC  - Lopressor held currently , resume as BP allows    #Social hx  - admitted from SNF, pt reportedly without family / HCP / surrogate decision maker with history of dementia  - CM/SW consulted to assist    #DVT ppx  - heparin SQ    Dispo: Pending clinical improvement, pending Ethics and Cards consult. Will possibly need 2 Physician consent for surgical intervention.

## 2025-05-27 NOTE — PROGRESS NOTE ADULT - SUBJECTIVE AND OBJECTIVE BOX
5/27/25: Pt was seen by Podiatry team today. Pt was resting bedside, NAD, dressings to STEVE LE dry, clean and intact.    PMH:   CAD (coronary artery disease)  Atrial fibrillation  Alcohol use  BPH (benign prostatic hyperplasia)  Paranoia  Hallucinations      PSH:  S/P CABG (coronary artery bypass graft)    Allergies:  No Known Allergies      Labs:                              10.9   7.33  )-----------( 412      ( 27 May 2025 07:20 )             34.0              05-27    140  |  104  |  11  ----------------------------<  77  3.7   |  30  |  0.92    Ca    9.2      27 May 2025 07:20       Vital Signs Last 24 Hrs  T(C): 36.7 (27 May 2025 09:15), Max: 37.1 (26 May 2025 16:01)  T(F): 98.1 (27 May 2025 09:15), Max: 98.8 (26 May 2025 16:01)  HR: 92 (27 May 2025 09:15) (71 - 102)  BP: 152/86 (27 May 2025 09:15) (117/92 - 152/86)  BP(mean): 10 (27 May 2025 09:15) (10 - 10)  RR: 19 (27 May 2025 09:15) (18 - 19)  SpO2: 96% (27 May 2025 09:15) (95% - 98%)    Parameters below as of 27 May 2025 09:15  Patient On (Oxygen Delivery Method): room air                  REVIEW OF SYSTEMS:    CONSTITUTIONAL: No weakness, fevers or chills  EYES: No visual changes  RESPIRATORY: No cough, wheezing; No shortness of breath  CARDIOVASCULAR: No chest pain or palpitations  GASTROINTESTINAL: No abdominal or epigastric pain. No nausea, vomiting; No diarrhea or constipation.   GENITOURINARY: No dysuria, frequency or hematuria  NEUROLOGICAL: No numbness or weakness  SKIN: See physical examination.  All other review of systems is negative unless indicated above    Physical Exam:   Constitutional: NAD, alert;  Lower Extremity Focus  Derm:  Skin warm, dry and supple bilateral.    Right: Well-adhered brown scab lesion to medial aspect of heel, noted full epithelialized wound bed, no edema, no valerie-wound erythema, no malodor, no active discharge, no purulence, no fluctuance, no tracking/tunneling, no probe to bone.   Left: heel wound with necrotic base patching, wound size (8 cm X 7 cm), no edema, no valerie-wound erythema, +malodor,(improving), no active discharge, no purulence, no fluctuance, + probe to bone with calcaneus exposed  Vascular: Dorsalis Pedis and Posterior Tibial pulses nonpalpable.   Neuro: could not be obtained because of disposition  MSK: could not be obtained because of disposition      Culture - Abscess with Gram Stain (collected 20 May 2025 18:25)  Source: Abscess Leg - Left  Gram Stain (21 May 2025 06:29):    Few polymorphonuclear leukocytes seen per low power field    Rare Gram positive cocci in pairs seen per oil power field    Moderate Gram Negative Rods seen per oil power field    Urinalysis with Rflx Culture (collected 20 May 2025 13:47)      Culture - Sputum (collected 21 May 2025 15:30)  Source: ET Tube ET Tube  Gram Stain (21 May 2025 23:10):    Numerous polymorphonuclear leukocytes per low power field    Few Squamous epithelial cells per low power field    Few Gram positive cocci in pairs per oil power field    Few Gram Positive Rods per oil power field    Culture - Sputum (collected 21 May 2025 08:15)  Source: ET Tube ET Tube  Gram Stain (22 May 2025 00:43):    No polymorphonuclear leukocytes per low power field    No Squamous epithelial cells per low power field    No organisms seen per oil power field    Culture - Abscess with Gram Stain (collected 20 May 2025 18:25)  Source: Abscess Leg - Left  Gram Stain (21 May 2025 06:29):    Few polymorphonuclear leukocytes seen per low power field    Rare Gram positive cocci in pairs seen per oil power field    Moderate Gram Negative Rods seen per oil power field    Culture - Urine (collected 20 May 2025 13:47)  Source: Clean Catch None  Preliminary Report (21 May 2025 20:42):    >100,000 CFU/ml Escherichia coli    Urinalysis with Rflx Culture (collected 20 May 2025 13:47)    Culture - Blood (collected 20 May 2025 13:40)  Source: Blood Blood-Peripheral  Preliminary Report (21 May 2025 19:02):    No growth at 24 hours    Culture - Blood (collected 20 May 2025 13:40)  Source: Blood Blood-Peripheral  Preliminary Report (21 May 2025 19:02):    No growth at 24 hours    < from: Xray Foot AP + Lateral + Oblique, Bilat (05.20.25 @ 22:45) >  COMPARISON: 12/17/2024     3 views of the left foot and 3 views ofthe right foot show bony erosion   or postoperative changes involving the dorsal surface of the left   calcaneus. Loss of adjacent soft tissue either represents ulceration or   evidence of surgical debridement.    The right foot shows no evidence of calcaneal erosion. The joint spaces   are maintained.    IMPRESSION: Bone and soft tissue loss of and near left calcaneus as noted.      < end of copied text >

## 2025-05-27 NOTE — PROGRESS NOTE ADULT - SUBJECTIVE AND OBJECTIVE BOX
Ethics consult initiated.    Case discussed in detail with MITRA MADISON RN, JEANIE (Ethics Fellow) and NATO Harris MD (Medicine Attending) on 5/27/2025 from [id number]: Case discussed in detail. Per Dr. Harris, the patient appears to be unrepresented. Team requesting assistance for decision making as the patient does not have capacity and requires decision to either pursue surgical above knee amputation (AKA) or local wound care/conservative management and possibly comfort measures. Palliative Care also consulted to assist with goals of care discussions.    Ethics attempted to call Michael Toribio (patient's former girlfriend's grandson, [phone]) on 5/27/2025 at 1237. When called, phone number said it was not accepting calls. Unable to leave a voicemail.    Ethics attempted to call Johnny Freedman (patient's former girlfriend's grandson, [phone]) on 5/27/2025 at 1238. The number was disconnected.     Discussion with MITRA MADISON RN, MBE (Ethics Fellow) and Emerald REINOSO ( at [facility], [phone]) on 5/27/2025 from [id number]: mEerald informed the fellow that the patient had reportedly been residing with his girlfriend who has since passed away. The girlfriend's grandson had reportedly been involved in the patient's care for a while, but has not been heard from in approximately 6 months. The fellow informed  that the phone number for the grandson was disconnected, as was the number listed for the other grandson.  confirmed that the same number is the only one that [facility] has on file. Emerald could not provide any additional information about the patient's social or functional history.    Discussion with MITRA MADISON RN, MBE (Ethics Fellow), JOSE Sapp MD (Ethics Attending), and MITRA Suarez MD (Palliative Attending) on 5/27/2025 from [phone]: Ethics updated Dr. Suarez on the above discussions, including that the patient appears to be unrepresented at this time. Further discussions to be held with interdisciplinary team regarding goals of care for the patient as he lacks capacity and has no available surrogate decision maker.

## 2025-05-27 NOTE — CONSULT NOTE ADULT - ASSESSMENT
Process of Care  --Reviewed dx/treatment problems and alignment with Goals of Care    GOC:   Capacity:  HCP/Surrogate:  Code Status:  MOLST:  Dispo Plan:    Physical Aspects of Care  --Pain  appears comfortable  tylenol 1000mg TID PRN for severe pain    --Bowel Regimen  risk for constipation d/t immobility  daily dulcolax    --Dyspnea  No SOB at this time  comfortable and in NAD      Psychological and Psychiatric Aspects of Care:   --Greif/Bereavment: emotional support provided  --Hx of psychiatric dx: psychiatric hallucinations in the past  -Pastoral Care Available PRN     Social Aspects of Care  -SW involved     Cultural Aspects  -Primary Language: English    Goals of Care:       pt w/o capacity  If a patient lacks capacity to make their own medical decisions and there is NO HCP form available      As per Family Health Care Decision Act: The order of surrogate decision makers is as follows  1. Legal guardian appointed under Article 81   2. spouse or domestic partner   3. Adult child  4. Parent  5. Sibling  6. Close friend, age 18 or over, or a relative other than those listed above     If none of the above are available and NO NOK/friends can be found  2 physician consent for procedures, and ethics eval for withdrawal of care prior to 2 physician consent.     Capacity: pt w/ advancing dementia will not regain capacity at this stage as his lack of capacity is d/t an irreversible cause (at baseline he does not have capacity).        Prognosis: Death can occur at anytime, but if disease continues to progress naturally patient likely has days to weeks.    Ethical and Legal Aspects:   NA      Discussed With: Case coordinated with attending and SW and RN     Time Spent: 75 minutes including the care, coordination and counseling of this patient, 50% of which was spent coordinating and counseling.

## 2025-05-28 DIAGNOSIS — I25.10 ATHEROSCLEROTIC HEART DISEASE OF NATIVE CORONARY ARTERY WITHOUT ANGINA PECTORIS: ICD-10-CM

## 2025-05-28 DIAGNOSIS — I10 ESSENTIAL (PRIMARY) HYPERTENSION: ICD-10-CM

## 2025-05-28 DIAGNOSIS — I48.20 CHRONIC ATRIAL FIBRILLATION, UNSPECIFIED: ICD-10-CM

## 2025-05-28 PROCEDURE — 99222 1ST HOSP IP/OBS MODERATE 55: CPT | Mod: FS

## 2025-05-28 PROCEDURE — 99233 SBSQ HOSP IP/OBS HIGH 50: CPT

## 2025-05-28 PROCEDURE — 93925 LOWER EXTREMITY STUDY: CPT | Mod: 26

## 2025-05-28 RX ORDER — PIPERACILLIN-TAZO-DEXTROSE,ISO 3.375G/5
3.38 IV SOLUTION, PIGGYBACK PREMIX FROZEN(ML) INTRAVENOUS ONCE
Refills: 0 | Status: COMPLETED | OUTPATIENT
Start: 2025-05-28 | End: 2025-05-28

## 2025-05-28 RX ORDER — PIPERACILLIN-TAZO-DEXTROSE,ISO 3.375G/5
3.38 IV SOLUTION, PIGGYBACK PREMIX FROZEN(ML) INTRAVENOUS EVERY 8 HOURS
Refills: 0 | Status: DISCONTINUED | OUTPATIENT
Start: 2025-05-28 | End: 2025-05-29

## 2025-05-28 RX ORDER — PIPERACILLIN-TAZO-DEXTROSE,ISO 3.375G/5
3.38 IV SOLUTION, PIGGYBACK PREMIX FROZEN(ML) INTRAVENOUS ONCE
Refills: 0 | Status: DISCONTINUED | OUTPATIENT
Start: 2025-05-28 | End: 2025-05-28

## 2025-05-28 RX ADMIN — DONEPEZIL HYDROCHLORIDE 10 MILLIGRAM(S): 5 TABLET ORAL at 21:45

## 2025-05-28 RX ADMIN — HEPARIN SODIUM 5000 UNIT(S): 1000 INJECTION INTRAVENOUS; SUBCUTANEOUS at 06:28

## 2025-05-28 RX ADMIN — HEPARIN SODIUM 5000 UNIT(S): 1000 INJECTION INTRAVENOUS; SUBCUTANEOUS at 13:52

## 2025-05-28 RX ADMIN — POLYETHYLENE GLYCOL 3350 17 GRAM(S): 17 POWDER, FOR SOLUTION ORAL at 10:49

## 2025-05-28 RX ADMIN — MEMANTINE HYDROCHLORIDE 20 MILLIGRAM(S): 21 CAPSULE, EXTENDED RELEASE ORAL at 21:46

## 2025-05-28 RX ADMIN — FOLIC ACID 1 MILLIGRAM(S): 1 TABLET ORAL at 10:49

## 2025-05-28 RX ADMIN — Medication 125 MILLIGRAM(S): at 21:45

## 2025-05-28 RX ADMIN — Medication 25 GRAM(S): at 10:44

## 2025-05-28 RX ADMIN — MIRTAZAPINE 45 MILLIGRAM(S): 30 TABLET, FILM COATED ORAL at 21:46

## 2025-05-28 RX ADMIN — Medication 25 GRAM(S): at 23:29

## 2025-05-28 RX ADMIN — Medication 125 MILLIGRAM(S): at 10:48

## 2025-05-28 RX ADMIN — Medication 40 MILLIGRAM(S): at 10:49

## 2025-05-28 RX ADMIN — Medication 25 GRAM(S): at 16:08

## 2025-05-28 RX ADMIN — Medication 81 MILLIGRAM(S): at 10:49

## 2025-05-28 RX ADMIN — Medication 2 TABLET(S): at 21:46

## 2025-05-28 RX ADMIN — HEPARIN SODIUM 5000 UNIT(S): 1000 INJECTION INTRAVENOUS; SUBCUTANEOUS at 21:46

## 2025-05-28 RX ADMIN — ATORVASTATIN CALCIUM 10 MILLIGRAM(S): 80 TABLET, FILM COATED ORAL at 21:46

## 2025-05-28 NOTE — PROGRESS NOTE ADULT - SUBJECTIVE AND OBJECTIVE BOX
HPI:    CODE STATUS:     Ptis 78 yo Male, pmhx of CABG, CAD, ETOH, dementia T who presented to hospital unresponsive from home, here is mild periventricular   white  matter hypodensity likely related to small vessel ischemic disease.  Found to have a left heel presusre ulcer w. infection and sp exision debriedment.  High concern for residual infection and ethics and palliative were called to further discuss next steps for treatment plans as patient has no family or friends involved in his health care.     Unfortunatley patient lacks capacity and isunlikely to regain this d/t his underlying progresisve dementia.   A more conservative treatment option was 6weeks antibiotics vs. agressive aka for more definitive curative approach.     Risks and benefits were reviewed and this patient is high likelihood of poor outcome sp surgical intervention.     There are multiple concerns when considering risk / benefits of such a surgical intervention on a patient with this past medical history along his current underlying progressive dementia.     Has had a recent decline within the last few months, specifically with his neurocognitive function, things like he's been experiencing increased paranoia and hallucinations. These things did begin back in 2022, but we're worsening in recent months including decline in appetite mobility and ability to speak      This leads me to believe that medically this patient is advanced dementia at least at a 7c qualifying him for hospice.   That being said a surgical intervention at this stage to that level would likely cause more harm- include a progressive and rapid decline,  uncontrolled symptoms dt inability to communicate, and poor healing leading to worsening condition.       MY reccomendation at si stage is:     DNR DNI Comfort only, Comfort feeds, no HD , No vasopressors, no IV hydration, no antibiotics, no escalation of care, DNH           Pain and Dyspnea:   PAIN: ( ) YES  ( X)  NO  Pt unable to characterise d/t clinical status     DYSPNEA ( ) Yes ( X) No  Patient unable to characterise d/t clinical status     Review of Systems:      unable to perform d/t dementia advancing          PHYSICAL EXAM:    Vital Signs Last 24 Hrs  T(C): 36.3 (28 May 2025 08:24), Max: 36.6 (27 May 2025 16:16)  T(F): 97.3 (28 May 2025 08:24), Max: 97.9 (27 May 2025 16:16)  HR: 76 (28 May 2025 08:24) (76 - 81)  BP: 130/75 (28 May 2025 08:24) (104/66 - 137/73)  BP(mean): --  RR: 16 (28 May 2025 08:24) (16 - 19)  SpO2: 98% (28 May 2025 08:24) (93% - 98%)    Parameters below as of 28 May 2025 08:24  Patient On (Oxygen Delivery Method): room air      Daily     Daily     PPSV2:    %  FAST:    General:  HEENT:  Lungs:  Cardiac:  GI:  :  Ext:  Neuro:      LABS and RADIOLOGY: REVIEWED

## 2025-05-28 NOTE — CONSULT NOTE ADULT - PROBLEM SELECTOR RECOMMENDATION 9
. pt. with known CAD/CABG, on asa and statin, non complaint with meds and follow up  . pt. was seen by our cardiology group on 8/2022 for vasovagal syncope due to dehydration   . TTE from 8/2022 with mildly reduced LVEF 40-45%,  moderate MS/MR      . Cardiology consulted for optimization, however decision was made later on in the day to have pt. on comfort care - d/w Hospitalist - Dr. Harris, signed off

## 2025-05-28 NOTE — PROGRESS NOTE ADULT - SUBJECTIVE AND OBJECTIVE BOX
5/28/25: Pt was seen by Podiatry team. Pt was resting bedside, NAD, dressings to STEVE LE dry, clean and intact.    PMH:   CAD (coronary artery disease)  Atrial fibrillation  Alcohol use  BPH (benign prostatic hyperplasia)  Paranoia  Hallucinations      PSH:  S/P CABG (coronary artery bypass graft)    Allergies:  No Known Allergies      Labs:                              10.9   7.33  )-----------( 412      ( 27 May 2025 07:20 )             34.0              05-27    140  |  104  |  11  ----------------------------<  77  3.7   |  30  |  0.92    Ca    9.2      27 May 2025 07:20      Vital Signs Last 24 Hrs  T(C): 36.3 (28 May 2025 08:24), Max: 36.6 (27 May 2025 16:16)  T(F): 97.3 (28 May 2025 08:24), Max: 97.9 (27 May 2025 16:16)  HR: 76 (28 May 2025 08:24) (76 - 81)  BP: 130/75 (28 May 2025 08:24) (104/66 - 137/73)  BP(mean): --  RR: 16 (28 May 2025 08:24) (16 - 19)  SpO2: 98% (28 May 2025 08:24) (93% - 98%)    Parameters below as of 28 May 2025 08:24  Patient On (Oxygen Delivery Method): room air            REVIEW OF SYSTEMS:    CONSTITUTIONAL: No weakness, fevers or chills  EYES: No visual changes  RESPIRATORY: No cough, wheezing; No shortness of breath  CARDIOVASCULAR: No chest pain or palpitations  GASTROINTESTINAL: No abdominal or epigastric pain. No nausea, vomiting; No diarrhea or constipation.   GENITOURINARY: No dysuria, frequency or hematuria  NEUROLOGICAL: No numbness or weakness  SKIN: See physical examination.  All other review of systems is negative unless indicated above    Physical Exam:   Constitutional: NAD, alert;  Lower Extremity Focus  Derm:  Skin warm, dry and supple bilateral.    Right: Well-adhered brown scab lesion to medial aspect of heel, noted full epithelialized wound bed, no edema, no valerie-wound erythema, no malodor, no active discharge, no purulence, no fluctuance, no tracking/tunneling, no probe to bone.   Left: heel wound with necrotic base patching, wound size (8 cm X 7 cm), no edema, no valerie-wound erythema, +malodor,(improving), no active discharge, no purulence, no fluctuance, + probe to bone with calcaneus exposed  Vascular: Dorsalis Pedis and Posterior Tibial pulses nonpalpable.   Neuro: could not be obtained because of disposition  MSK: could not be obtained because of disposition      Culture - Abscess with Gram Stain (collected 20 May 2025 18:25)  Source: Abscess Leg - Left  Gram Stain (21 May 2025 06:29):    Few polymorphonuclear leukocytes seen per low power field    Rare Gram positive cocci in pairs seen per oil power field    Moderate Gram Negative Rods seen per oil power field    Urinalysis with Rflx Culture (collected 20 May 2025 13:47)      Culture - Sputum (collected 21 May 2025 15:30)  Source: ET Tube ET Tube  Gram Stain (21 May 2025 23:10):    Numerous polymorphonuclear leukocytes per low power field    Few Squamous epithelial cells per low power field    Few Gram positive cocci in pairs per oil power field    Few Gram Positive Rods per oil power field    Culture - Sputum (collected 21 May 2025 08:15)  Source: ET Tube ET Tube  Gram Stain (22 May 2025 00:43):    No polymorphonuclear leukocytes per low power field    No Squamous epithelial cells per low power field    No organisms seen per oil power field    Culture - Abscess with Gram Stain (collected 20 May 2025 18:25)  Source: Abscess Leg - Left  Gram Stain (21 May 2025 06:29):    Few polymorphonuclear leukocytes seen per low power field    Rare Gram positive cocci in pairs seen per oil power field    Moderate Gram Negative Rods seen per oil power field    Culture - Urine (collected 20 May 2025 13:47)  Source: Clean Catch None  Preliminary Report (21 May 2025 20:42):    >100,000 CFU/ml Escherichia coli    Urinalysis with Rflx Culture (collected 20 May 2025 13:47)    Culture - Blood (collected 20 May 2025 13:40)  Source: Blood Blood-Peripheral  Preliminary Report (21 May 2025 19:02):    No growth at 24 hours    Culture - Blood (collected 20 May 2025 13:40)  Source: Blood Blood-Peripheral  Preliminary Report (21 May 2025 19:02):    No growth at 24 hours    < from: Xray Foot AP + Lateral + Oblique, Bilat (05.20.25 @ 22:45) >  COMPARISON: 12/17/2024     3 views of the left foot and 3 views ofthe right foot show bony erosion   or postoperative changes involving the dorsal surface of the left   calcaneus. Loss of adjacent soft tissue either represents ulceration or   evidence of surgical debridement.    The right foot shows no evidence of calcaneal erosion. The joint spaces   are maintained.    IMPRESSION: Bone and soft tissue loss of and near left calcaneus as noted.      < end of copied text >

## 2025-05-28 NOTE — CONSULT NOTE ADULT - SUBJECTIVE AND OBJECTIVE BOX
Consult requested by: NATO Harris MD	         Role: Attending 	         Service: Medicine   Current Attending: NATO Harris MD, Medicine   Other Consultants: MIRTA Suarez MD, Palliative; ERNST Harris MD, Vascular Surgery; AIME Lewis DPM, Podiatry		   Consultant: MITRA MADISON RN, MBE (Ethics Fellow)   Contact #s: [phone](Cell) [phone] (Office)   Consult purpose: To assist in the ethical dilemma posed by a 79-year-old male admitted for septic shock secondary to pneumonia and osteomyelitis, complicated by acute respiratory failure and encephalopathy, regarding goals of care.     Clinical summary: This is a 79-year-old patient with a medical history of coronary artery disease s/p CABG, atrial fibrillation (reportedly not on anticoagulation due to adherence issues), dementia, paranoia/hallucinations, alcohol use, and benign prostatic hyperplasia (BPH) who presented to the Emergency Department on 5/20/2025 after being found unresponsive in bed at [facility]. On arrival, the patient had a Jian Coma Scale (GCS) of 6, was hypotensive and hypoxic and required intubation. A CT scan of the chest revealed patchy nodular airspace opacities and bronchial wall thickening throughout the left lung, most pronounced in the left lower lobe, and minimal similar findings in the right upper and lower lobes, suggestive of pneumonitis. Podiatry was consulted for bilateral heel ulcers, noting the left heel wound appeared infected and was exsanguinated, requiring excisional debridement. The patient was subsequently admitted to the Critical Care service on 5/20/2025 for further workup and management of possible septic shock secondary to pneumonia, metabolic encephalopathy, infected heel ulcers, and acute respiratory failure requiring mechanical ventilation. On 5/21/2025, Podiatry noted cultures taken from the left heel wound were positive for Gram-positive cocci (GPC) and Gram-negative pathogens (GNP); an electroencephalogram (EEG) showed findings consistent with encephalopathy with no evidence of seizure activity; a bronchoscopy was performed for thickened secretions in the bronchus. The patient was successfully extubated on 5/23/2025 and transitioned to room air. Podiatry noted left heel with necrosis secondary to pressure, and given his functional status and comorbidities, the patient would not be a candidate for any surgical intervention. Podiatry recommended conservative medical management and an MRI of the left foot to assess the extent of the apparent osteomyelitis. The patient was transferred to the Medicine service on 5/23/2025. An MRI was unable to be obtained on 5/24/2025 due to lack of information to complete the necessary screening form. Infectious Disease evaluated the patient noting the patient was positive for a urinary tract infection (UTI) and his wound cultures were growing multidrug-resistant organisms (MDRO) and Proteus mirabilis. ID recommended 6 weeks of IV antibiotics via PICC line upon discharge. Vascular Surgery was consulted on 5/26/2025 for bilateral heel wounds, one significantly disfigured and possibly requiring above the knee amputation (AKA). Vascular indicated at that time that the patient may not be a candidate for surgery given his comorbidities. Palliative Care was consulted on 5/27/2025 for complex goals of care and symptom management, noting that if surgery was not pursued, comfort measures/hospice would be appropriate for the patient. Podiatry, Critical Care, Medicine, Infectious Disease, Vascular Surgery, and Palliative Care on consult. Ethics consult initiated to clarify goals of care for an unrepresented patient given the patient’s current state and his poor prognosis.      Prognosis Estimate (survival in hrs, days, wks, mos, yrs): Poor – per medical team   Patient Decision-Making Capacity: Lacks capacity – due to medical condition   Patient Aware of: Diagnosis: Unknown		Prognosis: Unknown   Name of medical decision-maker should patient lack capacity (relationship): None, see discussions and analysis below   Role: N/A		Contact #(s): N/A   Other Decision-Maker (I.e., HCA or Surrogate) Aware of: Diagnosis: N/A   Prognosis: N/A   Other Stakeholders: [facility] and [facility] ([phone]); Michael Toribio (patient's former girlfriend's grandson); Johnny Freedman (patient's former girlfriend's grandson); Emerald ( at [facility]); Selina (Asst Director of Nursing at [facility])   Evidence of Patient’s Preference of Life Sustaining Treatment (Written or Oral): MOLST completed in 2022 indicating patient’s wish for CPR (see discussions below)  Resuscitation status: DNR: Yes 	DNI: Yes MOLST completed on 5/28/2025     Discussions:   Case discussed in detail with MITRA MADISON, RN, MBE (Ethics Fellow) and NATO Harris MD (Medicine Attending) on 5/27/2025 from [id number]: Case discussed in detail. Per Dr. Harris, the patient appears to be unrepresented. Team requesting assistance for decision making as the patient does not have capacity and requires decision to either pursue surgical above knee amputation (AKA) or local wound care/conservative management and possibly comfort measures. Palliative Care also consulted to assist with goals of care discussions.     [facility] attempted to call Michael Toribio (patient's former girlfriend's grandson, [phone]) on 5/27/2025 at 1237. When called, phone number said it was not accepting calls. Unable to leave a voicemail.     [facility] attempted to call Johnny Freedman (patient's former girlfriend's grandson, [phone]) on 5/27/2025 at 1238. The number was disconnected.      Discussion with MITRA MADISON RN, MBE (Ethics Fellow) and Emerald REINOSO ( at [facility], [phone]) on 5/27/2025 from [phone]: Emerald informed the fellow that the patient had reportedly been residing with his girlfriend who has since passed away. The girlfriend's grandson had reportedly been involved in the patient's care for a while, but has not been heard from in approximately 6 months. The fellow informed  that the phone number for the grandson was disconnected, as was the number listed for the other grandson.  confirmed that the same number is the only one that [facility] has on file. Emerald could not provide any additional information about the patient's social or functional history.     Discussion with MITRA MADISON RN, MBE (Ethics Fellow), JOSE Sapp MD (Ethics Attending), and MITRA Suarez MD (Palliative Attending) on 5/27/2025 from [id number]: Ethics updated Dr. Suarez on the above discussions, including that the patient appears to be unrepresented at this time. Further discussions to be held with interdisciplinary team regarding goals of care for the patient as he lacks capacity and has no available surrogate decision maker.     Case discussed in detail with MITRA MADISON RN, JEANIE (Ethics Fellow), JOSE Sapp MD (Ethics Attending), MITRA Suarez MD (Palliative Attending), ERNST Abreu MD (Chair Ethics Committee), and NATO Harris MD (Medicine Attending) on 5/28/2025 from [phone]: Meeting convened to discuss details of the case. Dr. Harris reviewed the patient’s current condition, including osteomyelitis of the left heel that has led to a complex medical course. It has been noted by Podiatry and Vascular Surgery that one possible plan of care is an above the knee amputation versus local wound care, IV antibiotics, and conservative management. Per Vascular Surgery and Medicine, due to the patient’s comorbidities and progression of dementia, the patient may not be a candidate for surgery. The risks and benefits of both conservative treatment with 6 weeks of IV antibiotics and more aggressive surgical treatment via AKA were discussed and reviewed. It was agreed upon that the patient has a high likelihood of a poor outcome after surgical intervention, and there were multiple concerns raised considering the risks and benefits of surgical intervention given the patient’s past medical history and underlying progressive dementia. The present team members discussed need to determine patient’s baseline at [facility] to determine the progression of dementia. Ethics to investigate further and reach out to team if additional discussions needed.      Discussion with MITRA MADISON RN, MBE (Ethics Fellow) and Selina (Asst Director of Nursing at [facility]) [id number]: According to Selina, the patient has had a recent decline over the past few months. Specifically neurocognitive decline with an increase in paranoia and hallucinations—which have been occurring since 2022 but worsening recently, poor appetite requiring assistance with eating and encouragement from staff to ensure sufficient nutrition and requiring increased assistance with ambulation. He reportedly used a wheelchair at baseline that he could roll himself around in but has since been restricted to using a patricia-chair and requires a 2-person assist for any ambulation. He is intermittently adherent to treatments, occasionally refusing care. According to social work notes, the patient has consistently lacked insight into his medical condition and has demonstrated an inability to comprehend advance directives when they have attempted to address them. Selina to connect the fellow with social work to discuss any further details that were missed.     Discussion with MITRA MADISON RN, MBE (Ethics Fellow) and Emerald REINOSO ( at [facility], [phone]) on 5/28/2025 from [id number]: Discussed patient’s baseline at [facility] further. Per Emerald, the patient has been increasingly confused and forgetful, now mostly non-verbal and unable to communicate his needs sufficiently. Prior to this decline, the patient was able to participate in activities, engage in conversation, and move himself around. The patient has also had a decline in weight due to his insufficient nutritional intake. During the discussion, Emerald located a prior MOLST completed by the patient in 2022 indicating his wish to receive CPR should it be medically necessary. There was no associated documentation that could be found indicating what the conversation between the PA and the patient was at the time the MOLST was completed. Emerald was informed that further conversations among the team were going to continue regarding the next steps for the patient given this additional information.     Ethics Review Committee Convened on 5/28/2025 from [id number] via Teams with Meaghan Sapp MD ( of Medical Ethics), Sergio Abreu MD (Chair Ethics Committee), MITRA MADISON RN, MBE (Ethics Fellow), KEKE THOMPSON, RN (Ethics Fellow), MITRA Suarez MD (Palliative Care Attending), NATO Harris MD (Medicine Attending), ERNST Harris MD (Vascular Surgery Attending), AMANDA Paez RN (), AMANDA Milligan LMSW (), and KEKE ANDREWS (Ethics Fellow): Patient’s case discussed in detail by Dr. Raymundo Harris. Case discussed including his diagnosis, complications thus far and overall prognosis which is poor. Currently the patient has osteomyelitis of the left heel that led to septic shock requiring intubation and critical care upgrade earlier in the admission, now being managed with IV antibiotics. Dr. Harris also explained the patient’s comorbidities of coronary artery disease s/p CABG, atrial fibrillation (reportedly not on anticoagulation due to adherence issues), and dementia. MITRA MADISON RN, MBE and AMANDA Milligan LMSW explained attempts to identify a surrogate decision maker throughout the admission. Per chart review and discussions with staff at [facility], the patient reportedly resided with his girlfriend and her grandson at the beginning of 2022 prior to an admission to [facility] (). At that time, the patient’s girlfriend was admitted to a [facility] and unable to assist with decision making for the patient, so her grandson took on the role. From the chart, it was gleaned that the patient was discharged to [facility] after that admission and returned to  a couple months later. At that time, the patient’s girlfriend had reportedly passed away, and her grandson no longer wished to participate in medical decision making. According to [facility], the grandson has not been reachable for at least the past 6 months. Ethics informed the committee that phone numbers for the grandson and another grandson listed in the chart were disconnected. It was discussed that the patient has had a recent decline over the past few months. Specifically neurocognitive decline with an increase in paranoia and hallucinations—which have been occurring since 2022 but worsening recently, poor appetite requiring assistance with eating and encouragement from staff to ensure sufficient nutrition and requiring increased assistance with ambulation. He reportedly used a wheelchair at baseline that he could roll himself around in but has since been restricted to using a patricia-chair and requires a 2-person assist for any ambulation. He is intermittently adherent to treatments, occasionally refusing care. According to social work notes, the patient has consistently lacked insight into his medical condition and has demonstrated an inability to comprehend advance directives when they have attempted to address them. Ethics also informed the committee that a prior MOLST was completed by the patient in 2022 indicating his wish to receive CPR should it be medically necessary. There was no associated documentation that could be found indicating what the conversation between the PA and the patient was at the time the MOLST was completed. The committee discussed that though a MOLST exists indicating the patient’s wish for CPR, the patient’s clinical condition has changed significantly since 2022 and a reevaluation of directives should be considered. Dr. Suarez discussed that the patient currently lacks decisional capacity and is unlikely to regain capacity due to his underlying progressive dementia. Options for possible treatment including aggressive surgical intervention (above knee amputation) for a more definitive curative approach versus a conservative treatment option consisting of 6 weeks of antibiotics. Risks and benefits of each were reviewed and it was determined the patient has high likelihood of a poor outcome with surgical intervention, particularly in the setting of his underlying progressive dementia. It was discussed that surgical intervention may cause more harm, including a progressive and rapid decline, uncontrolled symptoms due to an inability to communicate effectively, and poor healing leading to a worsening condition. Conservative management with IV antibiotics was also discussed as prolonging suffering and burden to the patient, offering little to no palliation. Ethics and Palliative Care proposed moving forward with comfort measures only for the patient, inclusive of DNR, DNI, no feeding tube, no IV fluids, no antibiotics, no hemodialysis, do not hospitalize, and initiation of comfort feeds. Committee in unanimous agreement with implementing these proposed directives, as this patient meets criteria that given his illness, that life-sustaining treatment offers the patient no medical benefit because the patient will die, even if the treatment is provided; and the provision of life-sustaining treatment would violate accepted medical standards. The Review Committee reviewed MOLST Checklist #4 (https://www.health.[place].gov/professionals/patients/patient_rights/molst/docs/checklist_4.pdf) to ensure all appropriate statutory standards have been met prior to use of the MOLST process. The advantage of the MOLST form is that it is transferable to other settings across care transitions. The Review Committee also considered [facility] Care Decision Act criteria for withholding and withdrawing of life sustaining treatments. Dr. Suarez to complete MOLST form inclusive of said directives and place in patient’s chart.        Bioethics analysis: The central ethical issue that exists in this case is (A) respecting the patient’s autonomy versus (B) the providers’ desire for beneficence and non-maleficence as well as justice.      The conflict that exists in this situation is one hospital clinicians infrequently encounter in critically ill patients who have a poor prognosis for recovery but are without family members or surrogates to share decisions regarding aggressive life-sustaining treatments (LSTs). At one pole is the bioethical principle of autonomy – here needing to be determined. In tension at the other pole is the physician’s duty of non-maleficence – with interventions that appear to neither have little long-term benefit nor restore a functional quality of life for a specific patient and may increase the patient’s suffering or prolong the dying process.     Clinicians may variably interpret the principle of beneficence [“do good”] in this kind of situation, depending on their own values about the commitment to sustain life. Medical decision making for patients with neither decision-making capacity (DMC) nor a surrogate decision-maker presents an ethical challenge for healthcare providers because there is no way to obtain informed consent for treatment. This is particularly so when these decisions involve invasive/life-threatening procedures or withholding or withdrawing life-sustaining treatments and providing appropriate palliative care. In this case, the health team is commended for their virtue and invoking justice – by providing fairness and equitable care to the patient while attempting to locate a surrogate. Here, there is no surrogate to assist the medical team using the shared decision-making model to determine the level of care going forward as the potential surrogates are no longer reachable (see discussions above).     The bioethical principle of beneficence calls on clinicians to respect patient autonomy and to honor and preserve the patient’s sense of dignity and personhood, and his moral status. Clinicians may variably argue that beneficence calls for further aggressive interventions. Beneficence aims to promote the best possible health or quality of living or dying with aggressive interventions when these help prolong life with “good quality,” and with comfort care when LSTs are not possible and the aim is to achieve the best “quality of dying.” The clinician’s duty to non-maleficence means avoiding medical interventions whose risk and burden exceeds their benefit (“first do no harm”). As this patient lacks capacity, it is appropriate for the protection of his autonomy.     The 2010 [facility] Family Health Care Decisions Act (FHDCA) addresses the situation of a sick individual who lacks capacity and has no available surrogate. (1) The DCA requires hospitals, after a patient is admitted, to determine if the patient has a health care agent, guardian, or a person who can serve as the patient’s surrogate. If the patient has no such person and lacks capacity, the hospital must identify, to the extent practical, the patient’s wishes and preferences about pending health care decisions. WHERE THE PATIENT CONTINUES TO BE INCAPACITATED, THE ATTENDING PHYSICIAN MAY AUTHORIZE MAJOR MEDICAL TREATMENT AFTER CONSULTATION AND CONCURRENCE WITH THE PATIENT’S HEALTH CARE TEAM AND AN INDEPENDENT PHYSICIAN NOT INVOLVED WITH THE PATIENT’S CARE CONCURS WITH THE TREATMENT. In this case, the patient has a poor prognosis and is currently incapacitated from making medical decisions as he does not show understanding or insight into his medical condition. Therefore, it is appropriate to proceed with 2 physician consent for Mr. De Jesus.     In this case, the patient has a poor prognosis and is currently unable to make medical decisions as a result of his progressive dementia. There are no family members or other surrogate decision makers at this time.     [place] law aids the practitioner when making treatment decisions for patients that have lost their ability to make medical decisions and are unrepresented. For a decision regarding withholding and withdrawing life-sustaining treatment, the practitioner will make this determination taking into account the patient’s wishes, or if they are not known, in the patient’s best interest. (2) A decision to withhold or withdraw life-sustaining treatment must also meet the following criteria. (3) To a reasonable degree of medical certainty:     • life-sustaining treatment offers the patient no medical benefit because the patient will die imminently, even if the treatment is provided; and   • the provision of life-sustaining treatment would violate accepted medical standards.     This decision must be made by the attending practitioner with the independent concurrence of another physician, nurse practitioner, or physician assistant. (4)     [facility] Policy 100.24: An order to withhold/withdraw life sustaining treatment (including DNR/DNI) may be issued when two physicians determine that the treatment offers the patient no medical benefit because the patient will die imminently with or without the proposed treatment and the provision of such treatment violates accepted medical standards.     Per the team, the patient continues to have a poor prognosis. The patient has osteomyelitis of the left heel and progressive underlying dementia that hinders his ability to participate in medical decision making and interventions. While further interventions like above the knee amputation or IV antibiotics might stabilize him and prolong his life, his prognosis remains poor even if the treatment is provided, and it will likely introduce suffering and risk. Decisions concerning treatment are complex and must be taken into consideration given the higher rates of mortality and morbidity associated in this specific case. Physicians are under no legal or ethical obligation to offer treatments that would provide no medically indicated benefit or impose unnecessary risk or burden to the patient. The benefits of interventions must be weighed against the harm they may cause when they no longer improve prognosis but introduce suffering and risk.     Decisions regarding hospice care for unrepresented patients can be included in decisions about withholding and withdrawing life-sustaining treatments, including DNR and DNI orders. (5,6) [place] law again provides the criteria to secure a decision regarding hospice care for such patient. The attending practitioner can make decisions regarding hospice taking into account the patient’s wishes, or if they are not known, in the patient’s best interest; with the concurring opinion by another practitioner; and approval by an ethics review committee. (7)     In this specific case, particularly critical is ensuring palliation and comfort care measures for this patient. In accordance with the Palliative Care Access Act, the attending practitioner is required to provide patients with a terminal illness not only prognosis, risks and benefits of treatment options but also patient’s legal rights to symptom management at the end of life. (8) Fundamental to a palliative approach is the aim to reduce suffering and improve the quality of life for dying patients.     Lastly, social justice, sometimes referred to as distributive justice, refers to the equitable distribution and utilization of treatment and resources that maximizes benefit to society and the respect to vulnerable populations, while considering the vulnerability of patients with special needs. In this case, particular attention needs to be paid to certain social determinants of health (SDOH) including potential social and/or moral health disparity. Social determinants of health refer to both specific features of and pathways by which societal conditions affect health and that potentially can be altered by informed action. (9)  Consult requested by: NATO Harris MD	         Role: Attending 	         Service: Medicine   Other Consultants: MITRA Suarez MD, Palliative; ERNST Harris MD, Vascular Surgery; AIME Lewis DPM, Podiatry		   Consultant: MITRA MADISON RN, MBE (Ethics Fellow)   Contact #s: [phone](Cell) [phone] (Office)   Consult purpose: To assist in the ethical dilemma posed by a 79-year-old male with a complex medical history, admitted for septic shock secondary to pneumonia and osteomyelitis, complicated by acute respiratory failure and encephalopathy, regarding surrogacy and goals of care.     Clinical Summary (SEE NOTE 5/27/25): This is a 79-year-old patient with a medical history of coronary artery disease s/p CABG, atrial fibrillation (reportedly not on anticoagulation due to adherence issues), dementia, paranoia/hallucinations, alcohol use, and benign prostatic hyperplasia (BPH) who was brought in by EMS to the Emergency Department on 5/20/2025 after being found unresponsive in bed at [facility]. On arrival, it is noted that the patient had a [place] Coma Scale (GCS) of 6, was hypotensive and hypoxic and required intubation. A CT scan of the chest revealed patchy nodular airspace opacities and bronchial wall thickening throughout the left lung, most pronounced in the left lower lobe, and minimal similar findings in the right upper and lower lobes, suggestive of pneumonitis. Podiatry was consulted for bilateral heel ulcers, noting the left heel wound appeared infected and was exsanguinated, requiring excisional debridement. The patient was subsequently admitted to the Critical Care service on 5/20/2025 for further workup and management of possible septic shock secondary to pneumonia, metabolic encephalopathy, infected heel ulcers, and acute respiratory failure requiring mechanical ventilation. On 5/21/2025, Podiatry noted cultures taken from the left heel wound were positive for Gram-positive cocci (GPC) and Gram-negative pathogens (GNP); an electroencephalogram (EEG) showed findings consistent with encephalopathy with no evidence of seizure activity; a bronchoscopy was performed for thickened secretions in the bronchus. The patient was successfully extubated on 5/23/2025 and transitioned to room air. Podiatry noted left heel with necrosis secondary to pressure, and given his functional status and comorbidities, the patient would not be a candidate for any surgical intervention. Podiatry recommended conservative medical management and an MRI of the left foot to assess the extent of the apparent osteomyelitis. The patient was transferred to the Medicine service on 5/23/2025. An MRI was deferred on 5/24/2025 due to lack of information to complete the necessary screening form. Infectious Disease evaluated the patient noting the patient was positive for a urinary tract infection (UTI) and his wound cultures were growing multidrug-resistant organisms (MDRO) and Proteus mirabilis. ID recommended 6 weeks of IV antibiotics via PICC line upon discharge. Vascular Surgery was consulted on 5/26/2025 for bilateral heel wounds, one significantly disfigured and possibly requiring above the knee amputation (AKA). Vascular indicated at that time that the patient may not be a candidate for surgery given his comorbidities. Palliative Care was consulted on 5/27/2025 for complex goals of care and symptom management, noting that if surgery was not pursued, comfort measures/hospice would be appropriate for the patient. Palliative Care Attending noted that patient is advanced dementia at least at a 7c qualifying him for hospice and surgical intervention at this stage to that level would likely cause more harm - include a progressive and rapid decline,  uncontrolled symptoms due to inability to communicate, and poor healing leading to worsening condition. Podiatry, Cardiology, Infectious Disease, Vascular Surgery, and Palliative Care on consult. Ethics consult initiated to clarify goals of care for an unrepresented patient given the patient’s current state and his poor prognosis.      Prognosis Estimate (survival in hrs, days, wks, mos, yrs): Poor – per medical team   Patient Decision-Making Capacity: Lacks capacity – due to medical condition (acute illness and progressive dementia)  Patient Aware of: Diagnosis: Unknown		Prognosis: Unknown   Name of medical decision-maker should patient lack capacity (relationship): None, see discussions and analysis below   Role: N/A		Contact #(s): N/A   Other Decision-Maker (I.e., HCA or Surrogate) Aware of: Diagnosis: N/A   Prognosis: N/A   Other Stakeholders: [facility] ([phone]); Michael Toribio (patient's former girlfriend's grandson); Johnny Freedman (patient's former girlfriend's grandson); Emeradl ( at [facility]); Selina (Asst Director of Nursing at [facility])   Evidence of Patient’s Preference of Life Sustaining Treatment (Written or Oral): MOLST completed in 2022 at [facility] indicating patient’s wish for CPR per [facility] – see discussions below   Resuscitation status: DNR: Yes 	DNI: Yes    MOLST completed on 5/28/2025 AS A RESULT OF THIS CONSULT     Discussions:   Discussion with MITRA MADISON RN, MBE (Ethics Fellow) and NATO Harris MD (Medicine Attending) on 5/27/2025 from [phone]: Case discussed in detail. Per Dr. Harris, the patient appears to be unrepresented. Team requesting assistance for decision making as the patient does not have capacity and requires decision to either pursue surgical above knee amputation (AKA) or local wound care/conservative management and possibly comfort measures. Palliative Care also consulted to assist with goals of care discussions.     [facility] attempted to call Michael Toribio (patient's former girlfriend's grandson, [phone]) on 5/27/2025 at 1237. When called, phone number said it was not accepting calls. Unable to leave a voicemail.     [facility] attempted to call Johnny Freedman (patient's former girlfriend's grandson, [phone]) on 5/27/2025 at 1238. The number was disconnected.      Discussion with MITRA MADISON RN, JEANIE (Ethics Fellow) and Emerald REINOSO ( at [facility], [phone]) on 5/27/2025 from [phone]: Emerald informed the fellow that the patient had reportedly been residing with his girlfriend who has since passed away. The girlfriend's grandson had reportedly been involved in the patient's care for a while, but has not been heard from in approximately 6 months. The fellow informed  that the phone number for the grandson was disconnected, as was the number listed for the other grandson. JUAN DIEGO confirmed that the same number is the only one that [facility] has on file. Emerald could not provide any additional information about the patient's social or functional history.     Discussion with MITRA MADISON RN, MBE (Ethics Fellow), JOSE Sapp MD (Ethics Attending), and MITRA Suarez MD (Palliative Attending) on 5/27/2025 from [id number]: Ethics updated Dr. Suarez on the above discussions, including that the patient appears to be unrepresented at this time. Further discussions to be held with interdisciplinary team regarding goals of care for the patient as he lacks capacity and has no available surrogate decision maker.     Interdisciplinary Discussion with MITRA MADISON RN, JEANIE (Ethics Fellow), JOSE Sapp MD (Ethics Attending), MITRA Suarez MD (Palliative Attending), ERNST Abreu MD (Chair Ethics Committee), and NATO Harris MD (Medicine Attending) on 5/28/2025 from [id number]: Meeting convened to discuss details of the case. Dr. Harris reviewed the patient’s current condition, including osteomyelitis of the left heel that has led to a complex medical course. It has been noted by Podiatry and Vascular Surgery that one possible plan of care is an above the knee amputation versus local wound care, IV antibiotics, and conservative management. Per Vascular Surgery and Medicine, due to the patient’s comorbidities and progression of dementia, the patient may not be a candidate for surgery. The risks and benefits of both conservative treatment with 6 weeks of IV antibiotics and more aggressive surgical treatment via AKA were discussed and reviewed. It was agreed upon that the patient has a high likelihood of a poor outcome after surgical intervention, and there were multiple concerns raised considering the risks and benefits of surgical intervention given the patient’s past medical history and underlying progressive dementia. The present team members discussed need to determine patient’s baseline at [facility] to determine the progression of dementia. Ethics to investigate further and reach out to team if additional discussions needed.      Discussion with MITRA MADISON RN, JEANIE (Ethics Fellow) and Selina (Asst Director of Nursing at [facility]) [phone]: According to Selina, the patient has had a recent decline over the past few months. Specifically neurocognitive decline with an increase in paranoia and hallucinations—which have been occurring since 2022 but worsening recently, poor appetite requiring assistance with eating and encouragement from staff to ensure sufficient nutrition and requiring increased assistance with ambulation. He reportedly used a wheelchair at baseline that he could roll himself around but has since been restricted to using a patricia-chair and requires a 2-person assist for any ambulation. He is intermittently adherent to treatments, occasionally refusing care. According to social work notes, the patient has consistently lacked insight into his medical condition and has demonstrated an inability to comprehend advance directives when they have attempted to address them. Selina to connect the fellow with social work to discuss any further details that were missed.     Discussion with MITRA MADISON RN, MBE (Ethics Fellow) and Emerald REINOSO ( at [facility], [phone]) on 5/28/2025 from [phone]: Discussed patient’s baseline at [facility] further. Per Emerald, the patient has been increasingly confused and forgetful, now mostly non-verbal and unable to communicate his needs sufficiently. Prior to this decline, the patient was able to participate in activities, engage in conversation, and move himself around. The patient has also had a decline in weight due to his insufficient nutritional intake. During the discussion, Emerald located a prior MOLST completed by the patient in 2022 indicating his wish to receive CPR should it be medically necessary. There was no associated documentation that could be found indicating what the conversation between the PA and the patient was at the time the MOLST was completed. Emerald was informed that further conversations among the team were going to continue regarding the next steps for the patient given this additional information.     Ethics Review Committee Convened on 5/28/2025 from [id number] via Teams with Meaghan Sapp MD ( of Medical Ethics), Sergio Abreu MD (Chair, Ethics Committee), MITRA MADISON RN, MBE (Ethics Fellow), KEKE THOMPSON, RN (Ethics Fellow), MITRA Suarez MD (Palliative Care Attending), NATO Harris MD (Medicine Attending), ERNST Harris MD (Vascular Surgery Attending), AMANDA Paez RN (), AMANDA [facility] (), and KEKE ANDREWS (Ethics Fellow): Patient’s case discussed in detail by Dr. Raymundo Harris. Case discussed including his diagnosis, complications thus far and overall prognosis which is poor. Currently the patient has osteomyelitis of the left heel that led to septic shock requiring intubation and critical care upgrade earlier in the admission, now being managed with IV antibiotics. Dr. Harris also explained the patient’s comorbidities of coronary artery disease s/p CABG, atrial fibrillation (reportedly not on anticoagulation due to adherence issues), and dementia. MITRA MADISON RN, MBE and AMANDA [facility]igan [facility] explained attempts to identify a surrogate decision maker throughout the admission. Per chart review and discussions with staff at [facility], the patient reportedly resided with his girlfriend and her grandson at the beginning of 2022 prior to an admission to [facility] (). At that time, the patient’s girlfriend was admitted to a [facility] and unable to assist with decision making for the patient, so her grandson took on the role. From the chart, it was gleaned that the patient was discharged to [facility] after that admission and returned to  a couple months later. At that time, the patient’s girlfriend had reportedly passed away, and her grandson no longer wished to participate in medical decision making. According to [facility], the grandson has not been reachable for at least the past 6 months. Ethics informed the committee that phone numbers for the grandson and another grandson listed in the chart were disconnected. It was discussed that the patient has had a recent decline over the past few months. Specifically neurocognitive decline with an increase in paranoia and hallucinations—which have been occurring since 2022 but worsening recently, poor appetite requiring assistance with eating and encouragement from staff to ensure sufficient nutrition and requiring increased assistance with ambulation. He reportedly used a wheelchair at baseline so that he could roll himself around but has since been restricted to using a patricia-chair and requires a 2-person assist for any ambulation. He is intermittently adherent to treatments, occasionally refusing care. According to social work notes, the patient has consistently lacked insight into his medical condition and has demonstrated an inability to comprehend advance directives when they have attempted to address them. Ethics also informed the committee that a prior MOLST was completed by the patient in 2022 indicating his wish to receive CPR should it be medically necessary. There was no associated documentation that could be found indicating what the conversation between the PA and the patient was at the time the MOLST was completed. The committee discussed that though a MOLST exists indicating the patient’s wish for CPR, the patient’s clinical condition has changed significantly since 2022 and a reevaluation of directives should be considered. Dr. Suarez discussed that the patient currently lacks decisional capacity and is unlikely to regain capacity due to his underlying progressive dementia. Options for possible treatment including aggressive surgical intervention (above knee amputation) for a more definitive curative approach versus a conservative treatment option consisting of 6 weeks of antibiotics were discussed. Risks and benefits of each were reviewed and it was determined the patient has high likelihood of a poor outcome with surgical intervention, particularly in the setting of his underlying progressive dementia. It was discussed that surgical intervention may cause more harm, including a progressive and rapid decline, uncontrolled symptoms due to an inability to communicate effectively, and poor healing leading to a worsening condition. Conservative management with IV antibiotics was also discussed as prolonging suffering and burden to the patient, offering little to no palliation. Ethics and Palliative Care proposed moving forward with comfort measures only for the patient, inclusive of DNR, DNI, no feeding tube, no IV fluids, no antibiotics, no hemodialysis, do not hospitalize, and initiation of comfort feeds. Committee in unanimous agreement with implementing these proposed directives, as this patient meets criteria that given his illness, that life-sustaining treatment offers the patient no medical benefit because the patient will die, even if the treatment is provided; and the provision of life-sustaining treatment would violate accepted medical standards. The Review Committee reviewed MOLST Checklist #4 (https://www.health.[place].gov/professionals/patients/patient_rights/molst/docs/checklist_4.pdf) to ensure all appropriate statutory standards have been met prior to use of the MOLST process. The advantage of the MOLST form is that it is transferable to other settings across care transitions. The Review Committee also considered [facility] Care Decision Act criteria for withholding and withdrawing of life sustaining treatments. Dr. Suarez along with Dr. Harris to complete MOLST form inclusive of said directives and place in patient’s chart.        Bioethics analysis: The central ethical issue that exists in this case is (A) respecting the patient’s autonomy versus (B) the providers’ desire for beneficence and non-maleficence as well as justice.      The conflict that exists in this situation is one hospital clinicians infrequently encounter in critically ill patients who have a poor prognosis for recovery but are without family members or surrogates to share decisions regarding aggressive life-sustaining treatments (LSTs). At one pole is the bioethical principle of autonomy – here needing to be determined. In tension at the other pole is the physician’s duty of non-maleficence – with interventions that appear to neither have little long-term benefit nor restore a functional quality of life for a specific patient and may increase the patient’s suffering or prolong the dying process.     Clinicians may variably interpret the principle of beneficence [“do good”] in this kind of situation, depending on their own values about the commitment to sustain life. Medical decision making for patients with neither decision-making capacity (DMC) nor a surrogate decision-maker presents an ethical challenge for healthcare providers because there is no way to obtain informed consent for treatment. This is particularly so when these decisions involve invasive/life-threatening procedures or withholding or withdrawing life-sustaining treatments and providing appropriate palliative care. In this case, the health team is commended for their virtue and invoking justice – by providing fairness and equitable care to the patient while attempting to locate a surrogate. Here, there is no surrogate to assist the medical team using the shared decision-making model to determine the level of care going forward as the potential surrogates are no longer reachable as noted in the discussions above.    The bioethical principle of beneficence calls on clinicians to respect patient autonomy and to honor and preserve the patient’s sense of dignity and personhood, and his moral status. Clinicians may variably argue that beneficence calls for further aggressive interventions. Beneficence aims to promote the best possible health or quality of living or dying with aggressive interventions when these help prolong life with “good quality,” and with comfort care when LSTs are not possible and the aim is to achieve the best “quality of dying.” The clinician’s duty to non-maleficence means avoiding medical interventions whose risk and burden exceeds their benefit (“first do no harm”). As this patient lacks capacity, it is appropriate for the protection of his autonomy.     The 2010 [facility] Family Health Care Decisions Act (FHDCA) addresses the situation of a sick individual who lacks capacity and has no available surrogate. (1) The DCA requires hospitals, after a patient is admitted, to determine if the patient has a health care agent, guardian, or a person who can serve as the patient’s surrogate. If the patient has no such person and lacks capacity, the hospital must identify, to the extent practical, the patient’s wishes and preferences about pending health care decisions. WHERE THE PATIENT CONTINUES TO BE INCAPACITATED, THE ATTENDING PHYSICIAN MAY AUTHORIZE MAJOR MEDICAL TREATMENT AFTER CONSULTATION AND CONCURRENCE WITH THE PATIENT’S HEALTH CARE TEAM AND AN INDEPENDENT PHYSICIAN NOT INVOLVED WITH THE PATIENT’S CARE CONCURS WITH THE TREATMENT. In this case, it has been determined that the patient does not have surrogates. In addition, the patient is currently incapacitated from making medical decisions as he does not show understanding or insight into his medical condition. Therefore, it is appropriate to proceed with 2 physician consent for Mr. De Jesus.    [place] law aids the practitioner when making treatment decisions for patients that have lost their ability to make medical decisions and are unrepresented. For a decision regarding withholding and withdrawing life-sustaining treatment, the practitioner will make this determination taking into account the patient’s wishes, or if they are not known, in the patient’s best interest. (2) A decision to withhold or withdraw life-sustaining treatment must also meet the following criteria. (3) To a reasonable degree of medical certainty:     • life-sustaining treatment offers the patient no medical benefit because the patient will die imminently, even if the treatment is provided; and   • the provision of life-sustaining treatment would violate accepted medical standards.     This decision must be made by the attending practitioner with the independent concurrence of another physician, nurse practitioner, or physician assistant. (4)     [facility] Policy 100.24: An order to withhold/withdraw life sustaining treatment (including DNR/DNI) may be issued when two physicians determine that the treatment offers the patient no medical benefit because the patient will die imminently with or without the proposed treatment and the provision of such treatment violates accepted medical standards.     Per the team, the patient has an overall poor prognosis. The patient has osteomyelitis of the left heel and progressive underlying dementia that hinders his ability to participate in medical decision making and interventions. While further interventions like above the knee amputation or IV antibiotics might stabilize him and prolong his life, his prognosis remains poor even if the treatment is provided, and it will likely introduce suffering and risk. Decisions concerning treatment are complex and must be taken into consideration given the higher rates of mortality and morbidity associated in this specific case. Physicians are under no legal or ethical obligation to offer treatments that would provide no medically indicated benefit or impose unnecessary risk or burden to the patient. The benefits of interventions must be weighed against the harm they may cause when they no longer improve prognosis but introduce suffering and risk.     Decisions regarding hospice care for unrepresented patients can be included in decisions about withholding and withdrawing life-sustaining treatments, including DNR and DNI orders. (5,6) [place] law again provides the criteria to secure a decision regarding hospice care for such patient. The attending practitioner can make decisions regarding hospice taking into account the patient’s wishes, or if they are not known, in the patient’s best interest; with the concurring opinion by another practitioner; and approval by an ethics review committee. (7)     In this specific case, particularly critical is ensuring palliation and comfort care measures for this patient. In accordance with the Palliative Care Access Act, the attending practitioner is required to provide patients with a terminal illness not only prognosis, risks and benefits of treatment options but also patient’s legal rights to symptom management at the end of life. (8) Fundamental to a palliative approach is the aim to reduce suffering and improve the quality of life for dying patients.     Lastly, social justice, sometimes referred to as distributive justice, refers to the equitable distribution and utilization of treatment and resources that maximizes benefit to society and the respect to vulnerable populations, while considering the vulnerability of patients with special needs. In this case, particular attention needs to be paid to certain social determinants of health (SDOH) including potential social and/or moral health disparity. Social determinants of health refer to both specific features of and pathways by which societal conditions affect health and that potentially can be altered by informed action. (9)

## 2025-05-28 NOTE — CONSULT NOTE ADULT - REASON FOR ADMISSION
found unresponsive

## 2025-05-28 NOTE — CONSULT NOTE ADULT - NS ATTEND AMEND GEN_ALL_CORE FT
Patient with above hx CAD  CABG AFIB not ON AC   with sepsis non healing foot ulcer , Possible PAD , with AMS      considered comfort care due to multiple comorbidities and poor mental state  will sign off

## 2025-05-28 NOTE — PROGRESS NOTE ADULT - TIME BILLING
Time spent  coordinating the patient's care. This includes reviewing documentation pertinent to this admission, results and imaging. Further tests, medications, and procedures have been ordered as indicated. Laboratory results and the plan of care were communicated to the patient. Discussed care plan with consultants including . Supporting documentation was completed and added to the patient's chart.
Patient seen labs reviewed, discussed with staff on rounds

## 2025-05-28 NOTE — CONSULT NOTE ADULT - CONSULT REQUESTED DATE/TIME
28-May-2025 16:07
24-May-2025 14:09
26-May-2025 05:38
27-May-2025 08:11
20-May-2025 18:13
27-May-2025 11:04

## 2025-05-28 NOTE — CONSULT NOTE ADULT - ASSESSMENT
Recommendation: Under the Family Health Care Decisions Act for patients without capacity and without health care proxy/surrogate decision maker, two physicians can determine advanced care planning. For decisions regarding withholding and withdrawing life-sustaining treatment or ordering a MOLST (DNR/DNI), please follow the criteria above.     In light of his critical illness, Do Not Resuscitate (DNR), Do Not Intubate (DNI), and de-escalation of treatment including no IV fluids, no antibiotics, no feeding tube, and no hemodialysis, with full comfort measures/hospice would be appropriate. Additionally, an Ethics Review Committee has determined by unanimous consensus that these directives are appropriate.     On 5/28/2025, a MOLST was completed inclusive of DNR, DNI, no feeding tube, no IV fluids, no antibiotics, no hemodialysis, do not hospitalize, and implementation of comfort measures only. MOLST was completed and placed in chart by Palliative Care.     Thank you for including the Ethics Consultation Service. Ethics commends the team for their virtue in the care and support of Mr. De Jesus.       Case discussed with Medical Ethicist Meaghan Sapp MD, MS, MPH, [facility] ( of Medical Ethics), and Sergio Abreu MD, [facility] Ethics Attending and Chair of the Ethics Committee     More than 50% of the time of this consultation was spent in coordination of Care of Patient       References   1 PARUL Presley & CHRIS Palacios (2020). Update Summary The Family Health Care Decisions Act. [facility] Health Law Journal. [email or web address]   2 [facility] §§ 2994-d.4, 2994-g.2 (2020).   3 [place] § 2994-g.5.   4 [facility] § 2994-g.5(b).   5 [facility] § 2994-g.5-a.   6 See [place] [employer]’s Family Health Care Decisions Act (FHCDA) Resource Center, Frequently Asked Questions About the Family Health Care Decisions Act. Available at: [email or web address]-resource-[email or web address] [last accessed April 8, 2021].   7 [facility] § 2994-g.5-a.   8 [facility] § 2997-d (2020). See also, Palliative Care Information Act, [facility] § 2997-c (2020).   9 RONNY HAWKINS (2001). A GLOSSARY FOR SOCIAL EPIDEMIOLOGY. J EPIDEMIOL COMMUNITY HEALTH, 55(10): 693–700.       Recommendation: Under the Family Health Care Decisions Act for patients without capacity and without health care proxy/surrogate decision maker, two physicians can determine advance care planning. For decisions regarding withholding and withdrawing life-sustaining treatment or ordering a MOLST (DNR/DNI), please follow the criteria above.     In light of his acute and terminal illnesses, Do Not Resuscitate (DNR), Do Not Intubate (DNI), and de-escalation of treatment including no IV fluids, no antibiotics, no feeding tube, and no hemodialysis, with full comfort measures/hospice would be appropriate. Additionally, an Ethics Review Committee has determined by unanimous consensus that these directives are appropriate.     On 5/28/2025, a MOLST was completed inclusive of DNR, DNI, no feeding tube, no IV fluids, no antibiotics, no hemodialysis, do not hospitalize, and implementation of comfort measures only. MOLST was completed and placed in chart by Palliative Care.     Thank you for including the Ethics Consultation Service. Ethics commends the team for their virtue in the care and support of Mr. De Jesus.     Case discussed with Ethics Attending: Meaghan Sapp MD, MS, MPH, [place]-[facility] ( of Medical Ethics), and Sergio Abreu MD, [facility] Ethics Attending and Chair of the Ethics Committee     More than 50% of the time of this consultation was spent in coordination of Care of Patient     References   1 PARUL Presley & CHRIS Palacios (2020). Update Summary The Family Health Care Decisions Act. [facility] Health Law Journal. [email or web address]-resource-center/   2 [facility] §§ 2994-d.4, 2994-g.2 (2020).   3 [place] 2994[place]g.5.   4 [place] § 2994-g.5(b).   5 [facility] § 2994-g.5-a.   6 See [place] [employer]’s Family Health Care Decisions Act (FHCDA) Resource Center, Frequently Asked Questions About the Family Health Care Decisions Act. Available at: [email or web address]-resource-center/ [last accessed April 8, 2021].   7 [facility] § 2994-g.5-a.   8 [facility] § 2997-d (2020). See also, Palliative Care Information Act, [facility] § 2997-c (2020).   9 RONNY HAWKINS (2001). A GLOSSARY FOR SOCIAL EPIDEMIOLOGY. J EPIDEMIOL COMMUNITY HEALTH, 55(10): 693–700.

## 2025-05-28 NOTE — PROGRESS NOTE ADULT - ASSESSMENT
78 y/o M with PMHx of CAD s/p CABG, paroxysmal afib (reportedly not on AC due to compliance issues), dementia, BPH, hx paranoia/hallucinations with mood disorder, , hx alcohol abuse, presented to  sent in from SNF for unresponsiveness, intubated for airway protection, found to be in septic shock requiring pressors for BP support and admitted to CCU, now stable for downgrade to med/surg    #Acute hypoxic respiratory failure s/p intubation, resolved  #Septic shock (POA) likely 2/2 infected L. heel ulcer, suspected OM + multifocal aspiration PNA + UTI   #Acute metabolic encephalopathy superimposed on baseline dementia, improved  - s/p CCU care requiring pressors and ventilator support, now extubated on room air and off pressors   - CT head and CTA H/N negative for acute pathology  - CT chest/abd/pelvis noted  - Left foot Xray noted  - EEG with encephalopathy, no seizure activity  - Podiatry following s/p debridement , recs appreciated-->Vascular Sx consult recommended  - BCx NGTD   - Left heel wound Cx 5/20 -- pan sensitive Proteus mirabilis, MDR Morganella morganii  - UA positive, UCx 5/20 -- >100K E. Coli   - ID on board; Continue IV zosyn (5/20 - )  - Continue wound care to b/l heel ulcers   - ID consulted to assist  - Podiatry input noted; Wound Care recs appreciated   - Vascular Sx on board; only options are above knee amputation vs local wound care--patient unable to make decisions due to underlying dementia, and he does not have family  - Palliative Care and Ethics Consulted (please see discussion below)    #CAD s/p CABG  - continue aspirin, statin     #Hx paroxysmal Afib not on AC  - Lopressor held currently , resume as BP allows    #Social hx  - admitted from SNF, pt reportedly without family / HCP / surrogate decision maker with history of dementia  - CM/SW consulted to assist    #DVT ppx  - heparin SQ    #Goals of Care/Advanced Care Directive  Two Multidisciplinary meetings held today with Palliative Care, Ethics committee, Vascular Surgery, and Social Work. Given the patient's poor prognosis of Left Heel Osteomyelitis in the context of advanced dementia and overall clinical decline over the last several months/years the decision was made to forego continuation of IV abx or any surgical intervention. The patient will be transitioned to Comfort Measures Only.       Dispo: Anticipate D/C back to [facility] under Comfort Measures Only/Hospice status.

## 2025-05-28 NOTE — CONSULT NOTE ADULT - CONSULT REASON
To assist in the ethical dilemma posed by a 79-year-old male admitted for septic shock secondary to pneumonia and osteomyelitis, complicated by acute respiratory failure and encephalopathy, regarding goals of care. To assist in the ethical dilemma posed by a 79-year-old male with a complex medical history, admitted for septic shock secondary to pneumonia and osteomyelitis, complicated by acute respiratory failure and encephalopathy, regarding surrogacy and goals of care.

## 2025-05-28 NOTE — PROGRESS NOTE ADULT - ASSESSMENT
A: 79-year-old male was seen for the following:  -Left heel with large pressure ulcer; infected-->s/p bedside excisional debridement (05/20/2025)  -Right heel pressure lesion with resolving eschar      P:   Pt seen and evaluated  Afebrile, No leukocytosis  s/p bedside Left heel excisional debridement (5/20/2025)to remove all the malodorous necrotic/infected tissue to the left heel  Post excisional debridement; leukocytosis resolved, drainage resolved, malodor improving but still present  High concern for residual infection and viability  Wound cx taken (5/20/25) Left Foot: Proteus mirabilis and Morganella morganii  ID reccs appreciated  Left heel continues with necrosis of heel bone and surrounding soft tissue secondary to pressure.  Given pt's functional status and comorbidities, pt is not a surgical candidate from podiatry stand point; recc continue with local wound care and close f/u  MRI was recommended to eval for extent of OM in the left heel but could not be obtained due to pt's inability to complete the MRI screening form and due to no information available for NOK  Strict decubitus offloading precautions to bilateral heels using CAIR boots  to prevent further soft tissue damage.   Recc vascular eval given the extensive loss of heel soft tissue and necrosis  All additional care per Med appreciated   Podiatry will follow while in house    Case seen with attending Dr. Lewis        Wound care Instructions for Right and Left Heel Ulcers to be changed Everyday:  1. Please Gently remove the old dressings  2. Apply betadine-soaked gauze to the Right Heel, cover ulcerative lesions with a 4x4 gauze wrap with kerlix and secure with tape  3. Clean Left Heel ulcer with Dakins solutions, apply  Dakin-soaked gauze to the wound, cover with sterilized gauze, wrap with tape and secure with tape  4. Please, have the both heel offloaded in the provided Z-flex boot at all times, while the patient is bedbound.  Thank you

## 2025-05-28 NOTE — PROGRESS NOTE ADULT - CONVERSATION DETAILS
DNR DNI Comfort only, Comfort feeds, no HD , No vasopressors, no IV hydration, no antibiotics, no escalation of care, DNH

## 2025-05-28 NOTE — PROGRESS NOTE ADULT - ASSESSMENT
Process of Care  --Reviewed dx/treatment problems and alignment with Goals of Care    GOC:     DNR DNI Comfort only, Comfort feeds, no HD , No vasopressors, no IV hydration, no antibiotics, no escalation of care, DNH   MOLST Complete       Physical Aspects of Care  --Pain  patient denies at this time  c/w current managment    --Bowel Regimen  denies constipation  risk for constipation d/t immobility  daily dulcolax    --Dyspnea  No SOB at this time  comfortable and in NAD    --Nausea Vomiting  denies    --Weakness  PT as tolerated     Psychological and Psychiatric Aspects of Care:   --Greif/Bereavment: emotional support provided  --Hx of psychiatric dx: none  -Pastoral Care Available PRN     Social Aspects of Care  -SW involved     Cultural Aspects  -Primary Language: English    Goals of Care:     We discussed Palliative Care team being a supportive team when a patient has ongoing illnesses.  We also discussed that it is not an end of life care service, but can help navigate symptoms and emotional support througout their hospital stay here.    Hospice was explained as well  as an end of life care philosophy.  When a disease cannot be cured, or family/patient decide the treatment burdens out weigh the risk and one choses to change focus of treatment from cure to quality/comfort.       Prognosis: Death can occur at anytime, but if disease continues to progress naturally patient likely has days to weeks.    Ethical and Legal Aspects:   NA      Discussed With: Case coordinated with attending and SW and RN     Time Spent: 90 minutes including the care, coordination and counseling of this patient, 50% of which was spent coordinating and counseling.

## 2025-05-28 NOTE — PROGRESS NOTE ADULT - SUBJECTIVE AND OBJECTIVE BOX
Chief Complaint: Patient is a 79y old  Male who presents with a chief complaint of found unresponsive (23 May 2025 10:01)      Subjective:   Patient seen and examined at the bedside. Does not offer any acute complaints. NAEON.      PAST MEDICAL & SURGICAL HISTORY:  CAD (coronary artery disease)    Atrial fibrillation  Not on AC    History of alcohol use    BPH (benign prostatic hyperplasia)    Paranoia    History of hallucinations    S/P CABG (coronary artery bypass graft)    Social History:  from [facility] no family or surrogate decision maker (20 May 2025 16:14)  No current alcohol, smoking or drug use    FAMILY HISTORY:  FH: asthma (Father)    Physical Exam:  Vital Signs Last 24 Hrs  T(C): 36.2 (23 May 2025 07:35), Max: 37 (22 May 2025 19:38)  T(F): 97.1 (23 May 2025 07:35), Max: 98.6 (22 May 2025 19:38)  HR: 85 (23 May 2025 12:00) (69 - 103)  BP: 120/65 (23 May 2025 12:00) (91/59 - 134/56)  BP(mean): 80 (23 May 2025 12:00) (63 - 102)  RR: 26 (23 May 2025 12:00) (13 - 27)  SpO2: 95% (23 May 2025 12:00) (95% - 100%)    Parameters below as of 23 May 2025 12:00  Patient On (Oxygen Delivery Method): room air    Constitutional: NAD, awake, deconditioned/frail  Respiratory: Breath sounds are clear bilaterally, No wheezing, rales or rhonchi  Cardiovascular: S1 and S2, RRR, no murmurs, gallops or rubs  Gastrointestinal: +BS, soft, non-tender, non-distended, no CVA tenderness  Extremities: No peripheral edema, +DP pulses b/l  Skin: +left heel ulcer--covered in surgical dressing, skin warm and dry    Labs:  05-23 @ 05:02  Glucose 69 mg/dL  HCO3 27 mmol/L  Chloride 112 mmol/L  Sodium 144 mmol/L>   Potassium 3.5 mmol/L  Creatinine 1.02 mg/dL  Calcium 8.6 mg/dL  BUN 16 mg/dL  eGFR 75 mL/min/1.73m2  Anion gap 5 mmol/L    WBC 10.33  Hemoglobin 9.5  Hemoatocrit 29.8  Platelet count 250    Micro:  BCx 5/20 -- NGTD  UCx 5/20 -- prelim with >100K E. Coli     Left heel wound Cx 5/20 -- pan sensitive Proteus mirabilis, MDR Morganella morganii    Sputum Cx 5/21 -- commensal jesenia     Radiology:  MRI Foot + Ankle with IV contrast -- Pending     < from: CT Head No Cont (05.20.25 @ 14:05) >  IMPRESSION:  No significant change    No acute intracranial  hemorrhage, mass effect or midline shift.    Mild white matter small vessel ischemic disease.      < from: CT Chest, abdomen and pelvis w/ IV Cont (05.20.25 @ 14:05) >  IMPRESSION:  Significant debris in the left mainstem bronchus extending into the left   pulmonary bronchi with associated patchy nodular airspace opacities,   tree-in-bud nodularity, and bronchial wall thickening throughout the left   lung, most pronounced in the left lower lobe. Minimal, similar findings   in the right upper and right lower lobes. Findings suggest aspiration   pneumonitis.    No acute CT findings in the abdomen or pelvis. No bowel obstruction or   inflammation.      < from: CT Angio Head w/ IV Cont (05.20.25 @ 16:49) >  IMPRESSION:  CT HEAD:  1. No acute intracranial hemorrhage, mass effect, or shift of the midline   structures.  2. Similar-appearing mild chronic white matter microvascular type changes.    CTA NECK:  1. No large vessel occlusion or major stenosis.  2. Imaging findings suspicious for the presence of multifocal pneumonia.   Please refer to the report for the concurrent dedicated chest, abdomen,   pelvis CT for findings regarding the thoracic, abdomen, and pelvic   structures.    CTA HEAD:  1. Atherosclerosis affecting the right V4 segment with tandem areas of   mild stenosis.  2. No large vessel occlusion or major stenosis.      < from: Xray Chest 1 View- PORTABLE-Urgent (Xray Chest 1 View- PORTABLE-Urgent .) (05.20.25 @ 19:05) >  IMPRESSION: NG tube inserted. Increasing scattered left mid lower lung   field infiltrates. Endotracheal tube present on the earlier study has   been removed.      < from: Xray Foot AP + Lateral + Oblique, Bilat (05.20.25 @ 22:45) >  IMPRESSION: Bone and soft tissue loss of and near left calcaneus as noted.    Medications:  MEDICATIONS  (STANDING):  aspirin  chewable 81 milliGRAM(s) Oral daily  atorvastatin 10 milliGRAM(s) Oral at bedtime  chlorhexidine 2% Cloths 1 Application(s) Topical <User Schedule>  folic acid 1 milliGRAM(s) Oral daily  heparin   Injectable 5000 Unit(s) SubCutaneous every 8 hours  mupirocin 2% Nasal 1 Application(s) Both Nostrils two times a day  pantoprazole  Injectable 40 milliGRAM(s) IV Push daily  piperacillin/tazobactam IVPB.. 3.375 Gram(s) IV Intermittent every 8 hours  polyethylene glycol 3350 17 Gram(s) Oral daily  senna 2 Tablet(s) Oral at bedtime  valproic  acid Syrup 125 milliGRAM(s) Oral two times a day    MEDICATIONS  (PRN):      Time based billing:   > 51 minutes of total time met or exceeded on this patient encounter. Time spent excludes time spent on separately reportable services/teaching during the encounter.

## 2025-05-28 NOTE — CONSULT NOTE ADULT - SUBJECTIVE AND OBJECTIVE BOX
CHIEF COMPLAINT: Patient is a 79y old  Male who presents with a chief complaint of found unresponsive (28 May 2025 11:08)      HPI:  This patient is a 79 year old male     with PMH signfiiant for             CAD             CABG             afib not on AC due to compliance             behavior health issues              BPH  Roney is resident of [facility].  Today patient was found unresposnive in bed  Was brought to ED, GCS was 6 , was intubated with rocuronium now intubated  small pupils got narcan in ed  ct chest shows patchy infiltrates  head ct negative (20 May 2025 16:14)      PAST MEDICAL / SURGICAL HISTORY:  PAST MEDICAL & SURGICAL HISTORY:  CAD (coronary artery disease)      Atrial fibrillation  Not on AC      History of alcohol use      BPH (benign prostatic hyperplasia)      Paranoia      History of hallucinations      S/P CABG (coronary artery bypass graft)          SOCIAL HISTORY:   Alcohol: Denied  Smoking: Nonsmoker  Drug Use: Denied  Marital Status:     FAMILY HISTORY: FAMILY HISTORY:  FH: asthma (Father)        MEDICATIONS  (STANDING):  aspirin  chewable 81 milliGRAM(s) Oral daily  atorvastatin 10 milliGRAM(s) Oral at bedtime  chlorhexidine 2% Cloths 1 Application(s) Topical <User Schedule>  donepezil 10 milliGRAM(s) Oral at bedtime  folic acid 1 milliGRAM(s) Oral daily  heparin   Injectable 5000 Unit(s) SubCutaneous every 8 hours  memantine 20 milliGRAM(s) Oral at bedtime  mirtazapine 45 milliGRAM(s) Oral at bedtime  pantoprazole  Injectable 40 milliGRAM(s) IV Push daily  piperacillin/tazobactam IVPB.. 3.375 Gram(s) IV Intermittent every 8 hours  polyethylene glycol 3350 17 Gram(s) Oral daily  senna 2 Tablet(s) Oral at bedtime  valproic  acid Syrup 125 milliGRAM(s) Oral two times a day    MEDICATIONS  (PRN):  HYDROmorphone  Injectable 0.2 milliGRAM(s) IV Push every 8 hours PRN Severe Pain (7 - 10)      Allergies    No Known Allergies    Intolerances        REVIEW OF SYSTEMS:  CONSTITUTIONAL: No weakness, fevers or chills  Eyes: No visual changes  NECK: No pain or stiffness  RESPIRATORY: No cough, wheezing, hemoptysis; No shortness of breath  CARDIOVASCULAR: No chest pain or palpitations  GASTROINTESTINAL: No abdominal pain. No nausea, vomiting, or hematemesis; No diarrhea or constipation. No melena or hematochezia.  GENITOURINARY: No dysuria, frequency or hematuria  NEUROLOGICAL: No numbness.  SKIN: No itching or rash  All other review of systems is negative unless indicated above    VITAL SIGNS:   Vital Signs Last 24 Hrs  T(C): 36.3 (28 May 2025 08:24), Max: 36.6 (27 May 2025 16:16)  T(F): 97.3 (28 May 2025 08:24), Max: 97.9 (27 May 2025 16:16)  HR: 76 (28 May 2025 08:24) (76 - 81)  BP: 130/75 (28 May 2025 08:24) (104/66 - 137/73)  BP(mean): --  RR: 16 (28 May 2025 08:24) (16 - 19)  SpO2: 98% (28 May 2025 08:24) (93% - 98%)    Parameters below as of 28 May 2025 08:24  Patient On (Oxygen Delivery Method): room air        I&O's Summary      PHYSICAL EXAM:  Constitutional: NAD, awake and alert  HEENT:  EOMI,  Pupils round, No oral cyanosis.  Pulmonary: Non-labored, breath sounds are clear bilaterally, No wheezing, rales or rhonchi  Cardiovascular: S1 and S2, regular rate and rhythm, no Murmurs, gallops or rubs  Gastrointestinal: Bowel Sounds present, soft, nontender.   Lymph: No peripheral edema. No cervical lymphadenopathy.  Neurological: Alert, no focal deficits  Skin: No rashes.  Psych:  Mood & affect appropriate    LABS:                        10.9   7.33  )-----------( 412      ( 27 May 2025 07:20 )             34.0                         11.3   6.12  )-----------( 389      ( 26 May 2025 06:43 )             34.4     27 May 2025 07:20    140    |  104    |  11     ----------------------------<  77     3.7     |  30     |  0.92   26 May 2025 06:43    142    |  106    |  11     ----------------------------<  68     4.7     |  32     |  0.92     Ca    9.2        27 May 2025 07:20  Ca    9.6        26 May 2025 06:43                   CHIEF COMPLAINT: Patient is a 79y old  Male who presents with a chief complaint of found unresponsive (28 May 2025 11:08)      HPI:  This patient is a 79 year old male    with PMH significant for CAD,   CABG,  Afib not on AC due to compliance,  behavior health issues,  BPH. Patient  is resident of [facility]. Today patient was found unresposnive in bed  Was brought to ED, GCS was 6 , was intubated with rocuronium small pupils got narcan in ed, ct chest shows patchy infiltrates, head ct negative (20 May 2025 16:14)    Cardiology consulted for optimization, however decision was made later on in the day to have pt. on comfort care - d/w Hospitalist - Dr. Harris.    PAST MEDICAL & SURGICAL HISTORY:  CAD (coronary artery disease) s/p CABG  Atrial fibrillation Not on AC  History of alcohol use  BPH (benign prostatic hyperplasia)  Paranoia  History of hallucinations      SOCIAL HISTORY: unable to obtain     FAMILY HISTORY: unable to obtain     Home Medications:  acetaminophen 325 mg oral tablet: 2 tab(s) orally every 8 hours as needed for pain (20 May 2025 13:54)  aspirin 81 mg oral tablet, chewable: 1 tab(s) orally once a day (20 May 2025 13:55)  atorvastatin 10 mg oral tablet: 1 tab(s) orally once a day (at bedtime) (20 May 2025 15:52)  docusate sodium 100 mg oral capsule: 2 cap(s) orally once a day (at bedtime) (20 May 2025 15:52)  donepezil 10 mg oral tablet: 1 tab(s) orally once a day (at bedtime) (20 May 2025 15:52)  folic acid 1 mg oral tablet: 1 tab(s) orally once a day (20 May 2025 13:55)  melatonin 3 mg oral tablet: 1 tab(s) orally once a day (at bedtime) (20 May 2025 13:54)  memantine 10 mg oral tablet: 2 tab(s) orally once a day (at bedtime) (20 May 2025 15:52)  metoprolol tartrate 25 mg oral tablet: 0.5 tab(s) orally once a day ***hold for sbp &lt; 110*** (20 May 2025 15:52)  mirtazapine 45 mg oral tablet: 1 tab(s) orally once a day (at bedtime) (20 May 2025 15:52)  polyethylene glycol 3350 oral powder for reconstitution: 17 gram(s) orally once a day (20 May 2025 15:52)  senna (sennosides) 8.6 mg oral tablet: 2 tab(s) orally once a day (20 May 2025 15:52)  tamsulosin 0.4 mg oral capsule: 1 cap(s) orally once a day (in the evening) (20 May 2025 13:55)  traZODone 50 mg oral tablet: 1 tab(s) orally once a day (at bedtime) (20 May 2025 15:52)  valproic acid 250 mg/5 mL oral liquid: 2.5 milliliter(s) orally 2 times a day (20 May 2025 15:52)      MEDICATIONS  (STANDING):  aspirin  chewable 81 milliGRAM(s) Oral daily  atorvastatin 10 milliGRAM(s) Oral at bedtime  chlorhexidine 2% Cloths 1 Application(s) Topical <User Schedule>  donepezil 10 milliGRAM(s) Oral at bedtime  folic acid 1 milliGRAM(s) Oral daily  heparin   Injectable 5000 Unit(s) SubCutaneous every 8 hours  memantine 20 milliGRAM(s) Oral at bedtime  mirtazapine 45 milliGRAM(s) Oral at bedtime  pantoprazole  Injectable 40 milliGRAM(s) IV Push daily  piperacillin/tazobactam IVPB.. 3.375 Gram(s) IV Intermittent every 8 hours  polyethylene glycol 3350 17 Gram(s) Oral daily  senna 2 Tablet(s) Oral at bedtime  valproic  acid Syrup 125 milliGRAM(s) Oral two times a day    MEDICATIONS  (PRN):  HYDROmorphone  Injectable 0.2 milliGRAM(s) IV Push every 8 hours PRN Severe Pain (7 - 10)      Allergies - No Known Allergies    REVIEW OF SYSTEMS: All other review of systems is negative unless indicated above    VITAL SIGNS:   Vital Signs Last 24 Hrs  T(C): 36.3 (28 May 2025 08:24), Max: 36.6 (27 May 2025 16:16)  T(F): 97.3 (28 May 2025 08:24), Max: 97.9 (27 May 2025 16:16)  HR: 76 (28 May 2025 08:24) (76 - 81)  BP: 130/75 (28 May 2025 08:24) (104/66 - 137/73)  BP(mean): --  RR: 16 (28 May 2025 08:24) (16 - 19)  SpO2: 98% (28 May 2025 08:24) (93% - 98%)    Parameters below as of 28 May 2025 08:24  Patient On (Oxygen Delivery Method): room air        I&O's Summary      PHYSICAL EXAM:  Constitutional: NAD, awake, deconditioned/frail  Respiratory: Breath sounds are clear bilaterally, No wheezing, rales or rhonchi  Cardiovascular: S1 and S2, RRR, no murmurs, gallops or rubs  Gastrointestinal: +BS, soft, non-tender, non-distended, no CVA tenderness  Extremities: No peripheral edema, +DP pulses b/l      LABS: reviewed                         10.9   7.33  )-----------( 412      ( 27 May 2025 07:20 )             34.0                         11.3   6.12  )-----------( 389      ( 26 May 2025 06:43 )             34.4     27 May 2025 07:20    140    |  104    |  11     ----------------------------<  77     3.7     |  30     |  0.92   26 May 2025 06:43    142    |  106    |  11     ----------------------------<  68     4.7     |  32     |  0.92     Ca    9.2        27 May 2025 07:20  Ca    9.6        26 May 2025 06:43    Radiology/EKG: reviewed     < from: 12 Lead ECG (05.20.25 @ 13:18) >  Diagnosis Line Sinus rhythm [id number] degree A-V block  Possible Inferior infarct , age undetermined  Anteroseptal infarct (cited on or before 09-SEP-2012)  Abnormal ECG  When compared with ECG of 31-DEC-2024 16:19,  Sinus rhythm is no longer with 2nd degree A-V block (Mobitz I)  T wave inversion now evident in Inferior leads  T wave inversion no longer evident in Anterior leads  Confirmed by Palla MD, Eduardo ([id number]) on 5/21/2025 1:31:33 PM    < end of copied text >    < from: TTE Echo Complete w/o Contrast w/ Doppler (08.09.22 @ 10:55) >   The IVC appears normal.     Impression     Summary     The left ventricle size is normal.   Overall LV function appears mild to moderately reduced globally.   Estimated left ventricular ejection fraction is 40-45 %.   The left atrium is mildly dilated.   The right ventricle exhibits, mild diffuse hypokinesis, and mild   depression of contractility.   Mild aortic sclerosis is present with normal valvular opening.   Moderate mitral stenosis is present.   Moderate (2+) mitral regurgitation is present.   Redundant chordae is seen.   Mild (1+) tricuspid valve regurgitation is present.   Trace pulmonic valvular regurgitation is present.   No evidence of pericardial effusion.     Signature     ----------------------------------------------------------------   Electronically signed by Aniceto Kirk DO(Interpreting    < end of copied text >

## 2025-05-29 ENCOUNTER — TRANSCRIPTION ENCOUNTER (OUTPATIENT)
Age: 80
End: 2025-05-29

## 2025-05-29 VITALS
OXYGEN SATURATION: 97 % | HEART RATE: 68 BPM | DIASTOLIC BLOOD PRESSURE: 70 MMHG | SYSTOLIC BLOOD PRESSURE: 119 MMHG | RESPIRATION RATE: 18 BRPM

## 2025-05-29 PROCEDURE — 99239 HOSP IP/OBS DSCHRG MGMT >30: CPT

## 2025-05-29 RX ADMIN — HEPARIN SODIUM 5000 UNIT(S): 1000 INJECTION INTRAVENOUS; SUBCUTANEOUS at 06:23

## 2025-05-29 RX ADMIN — FOLIC ACID 1 MILLIGRAM(S): 1 TABLET ORAL at 10:25

## 2025-05-29 RX ADMIN — Medication 40 MILLIGRAM(S): at 10:25

## 2025-05-29 RX ADMIN — Medication 125 MILLIGRAM(S): at 10:23

## 2025-05-29 RX ADMIN — Medication 25 GRAM(S): at 06:24

## 2025-05-29 RX ADMIN — POLYETHYLENE GLYCOL 3350 17 GRAM(S): 17 POWDER, FOR SOLUTION ORAL at 10:25

## 2025-05-29 RX ADMIN — Medication 81 MILLIGRAM(S): at 10:26

## 2025-05-29 NOTE — DISCHARGE NOTE PROVIDER - NSDCFUADDINST_GEN_ALL_CORE_FT
Wound care Instructions for Right and Left Heel Ulcers to be changed Everyday:  1. Please Gently remove the old dressings  2. Apply betadine-soaked gauze to the Right Heel, cover ulcerative lesions with a 4x4 gauze wrap with kerlix and secure with tape  3. Clean Left Heel ulcer with Dakins solutions, apply  Dakin-soaked gauze to the wound, cover with sterilized gauze, wrap with tape and secure with tape  4. Please, have the both heel offloaded in the provided Z-flex boot at all times, while the patient is bedbound.

## 2025-05-29 NOTE — PROGRESS NOTE ADULT - SUBJECTIVE AND OBJECTIVE BOX
5/29/25: Pt was seen by Podiatry team today. Pt was resting bedside, NAD, dressings to STEVE LE dry, clean and intact.    PMH:   CAD (coronary artery disease)  Atrial fibrillation  Alcohol use  BPH (benign prostatic hyperplasia)  Paranoia  Hallucinations      PSH:  S/P CABG (coronary artery bypass graft)    Allergies:  No Known Allergies      Labs:                              10.9   7.33  )-----------( 412      ( 27 May 2025 07:20 )             34.0              05-27    140  |  104  |  11  ----------------------------<  77  3.7   |  30  |  0.92    Ca    9.2      27 May 2025 07:20      Vital Signs Last 24 Hrs  T(C): 36.3 (28 May 2025 16:49), Max: 36.3 (28 May 2025 16:49)  T(F): 97.3 (28 May 2025 16:49), Max: 97.3 (28 May 2025 16:49)  HR: 68 (29 May 2025 07:36) (68 - 71)  BP: 119/70 (29 May 2025 07:36) (119/70 - 135/76)  BP(mean): --  RR: 18 (29 May 2025 07:36) (16 - 18)  SpO2: 97% (29 May 2025 07:36) (96% - 97%)    Parameters below as of 29 May 2025 07:36  Patient On (Oxygen Delivery Method): room air        REVIEW OF SYSTEMS:    CONSTITUTIONAL: No weakness, fevers or chills  EYES: No visual changes  RESPIRATORY: No cough, wheezing; No shortness of breath  CARDIOVASCULAR: No chest pain or palpitations  GASTROINTESTINAL: No abdominal or epigastric pain. No nausea, vomiting; No diarrhea or constipation.   GENITOURINARY: No dysuria, frequency or hematuria  NEUROLOGICAL: No numbness or weakness  SKIN: See physical examination.  All other review of systems is negative unless indicated above    Physical Exam:   Constitutional: NAD, alert;  Lower Extremity Focus  Derm:  Skin warm, dry and supple bilateral.    Right: Well-adhered brown scab lesion to medial aspect of heel, noted full epithelialized wound bed, no edema, no valerie-wound erythema, no malodor, no active discharge, no purulence, no fluctuance, no tracking/tunneling, no probe to bone.   Left: heel wound with necrotic base patching, wound size (8 cm X 7 cm), no edema, no valerie-wound erythema, +malodor,(improving), no active discharge, no purulence, no fluctuance, + probe to bone with calcaneus exposed  Vascular: Dorsalis Pedis and Posterior Tibial pulses nonpalpable.   Neuro: could not be obtained because of disposition  MSK: could not be obtained because of disposition      Culture - Abscess with Gram Stain (collected 20 May 2025 18:25)  Source: Abscess Leg - Left  Gram Stain (21 May 2025 06:29):    Few polymorphonuclear leukocytes seen per low power field    Rare Gram positive cocci in pairs seen per oil power field    Moderate Gram Negative Rods seen per oil power field    Urinalysis with Rflx Culture (collected 20 May 2025 13:47)      Culture - Sputum (collected 21 May 2025 15:30)  Source: ET Tube ET Tube  Gram Stain (21 May 2025 23:10):    Numerous polymorphonuclear leukocytes per low power field    Few Squamous epithelial cells per low power field    Few Gram positive cocci in pairs per oil power field    Few Gram Positive Rods per oil power field    Culture - Sputum (collected 21 May 2025 08:15)  Source: ET Tube ET Tube  Gram Stain (22 May 2025 00:43):    No polymorphonuclear leukocytes per low power field    No Squamous epithelial cells per low power field    No organisms seen per oil power field    Culture - Abscess with Gram Stain (collected 20 May 2025 18:25)  Source: Abscess Leg - Left  Gram Stain (21 May 2025 06:29):    Few polymorphonuclear leukocytes seen per low power field    Rare Gram positive cocci in pairs seen per oil power field    Moderate Gram Negative Rods seen per oil power field    Culture - Urine (collected 20 May 2025 13:47)  Source: Clean Catch None  Preliminary Report (21 May 2025 20:42):    >100,000 CFU/ml Escherichia coli    Urinalysis with Rflx Culture (collected 20 May 2025 13:47)    Culture - Blood (collected 20 May 2025 13:40)  Source: Blood Blood-Peripheral  Preliminary Report (21 May 2025 19:02):    No growth at 24 hours    Culture - Blood (collected 20 May 2025 13:40)  Source: Blood Blood-Peripheral  Preliminary Report (21 May 2025 19:02):    No growth at 24 hours    < from: Xray Foot AP + Lateral + Oblique, Bilat (05.20.25 @ 22:45) >  COMPARISON: 12/17/2024     3 views of the left foot and 3 views ofthe right foot show bony erosion   or postoperative changes involving the dorsal surface of the left   calcaneus. Loss of adjacent soft tissue either represents ulceration or   evidence of surgical debridement.    The right foot shows no evidence of calcaneal erosion. The joint spaces   are maintained.    IMPRESSION: Bone and soft tissue loss of and near left calcaneus as noted.      < end of copied text >

## 2025-05-29 NOTE — DISCHARGE NOTE PROVIDER - ATTENDING DISCHARGE PHYSICAL EXAMINATION:
Constitutional: NAD, awake, deconditioned/frail  Respiratory: Breath sounds are clear bilaterally, No wheezing, rales or rhonchi  Cardiovascular: S1 and S2, RRR, no murmurs, gallops or rubs  Gastrointestinal: +BS, soft, non-tender, non-distended, no CVA tenderness  Extremities: No peripheral edema, +DP pulses b/l  Skin: +left heel ulcer--covered in surgical dressing, skin warm and dry

## 2025-05-29 NOTE — DISCHARGE NOTE PROVIDER - NSDCMRMEDTOKEN_GEN_ALL_CORE_FT
acetaminophen 325 mg oral tablet: 2 tab(s) orally every 8 hours as needed for pain  aspirin 81 mg oral tablet, chewable: 1 tab(s) orally once a day  atorvastatin 10 mg oral tablet: 1 tab(s) orally once a day (at bedtime)  docusate sodium 100 mg oral capsule: 2 cap(s) orally once a day (at bedtime)  donepezil 10 mg oral tablet: 1 tab(s) orally once a day (at bedtime)  folic acid 1 mg oral tablet: 1 tab(s) orally once a day  melatonin 3 mg oral tablet: 1 tab(s) orally once a day (at bedtime)  memantine 10 mg oral tablet: 2 tab(s) orally once a day (at bedtime)  metoprolol tartrate 25 mg oral tablet: 0.5 tab(s) orally once a day ***hold for sbp &lt; 110***  mirtazapine 45 mg oral tablet: 1 tab(s) orally once a day (at bedtime)  polyethylene glycol 3350 oral powder for reconstitution: 17 gram(s) orally once a day  senna (sennosides) 8.6 mg oral tablet: 2 tab(s) orally once a day  tamsulosin 0.4 mg oral capsule: 1 cap(s) orally once a day (in the evening)  traZODone 50 mg oral tablet: 1 tab(s) orally once a day (at bedtime)  valproic acid 250 mg/5 mL oral liquid: 2.5 milliliter(s) orally 2 times a day

## 2025-05-29 NOTE — PROGRESS NOTE ADULT - ASSESSMENT
A: 79-year-old male was seen for the following:  -Left heel with large pressure ulcer; infected-->s/p bedside excisional debridement (05/20/2025)  -Right heel pressure lesion with resolving eschar      P:   Pt seen and evaluated  Afebrile, No leukocytosis  s/p bedside Left heel excisional debridement (5/20/2025)to remove all the malodorous necrotic/infected tissue to the left heel  Post excisional debridement; leukocytosis resolved, drainage resolved, malodor improving but still noted  High concern for residual infection and viability  Wound cx taken (5/20/25) Left Foot: Proteus mirabilis and Morganella morganiiI:   ID reccs appreciated  Left heel continues with necrosis of heel bone and surrounding soft tissue secondary to pressure.  Given pt's functional status and comorbidities, pt is not a surgical candidate from podiatry stand point; recc continue with local wound care and close f/u  MRI was recommended to eval for extent of OM in the left heel but could not be obtained due to pt's inability to complete the MRI screening form and due to no information available for [facility]  Strict decubitus offloading precautions to bilateral heels using CAIR boots  to prevent further soft tissue damage.   Recc vascular eval given the extensive loss of heel soft tissue and necrosis  All additional care per Med appreciated   Podiatry will follow while in house    Case seen with attending Dr. Lewis        Wound care Instructions for Right and Left Heel Ulcers to be changed Everyday:  1. Please Gently remove the old dressings  2. Apply betadine-soaked gauze to the Right Heel, cover ulcerative lesions with a 4x4 gauze wrap with kerlix and secure with tape  3. Clean Left Heel ulcer with Dakins solutions, apply  Dakin-soaked gauze to the wound, cover with sterilized gauze, wrap with tape and secure with tape  4. Please, have the both heel offloaded in the provided Z-flex boot at all times, while the patient is bedbound.  Thank you

## 2025-05-29 NOTE — PROGRESS NOTE ADULT - REASON FOR ADMISSION
found unresponsive

## 2025-05-29 NOTE — DISCHARGE NOTE PROVIDER - CARE PROVIDER_API CALL
Holden King)  Pulmonary Disease  [address] [place] [address]  Phone: [phone]  Fax: [phone]  Established Patient  Follow Up Time: 1 week

## 2025-05-29 NOTE — PROGRESS NOTE ADULT - NUTRITIONAL ASSESSMENT
This patient has been assessed with a concern for Malnutrition and has been determined to have a diagnosis/diagnoses of Severe protein-calorie malnutrition.    This patient is being managed with:   Diet Minced and Moist-  Entered: May 22 2025  6:04PM    The following pending diet order is being considered for treatment of Severe protein-calorie malnutrition:  Diet NPO with Tube Feed-  Tube Feeding Modality: Orogastric  Ruth [facility] Peptide 1.5 (KFPEPT1.5RTH)  Total Volume for 24 Hours (mL): 1680  Continuous  Starting Tube Feed Rate {mL per Hour}: 20  Increase Tube Feed Rate by (mL): 10     Every 6 hours  Until Goal Tube Feed Rate (mL per Hour): 70  Tube Feed Duration (in Hours): 24  Tube Feed Start Time: 00:00  Free Water Flush  Free Water Flush Instructions:  Free water fushes per MD to maintain hydration  No Carb Prosource (1pkg = 15gms Protein)     Qty per Day:  1  Entered: May 22 2025 10:59AM  
This patient has been assessed with a concern for Malnutrition and has been determined to have a diagnosis/diagnoses of Severe protein-calorie malnutrition.    This patient is being managed with:   Diet NPO with Tube Feed-  Tube Feeding Modality: Orogastric  Jevity 1.5 Kyle (JEVITY1.5)  Total Volume for 24 Hours (mL): 960  Continuous  Starting Tube Feed Rate {mL per Hour}: 10  Until Goal Tube Feed Rate (mL per Hour): 40  Tube Feed Duration (in Hours): 24  Tube Feed Start Time: 23:59  Entered: May 21 2025 11:46PM  
This patient has been assessed with a concern for Malnutrition and has been determined to have a diagnosis/diagnoses of Severe protein-calorie malnutrition.    This patient is being managed with:   Diet Minced and Moist-  Entered: May 22 2025  6:04PM    The following pending diet order is being considered for treatment of Severe protein-calorie malnutrition:  Diet NPO with Tube Feed-  Tube Feeding Modality: Orogastric  Ruth [facility] Peptide 1.5 (KFPEPT1.5RTH)  Total Volume for 24 Hours (mL): 1680  Continuous  Starting Tube Feed Rate {mL per Hour}: 20  Increase Tube Feed Rate by (mL): 10     Every 6 hours  Until Goal Tube Feed Rate (mL per Hour): 70  Tube Feed Duration (in Hours): 24  Tube Feed Start Time: 00:00  Free Water Flush  Free Water Flush Instructions:  Free water fushes per MD to maintain hydration  No Carb Prosource (1pkg = 15gms Protein)     Qty per Day:  1  Entered: May 22 2025 10:59AM  
This patient has been assessed with a concern for Malnutrition and has been determined to have a diagnosis/diagnoses of Severe protein-calorie malnutrition.    This patient is being managed with:   Diet Minced and Moist-  Entered: May 22 2025  6:04PM    The following pending diet order is being considered for treatment of Severe protein-calorie malnutrition:  Diet NPO with Tube Feed-  Tube Feeding Modality: Orogastric  Ruth [facility] Peptide 1.5 (KFPEPT1.5RTH)  Total Volume for 24 Hours (mL): 1680  Continuous  Starting Tube Feed Rate {mL per Hour}: 20  Increase Tube Feed Rate by (mL): 10     Every 6 hours  Until Goal Tube Feed Rate (mL per Hour): 70  Tube Feed Duration (in Hours): 24  Tube Feed Start Time: 00:00  Free Water Flush  Free Water Flush Instructions:  Free water fushes per MD to maintain hydration  No Carb Prosource (1pkg = 15gms Protein)     Qty per Day:  1  Entered: May 22 2025 10:59AM  
This patient has been assessed with a concern for Malnutrition and has been determined to have a diagnosis/diagnoses of Severe protein-calorie malnutrition.    This patient is being managed with:   Diet Minced and Moist-  Entered: May 22 2025  6:04PM    The following pending diet order is being considered for treatment of Severe protein-calorie malnutrition:  Diet NPO with Tube Feed-  Tube Feeding Modality: Orogastric  Ruth [facility] Peptide 1.5 (KFPEPT1.5RTH)  Total Volume for 24 Hours (mL): 1680  Continuous  Starting Tube Feed Rate {mL per Hour}: 20  Increase Tube Feed Rate by (mL): 10     Every 6 hours  Until Goal Tube Feed Rate (mL per Hour): 70  Tube Feed Duration (in Hours): 24  Tube Feed Start Time: 00:00  Free Water Flush  Free Water Flush Instructions:  Free water fushes per MD to maintain hydration  No Carb Prosource (1pkg = 15gms Protein)     Qty per Day:  1  Entered: May 22 2025 10:59AM  
This patient has been assessed with a concern for Malnutrition and has been determined to have a diagnosis/diagnoses of Severe protein-calorie malnutrition.    This patient is being managed with:   Diet NPO with Tube Feed-  Tube Feeding Modality: Orogastric  Jevity 1.5 Kyle (JEVITY1.5)  Total Volume for 24 Hours (mL): 960  Continuous  Starting Tube Feed Rate {mL per Hour}: 10  Until Goal Tube Feed Rate (mL per Hour): 40  Tube Feed Duration (in Hours): 24  Tube Feed Start Time: 23:59  Entered: May 21 2025 11:46PM  
This patient has been assessed with a concern for Malnutrition and has been determined to have a diagnosis/diagnoses of Severe protein-calorie malnutrition.  
This patient has been assessed with a concern for Malnutrition and has been determined to have a diagnosis/diagnoses of Severe protein-calorie malnutrition.    This patient is being managed with:   Diet Minced and Moist-  Entered: May 22 2025  6:04PM    The following pending diet order is being considered for treatment of Severe protein-calorie malnutrition:  Diet NPO with Tube Feed-  Tube Feeding Modality: Orogastric  Ruth [facility] Peptide 1.5 (KFPEPT1.5RTH)  Total Volume for 24 Hours (mL): 1680  Continuous  Starting Tube Feed Rate {mL per Hour}: 20  Increase Tube Feed Rate by (mL): 10     Every 6 hours  Until Goal Tube Feed Rate (mL per Hour): 70  Tube Feed Duration (in Hours): 24  Tube Feed Start Time: 00:00  Free Water Flush  Free Water Flush Instructions:  Free water fushes per MD to maintain hydration  No Carb Prosource (1pkg = 15gms Protein)     Qty per Day:  1  Entered: May 22 2025 10:59AM  
This patient has been assessed with a concern for Malnutrition and has been determined to have a diagnosis/diagnoses of Severe protein-calorie malnutrition.    This patient is being managed with:   Diet Minced and Moist-  Entered: May 22 2025  6:04PM    The following pending diet order is being considered for treatment of Severe protein-calorie malnutrition:  Diet NPO with Tube Feed-  Tube Feeding Modality: Orogastric  Ruth [facility] Peptide 1.5 (KFPEPT1.5RTH)  Total Volume for 24 Hours (mL): 1680  Continuous  Starting Tube Feed Rate {mL per Hour}: 20  Increase Tube Feed Rate by (mL): 10     Every 6 hours  Until Goal Tube Feed Rate (mL per Hour): 70  Tube Feed Duration (in Hours): 24  Tube Feed Start Time: 00:00  Free Water Flush  Free Water Flush Instructions:  Free water fushes per MD to maintain hydration  No Carb Prosource (1pkg = 15gms Protein)     Qty per Day:  1  Entered: May 22 2025 10:59AM  
This patient has been assessed with a concern for Malnutrition and has been determined to have a diagnosis/diagnoses of Severe protein-calorie malnutrition.    This patient is being managed with:   Diet Minced and Moist-  Entered: May 22 2025  6:04PM    The following pending diet order is being considered for treatment of Severe protein-calorie malnutrition:  Diet NPO with Tube Feed-  Tube Feeding Modality: Orogastric  Ruth [facility] Peptide 1.5 (KFPEPT1.5RTH)  Total Volume for 24 Hours (mL): 1680  Continuous  Starting Tube Feed Rate {mL per Hour}: 20  Increase Tube Feed Rate by (mL): 10     Every 6 hours  Until Goal Tube Feed Rate (mL per Hour): 70  Tube Feed Duration (in Hours): 24  Tube Feed Start Time: 00:00  Free Water Flush  Free Water Flush Instructions:  Free water fushes per MD to maintain hydration  No Carb Prosource (1pkg = 15gms Protein)     Qty per Day:  1  Entered: May 22 2025 10:59AM  
This patient has been assessed with a concern for Malnutrition and has been determined to have a diagnosis/diagnoses of Severe protein-calorie malnutrition.    This patient is being managed with:   Diet Minced and Moist-  Entered: May 22 2025  6:04PM    The following pending diet order is being considered for treatment of Severe protein-calorie malnutrition:  Diet NPO with Tube Feed-  Tube Feeding Modality: Orogastric  Ruth [facility] Peptide 1.5 (KFPEPT1.5RTH)  Total Volume for 24 Hours (mL): 1680  Continuous  Starting Tube Feed Rate {mL per Hour}: 20  Increase Tube Feed Rate by (mL): 10     Every 6 hours  Until Goal Tube Feed Rate (mL per Hour): 70  Tube Feed Duration (in Hours): 24  Tube Feed Start Time: 00:00  Free Water Flush  Free Water Flush Instructions:  Free water fushes per MD to maintain hydration  No Carb Prosource (1pkg = 15gms Protein)     Qty per Day:  1  Entered: May 22 2025 10:59AM  
This patient has been assessed with a concern for Malnutrition and has been determined to have a diagnosis/diagnoses of Severe protein-calorie malnutrition.    This patient is being managed with:   Diet NPO with Tube Feed-  Tube Feeding Modality: Orogastric  Jevity 1.5 Kyle (JEVITY1.5)  Total Volume for 24 Hours (mL): 960  Continuous  Starting Tube Feed Rate {mL per Hour}: 10  Until Goal Tube Feed Rate (mL per Hour): 40  Tube Feed Duration (in Hours): 24  Tube Feed Start Time: 23:59  Entered: May 21 2025 11:46PM  
This patient has been assessed with a concern for Malnutrition and has been determined to have a diagnosis/diagnoses of Severe protein-calorie malnutrition.    This patient is being managed with:   Diet NPO with Tube Feed-  Tube Feeding Modality: Orogastric  Jevity 1.5 Kyle (JEVITY1.5)  Total Volume for 24 Hours (mL): 960  Continuous  Starting Tube Feed Rate {mL per Hour}: 10  Until Goal Tube Feed Rate (mL per Hour): 40  Tube Feed Duration (in Hours): 24  Tube Feed Start Time: 23:59  Entered: May 21 2025 11:46PM  
This patient has been assessed with a concern for Malnutrition and has been determined to have a diagnosis/diagnoses of Severe protein-calorie malnutrition.    This patient is being managed with:   Diet Minced and Moist-  Entered: May 22 2025  6:04PM    The following pending diet order is being considered for treatment of Severe protein-calorie malnutrition:  Diet NPO with Tube Feed-  Tube Feeding Modality: Orogastric  Ruth [facility] Peptide 1.5 (KFPEPT1.5RTH)  Total Volume for 24 Hours (mL): 1680  Continuous  Starting Tube Feed Rate {mL per Hour}: 20  Increase Tube Feed Rate by (mL): 10     Every 6 hours  Until Goal Tube Feed Rate (mL per Hour): 70  Tube Feed Duration (in Hours): 24  Tube Feed Start Time: 00:00  Free Water Flush  Free Water Flush Instructions:  Free water fushes per MD to maintain hydration  No Carb Prosource (1pkg = 15gms Protein)     Qty per Day:  1  Entered: May 22 2025 10:59AM  
This patient has been assessed with a concern for Malnutrition and has been determined to have a diagnosis/diagnoses of Severe protein-calorie malnutrition.    This patient is being managed with:   Diet Minced and Moist-  Entered: May 22 2025  6:04PM    The following pending diet order is being considered for treatment of Severe protein-calorie malnutrition:  Diet NPO with Tube Feed-  Tube Feeding Modality: Orogastric  Ruth [facility] Peptide 1.5 (KFPEPT1.5RTH)  Total Volume for 24 Hours (mL): 1680  Continuous  Starting Tube Feed Rate {mL per Hour}: 20  Increase Tube Feed Rate by (mL): 10     Every 6 hours  Until Goal Tube Feed Rate (mL per Hour): 70  Tube Feed Duration (in Hours): 24  Tube Feed Start Time: 00:00  Free Water Flush  Free Water Flush Instructions:  Free water fushes per MD to maintain hydration  No Carb Prosource (1pkg = 15gms Protein)     Qty per Day:  1  Entered: May 22 2025 10:59AM  
This patient has been assessed with a concern for Malnutrition and has been determined to have a diagnosis/diagnoses of Severe protein-calorie malnutrition.    This patient is being managed with:   Diet NPO with Tube Feed-  Tube Feeding Modality: Orogastric  Jevity 1.5 Kyle (JEVITY1.5)  Total Volume for 24 Hours (mL): 960  Continuous  Starting Tube Feed Rate {mL per Hour}: 10  Until Goal Tube Feed Rate (mL per Hour): 40  Tube Feed Duration (in Hours): 24  Tube Feed Start Time: 23:59  Entered: May 21 2025 11:46PM  
This patient has been assessed with a concern for Malnutrition and has been determined to have a diagnosis/diagnoses of Severe protein-calorie malnutrition.    This patient is being managed with:   Diet Minced and Moist-  Entered: May 22 2025  6:04PM    The following pending diet order is being considered for treatment of Severe protein-calorie malnutrition:  Diet NPO with Tube Feed-  Tube Feeding Modality: Orogastric  Ruth [facility] Peptide 1.5 (KFPEPT1.5RTH)  Total Volume for 24 Hours (mL): 1680  Continuous  Starting Tube Feed Rate {mL per Hour}: 20  Increase Tube Feed Rate by (mL): 10     Every 6 hours  Until Goal Tube Feed Rate (mL per Hour): 70  Tube Feed Duration (in Hours): 24  Tube Feed Start Time: 00:00  Free Water Flush  Free Water Flush Instructions:  Free water fushes per MD to maintain hydration  No Carb Prosource (1pkg = 15gms Protein)     Qty per Day:  1  Entered: May 22 2025 10:59AM

## 2025-05-29 NOTE — PROGRESS NOTE ADULT - SUBJECTIVE AND OBJECTIVE BOX
Date of service: 05-29-25 @ 12:07    pt seen and examined  weak appearing   no o/n events    ROS: no fever or chills; denies dizziness, no HA, no SOB or cough, no abdominal pain, no diarrhea or constipation;  no legs pain, no rashes      MEDICATIONS  (STANDING):  aspirin  chewable 81 milliGRAM(s) Oral daily  atorvastatin 10 milliGRAM(s) Oral at bedtime  chlorhexidine 2% Cloths 1 Application(s) Topical <User Schedule>  donepezil 10 milliGRAM(s) Oral at bedtime  folic acid 1 milliGRAM(s) Oral daily  heparin   Injectable 5000 Unit(s) SubCutaneous every 8 hours  memantine 20 milliGRAM(s) Oral at bedtime  mirtazapine 45 milliGRAM(s) Oral at bedtime  pantoprazole  Injectable 40 milliGRAM(s) IV Push daily  piperacillin/tazobactam IVPB.. 3.375 Gram(s) IV Intermittent every 8 hours  polyethylene glycol 3350 17 Gram(s) Oral daily  senna 2 Tablet(s) Oral at bedtime  valproic  acid Syrup 125 milliGRAM(s) Oral two times a day    Vital Signs Last 24 Hrs  T(C): 36.3 (28 May 2025 16:49), Max: 36.3 (28 May 2025 16:49)  T(F): 97.3 (28 May 2025 16:49), Max: 97.3 (28 May 2025 16:49)  HR: 68 (29 May 2025 07:36) (68 - 71)  BP: 119/70 (29 May 2025 07:36) (119/70 - 135/76)  BP(mean): --  RR: 18 (29 May 2025 07:36) (16 - 18)  SpO2: 97% (29 May 2025 07:36) (96% - 97%)    Parameters below as of 29 May 2025 07:36  Patient On (Oxygen Delivery Method): room air      PE:  Constitutional: NAD  HEENT: NC/AT, EOMI, PERRLA, conjunctivae clear; ears and nose atraumatic; pharynx benign  Neck: supple; thyroid not palpable  Back: no tenderness  Respiratory: decreased breath sounds rhonchi  Cardiovascular: S1S2 regular, no murmurs  Abdomen: soft, not tender, not distended, positive BS; liver and spleen WNL  Genitourinary: no suprapubic tenderness  Lymphatic: no LN palpable  Musculoskeletal: no muscle tenderness, no joint swelling or tenderness  Extremities: no pedal edema R heel wound L heel necrotic ulcer   Neurological/ Psychiatric: moving all extremities  Skin: no rashes; no palpable lesions    Labs: all available labs reviewed                 Urinalysis Basic - ( 24 May 2025 06:42 )    Color: x / Appearance: x / SG: x / pH: x  Gluc: 71 mg/dL / Ketone: x  / Bili: x / Urobili: x   Blood: x / Protein: x / Nitrite: x   Leuk Esterase: x / RBC: x / WBC x   Sq Epi: x / Non Sq Epi: x / Bacteria: x    Culture - Sputum . (05.21.25 @ 15:30)   Gram Stain:   Numerous polymorphonuclear leukocytes per low power field   Few Squamous epithelial cells per low power field   Few Gram positive cocci in pairs per oil power field   Few Gram Positive Rods per oil power field  Specimen Source: ET Tube ET Tube  Culture Results:   Commensal jesenia consistent with body site  Culture - Sputum . (05.21.25 @ 08:15)   Gram Stain:   No polymorphonuclear leukocytes per low power field   No Squamous epithelial cells per low power field   No organisms seen per oil power field  Specimen Source: ET Tube ET Tube  Culture Results:   Commensal jesenia consistent with body siteSpecimen   Source: Abscess Leg - Left  Culture Results:   Moderate Proteus mirabilis   Moderate Morganella morganii  Organism Identification: Proteus mirabilis   Morganella morganii      Radiology: all available radiological tests reviewed  < from: Xray Foot AP + Lateral + Oblique, Bilat (05.20.25 @ 22:45) >  ACC: [id number] EXAM:  XR FOOT COMP MIN 3 VIEWS BI   ORDERED BY: EVIN RIVAS     PROCEDURE DATE:  05/20/2025          INTERPRETATION:  Bilateral feet    HISTORY: Heel ulcers    COMPARISON: 12/17/2024     3 views of the left foot and 3 views ofthe right foot show bony erosion   or postoperative changes involving the dorsal surface of the left   calcaneus. Loss of adjacent soft tissue either represents ulceration or   evidence of surgical debridement.    The right foot shows no evidence of calcaneal erosion. The joint spaces   are maintained.    IMPRESSION: Bone and soft tissue loss of and near left calcaneus as noted.        Thank you for this referral.    --- End of Report ---    < end of copied text >  < from: Xray Chest 1 View- PORTABLE-Urgent (Xray Chest 1 View- PORTABLE-Urgent .) (05.20.25 @ 19:05) >    IMPRESSION: NG tube inserted. Increasing scattered left mid lower lung   field infiltrates. Endotracheal tube present on the earlier study has   been removed.    --- End of Report ---    < end of copied text >  < from: CT Abdomen and Pelvis w/ IV Cont (05.20.25 @ 14:06) >  IMPRESSION:  Significant debris in the left mainstem bronchus extending into the left   pulmonary bronchi with associated patchy nodular airspace opacities,   tree-in-bud nodularity, and bronchial wall thickening throughout the left   lung, most pronounced in the left lower lobe. Minimal, similar findings   in the right upper and right lower lobes. Findings suggest aspiration   pneumonitis.    No acute CT findings in the abdomen or pelvis. No bowel obstruction or   inflammation.      < end of copied text >    Advanced directives addressed: full resuscitation

## 2025-05-29 NOTE — DISCHARGE NOTE NURSING/CASE MANAGEMENT/SOCIAL WORK - PATIENT PORTAL LINK FT
You can access the FollowMyHealth Patient Portal offered by [facility] by registering at the following website: [email or web address]. By joining [employer]’s FollowMyHealth portal, you will also be able to view your health information using other applications (apps) compatible with our system.

## 2025-05-29 NOTE — DISCHARGE NOTE PROVIDER - HOSPITAL COURSE
80 y/o M with PMHx of CAD s/p CABG, paroxysmal afib (reportedly not on AC due to compliance issues), dementia, BPH, hx paranoia/hallucinations with mood disorder, , hx alcohol abuse, presented to  sent in from SNF for unresponsiveness, intubated for airway protection, found to be in septic shock requiring pressors for BP support and admitted to CCU, now stable for downgrade to med/surg    #Acute hypoxic respiratory failure s/p intubation, resolved  #Septic shock (POA) likely 2/2 infected L. heel ulcer, suspected OM + multifocal aspiration PNA + UTI   #Acute metabolic encephalopathy superimposed on baseline dementia, improved  - s/p CCU care requiring pressors and ventilator support, now extubated on room air and off pressors   - CT head and CTA H/N negative for acute pathology  - CT chest/abd/pelvis noted  - Left foot Xray noted  - EEG with encephalopathy, no seizure activity  - Podiatry following s/p debridement , recs appreciated-->Vascular Sx consult recommended  - BCx NGTD   - Left heel wound Cx 5/20 -- pan sensitive Proteus mirabilis, MDR Morganella morganii  - UA positive, UCx 5/20 -- >100K E. Coli   - ID on board; S/p IV zosyn (5/20 - 5/29)  - Continue wound care to b/l heel ulcers   - ID input appreciated  - Podiatry input noted; Wound Care recs appreciated   - Vascular Sx on board; only options are above knee amputation vs local wound care--patient unable to make decisions due to underlying dementia, and he does not have family  - Palliative Care and Ethics Consulted (please see discussion below)    #CAD s/p CABG  - continue aspirin, statin     #Hx paroxysmal Afib not on AC  - resume metoprolol    #Social hx  - admitted from SNF, pt reportedly without family / HCP / surrogate decision maker with history of dementia      #Goals of Care/Advanced Care Directive  Two Multidisciplinary meetings held today with Palliative Care, Ethics committee, Vascular Surgery, and Social Work. Given the patient's poor prognosis of Left Heel Osteomyelitis in the context of advanced dementia and overall clinical decline over the last several months/years the decision was made to forego continuation of IV abx or any surgical intervention. The patient will be transitioned to Comfort Measures Only.  Please see Ethics note for further information.

## 2025-05-29 NOTE — DISCHARGE NOTE PROVIDER - NSDCCPCAREPLAN_GEN_ALL_CORE_FT
PRINCIPAL DISCHARGE DIAGNOSIS  Diagnosis: Septic shock  Assessment and Plan of Treatment:       SECONDARY DISCHARGE DIAGNOSES  Diagnosis: Pneumonia, aspiration  Assessment and Plan of Treatment:

## 2025-05-29 NOTE — PROGRESS NOTE ADULT - ASSESSMENT
79 year old male with PMH significant for CAD CABG afib not on AC due to compliance behavior health issues BPH Patient is resident of [facility]. patient was found unresponsive in bed Was brought to ED, GCS was 6 , was intubated with rocuronium now intubated small pupils got narcan in ed ct chest shows patchy infiltrates head ct negative, Ct chest shows aspiration pneumonia, extubated, urine cx grew ecoli, noted with R heel wound infection, necrosis, probe to bone, eval by podiatry s/p excisional debridement, wound cx grew MDR ANDRES, Proteus mirabilis, started on IV zosyn since 5/20. Has L heel necrosis/Om as well.     Sepsis. Acute respiratory failure. Aspiration pneumonia. R heel wound infection/L heel necrosis/Osteomyelitis. Ecoli UTI. Metabolic encephalopathy  - imaging reviewed  - on IV zosyn [id number]  #9-10  - continue with IV antibiotic coverage   - urine cx growing Ecoli S to above; blood cx no growth   - wound cx growing MDR ANDRES, Proteus mirabilis   - podiatry eval noted s/p bedside debridement of R heel  - wound care  - on dc plan for picc line and 6 weeks IV invanz 1gm daily until 6/30/25   - fu cbc  - tolerating abx well so far; no side effects noted  - reason for abx use and side effects reviewed with patient  - supportive care    IV to PO token is not indicated    Concern for infection with multi-resistant organisms was raised. Prior cultures reviewed. An epidemiologic assessment was performed. There is a significant risk for resistant microorganisms to spread to family members, and/or healthcare staff. The patient will be placed on isolation according to infection control policy. Will reconsider isolation measures based on new culture results and other clinical data as appropriate. Appropriate cultures collected and an appropriate broad spectrum antibiotic therapy will be considered.

## 2025-05-29 NOTE — PROGRESS NOTE ADULT - PROVIDER SPECIALTY LIST ADULT
Critical Care
Critical Care
Hospitalist
Infectious Disease
Podiatry
Podiatry
Hospitalist
Podiatry
Ethics
Infectious Disease
Podiatry
Critical Care
Critical Care
Infectious Disease
Podiatry
Hospitalist
Hospitalist
Palliative Care
Hospitalist
Hospitalist

## 2025-05-29 NOTE — DISCHARGE NOTE PROVIDER - DETAILS OF MALNUTRITION DIAGNOSIS/DIAGNOSES
This patient has been assessed with a concern for Malnutrition and was treated during this hospitalization for the following Nutrition diagnosis/diagnoses:     -  05/21/2025: Severe protein-calorie malnutrition

## 2025-06-05 DIAGNOSIS — N17.9 ACUTE KIDNEY FAILURE, UNSPECIFIED: ICD-10-CM

## 2025-06-05 DIAGNOSIS — J69.0 PNEUMONITIS DUE TO INHALATION OF FOOD AND VOMIT: ICD-10-CM

## 2025-06-05 DIAGNOSIS — E43 UNSPECIFIED SEVERE PROTEIN-CALORIE MALNUTRITION: ICD-10-CM

## 2025-06-05 DIAGNOSIS — G93.41 METABOLIC ENCEPHALOPATHY: ICD-10-CM

## 2025-06-05 DIAGNOSIS — F10.21 ALCOHOL DEPENDENCE, IN REMISSION: ICD-10-CM

## 2025-06-05 DIAGNOSIS — N40.0 BENIGN PROSTATIC HYPERPLASIA WITHOUT LOWER URINARY TRACT SYMPTOMS: ICD-10-CM

## 2025-06-05 DIAGNOSIS — I25.10 ATHEROSCLEROTIC HEART DISEASE OF NATIVE CORONARY ARTERY WITHOUT ANGINA PECTORIS: ICD-10-CM

## 2025-06-05 DIAGNOSIS — I10 ESSENTIAL (PRIMARY) HYPERTENSION: ICD-10-CM

## 2025-06-05 DIAGNOSIS — Z79.82 LONG TERM (CURRENT) USE OF ASPIRIN: ICD-10-CM

## 2025-06-05 DIAGNOSIS — R65.21 SEVERE SEPSIS WITH SEPTIC SHOCK: ICD-10-CM

## 2025-06-05 DIAGNOSIS — I48.20 CHRONIC ATRIAL FIBRILLATION, UNSPECIFIED: ICD-10-CM

## 2025-06-05 DIAGNOSIS — J96.01 ACUTE RESPIRATORY FAILURE WITH HYPOXIA: ICD-10-CM

## 2025-06-05 DIAGNOSIS — Z66 DO NOT RESUSCITATE: ICD-10-CM

## 2025-06-05 DIAGNOSIS — B96.20 UNSPECIFIED ESCHERICHIA COLI [E. COLI] AS THE CAUSE OF DISEASES CLASSIFIED ELSEWHERE: ICD-10-CM

## 2025-06-05 DIAGNOSIS — R74.01 ELEVATION OF LEVELS OF LIVER TRANSAMINASE LEVELS: ICD-10-CM

## 2025-06-05 DIAGNOSIS — R41.82 ALTERED MENTAL STATUS, UNSPECIFIED: ICD-10-CM

## 2025-06-05 DIAGNOSIS — A41.9 SEPSIS, UNSPECIFIED ORGANISM: ICD-10-CM

## 2025-06-05 DIAGNOSIS — N39.0 URINARY TRACT INFECTION, SITE NOT SPECIFIED: ICD-10-CM

## 2025-06-05 DIAGNOSIS — Z95.1 PRESENCE OF AORTOCORONARY BYPASS GRAFT: ICD-10-CM
